# Patient Record
Sex: FEMALE | Race: WHITE | NOT HISPANIC OR LATINO | Employment: OTHER | URBAN - METROPOLITAN AREA
[De-identification: names, ages, dates, MRNs, and addresses within clinical notes are randomized per-mention and may not be internally consistent; named-entity substitution may affect disease eponyms.]

---

## 2017-10-19 ENCOUNTER — HISTORICAL (OUTPATIENT)
Dept: ADMINISTRATIVE | Facility: HOSPITAL | Age: 82
End: 2017-10-19

## 2017-11-16 ENCOUNTER — HISTORICAL (OUTPATIENT)
Dept: ADMINISTRATIVE | Facility: HOSPITAL | Age: 82
End: 2017-11-16

## 2018-09-27 ENCOUNTER — HISTORICAL (OUTPATIENT)
Dept: ADMINISTRATIVE | Facility: HOSPITAL | Age: 83
End: 2018-09-27

## 2022-04-11 ENCOUNTER — HISTORICAL (OUTPATIENT)
Dept: ADMINISTRATIVE | Facility: HOSPITAL | Age: 87
End: 2022-04-11

## 2022-04-13 ENCOUNTER — HISTORICAL (OUTPATIENT)
Dept: ADMINISTRATIVE | Facility: HOSPITAL | Age: 87
End: 2022-04-13

## 2022-04-26 ENCOUNTER — HISTORICAL (OUTPATIENT)
Dept: ADMINISTRATIVE | Facility: HOSPITAL | Age: 87
End: 2022-04-26

## 2022-04-28 VITALS
WEIGHT: 143 LBS | DIASTOLIC BLOOD PRESSURE: 60 MMHG | HEIGHT: 68 IN | SYSTOLIC BLOOD PRESSURE: 118 MMHG | BODY MASS INDEX: 21.67 KG/M2

## 2022-08-16 ENCOUNTER — HOSPITAL ENCOUNTER (INPATIENT)
Facility: HOSPITAL | Age: 87
LOS: 27 days | Discharge: NURSING, REHAB OR LONG-TERM CARE | DRG: 392 | End: 2022-09-13
Attending: STUDENT IN AN ORGANIZED HEALTH CARE EDUCATION/TRAINING PROGRAM | Admitting: INTERNAL MEDICINE
Payer: MEDICARE

## 2022-08-16 DIAGNOSIS — R10.9 ABDOMINAL PAIN, UNSPECIFIED ABDOMINAL LOCATION: ICD-10-CM

## 2022-08-16 DIAGNOSIS — N39.0 URINARY TRACT INFECTION WITH HEMATURIA, SITE UNSPECIFIED: ICD-10-CM

## 2022-08-16 DIAGNOSIS — R60.9 SWELLING: ICD-10-CM

## 2022-08-16 DIAGNOSIS — K59.00 CONSTIPATION, UNSPECIFIED CONSTIPATION TYPE: ICD-10-CM

## 2022-08-16 DIAGNOSIS — E87.20 LACTIC ACIDOSIS: ICD-10-CM

## 2022-08-16 DIAGNOSIS — M79.606 LEG PAIN: ICD-10-CM

## 2022-08-16 DIAGNOSIS — R31.9 URINARY TRACT INFECTION WITH HEMATURIA, SITE UNSPECIFIED: ICD-10-CM

## 2022-08-16 DIAGNOSIS — R10.9 ABDOMINAL CRAMPING: Primary | ICD-10-CM

## 2022-08-16 DIAGNOSIS — K29.70 GASTRITIS: ICD-10-CM

## 2022-08-16 DIAGNOSIS — D72.829 LEUKOCYTOSIS, UNSPECIFIED TYPE: ICD-10-CM

## 2022-08-16 DIAGNOSIS — K86.9 PANCREATIC LESION: ICD-10-CM

## 2022-08-16 DIAGNOSIS — R55 SYNCOPE: ICD-10-CM

## 2022-08-16 DIAGNOSIS — E87.6 HYPOKALEMIA: ICD-10-CM

## 2022-08-16 LAB
ALBUMIN SERPL-MCNC: 3.8 GM/DL (ref 3.4–4.8)
ALBUMIN/GLOB SERPL: 1.4 RATIO (ref 1.1–2)
ALP SERPL-CCNC: 79 UNIT/L (ref 40–150)
ALT SERPL-CCNC: 37 UNIT/L (ref 0–55)
APPEARANCE UR: CLEAR
AST SERPL-CCNC: 50 UNIT/L (ref 5–34)
BACTERIA #/AREA URNS AUTO: NORMAL /HPF
BILIRUB UR QL STRIP.AUTO: ABNORMAL MG/DL
BILIRUBIN DIRECT+TOT PNL SERPL-MCNC: 1.2 MG/DL
BUN SERPL-MCNC: 17.8 MG/DL (ref 9.8–20.1)
CALCIUM SERPL-MCNC: 9.7 MG/DL (ref 8.4–10.2)
CHLORIDE SERPL-SCNC: 97 MMOL/L (ref 98–111)
CO2 SERPL-SCNC: 26 MMOL/L (ref 23–31)
COLOR UR AUTO: ABNORMAL
CREAT SERPL-MCNC: 0.92 MG/DL (ref 0.55–1.02)
ERYTHROCYTE [DISTWIDTH] IN BLOOD BY AUTOMATED COUNT: 13 % (ref 11.5–17)
GFR SERPLBLD CREATININE-BSD FMLA CKD-EPI: 59 MLS/MIN/1.73/M2
GLOBULIN SER-MCNC: 2.8 GM/DL (ref 2.4–3.5)
GLUCOSE SERPL-MCNC: 161 MG/DL (ref 75–121)
GLUCOSE UR QL STRIP.AUTO: NEGATIVE MG/DL
HCT VFR BLD AUTO: 42 % (ref 37–47)
HGB BLD-MCNC: 14.3 GM/DL (ref 12–16)
IMM GRANULOCYTES # BLD AUTO: 0.31 X10(3)/MCL (ref 0–0.04)
IMM GRANULOCYTES NFR BLD AUTO: 1.1 %
KETONES UR QL STRIP.AUTO: ABNORMAL MG/DL
LEUKOCYTE ESTERASE UR QL STRIP.AUTO: ABNORMAL UNIT/L
LIPASE SERPL-CCNC: 12 U/L
MCH RBC QN AUTO: 31.8 PG (ref 27–31)
MCHC RBC AUTO-ENTMCNC: 34 MG/DL (ref 33–36)
MCV RBC AUTO: 93.5 FL (ref 80–94)
NITRITE UR QL STRIP.AUTO: POSITIVE
NRBC BLD AUTO-RTO: 0 %
PH UR STRIP.AUTO: 6 [PH]
PLATELET # BLD AUTO: 250 X10(3)/MCL (ref 130–400)
PMV BLD AUTO: 9.9 FL (ref 7.4–10.4)
POTASSIUM SERPL-SCNC: 2.6 MMOL/L (ref 3.5–5.1)
PROT SERPL-MCNC: 6.6 GM/DL (ref 5.8–7.6)
PROT UR QL STRIP.AUTO: ABNORMAL MG/DL
RBC # BLD AUTO: 4.49 X10(6)/MCL (ref 4.2–5.4)
RBC #/AREA URNS AUTO: <5 /HPF
RBC UR QL AUTO: NEGATIVE UNIT/L
SODIUM SERPL-SCNC: 137 MMOL/L (ref 132–146)
SP GR UR STRIP.AUTO: 1.02 (ref 1–1.03)
SQUAMOUS #/AREA URNS AUTO: <5 /HPF
UROBILINOGEN UR STRIP-ACNC: 1 MG/DL
WBC # SPEC AUTO: 28.1 X10(3)/MCL (ref 4.5–11.5)
WBC #/AREA URNS AUTO: <5 /HPF

## 2022-08-16 PROCEDURE — 96365 THER/PROPH/DIAG IV INF INIT: CPT

## 2022-08-16 PROCEDURE — 85025 COMPLETE CBC W/AUTO DIFF WBC: CPT | Performed by: STUDENT IN AN ORGANIZED HEALTH CARE EDUCATION/TRAINING PROGRAM

## 2022-08-16 PROCEDURE — 96366 THER/PROPH/DIAG IV INF ADDON: CPT

## 2022-08-16 PROCEDURE — 63600175 PHARM REV CODE 636 W HCPCS: Performed by: STUDENT IN AN ORGANIZED HEALTH CARE EDUCATION/TRAINING PROGRAM

## 2022-08-16 PROCEDURE — 81001 URINALYSIS AUTO W/SCOPE: CPT | Performed by: STUDENT IN AN ORGANIZED HEALTH CARE EDUCATION/TRAINING PROGRAM

## 2022-08-16 PROCEDURE — 80053 COMPREHEN METABOLIC PANEL: CPT | Performed by: STUDENT IN AN ORGANIZED HEALTH CARE EDUCATION/TRAINING PROGRAM

## 2022-08-16 PROCEDURE — 83690 ASSAY OF LIPASE: CPT | Performed by: STUDENT IN AN ORGANIZED HEALTH CARE EDUCATION/TRAINING PROGRAM

## 2022-08-16 PROCEDURE — 96368 THER/DIAG CONCURRENT INF: CPT

## 2022-08-16 PROCEDURE — 99285 EMERGENCY DEPT VISIT HI MDM: CPT | Mod: 25

## 2022-08-16 PROCEDURE — 36415 COLL VENOUS BLD VENIPUNCTURE: CPT | Performed by: STUDENT IN AN ORGANIZED HEALTH CARE EDUCATION/TRAINING PROGRAM

## 2022-08-16 RX ORDER — MAGNESIUM SULFATE HEPTAHYDRATE 40 MG/ML
2 INJECTION, SOLUTION INTRAVENOUS
Status: COMPLETED | OUTPATIENT
Start: 2022-08-16 | End: 2022-08-17

## 2022-08-16 RX ORDER — POTASSIUM CHLORIDE 14.9 MG/ML
40 INJECTION INTRAVENOUS ONCE
Status: COMPLETED | OUTPATIENT
Start: 2022-08-16 | End: 2022-08-17

## 2022-08-16 RX ADMIN — MAGNESIUM SULFATE HEPTAHYDRATE 2 G: 40 INJECTION, SOLUTION INTRAVENOUS at 10:08

## 2022-08-16 RX ADMIN — IOPAMIDOL 100 ML: 755 INJECTION, SOLUTION INTRAVENOUS at 11:08

## 2022-08-16 RX ADMIN — POTASSIUM CHLORIDE 40 MEQ: 14.9 INJECTION, SOLUTION INTRAVENOUS at 10:08

## 2022-08-17 PROBLEM — R10.9 ABDOMINAL CRAMPING: Status: ACTIVE | Noted: 2022-08-17

## 2022-08-17 LAB
ABS NEUT (OLG): 26.04 X10(3)/MCL (ref 2.1–9.2)
ABS NEUT (OLG): 26.13 X10(3)/MCL (ref 2.1–9.2)
ANION GAP SERPL CALC-SCNC: 12 MEQ/L
AV INDEX (PROSTH): 0.89
AV MEAN GRADIENT: 3 MMHG
AV PEAK GRADIENT: 5 MMHG
AV VALVE AREA: 2.78 CM2
AV VELOCITY RATIO: 0.88
BSA FOR ECHO PROCEDURE: 1.71 M2
BUN SERPL-MCNC: 22.4 MG/DL (ref 9.8–20.1)
BURR CELLS (OLG): ABNORMAL
BURR CELLS (OLG): ABNORMAL
CALCIUM SERPL-MCNC: 8.5 MG/DL (ref 8.4–10.2)
CHLORIDE SERPL-SCNC: 102 MMOL/L (ref 98–111)
CHOLEST SERPL-MCNC: 78 MG/DL
CHOLEST/HDLC SERPL: 2 {RATIO} (ref 0–5)
CO2 SERPL-SCNC: 21 MMOL/L (ref 23–31)
CREAT SERPL-MCNC: 0.8 MG/DL (ref 0.55–1.02)
CREAT/UREA NIT SERPL: 28
CV ECHO LV RWT: 0.68 CM
DOP CALC AO PEAK VEL: 1.13 M/S
DOP CALC AO VTI: 20.9 CM
DOP CALC LVOT AREA: 3.1 CM2
DOP CALC LVOT DIAMETER: 2 CM
DOP CALC LVOT PEAK VEL: 1 M/S
DOP CALC LVOT STROKE VOLUME: 58.09 CM3
DOP CALC MV VTI: 28.1 CM
DOP CALCLVOT PEAK VEL VTI: 18.5 CM
E/A RATIO: 0.76
E/E' RATIO: 11.64 M/S
ECHO LV POSTERIOR WALL: 1.2 CM (ref 0.6–1.1)
EJECTION FRACTION: 66 %
ELLIPTOCYTOSIS (OHS): ABNORMAL
ERYTHROCYTE [DISTWIDTH] IN BLOOD BY AUTOMATED COUNT: 13.2 % (ref 11.5–17)
EST. AVERAGE GLUCOSE BLD GHB EST-MCNC: 102.5 MG/DL
FRACTIONAL SHORTENING: 21 % (ref 28–44)
GFR SERPLBLD CREATININE-BSD FMLA CKD-EPI: >60 MLS/MIN/1.73/M2
GLUCOSE SERPL-MCNC: 152 MG/DL (ref 75–121)
HBA1C MFR BLD: 5.2 %
HCT VFR BLD AUTO: 41.9 % (ref 37–47)
HDLC SERPL-MCNC: 35 MG/DL (ref 35–60)
HGB BLD-MCNC: 14 GM/DL (ref 12–16)
IMM GRANULOCYTES # BLD AUTO: 0.57 X10(3)/MCL (ref 0–0.04)
IMM GRANULOCYTES NFR BLD AUTO: 2 %
INSTRUMENT WBC (OLG): 28 X10(3)/MCL
INSTRUMENT WBC (OLG): 28.4 X10(3)/MCL
INTERVENTRICULAR SEPTUM: 0.97 CM (ref 0.6–1.1)
LACTATE SERPL-SCNC: 2.5 MMOL/L (ref 0.5–2.2)
LACTATE SERPL-SCNC: 2.5 MMOL/L (ref 0.5–2.2)
LACTATE SERPL-SCNC: 3 MMOL/L (ref 0.5–2.2)
LACTATE SERPL-SCNC: 3.2 MMOL/L (ref 0.5–2.2)
LACTATE SERPL-SCNC: 3.4 MMOL/L (ref 0.5–2.2)
LACTATE SERPL-SCNC: 3.9 MMOL/L (ref 0.5–2.2)
LACTATE SERPL-SCNC: 5.3 MMOL/L (ref 0.5–2.2)
LDLC SERPL CALC-MCNC: 29 MG/DL (ref 50–140)
LEFT INTERNAL DIMENSION IN SYSTOLE: 2.79 CM (ref 2.1–4)
LEFT VENTRICLE DIASTOLIC VOLUME INDEX: 30 ML/M2
LEFT VENTRICLE DIASTOLIC VOLUME: 51.9 ML
LEFT VENTRICLE MASS INDEX: 68 G/M2
LEFT VENTRICLE SYSTOLIC VOLUME INDEX: 16.9 ML/M2
LEFT VENTRICLE SYSTOLIC VOLUME: 29.3 ML
LEFT VENTRICULAR INTERNAL DIMENSION IN DIASTOLE: 3.53 CM (ref 3.5–6)
LEFT VENTRICULAR MASS: 118.08 G
LV LATERAL E/E' RATIO: 10.67 M/S
LV SEPTAL E/E' RATIO: 12.8 M/S
LVOT MG: 3 MMHG
LVOT MV: 0.77 CM/S
LYMPHOCYTES NFR BLD MANUAL: 0.85 X10(3)/MCL
LYMPHOCYTES NFR BLD MANUAL: 1.12 X10(3)/MCL
LYMPHOCYTES NFR BLD MANUAL: 3 %
LYMPHOCYTES NFR BLD MANUAL: 4 %
MAGNESIUM SERPL-MCNC: 3.6 MG/DL (ref 1.6–2.6)
MCH RBC QN AUTO: 31.5 PG (ref 27–31)
MCHC RBC AUTO-ENTMCNC: 33.4 MG/DL (ref 33–36)
MCV RBC AUTO: 94.2 FL (ref 80–94)
MONOCYTES NFR BLD MANUAL: 1.12 X10(3)/MCL (ref 0.1–1.3)
MONOCYTES NFR BLD MANUAL: 1.42 X10(3)/MCL (ref 0.1–1.3)
MONOCYTES NFR BLD MANUAL: 4 %
MONOCYTES NFR BLD MANUAL: 5 %
MV MEAN GRADIENT: 2 MMHG
MV PEAK A VEL: 0.84 M/S
MV PEAK E VEL: 0.64 M/S
MV PEAK GRADIENT: 5 MMHG
MV VALVE AREA BY CONTINUITY EQUATION: 2.07 CM2
NEUTROPHILS NFR BLD MANUAL: 92 %
NEUTROPHILS NFR BLD MANUAL: 93 %
NRBC BLD AUTO-RTO: 0 %
PHOSPHATE SERPL-MCNC: 3.6 MG/DL (ref 2.3–4.7)
PISA TR MAX VEL: 2.33 M/S
PLATELET # BLD AUTO: 269 X10(3)/MCL (ref 130–400)
PLATELET # BLD EST: ADEQUATE 10*3/UL
PLATELET # BLD EST: NORMAL 10*3/UL
PMV BLD AUTO: 10.3 FL (ref 7.4–10.4)
POIKILOCYTOSIS BLD QL SMEAR: ABNORMAL
POIKILOCYTOSIS BLD QL SMEAR: ABNORMAL
POTASSIUM SERPL-SCNC: 3.1 MMOL/L (ref 3.5–5.1)
PV PEAK VELOCITY: 1.4 CM/S
RBC # BLD AUTO: 4.45 X10(6)/MCL (ref 4.2–5.4)
RBC MORPH BLD: ABNORMAL
RBC MORPH BLD: ABNORMAL
RIGHT VENTRICULAR END-DIASTOLIC DIMENSION: 2.21 CM
SODIUM SERPL-SCNC: 135 MMOL/L (ref 132–146)
TDI LATERAL: 0.06 M/S
TDI SEPTAL: 0.05 M/S
TDI: 0.06 M/S
TEAR DROP CELL (OLG): ABNORMAL
TR MAX PG: 22 MMHG
TRICUSPID ANNULAR PLANE SYSTOLIC EXCURSION: 1.67 CM
TRIGL SERPL-MCNC: 71 MG/DL (ref 37–140)
VLDLC SERPL CALC-MCNC: 14 MG/DL
WBC # SPEC AUTO: 28.4 X10(3)/MCL (ref 4.5–11.5)

## 2022-08-17 PROCEDURE — 96366 THER/PROPH/DIAG IV INF ADDON: CPT

## 2022-08-17 PROCEDURE — 36415 COLL VENOUS BLD VENIPUNCTURE: CPT | Performed by: STUDENT IN AN ORGANIZED HEALTH CARE EDUCATION/TRAINING PROGRAM

## 2022-08-17 PROCEDURE — 83036 HEMOGLOBIN GLYCOSYLATED A1C: CPT | Performed by: PHYSICIAN ASSISTANT

## 2022-08-17 PROCEDURE — 85025 COMPLETE CBC W/AUTO DIFF WBC: CPT | Performed by: INTERNAL MEDICINE

## 2022-08-17 PROCEDURE — 80048 BASIC METABOLIC PNL TOTAL CA: CPT | Performed by: INTERNAL MEDICINE

## 2022-08-17 PROCEDURE — 25500020 PHARM REV CODE 255: Performed by: STUDENT IN AN ORGANIZED HEALTH CARE EDUCATION/TRAINING PROGRAM

## 2022-08-17 PROCEDURE — 83605 ASSAY OF LACTIC ACID: CPT | Performed by: STUDENT IN AN ORGANIZED HEALTH CARE EDUCATION/TRAINING PROGRAM

## 2022-08-17 PROCEDURE — 63600175 PHARM REV CODE 636 W HCPCS: Performed by: INTERNAL MEDICINE

## 2022-08-17 PROCEDURE — 25000003 PHARM REV CODE 250: Performed by: PHYSICIAN ASSISTANT

## 2022-08-17 PROCEDURE — 63600175 PHARM REV CODE 636 W HCPCS: Performed by: STUDENT IN AN ORGANIZED HEALTH CARE EDUCATION/TRAINING PROGRAM

## 2022-08-17 PROCEDURE — 21400001 HC TELEMETRY ROOM

## 2022-08-17 PROCEDURE — 83735 ASSAY OF MAGNESIUM: CPT | Performed by: INTERNAL MEDICINE

## 2022-08-17 PROCEDURE — 83605 ASSAY OF LACTIC ACID: CPT | Performed by: INTERNAL MEDICINE

## 2022-08-17 PROCEDURE — 87040 BLOOD CULTURE FOR BACTERIA: CPT | Performed by: STUDENT IN AN ORGANIZED HEALTH CARE EDUCATION/TRAINING PROGRAM

## 2022-08-17 PROCEDURE — 63600175 PHARM REV CODE 636 W HCPCS: Performed by: PHYSICIAN ASSISTANT

## 2022-08-17 PROCEDURE — 11000001 HC ACUTE MED/SURG PRIVATE ROOM

## 2022-08-17 PROCEDURE — 25000003 PHARM REV CODE 250: Performed by: STUDENT IN AN ORGANIZED HEALTH CARE EDUCATION/TRAINING PROGRAM

## 2022-08-17 PROCEDURE — 84100 ASSAY OF PHOSPHORUS: CPT | Performed by: INTERNAL MEDICINE

## 2022-08-17 PROCEDURE — 80061 LIPID PANEL: CPT | Performed by: PHYSICIAN ASSISTANT

## 2022-08-17 RX ORDER — ACETAMINOPHEN 325 MG/1
650 TABLET ORAL EVERY 8 HOURS PRN
Status: DISCONTINUED | OUTPATIENT
Start: 2022-08-17 | End: 2022-09-13 | Stop reason: HOSPADM

## 2022-08-17 RX ORDER — MORPHINE SULFATE 4 MG/ML
4 INJECTION, SOLUTION INTRAMUSCULAR; INTRAVENOUS EVERY 6 HOURS PRN
Status: DISCONTINUED | OUTPATIENT
Start: 2022-08-17 | End: 2022-08-26

## 2022-08-17 RX ORDER — TALC
6 POWDER (GRAM) TOPICAL NIGHTLY PRN
Status: DISCONTINUED | OUTPATIENT
Start: 2022-08-17 | End: 2022-09-13 | Stop reason: HOSPADM

## 2022-08-17 RX ORDER — SODIUM CHLORIDE 9 MG/ML
INJECTION, SOLUTION INTRAVENOUS CONTINUOUS
Status: DISCONTINUED | OUTPATIENT
Start: 2022-08-17 | End: 2022-08-17

## 2022-08-17 RX ORDER — HYDRALAZINE HYDROCHLORIDE 20 MG/ML
10 INJECTION INTRAMUSCULAR; INTRAVENOUS
Status: DISCONTINUED | OUTPATIENT
Start: 2022-08-17 | End: 2022-08-21

## 2022-08-17 RX ORDER — DEXTROSE MONOHYDRATE, SODIUM CHLORIDE, AND POTASSIUM CHLORIDE 50; 1.49; 9 G/1000ML; G/1000ML; G/1000ML
INJECTION, SOLUTION INTRAVENOUS CONTINUOUS
Status: DISCONTINUED | OUTPATIENT
Start: 2022-08-17 | End: 2022-08-17

## 2022-08-17 RX ORDER — SODIUM CHLORIDE, SODIUM LACTATE, POTASSIUM CHLORIDE, CALCIUM CHLORIDE 600; 310; 30; 20 MG/100ML; MG/100ML; MG/100ML; MG/100ML
INJECTION, SOLUTION INTRAVENOUS CONTINUOUS
Status: DISCONTINUED | OUTPATIENT
Start: 2022-08-17 | End: 2022-08-17

## 2022-08-17 RX ORDER — SODIUM CHLORIDE 0.9 % (FLUSH) 0.9 %
10 SYRINGE (ML) INJECTION
Status: DISCONTINUED | OUTPATIENT
Start: 2022-08-17 | End: 2022-09-13 | Stop reason: HOSPADM

## 2022-08-17 RX ORDER — SODIUM CHLORIDE AND POTASSIUM CHLORIDE 300; 900 MG/100ML; MG/100ML
INJECTION, SOLUTION INTRAVENOUS CONTINUOUS
Status: DISCONTINUED | OUTPATIENT
Start: 2022-08-17 | End: 2022-08-17

## 2022-08-17 RX ORDER — METRONIDAZOLE 500 MG/100ML
500 INJECTION, SOLUTION INTRAVENOUS
Status: DISCONTINUED | OUTPATIENT
Start: 2022-08-17 | End: 2022-08-20

## 2022-08-17 RX ORDER — GLUCAGON 1 MG
1 KIT INJECTION
Status: DISCONTINUED | OUTPATIENT
Start: 2022-08-17 | End: 2022-09-13 | Stop reason: HOSPADM

## 2022-08-17 RX ORDER — INSULIN ASPART 100 [IU]/ML
0-5 INJECTION, SOLUTION INTRAVENOUS; SUBCUTANEOUS
Status: DISCONTINUED | OUTPATIENT
Start: 2022-08-17 | End: 2022-09-13 | Stop reason: HOSPADM

## 2022-08-17 RX ORDER — ONDANSETRON 2 MG/ML
4 INJECTION INTRAMUSCULAR; INTRAVENOUS EVERY 8 HOURS PRN
Status: DISCONTINUED | OUTPATIENT
Start: 2022-08-17 | End: 2022-08-21

## 2022-08-17 RX ORDER — POTASSIUM CHLORIDE 20 MEQ/1
40 TABLET, EXTENDED RELEASE ORAL ONCE
Status: COMPLETED | OUTPATIENT
Start: 2022-08-17 | End: 2022-08-17

## 2022-08-17 RX ORDER — CIPROFLOXACIN 2 MG/ML
400 INJECTION, SOLUTION INTRAVENOUS
Status: DISCONTINUED | OUTPATIENT
Start: 2022-08-17 | End: 2022-08-20

## 2022-08-17 RX ORDER — ENOXAPARIN SODIUM 100 MG/ML
30 INJECTION SUBCUTANEOUS EVERY 24 HOURS
Status: DISCONTINUED | OUTPATIENT
Start: 2022-08-17 | End: 2022-08-18

## 2022-08-17 RX ORDER — IBUPROFEN 200 MG
16 TABLET ORAL
Status: DISCONTINUED | OUTPATIENT
Start: 2022-08-17 | End: 2022-09-13 | Stop reason: HOSPADM

## 2022-08-17 RX ORDER — DOCUSATE SODIUM 100 MG/1
100 CAPSULE, LIQUID FILLED ORAL DAILY
Status: DISCONTINUED | OUTPATIENT
Start: 2022-08-17 | End: 2022-08-17

## 2022-08-17 RX ORDER — HYDROCODONE BITARTRATE AND ACETAMINOPHEN 5; 325 MG/1; MG/1
1 TABLET ORAL EVERY 6 HOURS PRN
Status: DISCONTINUED | OUTPATIENT
Start: 2022-08-17 | End: 2022-09-13 | Stop reason: HOSPADM

## 2022-08-17 RX ORDER — POTASSIUM CHLORIDE 14.9 MG/ML
20 INJECTION INTRAVENOUS
Status: DISPENSED | OUTPATIENT
Start: 2022-08-17 | End: 2022-08-18

## 2022-08-17 RX ORDER — IBUPROFEN 200 MG
24 TABLET ORAL
Status: DISCONTINUED | OUTPATIENT
Start: 2022-08-17 | End: 2022-09-13 | Stop reason: HOSPADM

## 2022-08-17 RX ORDER — POLYETHYLENE GLYCOL 3350 17 G/17G
17 POWDER, FOR SOLUTION ORAL 2 TIMES DAILY PRN
Status: DISCONTINUED | OUTPATIENT
Start: 2022-08-17 | End: 2022-08-17

## 2022-08-17 RX ADMIN — ONDANSETRON 4 MG: 2 INJECTION INTRAMUSCULAR; INTRAVENOUS at 07:08

## 2022-08-17 RX ADMIN — SODIUM CHLORIDE, POTASSIUM CHLORIDE, SODIUM LACTATE AND CALCIUM CHLORIDE 1000 ML: 600; 310; 30; 20 INJECTION, SOLUTION INTRAVENOUS at 07:08

## 2022-08-17 RX ADMIN — POTASSIUM CHLORIDE 40 MEQ: 1500 TABLET, EXTENDED RELEASE ORAL at 12:08

## 2022-08-17 RX ADMIN — POTASSIUM CHLORIDE: 149 INJECTION, SOLUTION, CONCENTRATE INTRAVENOUS at 09:08

## 2022-08-17 RX ADMIN — SODIUM CHLORIDE, POTASSIUM CHLORIDE, SODIUM LACTATE AND CALCIUM CHLORIDE 1000 ML: 600; 310; 30; 20 INJECTION, SOLUTION INTRAVENOUS at 01:08

## 2022-08-17 RX ADMIN — SODIUM CHLORIDE, POTASSIUM CHLORIDE, SODIUM LACTATE AND CALCIUM CHLORIDE: 600; 310; 30; 20 INJECTION, SOLUTION INTRAVENOUS at 08:08

## 2022-08-17 RX ADMIN — SODIUM CHLORIDE, POTASSIUM CHLORIDE, SODIUM LACTATE AND CALCIUM CHLORIDE 1000 ML: 600; 310; 30; 20 INJECTION, SOLUTION INTRAVENOUS at 05:08

## 2022-08-17 RX ADMIN — CEFTRIAXONE SODIUM 1 G: 1 INJECTION, POWDER, FOR SOLUTION INTRAMUSCULAR; INTRAVENOUS at 02:08

## 2022-08-17 RX ADMIN — HYDRALAZINE HYDROCHLORIDE 10 MG: 20 INJECTION, SOLUTION INTRAMUSCULAR; INTRAVENOUS at 01:08

## 2022-08-17 RX ADMIN — SODIUM CHLORIDE 1000 ML: 9 INJECTION, SOLUTION INTRAVENOUS at 07:08

## 2022-08-17 RX ADMIN — ENOXAPARIN SODIUM 30 MG: 100 INJECTION SUBCUTANEOUS at 06:08

## 2022-08-17 NOTE — H&P
[facility] Hospital Medicine History & Physical Examination       Patient Name: Fidelina Darling  MRN: [id number]  Patient Class: IP- Inpatient   Admission Date: 8/16/2022   Admitting Physician: Alex Whitfield MD   Length of Stay: 0  Attending Physician: LORNA Duran MD   Primary Care Provider: Primary Doctor No  Face-to-Face encounter date: 08/17/2022  Code Status: Full Code  Chief Complaint: Abdominal Cramping (Worsening abd pain/cramping x 5 days. Has not had BM in 5 days. On ems arrival, pt hypotensive. Intermittent N/V. Also c/o pain near sacrum after fall 2 days ago)        Patient information was obtained from patient, patient's family, past medical records and ER records.     HISTORY OF PRESENT ILLNESS:   Fidelina Darling is a 89 y.o. White female with a past medical history of hypertension, hyperlipidemia. The patient presented to [facility] on 8/16/2022 with a primary complaint of abdominal pain and falls due to syncope.  Patient reports having a fall on 8/12 while in her garage.  She states she was grabbing for the car door handle when she missed it and fell. Fall was unwitnessed and she reports loss of consciousness. On 08/15 she had a second fall while in the kitchen but denies any loss of consciousness.  Yesterday (08/16/2022) patient was going to the bathroom when she passed out.  Episode was witnessed by neighbor who is visiting her. She reports urinary frequency. Patient denies complaints of headache, vision changes, shortness of breath, cough.  She has been experiencing lower abdominal pain for the last several days. Granddaughter at bedside states that patient is dependent on laxatives have a bowel movement.  Although she uses laxatives she often has to manually disimpact herself . She normally goes every 4-5 days with last bowel movement being 5 days ago. She has been having increased belching.  Granddaughter also states patient has little to no appetite.  Patient lives alone,  ambulates with walker, drives and completes activity daily living independently.  Family as it patient to be experiencing dementia over the last 6 months.    Upon presentation to the ED, patient afebrile, hemodynamically stable and SpO2 96% on room air.  Labs notable for WBC 28, potassium 2.6, glucose 161, lactic acid 3.9.  UA with 1+ leukocyte esterase, 2+ bilirubin, positive nitrites, trace ketones and protein.  Chest x-ray without acute processes.  CT abdomen pelvis revealed mild distention of the colon with liquefied stool, indeterminate bilateral adrenal nodules and pancreatic cysts.  While in the ED patient received 40 mEq of IV potassium chloride, 2 g of magnesium sulfate and Rocephin was started for UTI.  Patient admitted to hospital medicine services for further medical management.      PAST MEDICAL HISTORY:   Hypertension   Hyperlipidemia  Gastritis    PAST SURGICAL HISTORY:   Knee replacement   Hip replacement  Cholecystectomy  Tonsillectomy   Lipoma excision     ALLERGIES:   No known allergies    FAMILY HISTORY:   Mother: Cervical cancer   Father:  Stroke, myocardial infarction    SOCIAL HISTORY:   Denies tobacco, alcohol and illicit drug use    HOME MEDICATIONS:   As documented    REVIEW OF SYSTEMS:   Except as documented, all other systems reviewed and negative     PHYSICAL EXAM:     VITAL SIGNS: 24 HRS MIN & MAX LAST   Temp  Min: 97.5 °F (36.4 °C)  Max: 97.5 °F (36.4 °C) 97.5 °F (36.4 °C)   BP  Min: 113/75  Max: 178/84 (!) 152/89   Pulse  Min: 70  Max: 80  76   Resp  Min: 14  Max: 24 19   SpO2  Min: 93 %  Max: 97 % 96 %       General appearance: Well-developed, well-nourished female in no apparent distress.  Granddaughter at bedside.  HEENT: Atraumatic head. Dry mucous membranes of oral cavity.  Lungs: Clear to auscultation bilaterally.   Heart: Regular rate and rhythm.   Abdomen: Soft, mildly distended, generalized tenderness. Bowel sounds are hypoactive.  Frequently belching  Extremities: No  cyanosis, clubbing. No deformities.  Skin: No Rash. Warm and dry.  Neuro: Awake, alert and oriented self, place and city. Motor and sensory exams grossly intact.  Psych/mental status: Appropriate mood and affect. Cooperative. Responds appropriately to questions.       LABS AND IMAGING:     Recent Labs   Lab 08/16/22 2118   WBC 28.1*   RBC 4.49   HGB 14.3   HCT 42.0   MCV 93.5   MCH 31.8*   MCHC 34.0   RDW 13.0      MPV 9.9       Recent Labs   Lab 08/16/22 2118      K 2.6*   CO2 26   BUN 17.8   CREATININE 0.92   CALCIUM 9.7   ALBUMIN 3.8   ALKPHOS 79   ALT 37   AST 50*   BILITOT 1.2       Microbiology Results (last 7 days)     Procedure Component Value Units Date/Time    Blood Culture #1 **CANNOT BE ORDERED STAT** [[id number]] Collected: 08/17/22 0148    Order Status: Resulted Specimen: Blood Updated: 08/17/22 0201    Blood Culture #2 **CANNOT BE ORDERED STAT** [[id number]] Collected: 08/17/22 0148    Order Status: Resulted Specimen: Blood Updated: 08/17/22 0201           X-Ray Chest 1 View  Narrative: EXAMINATION:  XR CHEST 1 VIEW    CLINICAL HISTORY:  cough;, .    COMPARISON:  September 27, 2017    FINDINGS:  No alveolar consolidation, effusion, or pneumothorax is seen.   The thoracic aorta is normal  cardiac silhouette, central pulmonary vessels and mediastinum are normal in size and are grossly unremarkable.   visualized osseous structures are grossly unremarkable.    Some linear densities in the left retrocardiac region either represent atelectatic changes and or fibrotic streaks  Impression: No acute chest disease is identified.    Electronically signed by: Bharathi Gautiher  Date:    08/17/2022  Time:    09:06  CT Abdomen Pelvis With Contrast  Narrative: EXAMINATION:  CT ABDOMEN PELVIS WITH CONTRAST    CLINICAL HISTORY:  Abdominal pain, acute, nonlocalized;Fall few days ago, worsening abdominal pain;    TECHNIQUE:  Helically acquired images with axial, sagittal and coronal reformations were  obtained from the lung bases to the pubic symphysis after the IV administration of contrast.    Automated tube current modulation, weight-based exposure dosing, and/or iterative reconstruction technique utilized to reach lowest reasonably achievable exposure rate.    DLP: 319 mGy*cm    COMPARISON:  No relevant prior available for comparison at the time of dictation.    FINDINGS:  HEART: Normal in size. No pericardial effusion.    LUNG BASES: Basilar atelectasis versus scarring.    LIVER: Normal attenuation. No appreciable focal hepatic lesion.    BILIARY: Gallbladder is surgically absent.    PANCREAS: There are small pancreatic cysts.  Cysts measure 1.5 cm at the tail (3, 24 and 1.1 cm at the neck (3, 23.    SPLEEN: Normal in size    ADRENALS: There are bilateral adrenal nodules.  Right adrenal nodule measures 1.6 cm.  Left adrenal nodule measures 1.1 cm.  Contrast enhanced CT appearance is nonspecific.    KIDNEYS/URETERS: The kidneys enhance symmetrically.  No hydronephrosis.   Too small to characterize left renal cortical hypodensity.  Peripelvic left renal cyst at the lower pole.    GI TRACT/MESENTERY: There is fluid in the distal esophagus.  Small sliding hiatal hernia.  The small bowel is normal in caliber.  The colon is distended with stool which appears liquified to the level of the descending colon.  The appendix is not confidently visualized.    PERITONEUM: No free fluid.No free air.    LYMPH NODES: No enlarged lymph nodes by size criteria.    VASCULATURE: Aortic atherosclerosis.    BLADDER: Nondistended bladder limits CT evaluation    REPRODUCTIVE ORGANS: Normal as visualized.    SOFT TISSUES: Unremarkable.    BONES: Postop left hip arthroplasty.  Lumbar spondylosis.  Impression: 1. Mild distention of the colon with liquefied stool.  No appreciable obstructing lesion by CT.  2. Indeterminate bilateral adrenal nodules.  In a patient with no known history of malignancy this can be evaluated with 1 year  follow-up adrenal CT per ACR white paper.  Consider clinical endocrinology evaluation  3. Pancreatic cysts.  For incidental pancreatic cysts in patients greater than 80 years of age at presentation ACR white paper recommend reimaging in 2 years depending on clinical comorbidity.  The preliminary and final reports are concordant.    Electronically signed by: Lise Holloway  Date:    08/17/2022  Time:    07:54        ASSESSMENT & PLAN:   Assessment:  Recurrent syncope with falls  Acute bacterial cystitis  Bilateral adrenal nodules  Pancreatic cyst  Lactic acidosis  Hypokalemia  Abdominal pain  Essential hypertension    Plan:  - CT of head, echo, carotid ultrasound, lipid panel and hemoglobin A1c ordered for syncope  - Continue with Rocephin. Urine culture ordered  - Follow results of blood cultures  - IVF hydration  - Trend lactic acid  - Potassium replaced. Will repeat CMP this morning with potassium 3.1. Will order Potassium chloride 40 mEq. Continue to monitor electrolytes  - IV Hydralazine as needed for hypertension  - Consult placed to general surgery  - Resume appropriate home medications  - Labs in AM      VTE Prophylaxis: will be placed on Lovenox for DVT prophylaxis and will be advised to be as mobile as possible and sit in a chair as tolerated      __________________________________________________________________________  INPATIENT LIST OF MEDICATIONS     Current Facility-Administered Medications:     acetaminophen tablet 650 mg, 650 mg, Oral, Q8H PRN, Umer Bynum MD    lactated ringers infusion, , Intravenous, Continuous, Alex Whitfield MD    melatonin tablet 6 mg, 6 mg, Oral, Nightly PRN, Umer Bynum MD    ondansetron injection 4 mg, 4 mg, Intravenous, Q8H PRN, Umer Bynum MD    sodium chloride 0.9% flush 10 mL, 10 mL, Intravenous, PRN, Umer Bynum MD  No current outpatient medications on file.      Scheduled Meds:  Continuous Infusions:   lactated ringers       PRN  Meds:.acetaminophen, melatonin, ondansetron, sodium chloride 0.9%      Discharge Planning and Disposition: Anticipated discharge to be determined.    IGeorgina PA, have reviewed and discussed the case with Dr. LORNA Duran.    Please see the following addendum for further assessment and plan from there attending MD.    Georgina Nevarez PA-C  08/17/2022

## 2022-08-17 NOTE — ED PROVIDER NOTES
Encounter Date: 8/16/2022    SCRIBE #1 NOTE: I, Rebecca Joya, am scribing for, and in the presence of,  Umer Bynum. I have scribed the following portions of the note - the EKG reading. Other sections scribed: HPI, ROS, physical exam.       History     Chief Complaint   Patient presents with    Abdominal Cramping     Worsening abd pain/cramping x 5 days. Has not had BM in 5 days. On ems arrival, pt hypotensive. Intermittent N/V. Also c/o pain near sacrum after fall 2 days ago     89 y/o female presents to the ED with complaints of abdominal pain and sacral pain following a fall last week. Pt reports that she has fallen a couple times and complains of back pain, leg pain and abdominal pain. She notes that it has been about 4 days since she has had a bowel movement. She denies symptoms of chest pain and SOB. Pt notes that she lives alone and that she has been ambulatory since the falls.     The history is provided by the patient. No  was used.   Abdominal Pain  The current episode started several days ago. The onset of the illness was abrupt. The problem has been gradually worsening. The abdominal pain is generalized. The abdominal pain does not radiate. The abdominal pain is relieved by nothing. The other symptoms of the illness do not include fever, shortness of breath or dysuria.   Additional symptoms associated with the illness include back pain.     Review of patient's allergies indicates:  No Known Allergies  History reviewed. No pertinent past medical history.  History reviewed. No pertinent surgical history.  History reviewed. No pertinent family history.     Review of Systems   Constitutional: Negative for fever.   HENT: Negative for sore throat.    Eyes: Negative for visual disturbance.   Respiratory: Negative for shortness of breath.    Cardiovascular: Negative for chest pain.   Gastrointestinal: Positive for abdominal pain.   Genitourinary: Negative for dysuria.   Musculoskeletal:  Positive for back pain. Negative for joint swelling.        Leg pain   Skin: Negative for rash.   Neurological: Negative for weakness.   Psychiatric/Behavioral: Negative for confusion.       Physical Exam     Initial Vitals [08/16/22 1858]   BP Pulse Resp Temp SpO2   (!) 145/81 80 20 97.5 °F (36.4 °C) 96 %      MAP       --         Physical Exam    Nursing note and vitals reviewed.  Constitutional: She appears well-developed and well-nourished.   HENT:   Head: Normocephalic and atraumatic.   Eyes: EOM are normal. Pupils are equal, round, and reactive to light.   Neck:   Normal range of motion.  Cardiovascular: Normal rate, regular rhythm, normal heart sounds and intact distal pulses.   No murmur heard.  Pulmonary/Chest: Breath sounds normal. No respiratory distress. She has no wheezes. She has no rales.   Abdominal: Abdomen is soft. She exhibits distension. There is generalized abdominal tenderness. There is no rebound.   Genitourinary:    Genitourinary Comments: Rectal exam: No fecal impaction noted.  Female chaperone present.      Musculoskeletal:         General: No tenderness or edema. Normal range of motion.      Cervical back: Normal range of motion.     Neurological: She is alert and oriented to person, place, and time. She has normal strength. No cranial nerve deficit. GCS score is 15. GCS eye subscore is 4. GCS verbal subscore is 5. GCS motor subscore is 6.   demented   Skin: Skin is warm and dry. Capillary refill takes less than 2 seconds. No rash noted. No erythema.   Psychiatric: She has a normal mood and affect.         ED Course   Procedures  Labs Reviewed   COMPREHENSIVE METABOLIC PANEL - Abnormal; Notable for the following components:       Result Value    Potassium Level 2.6 (*)     Chloride 97 (*)     Glucose Level 161 (*)     Aspartate Aminotransferase 50 (*)     All other components within normal limits   URINALYSIS, REFLEX TO URINE CULTURE - Abnormal; Notable for the following components:     Color, UA Dark Yellow (*)     Protein, UA Trace (*)     Ketones, UA Trace (*)     Bilirubin, UA 2+ (*)     Nitrites, UA Positive (*)     Leukocyte Esterase, UA 1+ (*)     All other components within normal limits   CBC WITH DIFFERENTIAL - Abnormal; Notable for the following components:    WBC 28.1 (*)     MCH 31.8 (*)     IG# 0.31 (*)     All other components within normal limits   MANUAL DIFFERENTIAL - Abnormal; Notable for the following components:    Abs Neut 26.04 (*)     RBC Morph Abnormal (*)     Poik 2+ (*)     Shanon Cells 1+ (*)     Elliptocytosis 1+ (*)     All other components within normal limits   LACTIC ACID, PLASMA - Abnormal; Notable for the following components:    Lactic Acid Level 2.5 (*)     All other components within normal limits    All other components within normal limits   BASIC METABOLIC PANEL - Abnormal; Notable for the following components:    Potassium Level 3.1 (*)     Carbon Dioxide 21 (*)     Glucose Level 152 (*)     Blood Urea Nitrogen 22.4 (*)     All other components within normal limits   MAGNESIUM - Abnormal; Notable for the following components:    Magnesium Level 3.60 (*)     All other components within normal limits   CBC WITH DIFFERENTIAL - Abnormal; Notable for the following components:    WBC 28.4 (*)     MCV 94.2 (*)     MCH 31.5 (*)     IG# 0.57 (*)     All other components within normal limits   LACTIC ACID, PLASMA - Abnormal; Notable for the following components:    Lactic Acid Level 2.5 (*)     All other components within normal limits   LIPID PANEL - Abnormal; Notable for the following components:    LDL Cholesterol 29.00 (*)     All other components within normal limits   MANUAL DIFFERENTIAL - Abnormal; Notable for the following components:    Abs Mono 1.42 (*)     Abs Neut 26.128 (*)     RBC Morph Abnormal (*)     Poik 2+ (*)     Tear drop cell 1+ (*)     [place] Cells 1+ (*)     All other components within normal limits   LACTIC ACID, PLASMA - Abnormal; Notable for  the following components:    Lactic Acid Level 3.4 (*)     All other components within normal limits   LIPASE - Normal   URINALYSIS, MICROSCOPIC - Normal   PHOSPHORUS - Normal   CBC W/ AUTO DIFFERENTIAL    Narrative:     The following orders were created for panel order CBC auto differential.  Procedure                               Abnormality         Status                     ---------                               -----------         ------                     CBC with Differential[[id number]]        Abnormal            Final result               Manual Differential[[id number]]          Abnormal            Final result                 Please view results for these tests on the individual orders.   CBC W/ AUTO DIFFERENTIAL    Narrative:     The following orders were created for panel order CBC Auto Differential.  Procedure                               Abnormality         Status                     ---------                               -----------         ------                     CBC with Differential[[id number]]        Abnormal            Final result               Manual Differential[[id number]]          Abnormal            Final result                 Please view results for these tests on the individual orders.   HEMOGLOBIN A1C   POCT GLUCOSE MONITORING CONTINUOUS     EKG Readings: (Independently Interpreted)   Initial Reading: No STEMI. Rhythm: Normal Sinus Rhythm. Heart Rate: 83. Ectopy: No Ectopy. Conduction: LBBB. ST Segments: Normal ST Segments. T Waves: Normal. Clinical Impression: Normal Sinus Rhythm with LBBB   EKG performed at 19:05.       Imaging Results          CT Head Without Contrast (Final result)  Result time 08/17/22 11:30:51    Final result by Jorden Gomez MD (08/17/22 11:30:51)                 Impression:      1.  No acute intracranial findings identified.    2.  Chronic microangiopathic ischemia and atrophy.      Electronically signed by: Jorden  Gomez  Date:    08/17/2022  Time:    11:30             Narrative:    EXAMINATION:  CT HEAD WITHOUT CONTRAST    CLINICAL HISTORY:  Syncope, recurrent;    TECHNIQUE:  Sequential axial images were performed of the brain without contrast.    Dose product length of 1091 mGycm. Automated exposure control was utilized to minimize radiation dose.    COMPARISON:  None available.    FINDINGS:  There is no intracranial mass effect, midline shift, hydrocephalus or hemorrhage. There is no sulcal effacement or low attenuation changes to suggest recent large vessel territory infarction. Chronic appearing periventricular and subcortical white matter low attenuation changes are consistent with chronic microangiopathic ischemia. The ventricular system and sulcal markings prominence is consistent with atrophy. There is no acute extra axial fluid collection.  There is an expanded empty sella.  Visualized paranasal sinuses are clear without mucosal thickening, polypoidal abnormality or air-fluid levels. Mastoid air cells aeration is optimal.                               X-Ray Chest 1 View (Final result)  Result time 08/17/22 09:06:00    Final result by Bharathi Gauthier MD (08/17/22 09:06:00)                 Impression:      No acute chest disease is identified.      Electronically signed by: Bharathi Gauthier  Date:    08/17/2022  Time:    09:06             Narrative:    EXAMINATION:  XR CHEST 1 VIEW    CLINICAL HISTORY:  cough;, .    COMPARISON:  September 27, 2017    FINDINGS:  No alveolar consolidation, effusion, or pneumothorax is seen.   The thoracic aorta is normal  cardiac silhouette, central pulmonary vessels and mediastinum are normal in size and are grossly unremarkable.   visualized osseous structures are grossly unremarkable.    Some linear densities in the left retrocardiac region either represent atelectatic changes and or fibrotic streaks                               CT Abdomen Pelvis With Contrast (Final result)   Result time 08/17/22 07:54:48    Final result by Lise Holloway MD (08/17/22 07:54:48)                 Impression:      1. Mild distention of the colon with liquefied stool.  No appreciable obstructing lesion by CT.  2. Indeterminate bilateral adrenal nodules.  In a patient with no known history of malignancy this can be evaluated with 1 year follow-up adrenal CT per ACR white paper.  Consider clinical endocrinology evaluation  3. Pancreatic cysts.  For incidental pancreatic cysts in patients greater than 80 years of age at presentation ACR white paper recommend reimaging in 2 years depending on clinical comorbidity.  The preliminary and final reports are concordant.      Electronically signed by: Lise Holloway  Date:    08/17/2022  Time:    07:54             Narrative:    EXAMINATION:  CT ABDOMEN PELVIS WITH CONTRAST    CLINICAL HISTORY:  Abdominal pain, acute, nonlocalized;Fall few days ago, worsening abdominal pain;    TECHNIQUE:  Helically acquired images with axial, sagittal and coronal reformations were obtained from the lung bases to the pubic symphysis after the IV administration of contrast.    Automated tube current modulation, weight-based exposure dosing, and/or iterative reconstruction technique utilized to reach lowest reasonably achievable exposure rate.    DLP: 319 mGy*cm    COMPARISON:  No relevant prior available for comparison at the time of dictation.    FINDINGS:  HEART: Normal in size. No pericardial effusion.    LUNG BASES: Basilar atelectasis versus scarring.    LIVER: Normal attenuation. No appreciable focal hepatic lesion.    BILIARY: Gallbladder is surgically absent.    PANCREAS: There are small pancreatic cysts.  Cysts measure 1.5 cm at the tail (3, 24 and 1.1 cm at the neck (3, 23.    SPLEEN: Normal in size    ADRENALS: There are bilateral adrenal nodules.  Right adrenal nodule measures 1.6 cm.  Left adrenal nodule measures 1.1 cm.  Contrast enhanced CT appearance is  nonspecific.    KIDNEYS/URETERS: The kidneys enhance symmetrically.  No hydronephrosis.   Too small to characterize left renal cortical hypodensity.  Peripelvic left renal cyst at the lower pole.    GI TRACT/MESENTERY: There is fluid in the distal esophagus.  Small sliding hiatal hernia.  The small bowel is normal in caliber.  The colon is distended with stool which appears liquified to the level of the descending colon.  The appendix is not confidently visualized.    PERITONEUM: No free fluid.No free air.    LYMPH NODES: No enlarged lymph nodes by size criteria.    VASCULATURE: Aortic atherosclerosis.    BLADDER: Nondistended bladder limits CT evaluation    REPRODUCTIVE ORGANS: Normal as visualized.    SOFT TISSUES: Unremarkable.    BONES: Postop left hip arthroplasty.  Lumbar spondylosis.                    Preliminary result by Interface, Rad Results In (08/16/22 23:22:20)                 Narrative:    START OF REPORT:  Technique: CT of the abdomen and pelvis was performed with axial images as well as sagittal and coronal reconstruction images with intravenous contrast but without oral contrast.    Comparison: None available.    Clinical History: Abdominal pain, acute, nonlocalized, Fall few days ago, worsening abdominal pain.    Dosage Information: Automated Exposure Control was utilized.    Findings:  Lines and Tubes: None.  Thorax:  Lungs: Mild streaky opacity is present at the visualized lung bases, consistent with nonspecific dependent changes scarring and atelectasis.  Pleura: No pleural effusion is seen.  Heart: The heart size is within normal limits.  Abdomen:  Abdominal Wall: No abdominal wall pathology is seen.  Liver: The liver appears unremarkable.  Biliary System: No extrahepatic biliary duct dilatation is seen.  Gallbladder: The gallbladder appears unremarkable.  Pancreas: There is a 9 mm thin walled cystic lesion in the proximal body of the pancreas (series 3 image 23). Another similar appearing 17  x 8 mm lesion is seen in the distal body of the pancreas (series 3 image 24). Considerations include pseudocysts versus cystic neoplasms.  Spleen: The spleen appears unremarkable.  Adrenals: There are enhancing nodules in the right and left adrenal glands which measures 15 mm and 11 mm respectively. These are of indeterminate etiology.  Kidneys: There is a 9 mm exophytic cyst at the lower pole of the right kidney (series 3 image 33). Mild fullness of the lower pole calyces and extrarenal pelvis of the left kidney are seen. The kidneys otherwise appear unremarkable.  Aorta: There is mild calcification of the abdominal aorta and its branches.  IVC: Unremarkable.  Bowel:  Esophagus: The visualized esophagus appears unremarkable.  Stomach: The stomach appears unremarkable.  Duodenum: Unremarkable appearing duodenum.  Small Bowel: The small bowel appears unremarkable.  Colon: The colon is diffusely moderately fluid distended with prominent liquid stool in the descending colon. There is slow gradual transition to normal caliber in the sigmoid and rectum. No focal stricture or filling defect identified. Findings suggest a generally atonic colon. Correlate for symptoms of constipation and chronic laxative abuse.  Appendix: The appendix is not identified but no inflammatory changes are seen in the right lower quadrant to suggest appendicitis.  Peritoneum: No intraperitoneal free air or ascites is seen.    Pelvis:  Bladder: The bladder is nondistended and cannot be definitively evaluated.  Female:  Uterus: The uterus appears unremarkable.  Ovaries: No adnexal masses are seen.    Bony structures: The bones are osteopenic. Mild degenerative changes are seen in the spine. A left total hip prosthesis is in place.      Impression:  1. There is a 9 mm thin walled cystic lesion in the proximal body of the pancreas (series 3 image 23). Another similar appearing 17 x 8 mm lesion is seen in the distal body of the pancreas (series 3  image 24). Considerations include pseudocysts versus cystic neoplasms. Correlate clinically as regards further evaluation and follow-up.  2. There are enhancing nodules in the right and left adrenal glands which measures 15 mm and 11 mm respectively. These are of indeterminate etiology. Correlate clinically as regards further evaluation and follow-up.  3. Mild fullness of the lower pole calyces and extrarenal pelvis of the left kidney are seen.  4. The colon is diffusely moderately fluid distended with prominent liquid stool in the descending colon. There is slow gradual transition to normal caliber in the sigmoid and rectum. No focal stricture or filling defect identified. Findings suggest a generally atonic colon. Correlate for symptoms of constipation and chronic laxative abuse.  5. Details and other findings as discussed above.                      Preliminary result by Lise Holloway MD (08/16/22 23:22:20)                 Narrative:    START OF REPORT:  Technique: CT of the abdomen and pelvis was performed with axial images as well as sagittal and coronal reconstruction images with intravenous contrast but without oral contrast.    Comparison: None available.    Clinical History: Abdominal pain, acute, nonlocalized, Fall few days ago, worsening abdominal pain.    Dosage Information: Automated Exposure Control was utilized.    Findings:  Lines and Tubes: None.  Thorax:  Lungs: Mild streaky opacity is present at the visualized lung bases, consistent with nonspecific dependent changes scarring and atelectasis.  Pleura: No pleural effusion is seen.  Heart: The heart size is within normal limits.  Abdomen:  Abdominal Wall: No abdominal wall pathology is seen.  Liver: The liver appears unremarkable.  Biliary System: No extrahepatic biliary duct dilatation is seen.  Gallbladder: The gallbladder appears unremarkable.  Pancreas: There is a 9 mm thin walled cystic lesion in the proximal body of the pancreas (series  3 image 23). Another similar appearing 17 x 8 mm lesion is seen in the distal body of the pancreas (series 3 image 24). Considerations include pseudocysts versus cystic neoplasms.  Spleen: The spleen appears unremarkable.  Adrenals: There are enhancing nodules in the right and left adrenal glands which measures 15 mm and 11 mm respectively. These are of indeterminate etiology.  Kidneys: There is a 9 mm exophytic cyst at the lower pole of the right kidney (series 3 image 33). Mild fullness of the lower pole calyces and extrarenal pelvis of the left kidney are seen. The kidneys otherwise appear unremarkable.  Aorta: There is mild calcification of the abdominal aorta and its branches.  IVC: Unremarkable.  Bowel:  Esophagus: The visualized esophagus appears unremarkable.  Stomach: The stomach appears unremarkable.  Duodenum: Unremarkable appearing duodenum.  Small Bowel: The small bowel appears unremarkable.  Colon: The colon is diffusely moderately fluid distended with prominent liquid stool in the descending colon. There is slow gradual transition to normal caliber in the sigmoid and rectum. No focal stricture or filling defect identified. Findings suggest a generally atonic colon. Correlate for symptoms of constipation and chronic laxative abuse.  Appendix: The appendix is not identified but no inflammatory changes are seen in the right lower quadrant to suggest appendicitis.  Peritoneum: No intraperitoneal free air or ascites is seen.    Pelvis:  Bladder: The bladder is nondistended and cannot be definitively evaluated.  Female:  Uterus: The uterus appears unremarkable.  Ovaries: No adnexal masses are seen.    Bony structures: The bones are osteopenic. Mild degenerative changes are seen in the spine. A left total hip prosthesis is in place.      Impression:  1. There is a 9 mm thin walled cystic lesion in the proximal body of the pancreas (series 3 image 23). Another similar appearing 17 x 8 mm lesion is seen in the  distal body of the pancreas (series 3 image 24). Considerations include pseudocysts versus cystic neoplasms. Correlate clinically as regards further evaluation and follow-up.  2. There are enhancing nodules in the right and left adrenal glands which measures 15 mm and 11 mm respectively. These are of indeterminate etiology. Correlate clinically as regards further evaluation and follow-up.  3. Mild fullness of the lower pole calyces and extrarenal pelvis of the left kidney are seen.  4. The colon is diffusely moderately fluid distended with prominent liquid stool in the descending colon. There is slow gradual transition to normal caliber in the sigmoid and rectum. No focal stricture or filling defect identified. Findings suggest a generally atonic colon. Correlate for symptoms of constipation and chronic laxative abuse.  5. Details and other findings as discussed above.                                   Medications   sodium chloride 0.9% flush 10 mL (has no administration in time range)   melatonin tablet 6 mg (has no administration in time range)   acetaminophen tablet 650 mg (has no administration in time range)   morphine injection 4 mg (has no administration in time range)   HYDROcodone-acetaminophen 5-325 mg per tablet 1 tablet (1 tablet Oral Given 8/19/22 2009)   glucose chewable tablet 16 g (has no administration in time range)   glucose chewable tablet 24 g (has no administration in time range)   dextrose 50% injection 12.5 g (has no administration in time range)   dextrose 50% injection 25 g (has no administration in time range)   glucagon (human recombinant) injection 1 mg (has no administration in time range)   insulin aspart U-100 injection 0-5 Units (has no administration in time range)   potassium chloride 20 mEq in 100 mL IVPB (FOR CENTRAL LINE ADMINISTRATION ONLY) (has no administration in time range)   nystatin 100,000 unit/mL suspension 500,000 Units (500,000 Units Oral Given 8/21/22 9924)    enoxaparin injection 30 mg (30 mg Subcutaneous Given 8/21/22 0884)   albuterol-ipratropium 2.5 mg-0.5 mg/3 mL nebulizer solution 3 mL (3 mLs Nebulization Given 8/20/22 0157)   albuterol-ipratropium 2.5 mg-0.5 mg/3 mL nebulizer solution 3 mL (3 mLs Nebulization Given 8/21/22 1420)   piperacillin-tazobactam (ZOSYN) 4.5 g in dextrose 5 % in water (D5W) 5 % 100 mL IVPB (MB+) (4.5 g Intravenous New Bag 8/21/22 1346)   Amino acid 4.25% - dextrose 5% (CLINIMIX-E) solution (1L provides 42.5 gm AA, 50 gm CHO (170 kcal/L dextrose), Na 35, K 30, Mg 5, Ca 4.5, Acetate 70, Cl 39, Phos 15) ( Intravenous New Bag 8/21/22 5516)   metoclopramide HCl injection 5 mg (has no administration in time range)   ondansetron injection 4 mg (has no administration in time range)   labetaloL injection 20 mg (has no administration in time range)   methylnaltrexone 12 mg/0.6 mL subcutaneous injection 9.2 mg (9.2 mg Subcutaneous Given 8/21/22 1457)   Amino acid 4.25% - dextrose 5% (CLINIMIX-E) solution (1L provides 42.5 gm AA, 50 gm CHO (170 kcal/L dextrose), Na 35, K 30, Mg 5, Ca 4.5, Acetate 70, Cl 39, Phos 15) ( Intravenous New Bag 8/21/22 9926)   bisacodyL suppository 10 mg (10 mg Rectal Given 8/21/22 1513)   magnesium sulfate 2g in water 50mL IVPB (premix) (0 g Intravenous Stopped 8/17/22 0039)   potassium chloride 20 mEq in 100 mL IVPB (FOR CENTRAL LINE ADMINISTRATION ONLY) (0 mEq Intravenous Stopped 8/17/22 0258)   iopamidoL (ISOVUE-370) injection 100 mL (100 mLs Intravenous Given 8/16/22 1483)   cefTRIAXone (ROCEPHIN) 1 g in dextrose 5 % in water (D5W) 5 % 50 mL IVPB (MB+) (0 g Intravenous Stopped 8/17/22 0329)   lactated ringers bolus 1,000 mL (0 mLs Intravenous Stopped 8/17/22 0630)   lactated ringers bolus 1,000 mL (0 mLs Intravenous Stopped 8/17/22 0830)   potassium chloride SA CR tablet 40 mEq (40 mEq Oral Given 8/17/22 1214)   lactated ringers bolus 1,000 mL (0 mLs Intravenous Stopped 8/17/22 1400)   lactated ringers bolus 1,000 mL  (0 mLs Intravenous Stopped 8/17/22 1842)   sodium chloride 0.9% bolus 1,000 mL (0 mLs Intravenous Stopped 8/17/22 2023)   sodium chloride 0.9% bolus 1,000 mL (0 mLs Intravenous Stopped 8/18/22 0230)   methocarbamoL tablet 500 mg (500 mg Oral Given 8/18/22 2347)   potassium chloride SA CR tablet 40 mEq (40 mEq Oral Given 8/19/22 1546)     Medical Decision Making:   ED Management:  Patient is a 89 y/o female presents to the ED for generalized weakness, constipation. See HPI/exam. Labs/imaging as noted.  Hypokalemia, lactic acid noted.  UTI noted.  Started on abx.  Discussed all results and discussed treatment plan.  Discussed need for admission for continued evaluation, management, diagnosis, and treatment.  Patient and family verbalized understanding and agreed to plan as discussed.               Attending Attestation:           Physician Attestation for Scribe:  Physician Attestation Statement for Scribe #1: I, Umer Bynum, reviewed documentation, as scribed by Rebecca Joya in my presence, and it is both accurate and complete.             ED Course as of 08/21/22 1838   Wed Aug 17, 2022   0137 Spoke with Taty who will admit [MM]      ED Course User Index  [MM] Gwen Coleman             Clinical Impression:   Final diagnoses:  [R10.9] Abdominal cramping (Primary)  [K59.00] Constipation, unspecified constipation type  [R10.9] Abdominal pain, unspecified abdominal location  [N39.0, R31.9] Urinary tract infection with hematuria, site unspecified  [D72.829] Leukocytosis, unspecified type  [K86.9] Pancreatic lesion  [E87.6] Hypokalemia  [E87.2] Lactic acidosis          ED Disposition Condition    Admit               Umer Bynum MD  08/21/22 1840

## 2022-08-17 NOTE — CONSULTS
General/Acute Care Surgery   History and Physical     HD#0  POD#* No surgery found *    HPI  89yoF presented to ER after a fall and with abdominal pain. States abdominal pain began a few days ago. Pain is diffuse and associated with reflux, nausea (x several weeks). Last BM 5 days ago but endorses passing gas, feels like she needs to have a BM now.    Past Medical History: HTN, takes daily ASA  Surgical History: knee replacement, hip replacement  Social History: negative x3    Review of Systems: 10 point ROS negative except as stated in HPI       Objective  Temp:  [97.5 °F (36.4 °C)] 97.5 °F (36.4 °C)  Pulse:  [63-80] 77  Resp:  [14-24] 18  SpO2:  [93 %-97 %] 94 %  BP: (113-178)/(74-89) 168/88      GEN: NAD   NEURO: AAOx3, some mild dementia  HEENT: NC, AT  RESP: No increased WOB, equal rise and fall of the chest   CV: Regular rate   ABD: Soft, nondistended, pain out of proportion in bilateral lower quadrants with light palpation  : Curry none  EXT: Moving all extremities spontaneously      Labs  Lactate 2.5 > 3.9 > 5.3 > 2.5 > 3.4    Imaging  CT A/P:  1. Mild distention of the colon with liquefied stool.  No appreciable obstructing lesion by CT.  2. Indeterminate bilateral adrenal nodules.  In a patient with no known history of malignancy this can be evaluated with 1 year follow-up adrenal CT per ACR white paper.  Consider clinical endocrinology evaluation  3. Pancreatic cysts.  For incidental pancreatic cysts in patients greater than 80 years of age at presentation ACR white paper recommend reimaging in 2 years depending on clinical comorbidity.     Assessment/Plan  89yoF presents with fall and abdominal pain for a few days; very tender on abdominal exam. Lactate 3.4 from 2.5 from 5.3 with continuous fluids. Possibility of acute mesenteric ischemia although unclear at this time based on labs and CT scan.    - Will give another liter of LR; recommend aggressive fluid resuscitation. Trend lactate.  - Surgery will  follow for serial abdominal exams    Kadie Ayala MD  [facility]    8/17/2022  4:27 PM

## 2022-08-18 LAB
ALBUMIN SERPL-MCNC: 2.4 GM/DL (ref 3.4–4.8)
ALBUMIN/GLOB SERPL: 1.2 RATIO (ref 1.1–2)
ALP SERPL-CCNC: 63 UNIT/L (ref 40–150)
ALT SERPL-CCNC: 24 UNIT/L (ref 0–55)
AST SERPL-CCNC: 33 UNIT/L (ref 5–34)
BASOPHILS # BLD AUTO: 0.06 X10(3)/MCL (ref 0–0.2)
BASOPHILS NFR BLD AUTO: 0.3 %
BILIRUBIN DIRECT+TOT PNL SERPL-MCNC: 0.7 MG/DL
BUN SERPL-MCNC: 19.4 MG/DL (ref 9.8–20.1)
CALCIUM SERPL-MCNC: 7.4 MG/DL (ref 8.4–10.2)
CHLORIDE SERPL-SCNC: 107 MMOL/L (ref 98–111)
CK SERPL-CCNC: 223 U/L (ref 29–168)
CO2 SERPL-SCNC: 25 MMOL/L (ref 23–31)
CREAT SERPL-MCNC: 0.66 MG/DL (ref 0.55–1.02)
EOSINOPHIL # BLD AUTO: 0.1 X10(3)/MCL (ref 0–0.9)
EOSINOPHIL NFR BLD AUTO: 0.5 %
ERYTHROCYTE [DISTWIDTH] IN BLOOD BY AUTOMATED COUNT: 13.6 % (ref 11.5–17)
GFR SERPLBLD CREATININE-BSD FMLA CKD-EPI: >60 MLS/MIN/1.73/M2
GLOBULIN SER-MCNC: 2 GM/DL (ref 2.4–3.5)
GLUCOSE SERPL-MCNC: 132 MG/DL (ref 75–121)
HCT VFR BLD AUTO: 36 % (ref 37–47)
HGB BLD-MCNC: 11.9 GM/DL (ref 12–16)
IMM GRANULOCYTES # BLD AUTO: 0.62 X10(3)/MCL (ref 0–0.04)
IMM GRANULOCYTES NFR BLD AUTO: 3.2 %
LACTATE SERPL-SCNC: 2 MMOL/L (ref 0.5–2.2)
LEFT CCA DIST DIAS: 9 CM/S
LEFT CCA DIST SYS: 49 CM/S
LEFT CCA PROX SYS: 59 CM/S
LEFT ECA SYS: 68 CM/S
LEFT ICA DIST DIAS: 13 CM/S
LEFT ICA DIST SYS: 56 CM/S
LEFT ICA MID DIAS: 18 CM/S
LEFT ICA MID SYS: 78 CM/S
LEFT ICA PROX DIAS: 12 CM/S
LEFT ICA PROX SYS: 50 CM/S
LEFT VERTEBRAL DIAS: 10 CM/S
LEFT VERTEBRAL SYS: 46 CM/S
LYMPHOCYTES # BLD AUTO: 1.25 X10(3)/MCL (ref 0.6–4.6)
LYMPHOCYTES NFR BLD AUTO: 6.4 %
MCH RBC QN AUTO: 31.5 PG (ref 27–31)
MCHC RBC AUTO-ENTMCNC: 33.1 MG/DL (ref 33–36)
MCV RBC AUTO: 95.2 FL (ref 80–94)
MONOCYTES # BLD AUTO: 1.45 X10(3)/MCL (ref 0.1–1.3)
MONOCYTES NFR BLD AUTO: 7.4 %
NEUTROPHILS # BLD AUTO: 16.2 X10(3)/MCL (ref 2.1–9.2)
NEUTROPHILS NFR BLD AUTO: 82.2 %
NRBC BLD AUTO-RTO: 0 %
OHS CV CAROTID RIGHT ICA EDV HIGHEST: 15
OHS CV CAROTID ULTRASOUND LEFT ICA/CCA RATIO: 1.59
OHS CV CAROTID ULTRASOUND RIGHT ICA/CCA RATIO: 1.13
OHS CV PV CAROTID LEFT HIGHEST CCA: 59
OHS CV PV CAROTID LEFT HIGHEST ICA: 78
OHS CV PV CAROTID RIGHT HIGHEST CCA: 71
OHS CV PV CAROTID RIGHT HIGHEST ICA: 62
OHS CV US CAROTID LEFT HIGHEST EDV: 18
PLATELET # BLD AUTO: 226 X10(3)/MCL (ref 130–400)
PMV BLD AUTO: 9.3 FL (ref 7.4–10.4)
POCT GLUCOSE: 122 MG/DL (ref 70–110)
POCT GLUCOSE: 144 MG/DL (ref 70–110)
POCT GLUCOSE: 165 MG/DL (ref 70–110)
POCT GLUCOSE: 174 MG/DL (ref 70–110)
POTASSIUM SERPL-SCNC: 3.5 MMOL/L (ref 3.5–5.1)
PROT SERPL-MCNC: 4.4 GM/DL (ref 5.8–7.6)
RBC # BLD AUTO: 3.78 X10(6)/MCL (ref 4.2–5.4)
RIGHT CCA DIST DIAS: 9 CM/S
RIGHT CCA DIST SYS: 55 CM/S
RIGHT CCA PROX DIAS: 8 CM/S
RIGHT CCA PROX SYS: 71 CM/S
RIGHT ECA DIAS: 0 CM/S
RIGHT ECA SYS: 54 CM/S
RIGHT ICA DIST DIAS: 15 CM/S
RIGHT ICA DIST SYS: 60 CM/S
RIGHT ICA MID DIAS: 15 CM/S
RIGHT ICA MID SYS: 62 CM/S
RIGHT ICA PROX DIAS: 10 CM/S
RIGHT ICA PROX SYS: 46 CM/S
RIGHT VERTEBRAL DIAS: 6 CM/S
RIGHT VERTEBRAL SYS: 36 CM/S
SODIUM SERPL-SCNC: 139 MMOL/L (ref 132–146)
URATE SERPL-MCNC: 2.1 MG/DL (ref 2.6–6)
WBC # SPEC AUTO: 19.6 X10(3)/MCL (ref 4.5–11.5)

## 2022-08-18 PROCEDURE — 25000003 PHARM REV CODE 250: Performed by: NURSE PRACTITIONER

## 2022-08-18 PROCEDURE — 25000003 PHARM REV CODE 250: Performed by: STUDENT IN AN ORGANIZED HEALTH CARE EDUCATION/TRAINING PROGRAM

## 2022-08-18 PROCEDURE — 11000001 HC ACUTE MED/SURG PRIVATE ROOM

## 2022-08-18 PROCEDURE — 80053 COMPREHEN METABOLIC PANEL: CPT | Performed by: PHYSICIAN ASSISTANT

## 2022-08-18 PROCEDURE — 21400001 HC TELEMETRY ROOM

## 2022-08-18 PROCEDURE — 83605 ASSAY OF LACTIC ACID: CPT | Performed by: STUDENT IN AN ORGANIZED HEALTH CARE EDUCATION/TRAINING PROGRAM

## 2022-08-18 PROCEDURE — 85025 COMPLETE CBC W/AUTO DIFF WBC: CPT | Performed by: PHYSICIAN ASSISTANT

## 2022-08-18 PROCEDURE — 82550 ASSAY OF CK (CPK): CPT | Performed by: STUDENT IN AN ORGANIZED HEALTH CARE EDUCATION/TRAINING PROGRAM

## 2022-08-18 PROCEDURE — 63600175 PHARM REV CODE 636 W HCPCS: Performed by: INTERNAL MEDICINE

## 2022-08-18 PROCEDURE — 25000003 PHARM REV CODE 250: Performed by: INTERNAL MEDICINE

## 2022-08-18 PROCEDURE — 87088 URINE BACTERIA CULTURE: CPT | Performed by: PHYSICIAN ASSISTANT

## 2022-08-18 PROCEDURE — S0030 INJECTION, METRONIDAZOLE: HCPCS | Performed by: INTERNAL MEDICINE

## 2022-08-18 PROCEDURE — 84550 ASSAY OF BLOOD/URIC ACID: CPT | Performed by: STUDENT IN AN ORGANIZED HEALTH CARE EDUCATION/TRAINING PROGRAM

## 2022-08-18 PROCEDURE — 51702 INSERT TEMP BLADDER CATH: CPT

## 2022-08-18 PROCEDURE — 36415 COLL VENOUS BLD VENIPUNCTURE: CPT | Performed by: STUDENT IN AN ORGANIZED HEALTH CARE EDUCATION/TRAINING PROGRAM

## 2022-08-18 PROCEDURE — 36415 COLL VENOUS BLD VENIPUNCTURE: CPT | Performed by: PHYSICIAN ASSISTANT

## 2022-08-18 RX ORDER — NYSTATIN 100000 [USP'U]/ML
500000 SUSPENSION ORAL
Status: DISCONTINUED | OUTPATIENT
Start: 2022-08-18 | End: 2022-09-09

## 2022-08-18 RX ORDER — ENOXAPARIN SODIUM 100 MG/ML
30 INJECTION SUBCUTANEOUS EVERY 12 HOURS
Status: DISCONTINUED | OUTPATIENT
Start: 2022-08-18 | End: 2022-09-09

## 2022-08-18 RX ORDER — METHOCARBAMOL 500 MG/1
500 TABLET, FILM COATED ORAL ONCE
Status: COMPLETED | OUTPATIENT
Start: 2022-08-18 | End: 2022-08-18

## 2022-08-18 RX ADMIN — HYDROCODONE BITARTRATE AND ACETAMINOPHEN 1 TABLET: 5; 325 TABLET ORAL at 09:08

## 2022-08-18 RX ADMIN — METHOCARBAMOL 500 MG: 500 TABLET ORAL at 11:08

## 2022-08-18 RX ADMIN — METRONIDAZOLE 500 MG: 500 INJECTION, SOLUTION INTRAVENOUS at 09:08

## 2022-08-18 RX ADMIN — METRONIDAZOLE 500 MG: 500 INJECTION, SOLUTION INTRAVENOUS at 01:08

## 2022-08-18 RX ADMIN — ENOXAPARIN SODIUM 30 MG: 30 INJECTION SUBCUTANEOUS at 08:08

## 2022-08-18 RX ADMIN — METRONIDAZOLE 500 MG: 500 INJECTION, SOLUTION INTRAVENOUS at 06:08

## 2022-08-18 RX ADMIN — NYSTATIN 500000 UNITS: 100000 SUSPENSION ORAL at 08:08

## 2022-08-18 RX ADMIN — CIPROFLOXACIN 400 MG: 2 INJECTION, SOLUTION INTRAVENOUS at 06:08

## 2022-08-18 RX ADMIN — SODIUM CHLORIDE 1000 ML: 9 INJECTION, SOLUTION INTRAVENOUS at 01:08

## 2022-08-18 RX ADMIN — CIPROFLOXACIN 400 MG: 2 INJECTION, SOLUTION INTRAVENOUS at 05:08

## 2022-08-18 RX ADMIN — NYSTATIN 500000 UNITS: 100000 SUSPENSION ORAL at 05:08

## 2022-08-18 NOTE — PROGRESS NOTES
[facility] Hospital Medicine Progress Note        Chief Complaint: Inpatient Follow-up for     Subjective:  Fidelina Darling is a 89 y.o. White female with a past medical history of hypertension, hyperlipidemia. The patient presented to [facility] on 8/16/2022 with a primary complaint of abdominal pain and falls due to syncope.  Patient reports having a fall on 8/12 while in her garage.  She states she was grabbing for the car door handle when she missed it and fell. Fall was unwitnessed and she reports loss of consciousness. On 08/15 she had a second fall while in the kitchen but denies any loss of consciousness.  Yesterday (08/16/2022) patient was going to the bathroom when she passed out.  Episode was witnessed by neighbor who is visiting her. She reports urinary frequency. Patient denies complaints of headache, vision changes, shortness of breath, cough.  She has been experiencing lower abdominal pain for the last several days. Granddaughter at bedside states that patient is dependent on laxatives have a bowel movement.  Although she uses laxatives she often has to manually disimpact herself . She normally goes every 4-5 days with last bowel movement being 5 days ago. She has been having increased belching.  Granddaughter also states patient has little to no appetite.  Patient lives alone, ambulates with walker, drives and completes activity daily living independently.  Family as it patient to be experiencing dementia over the last 6 months.     Upon presentation to the ED, patient afebrile, hemodynamically stable and SpO2 96% on room air.  Labs notable for WBC 28, potassium 2.6, glucose 161, lactic acid 3.9.  UA with 1+ leukocyte esterase, 2+ bilirubin, positive nitrites, trace ketones and protein.  Chest x-ray without acute processes.  CT abdomen pelvis revealed mild distention of the colon with liquefied stool, indeterminate bilateral adrenal nodules and pancreatic cysts.  While in the  ED patient received 40 mEq of IV potassium chloride, 2 g of magnesium sulfate and Rocephin was started for UTI.  Patient admitted to hospital medicine services for further medical management.    Patient is clinically improving.  She was complaining of bilateral lower extremity pain.  She states that she has chronic knee issues.     Objective/physical exam:  General appearance: Well-developed, well-nourished female in no apparent distress.  Granddaughter at bedside.  HEENT: Atraumatic head. Dry mucous membranes of oral cavity.  Lungs: Clear to auscultation bilaterally.   Heart: Regular rate and rhythm.   Abdomen: Soft, mildly distended, generalized tenderness. Bowel sounds are hypoactive.  Frequently belching  Extremities: No cyanosis, clubbing. No deformities.  Skin: No Rash. Warm and dry.  Neuro: Awake, alert and oriented self, place and city. Motor and sensory exams grossly intact.    VITAL SIGNS: 24 HRS MIN & MAX LAST   Temp  Min: 97.4 °F (36.3 °C)  Max: 98.4 °F (36.9 °C) 98.2 °F (36.8 °C)   BP  Min: 120/61  Max: 152/77 120/61   Pulse  Min: 72  Max: 88  76   Resp  Min: 20  Max: 20 20   SpO2  Min: 88 %  Max: 95 % (!) 94 %       Labs, Microbiology and Imaging were Reviewed.      Microbiology Results (last 7 days)     Procedure Component Value Units Date/Time    Urine Culture High Risk [[id number]] Collected: 08/18/22 1325    Order Status: Sent Specimen: Urine, Clean Catch     Blood Culture #1 **CANNOT BE ORDERED STAT** [[id number]]  (Normal) Collected: 08/17/22 0148    Order Status: Completed Specimen: Blood Updated: 08/18/22 0302     CULTURE, BLOOD ([facility]) No Growth At 24 Hours    Blood Culture #2 **CANNOT BE ORDERED STAT** [[id number]]  (Normal) Collected: 08/17/22 0148    Order Status: Completed Specimen: Blood Updated: 08/18/22 0302     CULTURE, BLOOD ([facility]) No Growth At 24 Hours             Medications   ciprofloxacin  400 mg Intravenous Q12H    enoxaparin  30 mg Subcutaneous Daily    metronidazole  500 mg  Intravenous Q8H        acetaminophen, dextrose 50%, dextrose 50%, glucagon (human recombinant), glucose, glucose, hydrALAZINE, HYDROcodone-acetaminophen, insulin aspart U-100, melatonin, morphine, ondansetron, sodium chloride 0.9%     Radiology:  Echo  · The left ventricle is normal in size with concentric remodeling and   normal systolic function.  · The estimated ejection fraction is 66%.  · Normal right ventricular size with normal right ventricular systolic   function.  · Grade I left ventricular diastolic dysfunction.     CT Head Without Contrast  Narrative: EXAMINATION:  CT HEAD WITHOUT CONTRAST    CLINICAL HISTORY:  Syncope, recurrent;    TECHNIQUE:  Sequential axial images were performed of the brain without contrast.    Dose product length of 1091 mGycm. Automated exposure control was utilized to minimize radiation dose.    COMPARISON:  None available.    FINDINGS:  There is no intracranial mass effect, midline shift, hydrocephalus or hemorrhage. There is no sulcal effacement or low attenuation changes to suggest recent large vessel territory infarction. Chronic appearing periventricular and subcortical white matter low attenuation changes are consistent with chronic microangiopathic ischemia. The ventricular system and sulcal markings prominence is consistent with atrophy. There is no acute extra axial fluid collection.  There is an expanded empty sella.  Visualized paranasal sinuses are clear without mucosal thickening, polypoidal abnormality or air-fluid levels. Mastoid air cells aeration is optimal.  Impression: 1.  No acute intracranial findings identified.    2.  Chronic microangiopathic ischemia and atrophy.    Electronically signed by: Jorden Gomez  Date:    08/17/2022  Time:    11:30  X-Ray Chest 1 View  Narrative: EXAMINATION:  XR CHEST 1 VIEW    CLINICAL HISTORY:  cough;, .    COMPARISON:  September 27, 2017    FINDINGS:  No alveolar consolidation, effusion, or pneumothorax is seen.   The  thoracic aorta is normal  cardiac silhouette, central pulmonary vessels and mediastinum are normal in size and are grossly unremarkable.   visualized osseous structures are grossly unremarkable.    Some linear densities in the left retrocardiac region either represent atelectatic changes and or fibrotic streaks  Impression: No acute chest disease is identified.    Electronically signed by: Bharathi Gauthier  Date:    08/17/2022  Time:    09:06  CT Abdomen Pelvis With Contrast  Narrative: EXAMINATION:  CT ABDOMEN PELVIS WITH CONTRAST    CLINICAL HISTORY:  Abdominal pain, acute, nonlocalized;Fall few days ago, worsening abdominal pain;    TECHNIQUE:  Helically acquired images with axial, sagittal and coronal reformations were obtained from the lung bases to the pubic symphysis after the IV administration of contrast.    Automated tube current modulation, weight-based exposure dosing, and/or iterative reconstruction technique utilized to reach lowest reasonably achievable exposure rate.    DLP: 319 mGy*cm    COMPARISON:  No relevant prior available for comparison at the time of dictation.    FINDINGS:  HEART: Normal in size. No pericardial effusion.    LUNG BASES: Basilar atelectasis versus scarring.    LIVER: Normal attenuation. No appreciable focal hepatic lesion.    BILIARY: Gallbladder is surgically absent.    PANCREAS: There are small pancreatic cysts.  Cysts measure 1.5 cm at the tail (3, 24 and 1.1 cm at the neck (3, 23.    SPLEEN: Normal in size    ADRENALS: There are bilateral adrenal nodules.  Right adrenal nodule measures 1.6 cm.  Left adrenal nodule measures 1.1 cm.  Contrast enhanced CT appearance is nonspecific.    KIDNEYS/URETERS: The kidneys enhance symmetrically.  No hydronephrosis.   Too small to characterize left renal cortical hypodensity.  Peripelvic left renal cyst at the lower pole.    GI TRACT/MESENTERY: There is fluid in the distal esophagus.  Small sliding hiatal hernia.  The small bowel is  normal in caliber.  The colon is distended with stool which appears liquified to the level of the descending colon.  The appendix is not confidently visualized.    PERITONEUM: No free fluid.No free air.    LYMPH NODES: No enlarged lymph nodes by size criteria.    VASCULATURE: Aortic atherosclerosis.    BLADDER: Nondistended bladder limits CT evaluation    REPRODUCTIVE ORGANS: Normal as visualized.    SOFT TISSUES: Unremarkable.    BONES: Postop left hip arthroplasty.  Lumbar spondylosis.  Impression: 1. Mild distention of the colon with liquefied stool.  No appreciable obstructing lesion by CT.  2. Indeterminate bilateral adrenal nodules.  In a patient with no known history of malignancy this can be evaluated with 1 year follow-up adrenal CT per ACR white paper.  Consider clinical endocrinology evaluation  3. Pancreatic cysts.  For incidental pancreatic cysts in patients greater than 80 years of age at presentation ACR white paper recommend reimaging in 2 years depending on clinical comorbidity.  The preliminary and final reports are concordant.    Electronically signed by: Lise Holloway  Date:    08/17/2022  Time:    07:54          Assessment/Plan:  Recurrent syncope with falls  Acute bacterial cystitis  Bilateral adrenal nodules  Pancreatic cyst  Lactic acidosis  Hypokalemia  Abdominal pain  Essential hypertension     Plan:  - oral candidiasis, nystatin SS.  - continue treating with ciprofloxacin and Flagyl.  She is responding.  -will get stool cultures and C diff since she is having diarrhea>5 and normal stool softeners.  -is general surgery recommendations were noted.  -lactic acid is 2.  she is hemodynamically stable, CK level was checked and was less than 300.    - hypokalemia resolved.  Severe dilation of the colon, will consult GI.    All diagnosis and differential diagnosis have been reviewed; assessment and plan has been documented; I have personally reviewed the labs and test results that are presently  available; I have reviewed the patients medication list; I have reviewed the consulting providers response and recommendations. I have reviewed or attempted to review medical records based upon their availability.       Too Miner MD   08/18/2022

## 2022-08-18 NOTE — PROGRESS NOTES
Trauma/Acute Care Surgery   Daily Progress Note     HD#1  POD#* No surgery found *    Subjective  NAEON. AF. HTN, satting well on 1LNC. Lactate cleared. Large non-bloody BM yesterday, abdominal pain completely resolved today.    PRN Meds:  zofran  hydralazine     Objective  Temp:  [97.4 °F (36.3 °C)-98.4 °F (36.9 °C)] 98.4 °F (36.9 °C)  Pulse:  [63-88] 78  Resp:  [17-20] 20  SpO2:  [88 %-97 %] 88 %  BP: (133-178)/(59-89) 144/59     Intake/Output/LDA:  BMx1     Physical Exam:  GEN: NAD   NEURO: AAOx3, some mild dementia  HEENT: NC, AT  RESP: No increased WOB, equal rise and fall of the chest   CV: Regular rate   ABD: Soft, nondistended, nontender  : Curry none  EXT: Moving all extremities spontaneously      Labs  Lactate 2.5 > 3.9 > 5.3 > 2.5 > 3.4 > 2.0    Micro  Bcx, Ucx NG x 24hr    Assessment/Plan  89yoF presents with fall and abdominal pain for a few days; very tender on abdominal exam. Lactate 3.4 from 2.5 from 5.3 with continuous fluids. Initially with pain out of proportion to exam and c/f mesenteric ischemia. Now lactate cleared, abdominal exam benign after having BM.     - Lactate now cleared  - Abdominal exam now benign. Low concern for intra-abdominal process requiring surgery. Abdominal pain likely 2/2 constipation and UTI  - Recommend continue bowel regiment.  - No indication for acute intervention surgically. Surgery will sign off. Please reconsult with concerns.      Kadie Ayala MD  [facility]      8/18/2022  5:54 AM

## 2022-08-18 NOTE — NURSING
Nurses Note -- 4 Eyes      8/17/2022   06:00PM      Skin assessed during: Admit      [x] No Pressure Injuries Present    [x]Prevention Measures Documented      [] Yes- Altered Skin Integrity Present or Discovered   [] LDA Added if Not in Epic (Describe Wound)   [] New Altered Skin Integrity was Present on Admit and Documented in LDA   [] Wound Image Taken    Wound Care Consulted? No    Attending Nurse:  Marvin Barajas RN     Second RN/Staff Member:  Chandan Lugo

## 2022-08-18 NOTE — PLAN OF CARE
Problem: Adult Inpatient Plan of Care  Goal: Absence of Hospital-Acquired Illness or Injury  Outcome: Ongoing, Progressing  Goal: Optimal Comfort and Wellbeing  Outcome: Ongoing, Progressing     Problem: Fall Injury Risk  Goal: Absence of Fall and Fall-Related Injury  Outcome: Ongoing, Progressing     Problem: Infection  Goal: Absence of Infection Signs and Symptoms  Outcome: Ongoing, Progressing

## 2022-08-19 LAB
ALBUMIN SERPL-MCNC: 2.1 GM/DL (ref 3.4–4.8)
ALBUMIN/GLOB SERPL: 1.3 RATIO (ref 1.1–2)
ALP SERPL-CCNC: 59 UNIT/L (ref 40–150)
ALT SERPL-CCNC: 43 UNIT/L (ref 0–55)
AST SERPL-CCNC: 90 UNIT/L (ref 5–34)
BASOPHILS # BLD AUTO: 0.01 X10(3)/MCL (ref 0–0.2)
BASOPHILS NFR BLD AUTO: 0.1 %
BILIRUBIN DIRECT+TOT PNL SERPL-MCNC: 0.4 MG/DL
BUN SERPL-MCNC: 14.4 MG/DL (ref 9.8–20.1)
CALCIUM SERPL-MCNC: 7.1 MG/DL (ref 8.4–10.2)
CHLORIDE SERPL-SCNC: 109 MMOL/L (ref 98–111)
CO2 SERPL-SCNC: 24 MMOL/L (ref 23–31)
CREAT SERPL-MCNC: 0.6 MG/DL (ref 0.55–1.02)
EOSINOPHIL # BLD AUTO: 0.03 X10(3)/MCL (ref 0–0.9)
EOSINOPHIL NFR BLD AUTO: 0.3 %
ERYTHROCYTE [DISTWIDTH] IN BLOOD BY AUTOMATED COUNT: 13.5 % (ref 11.5–17)
GFR SERPLBLD CREATININE-BSD FMLA CKD-EPI: >60 MLS/MIN/1.73/M2
GLOBULIN SER-MCNC: 1.6 GM/DL (ref 2.4–3.5)
GLUCOSE SERPL-MCNC: 119 MG/DL (ref 75–121)
HCT VFR BLD AUTO: 29.9 % (ref 37–47)
HGB BLD-MCNC: 9.6 GM/DL (ref 12–16)
IMM GRANULOCYTES # BLD AUTO: 0.04 X10(3)/MCL (ref 0–0.04)
IMM GRANULOCYTES NFR BLD AUTO: 0.5 %
LYMPHOCYTES # BLD AUTO: 1.06 X10(3)/MCL (ref 0.6–4.6)
LYMPHOCYTES NFR BLD AUTO: 12.2 %
MCH RBC QN AUTO: 31.4 PG (ref 27–31)
MCHC RBC AUTO-ENTMCNC: 32.1 MG/DL (ref 33–36)
MCV RBC AUTO: 97.7 FL (ref 80–94)
MONOCYTES # BLD AUTO: 0.9 X10(3)/MCL (ref 0.1–1.3)
MONOCYTES NFR BLD AUTO: 10.3 %
NEUTROPHILS # BLD AUTO: 6.7 X10(3)/MCL (ref 2.1–9.2)
NEUTROPHILS NFR BLD AUTO: 76.6 %
NRBC BLD AUTO-RTO: 0 %
PLATELET # BLD AUTO: 180 X10(3)/MCL (ref 130–400)
PMV BLD AUTO: 9.7 FL (ref 7.4–10.4)
POCT GLUCOSE: 135 MG/DL (ref 70–110)
POCT GLUCOSE: 157 MG/DL (ref 70–110)
POTASSIUM SERPL-SCNC: 3.1 MMOL/L (ref 3.5–5.1)
PROT SERPL-MCNC: 3.7 GM/DL (ref 5.8–7.6)
RBC # BLD AUTO: 3.06 X10(6)/MCL (ref 4.2–5.4)
SODIUM SERPL-SCNC: 137 MMOL/L (ref 132–146)
WBC # SPEC AUTO: 8.7 X10(3)/MCL (ref 4.5–11.5)

## 2022-08-19 PROCEDURE — 63600175 PHARM REV CODE 636 W HCPCS: Performed by: INTERNAL MEDICINE

## 2022-08-19 PROCEDURE — 11000001 HC ACUTE MED/SURG PRIVATE ROOM

## 2022-08-19 PROCEDURE — 97166 OT EVAL MOD COMPLEX 45 MIN: CPT

## 2022-08-19 PROCEDURE — 25000003 PHARM REV CODE 250: Performed by: STUDENT IN AN ORGANIZED HEALTH CARE EDUCATION/TRAINING PROGRAM

## 2022-08-19 PROCEDURE — S0030 INJECTION, METRONIDAZOLE: HCPCS | Performed by: INTERNAL MEDICINE

## 2022-08-19 PROCEDURE — 80053 COMPREHEN METABOLIC PANEL: CPT | Performed by: STUDENT IN AN ORGANIZED HEALTH CARE EDUCATION/TRAINING PROGRAM

## 2022-08-19 PROCEDURE — 63600175 PHARM REV CODE 636 W HCPCS: Performed by: STUDENT IN AN ORGANIZED HEALTH CARE EDUCATION/TRAINING PROGRAM

## 2022-08-19 PROCEDURE — 97162 PT EVAL MOD COMPLEX 30 MIN: CPT

## 2022-08-19 PROCEDURE — 36415 COLL VENOUS BLD VENIPUNCTURE: CPT | Performed by: STUDENT IN AN ORGANIZED HEALTH CARE EDUCATION/TRAINING PROGRAM

## 2022-08-19 PROCEDURE — 21400001 HC TELEMETRY ROOM

## 2022-08-19 PROCEDURE — 25000003 PHARM REV CODE 250: Performed by: INTERNAL MEDICINE

## 2022-08-19 PROCEDURE — 85025 COMPLETE CBC W/AUTO DIFF WBC: CPT | Performed by: STUDENT IN AN ORGANIZED HEALTH CARE EDUCATION/TRAINING PROGRAM

## 2022-08-19 RX ORDER — POTASSIUM CHLORIDE 20 MEQ/1
40 TABLET, EXTENDED RELEASE ORAL
Status: COMPLETED | OUTPATIENT
Start: 2022-08-19 | End: 2022-08-19

## 2022-08-19 RX ADMIN — CIPROFLOXACIN 400 MG: 2 INJECTION, SOLUTION INTRAVENOUS at 05:08

## 2022-08-19 RX ADMIN — NYSTATIN 500000 UNITS: 100000 SUSPENSION ORAL at 08:08

## 2022-08-19 RX ADMIN — NYSTATIN 500000 UNITS: 100000 SUSPENSION ORAL at 05:08

## 2022-08-19 RX ADMIN — POTASSIUM CHLORIDE 40 MEQ: 1500 TABLET, EXTENDED RELEASE ORAL at 03:08

## 2022-08-19 RX ADMIN — ENOXAPARIN SODIUM 30 MG: 30 INJECTION SUBCUTANEOUS at 08:08

## 2022-08-19 RX ADMIN — METRONIDAZOLE 500 MG: 500 INJECTION, SOLUTION INTRAVENOUS at 06:08

## 2022-08-19 RX ADMIN — POTASSIUM CHLORIDE: 149 INJECTION, SOLUTION, CONCENTRATE INTRAVENOUS at 08:08

## 2022-08-19 RX ADMIN — HYDROCODONE BITARTRATE AND ACETAMINOPHEN 1 TABLET: 5; 325 TABLET ORAL at 08:08

## 2022-08-19 RX ADMIN — METRONIDAZOLE 500 MG: 500 INJECTION, SOLUTION INTRAVENOUS at 01:08

## 2022-08-19 RX ADMIN — NYSTATIN 500000 UNITS: 100000 SUSPENSION ORAL at 03:08

## 2022-08-19 RX ADMIN — POTASSIUM CHLORIDE: 149 INJECTION, SOLUTION, CONCENTRATE INTRAVENOUS at 03:08

## 2022-08-19 RX ADMIN — CIPROFLOXACIN 400 MG: 2 INJECTION, SOLUTION INTRAVENOUS at 06:08

## 2022-08-19 RX ADMIN — POTASSIUM CHLORIDE 40 MEQ: 1500 TABLET, EXTENDED RELEASE ORAL at 08:08

## 2022-08-19 RX ADMIN — ONDANSETRON 4 MG: 2 INJECTION INTRAMUSCULAR; INTRAVENOUS at 08:08

## 2022-08-19 RX ADMIN — METRONIDAZOLE 500 MG: 500 INJECTION, SOLUTION INTRAVENOUS at 08:08

## 2022-08-19 NOTE — PLAN OF CARE
Problem: Fall Injury Risk  Goal: Absence of Fall and Fall-Related Injury  Outcome: Ongoing, Progressing  Intervention: Promote Injury-Free Environment  Flowsheets (Taken 8/19/2022 0602)  Safety Promotion/Fall Prevention:   bed alarm set   assistive device/personal item within reach   Fall Risk signage in place   side rails raised x 3   instructed to call staff for mobility     Problem: Infection  Goal: Absence of Infection Signs and Symptoms  Outcome: Ongoing, Progressing

## 2022-08-19 NOTE — PLAN OF CARE
08/19/22 1050   Discharge Assessment   Assessment Type Discharge Planning Assessment   Confirmed/corrected address, phone number and insurance Yes   Confirmed Demographics Correct on Facesheet   Source of Information patient;family   When was your last doctors appointment?   (PCP: Dr. Errol Negron)   Communicated PRISCILLA with patient/caregiver Date not available/Unable to determine   Reason For Admission ABD Cramping   Lives With alone   Do you expect to return to your current living situation? Yes   Do you have help at home or someone to help you manage your care at home? No   Prior to hospitilization cognitive status: Alert/Oriented   Current cognitive status: Alert/Oriented   Walking or Climbing Stairs Difficulty ambulation difficulty, requires equipment   Mobility Management RW   Dressing/Bathing Difficulty none   Equipment Currently Used at Home bedside commode;cane, straight;grab bar;shower chair;wheelchair;walker, rolling   Readmission within 30 days? No   Patient currently being followed by outpatient case management? No   Do you currently have service(s) that help you manage your care at home? No   Do you take prescription medications? Yes   Do you have prescription coverage? Yes   Do you have any problems affording any of your prescribed medications? No   Is the patient taking medications as prescribed? yes   Who is going to help you get home at discharge? Ayana Soler, daughter, [phone] Christiane Vasquez, granddaughter, [phone]   How do you get to doctors appointments? car, drives self;family or friend will provide   Are you on dialysis? No   Do you take coumadin? No   Discharge Plan A Home Health   Discharge Plan B Home with family   DME Needed Upon Discharge  none   Discharge Plan discussed with: Patient;Adult children   Discharge Barriers Identified None   Discussed dc options with patient and granddaughter Christiane Vasquez at bedside. Requesting HHC at discharge and possibly [place] [facility]  referral. Will follow up after PT/OT eval.

## 2022-08-19 NOTE — PT/OT/SLP EVAL
Physical Therapy Evaluation    Patient Name:  Fidelina Darling   MRN:  [id number]    Recommendations:     Discharge Recommendations:  rehabilitation facility, nursing facility, skilled   Discharge Equipment Recommendations: none   Barriers to discharge:  impaired mobility    Assessment:     Fidelina Darling is a 89 y.o. female admitted with a medical diagnosis of Abdominal cramping.  She presents with the following impairments/functional limitations:  weakness, impaired endurance, impaired functional mobility, gait instability, impaired balance, decreased safety awareness, pain, decreased ROM. Patient tolerated PT evaluation poorly, mobilized to EOB w/ modA, however c/o significant abdominal in sitting at EOB and lightheadedness. BP assessed: 149/76, SpO2 at 95% on room air. Patient attempted sit<>stand with maxA, however unable to achieve full stand due to pain and feeling faint. Pt is appropriate for continued acute PT services while in-house with recommended d/c disposition pending progress.     Rehab Prognosis: Good; patient would benefit from acute skilled PT services to address these deficits and reach maximum level of function.    Recent Surgery: * No surgery found *      Plan:     During this hospitalization, patient to be seen daily to address the identified rehab impairments via gait training, therapeutic activities, therapeutic exercises, neuromuscular re-education and progress toward the following goals:    Plan of Care Expires:  09/16/22    Subjective     Chief Complaint: abdominal pain  Patient/Family Comments/goals: to go home  Pain/Comfort:  Pain Rating 1:  (Patient reported significant increase in abdominal pain with transition to upright sitting, did not provide numeric rating.)    Patients cultural, spiritual, Sabianist conflicts given the current situation: no    Living Environment:  Pt lives alone in [facility] with no steps to enter, daughter and granddaughter check on her frequently.  Prior to admission,  patients level of function was independent with rollator.  Equipment used at home: rollator, walker, rolling.  DME owned (not currently used): bedside commode and shower chair.  Upon discharge, patient will have assistance from family.    Objective:     Communicated with RN prior to session.  Patient found HOB elevated with pearson catheter, peripheral IV, telemetry  upon PT entry to room.    General Precautions: Standard, fall   Orthopedic Precautions:N/A   Braces: N/A  Respiratory Status: Room air    Exams:  RLE ROM: Pt with limited knee flexion due to h/o TKA  RLE Strength: WFL  LLE ROM: WFL  LLE Strength: WFL    Functional Mobility:  Bed Mobility:  Supine to Sit: moderate assistance  Transfers:  Sit to Stand:  maximal assistance and of 2 persons with hand-held assist      AM-PAC 6 CLICK MOBILITY  Total Score:11     Patient left HOB elevated with all lines intact, call button in reach, RN notified, and family present.    GOALS:   Multidisciplinary Problems       Physical Therapy Goals          Problem: Physical Therapy    Goal Priority Disciplines Outcome Goal Variances Interventions   Physical Therapy Goal     PT, PT/OT Ongoing, Progressing     Description: Goals to be met by: 22     Patient will increase functional independence with mobility by performin. Supine to sit with Stand-by Assistance  2. Sit to stand transfer with Stand-by Assistance  3. Gait  x 200 feet with Stand-by Assistance using Rolling Walker.                          History:     No past medical history on file.    No past surgical history on file.    Time Tracking:     PT Received On: 22  PT Start Time: 1028     PT Stop Time: 1053  PT Total Time (min): 25 min     Billable Minutes: Evaluation 25min      2022

## 2022-08-19 NOTE — PT/OT/SLP EVAL
Occupational Therapy   Evaluation    Name: Fidelina Darling  MRN: [id number]  Admitting Diagnosis:  Abdominal cramping  Recent Surgery: * No surgery found *      Recommendations:     Discharge Recommendations:  SNF, pending progress  Discharge Equipment Recommendations:     Barriers to discharge:   decreased caregiver support    Assessment:     Fidelina Darling is a 89 y.o. female with a medical diagnosis of Abdominal cramping.  She presents with generalized weakness, c/o abdominal pain and dizziness sitting EOB with stable BP and vitals throughout, appears likely with some anxiety from pain/ongoing abdominal issues and with decreased effort to attempt standing d/t above. At this time, pt would benefit from further skilled therapy prior to d/c to home as pt lives alone and is not at baseline presently. Performance deficits affecting function: self care, mobility, weakness, decreased activity tolerance.     Rehab Prognosis: Good; patient would benefit from acute skilled OT services to address these deficits and reach maximum level of function.       Plan:     Patient to be seen 3-5x/wk   to address the above listed problems via    · Plan of Care Expires:  9/16/22  · Plan of Care Reviewed with:  pt and granddaughter    Subjective     Chief Complaint: abdominal pain  Patient/Family Comments/goals: return to baseline    Occupational Profile:  Living Environment: lives in  home alone, granddaughter and daughter check in daily, tub shower  Previous level of function: pt has been sponge bathing at sink , ambulated with Rollator and can dress dress, drive, IADL's without assist.   Roles and Routines: none stated  Equipment Used at Home:   rollator, RW, cane, w/c, grab bars, bedside commode  Assistance upon Discharge: limited    Pain/Comfort:  ·  10/10 abdomen    Patients cultural, spiritual, L[employer] conflicts given the current situation:      Objective:     Communicated with: nsg and PT prior to session.  Patient found  supine with   upon OT entry to room.    General Precautions: Standard,     Orthopedic Precautions:    Braces:    Respiratory Status: Room air    Occupational Performance:    Bed Mobility:    · Patient completed Supine to Sit with moderate assistance    Functional Mobility/Transfers:  · Patient completed Sit <> Stand Transfer with maximal assistance  with  hand-held assist u/a to come to full stand, c/o dizziness throughout sitting and attempted stand  · Functional Mobility:     Activities of Daily Living:  · Socks total assist    Cognitive/Visual Perceptual:  Follows commands, oriented to place    Physical Exam:  wfl BUE's, R knee with decreased flexion    AMPAC 6 Click ADL:  AMPAC Total Score:      Treatment & Education:  Educated pt and granddaughter on POC, importance of OOB with nsg to chair if possible, rec's presently.   Education:    Patient left HOB elevated with all lines intact, call button in reach and granddaughter present present    GOALS:   Multidisciplinary Problems     Occupational Therapy Goals        Problem: Occupational Therapy    Goal Priority Disciplines Outcome Interventions   Occupational Therapy Goal     OT, PT/OT Ongoing, Progressing    Description: Goals to be met by: 9/16/22     Patient will increase functional independence with ADLs by performing:    UE Dressing with Modified [place].  LE Dressing with Modified [place].  Grooming while standing with Modified [place].  Toileting from bedside commode with Modified [place] for hygiene  and clothing management.   Toilet transfer to bedside commode with Modified [place].progress  to toilet.                     History:     No past medical history on file.    No past surgical history on file.    Time Tracking:     OT Date of Treatment:  8/19/22  OT Start Time:  1034  OT Stop Time:  890914 min  OT Total Time (min):      Billable Minutes:Evaluation mod [id number] min      8/19/2022

## 2022-08-19 NOTE — PROGRESS NOTES
Inpatient Nutrition Assessment    Admit Date: 8/16/2022   Length of Stay: 2 days  Nutrition Recommendation/Prescription     - Continue current diet as tolerated. Assist with meals as needed.   - Boost Max TID for additional nourishment.  - Consider an appetite stimulant as medically feasible.   - Also consider a Multi-vitamin with minerals as medically feasible.   - Monitor wt, labs, and intake.     Communication of Recommendations: reviewed with patient and/or caregiver    Nutrition Assessment     Malnutrition Assessment/Nutrition-Focused Physical Exam    Malnutrition in the context of chronic illness  Degree of Malnutrition: non-severe (moderate) malnutrition  Energy Intake: < 75% of estimated energy requirement for >/= 1 month  Interpretation of Weight Loss: unable to obtain  Body Fat: mild depletion  Area of Body Fat Loss: orbital region  and upper arm region - triceps / biceps  Muscle Mass Loss: mild depletion  Area of Muscle Mass Loss: temple region - temporalis muscle, clavicle bone region - pectoralis major, deltoid, trapezius muscles and dorsal hand - interosseous musle  Fluid Accumulation: does not meet criteria  Edema: does not meet criteria  Reduced  Strength: unable to obtain  A minimum of two characteristics is recommended for diagnosis of either severe or non-severe malnutrition.    Chart Review    Reason Seen: malnutrition screening tool    Diagnosis:  Recurrent syncope with falls  Acute bacterial cystitis  Bilateral adrenal nodules  Pancreatic cyst  Lactic acidosis  Hypokalemia  Abdominal pain  Essential hypertension    Relevant Medical History: HTN, HLD    Nutrition-Related Medications: Cipro, SSI, Zofran PRN, Kcl  Calorie Containing IV Medications: no significant kcals from medications at this time    Nutrition-Related Labs:  8/19: K 3.1, Glu 119, GFR>60    Diet/PN Order: Diet Soft & Bite Sized  Oral Supplement Order: none at this time  Tube Feeding Order: none at this time  Appetite/Oral  "Intake: poor/0-25% of meals  Factors Affecting Nutritional Intake: impaired cognitive status/motor control and decreased appetite  Food/Congregational/Cultural Preferences: none reported    Skin Integrity: excoriation (labia)  Wound(s):   none documented    Comments    8/19: Pt asleep during rounds; per pt's granddaughter, pt's appetite and wt has decreased over the last few years, more noticeably in the last 6 months. Granddaughter unsure of what weight was 6 months ago but states that the patient weighed ~175 lbs several years ago. Pt was on an appetite stimulant a few months ago which actually seemed to help but did not continue the regimen 2/2 price. Will send an oral supplement.     Anthropometrics    Height: 5' 7.99" (172.7 cm) Height Method: Stated  Weight: 61.2 kg (134 lb 14.7 oz) (08/17/22 0733) Weight Method: Bed Scale  BMI (Calculated): 20.5  BMI Classification: normal (BMI 18.5-24.9)  Ideal Body Weight (IBW), Female: 139.95 lb  % Ideal Body Weight, Female (lb): 96.41 %                       Usual Weight Provided By: not applicable    Wt Readings from Last 5 Encounters:   08/17/22 61.2 kg (134 lb 14.7 oz)   01/15/19 64.9 kg (142 lb 15.9 oz)     Weight Change(s) Since Admission:  Admit Weight: 61.2 kg (135 lb) (08/16/22 1901)    Estimated Needs    Weight Used For Calorie Calculations: 61.2 kg (134 lb 14.7 oz)  Energy Calorie Requirements (kcal): 1512 kcal (MSJ x 1.4 SF)  Energy Need Method: [facility]  Weight Used For Protein Calculations: 61.2 kg (134 lb 14.7 oz)  Protein Requirements: 80 gm (1.3g/kg)  Fluid Requirements (mL): 1836 mL (30mL/kg)  Temp: 98.2 °F (36.8 °C)       Enteral Nutrition    Patient not receiving enteral nutrition at this time.    Parenteral Nutrition    Patient not receiving parenteral nutrition support at this time.    Evaluation of Received Nutrient Intake    Calories: not meeting estimated needs  Protein: not meeting estimated needs    Patient Education    Not " applicable.    Nutrition Diagnosis     PES: Malnutrition related to insufficient energy intake as evidenced by <75% est energy requirements met >1 month, physical evidence of mild muscle and fat depletion. (new)    Interventions/Goals     Intervention(s): modified composition of meals/snacks, commercial beverage, multivitamin/mineral supplement therapy, prescription medication and collaboration with other providers  Goal: Meet greater than 75% of nutritional needs by follow-up. (new)    Monitoring & Evaluation     Dietitian will monitor energy intake and weight.  Nutrition Risk/Follow-Up: moderate (follow-up in 3-5 days)

## 2022-08-19 NOTE — PLAN OF CARE
Problem: Occupational Therapy  Goal: Occupational Therapy Goal  Description: Goals to be met by: 9/16/22     Patient will increase functional independence with ADLs by performing:    UE Dressing with Modified [place].  LE Dressing with Modified [place].  Grooming while standing with Modified [place].  Toileting from bedside commode with Modified [place] for hygiene  and clothing management.   Toilet transfer to bedside commode with Modified [place].progress  to toilet.    Outcome: Ongoing, Progressing

## 2022-08-19 NOTE — CONSULTS
Gastroenterology & Hepatology consult  History & Physical    Consult reason:  Dilated colon on imaging    HPI:  Ms. Darling is a 89-year-old female who presented to the ER with lower extremity pain, abdominal pain, cramping, and constipation.  She is a poor historian though endorses decreasing bowel movements with increasing leg pain, on pain medications, and decreased mobility.  She endorses no significant GI history in the past.  She reports undergoing colonoscopy 10 years ago that was negative.  She states as the constipation got worse, her crampy abdominal pain, nausea, and vomiting also became worse.  In the ER, she was found to have mild electrolyte abnormalities with elevated lactate and white count.  CT abdomen/pelvis with contrast showed mild colonic distention (5 cm in largest diameter by my measurement), a non obstructing pancreatic cyst, and no other acute findings.  General surgery was called given the dilated colon and lactic acidosis for which she was managed supportively.  Her lactic acidosis resolved and she reports no abdominal pain at this time.  She notably had 2 bowel movements this morning, brown per nursing report.  Unfortunately, with her recent falls and surgeries she has limited mobility secondary to pain.  She currently denies any infectious symptoms.      PCP:    Primary Doctor No        Review of patient's allergies indicates:  No Known Allergies         Current Facility-Administered Medications   Medication Dose Route Frequency Provider Last Rate Last Admin    acetaminophen tablet 650 mg  650 mg Oral Q8H PRN Umer Bynum MD        ciprofloxacin (CIPRO)400mg/200ml D5W IVPB 400 mg  400 mg Intravenous Q12H Neelima Duran  mL/hr at 08/18/22 1802 400 mg at 08/18/22 1802    dextrose 5 % and 0.9 % NaCl with potassium chloride 20 mEq   Intravenous Continuous Alex Whitfield  mL/hr at 08/17/22 2120 New Bag at 08/17/22 2120    dextrose 50% injection 12.5 g  12.5 g Intravenous PRN  Neelima Duran MD        dextrose 50% injection 25 g  25 g Intravenous PRN Neelima Duran MD        enoxaparin injection 30 mg  30 mg Subcutaneous Q12H Alex Whitfield MD        glucagon (human recombinant) injection 1 mg  1 mg Intramuscular PRN Neelima Duran MD        glucose chewable tablet 16 g  16 g Oral PRN Neelima Duran MD        glucose chewable tablet 24 g  24 g Oral PRN Neelima Duran MD        hydrALAZINE injection 10 mg  10 mg Intravenous Q2H PRN Georgina Nevarez PA-C   10 mg at 08/17/22 1346    HYDROcodone-acetaminophen 5-325 mg per tablet 1 tablet  1 tablet Oral Q6H PRN Neelima Duran MD        insulin aspart U-100 injection 0-5 Units  0-5 Units Subcutaneous QID (AC + HS) PRN Neelima Duran MD        melatonin tablet 6 mg  6 mg Oral Nightly PRN Umer Bynum MD        metronidazole IVPB 500 mg  500 mg Intravenous Q8H Neelima Duran  mL/hr at 08/18/22 1802 500 mg at 08/18/22 1802    morphine injection 4 mg  4 mg Intravenous Q6H PRN Neelima Duran MD        nystatin 100,000 unit/mL suspension 500,000 Units  500,000 Units Oral QID (WM & HS) Too Miner MD   500,000 Units at 08/18/22 1715    ondansetron injection 4 mg  4 mg Intravenous Q8H PRN Umer Bynum MD   4 mg at 08/17/22 1945    sodium chloride 0.9% flush 10 mL  10 mL Intravenous PRN Umer Bynum MD           No medications prior to admission.           Past Medical History:    No past medical history on file.     Past Surgical History:    No past surgical history on file.     Family History:    No family history on file.    Social History:    Social History     Tobacco Use    Smoking status: Not on file    Smokeless tobacco: Not on file   Substance Use Topics    Alcohol use: Not on file                 Review of Systems:        Review of Systems   Constitutional: Positive for activity change. Negative for chills and fever.   HENT: Negative.    Eyes: Negative.    Respiratory: Negative.    Cardiovascular:  Negative.    Gastrointestinal: Positive for constipation. Negative for abdominal distention, abdominal pain, blood in stool, nausea and vomiting.   Genitourinary: Negative.    Musculoskeletal: Positive for back pain.   Skin: Negative.    Neurological: Negative.    Psychiatric/Behavioral: Negative.            Objective:            VITAL SIGNS: 24 HR MIN & MAX    LAST    Temp  Min: 97.7 °F (36.5 °C)  Max: 98.5 °F (36.9 °C)    98.5 °F (36.9 °C)    BP  Min: 120/61  Max: 152/77    (!) 148/67    Pulse  Min: 72  Max: 81    76    Resp  Min: 14  Max: 20    14    SpO2  Min: 88 %  Max: 94 %    (!) 93 %          Intake/Output Summary (Last 24 hours) at 8/18/2022 1904  Last data filed at 8/18/2022 1807  Gross per 24 hour   Intake 240 ml   Output 1300 ml   Net -1060 ml           Physical Exam  Constitutional:       General: She is not in acute distress.     Appearance: Normal appearance. She is not ill-appearing.   HENT:      Head: Normocephalic and atraumatic.      Mouth/Throat:      Mouth: Mucous membranes are moist.      Comments: Poor dentition.  Eyes:      Conjunctiva/sclera: Conjunctivae normal.      Pupils: Pupils are equal, round, and reactive to light.   Cardiovascular:      Rate and Rhythm: Normal rate.      Pulses: Normal pulses.   Pulmonary:      Effort: Pulmonary effort is normal. No respiratory distress.   Abdominal:      General: Abdomen is flat. There is no distension.      Tenderness: There is no abdominal tenderness. There is no guarding or rebound.   Skin:     General: Skin is warm.      Coloration: Skin is not jaundiced.   Neurological:      General: No focal deficit present.      Mental Status: She is alert. Mental status is at baseline.   Psychiatric:         Mood and Affect: Mood normal.         Thought Content: Thought content normal.                 Recent Results (from the past 48 hour(s))   Comprehensive metabolic panel    Collection Time: 08/16/22  9:18 PM   Result Value Ref Range    Sodium Level 137  132 - 146 mmol/L    Potassium Level 2.6 (LL) 3.5 - 5.1 mmol/L    Chloride 97 (L) 98 - 111 mmol/L    Carbon Dioxide 26 23 - 31 mmol/L    Glucose Level 161 (H) 75 - 121 mg/dL    Blood Urea Nitrogen 17.8 9.8 - 20.1 mg/dL    Creatinine 0.92 0.55 - 1.02 mg/dL    Calcium Level Total 9.7 8.4 - 10.2 mg/dL    Protein Total 6.6 5.8 - 7.6 gm/dL    Albumin Level 3.8 3.4 - 4.8 gm/dL    Globulin 2.8 2.4 - 3.5 gm/dL    Albumin/Globulin Ratio 1.4 1.1 - 2.0 ratio    Bilirubin Total 1.2 <=1.5 mg/dL    Alkaline Phosphatase 79 40 - 150 unit/L    Alanine Aminotransferase 37 0 - 55 unit/L    Aspartate Aminotransferase 50 (H) 5 - 34 unit/L    eGFR 59 mls/min/1.73/m2   Lipase    Collection Time: 08/16/22  9:18 PM   Result Value Ref Range    Lipase Level 12 <=60 U/L   CBC with Differential    Collection Time: 08/16/22  9:18 PM   Result Value Ref Range    WBC 28.1 (H) 4.5 - 11.5 x10(3)/mcL    RBC 4.49 4.20 - 5.40 x10(6)/mcL    Hgb 14.3 12.0 - 16.0 gm/dL    Hct 42.0 37.0 - 47.0 %    MCV 93.5 80.0 - 94.0 fL    MCH 31.8 (H) 27.0 - 31.0 pg    MCHC 34.0 33.0 - 36.0 mg/dL    RDW 13.0 11.5 - 17.0 %    Platelet 250 130 - 400 x10(3)/mcL    MPV 9.9 7.4 - 10.4 fL    IG# 0.31 (H) 0 - 0.04 x10(3)/mcL    IG% 1.1 %    NRBC% 0.0 %   Manual Differential    Collection Time: 08/16/22  9:18 PM   Result Value Ref Range    Neut Man 93 %    Lymph Man 4 %    Monocyte Man 4 %    Instr WBC 28 x10(3)/mcL    Abs Mono 1.12 0.1 - 1.3 x10(3)/mcL    Abs Lymp 1.12 0.6 - 4.6 x10(3)/mcL    Abs Neut 26.04 (H) 2.1 - 9.2 x10(3)/mcL    RBC Morph Abnormal (A) Normal    Poik 2+ (A) (none)    Shanon Cells 1+ (A) (none)    Elliptocytosis 1+ (A) (none)    Platelet Est Adequate Normal, Adequate   Urinalysis, Reflex to Urine Culture Urine, Clean Catch    Collection Time: 08/16/22 10:25 PM    Specimen: Urine   Result Value Ref Range    Color, UA Dark Yellow (A) Yellow, Colorless, Other, Clear    Appearance, UA Clear Clear    Specific Gravity, UA 1.023 1.001 - 1.030    pH, UA 6.0 5.0, 5.5,  6.0, 6.5, 7.0, 7.5, 8.0, 8.5    Protein, UA Trace (A) Negative, 300  mg/dL    Glucose, UA Negative Negative, Normal mg/dL    Ketones, UA Trace (A) Negative, +1, +2, +3, +4, +5, >=160, >=80 mg/dL    Blood, UA Negative Negative unit/L    Bilirubin, UA 2+ (A) Negative mg/dL    Urobilinogen, UA 1.0 0.2, 1.0, Normal mg/dL    Nitrites, UA Positive (A) Negative    Leukocyte Esterase, UA 1+ (A) Negative, 75  unit/L   Urinalysis, Microscopic    Collection Time: 08/16/22 10:25 PM   Result Value Ref Range    RBC, UA <5 <=5 /HPF    WBC, UA <5 <=5 /HPF    Squamous Epithelial Cells, UA <5 <=5 /HPF    Bacteria, UA None Seen None Seen, Rare, Occasional /HPF   Lactic acid, plasma    Collection Time: 08/17/22  1:48 AM   Result Value Ref Range    Lactic Acid Level 2.5 (H) 0.5 - 2.2 mmol/L   Blood Culture #1 **CANNOT BE ORDERED STAT**    Collection Time: 08/17/22  1:48 AM    Specimen: Blood   Result Value Ref Range    CULTURE, BLOOD ([facility]) No Growth At 24 Hours    Blood Culture #2 **CANNOT BE ORDERED STAT**    Collection Time: 08/17/22  1:48 AM    Specimen: Blood   Result Value Ref Range    CULTURE, BLOOD ([facility]) No Growth At 24 Hours    Lactic Acid, Plasma    Collection Time: 08/17/22  6:08 AM   Result Value Ref Range    Lactic Acid Level 3.9 (HH) 0.5 - 2.2 mmol/L   Basic Metabolic Panel    Collection Time: 08/17/22  9:05 AM   Result Value Ref Range    Sodium Level 135 132 - 146 mmol/L    Potassium Level 3.1 (L) 3.5 - 5.1 mmol/L    Chloride 102 98 - 111 mmol/L    Carbon Dioxide 21 (L) 23 - 31 mmol/L    Glucose Level 152 (H) 75 - 121 mg/dL    Blood Urea Nitrogen 22.4 (H) 9.8 - 20.1 mg/dL    Creatinine 0.80 0.55 - 1.02 mg/dL    BUN/Creatinine Ratio 28     Calcium Level Total 8.5 8.4 - 10.2 mg/dL    Anion Gap 12.0 mEq/L    eGFR >60 mls/min/1.73/m2   Phosphorus    Collection Time: 08/17/22  9:05 AM   Result Value Ref Range    Phosphorus Level 3.6 2.3 - 4.7 mg/dL   Magnesium    Collection Time: 08/17/22  9:05 AM   Result Value Ref Range     Magnesium Level 3.60 (H) 1.60 - 2.60 mg/dL   Lactic Acid, Plasma    Collection Time: 08/17/22  9:05 AM   Result Value Ref Range    Lactic Acid Level 5.3 (HH) 0.5 - 2.2 mmol/L   CBC with Differential    Collection Time: 08/17/22 10:10 AM   Result Value Ref Range    WBC 28.4 (H) 4.5 - 11.5 x10(3)/mcL    RBC 4.45 4.20 - 5.40 x10(6)/mcL    Hgb 14.0 12.0 - 16.0 gm/dL    Hct 41.9 37.0 - 47.0 %    MCV 94.2 (H) 80.0 - 94.0 fL    MCH 31.5 (H) 27.0 - 31.0 pg    MCHC 33.4 33.0 - 36.0 mg/dL    RDW 13.2 11.5 - 17.0 %    Platelet 269 130 - 400 x10(3)/mcL    MPV 10.3 7.4 - 10.4 fL    IG# 0.57 (H) 0 - 0.04 x10(3)/mcL    IG% 2.0 %    NRBC% 0.0 %   Manual Differential    Collection Time: 08/17/22 10:10 AM   Result Value Ref Range    Neut Man 92 %    Lymph Man 3 %    Monocyte Man 5 %    Instr WBC 28.4 x10(3)/mcL    Abs Mono 1.42 (H) 0.1 - 1.3 x10(3)/mcL    Abs Lymp 0.852 0.6 - 4.6 x10(3)/mcL    Abs Neut 26.128 (H) 2.1 - 9.2 x10(3)/mcL    RBC Morph Abnormal (A) Normal    Poik 2+ (A) (none)    Tear drop cell 1+ (A) (none)    Shanon Cells 1+ (A) (none)    Platelet Est Normal Normal, Adequate   CV Ultrasound Bilateral Doppler Carotid    Collection Time: 08/17/22 10:43 AM   Result Value Ref Range    Left ICA/CCA ratio 1.59     Right ICA/CCA ratio 1.13     Left Highest ICA 78.00     Left Highest CCA 59     Right Highest ICA 62.00     Right Highest CCA 71     Right Highest EDV 15.00     LT Highest EDV 18.00     Right CCA prox sys 71 cm/s    Right CCA prox carrillo 8 cm/s    Right CCA dist sys 55 cm/s    Right CCA dist carrillo 9 cm/s    Right ICA prox sys 46 cm/s    Right ICA prox carrillo 10 cm/s    Right ICA mid sys 62 cm/s    Right ICA mid carrillo 15 cm/s    Right ICA dist sys 60 cm/s    Right ICA dist carrillo 15 cm/s    Right ECA sys 54 cm/s    Right vertebral sys 36 cm/s    Left CCA prox sys 59 cm/s    Left CCA dist sys 49 cm/s    Left CCA dist carrillo 9 cm/s    Left ICA prox sys 50 cm/s    Left ICA prox carrillo 12 cm/s    Left ICA mid sys 78 cm/s    Left ICA  mid carrillo 18 cm/s    Left ICA dist sys 56 cm/s    Left ICA dist carrillo 13 cm/s    Left ECA sys 68 cm/s    Left vertebral sys 46 cm/s    Right vertebral carrillo 6 cm/s    Right ECA carrillo 0 cm/s    Left vertebral carrillo 10 cm/s   Hemoglobin A1C    Collection Time: 08/17/22 11:03 AM   Result Value Ref Range    Hemoglobin A1c 5.2 <=7.0 %    Estimated Average Glucose 102.5 mg/dL   Lipid Panel    Collection Time: 08/17/22 11:03 AM   Result Value Ref Range    Cholesterol Total 78 <=200 mg/dL    HDL Cholesterol 35 35 - 60 mg/dL    Triglyceride 71 37 - 140 mg/dL    Cholesterol/HDL Ratio 2 0 - 5    Very Low Density Lipoprotein 14     LDL Cholesterol 29.00 (L) 50.00 - 140.00 mg/dL   Echo    Collection Time: 08/17/22 12:20 PM   Result Value Ref Range    BSA 1.71 m2    TDI SEPTAL 0.05 m/s    LV LATERAL E/E' RATIO 10.67 m/s    LV SEPTAL E/E' RATIO 12.80 m/s    EF 66 %    Left Ventricular Outflow Tract Mean Velocity 0.77 cm/s    Left Ventricular Outflow Tract Mean Gradient 3.00 mmHg    TDI LATERAL 0.06 m/s    PV PEAK VELOCITY 1.40 cm/s    LVIDd 3.53 3.5 - 6.0 cm    IVS 0.97 0.6 - 1.1 cm    Posterior Wall 1.20 (A) 0.6 - 1.1 cm    LVIDs 2.79 2.1 - 4.0 cm    FS 21 28 - 44 %    LV mass 118.08 g    RVDD 2.21 cm    TAPSE 1.67 cm    Left Ventricle Relative Wall Thickness 0.68 cm    AV mean gradient 3 mmHg    AV valve area 2.78 cm2    AV Velocity Ratio 0.88     AV index (prosthetic) 0.89     MV mean gradient 2 mmHg    MV valve area by continuity eq 2.07 cm2    E/A ratio 0.76     Mean e' 0.06 m/s    LVOT diameter 2.00 cm    LVOT area 3.1 cm2    LVOT peak jorge 1.00 m/s    LVOT peak VTI 18.50 cm    Ao peak jorge 1.13 m/s    Ao VTI 20.9 cm    LVOT stroke volume 58.09 cm3    AV peak gradient 5 mmHg    MV peak gradient 5 mmHg    E/E' ratio 11.64 m/s    MV Peak E Jorge 0.64 m/s    TR Max Jorge 2.33 m/s    MV VTI 28.1 cm    MV Peak A Jorge 0.84 m/s    LV Systolic Volume 29.30 mL    LV Systolic Volume Index 16.9 mL/m2    LV Diastolic Volume 51.90 mL    LV Diastolic  Volume Index 30.00 mL/m2    LV Mass Index 68 g/m2    Triscuspid Valve Regurgitation Peak Gradient 22 mmHg   Lactic Acid, Plasma    Collection Time: 08/17/22 12:36 PM   Result Value Ref Range    Lactic Acid Level 2.5 (H) 0.5 - 2.2 mmol/L   Lactic Acid, Plasma    Collection Time: 08/17/22  2:42 PM   Result Value Ref Range    Lactic Acid Level 3.4 (H) 0.5 - 2.2 mmol/L   Lactic Acid, Plasma    Collection Time: 08/17/22  6:00 PM   Result Value Ref Range    Lactic Acid Level 3.2 (H) 0.5 - 2.2 mmol/L   Lactic Acid, Plasma    Collection Time: 08/17/22 10:31 PM   Result Value Ref Range    Lactic Acid Level 3.0 (H) 0.5 - 2.2 mmol/L   POCT glucose    Collection Time: 08/18/22 12:22 AM   Result Value Ref Range    POCT Glucose 174 (H) 70 - 110 mg/dL   Comprehensive Metabolic Panel    Collection Time: 08/18/22  2:24 AM   Result Value Ref Range    Sodium Level 139 132 - 146 mmol/L    Potassium Level 3.5 3.5 - 5.1 mmol/L    Chloride 107 98 - 111 mmol/L    Carbon Dioxide 25 23 - 31 mmol/L    Glucose Level 132 (H) 75 - 121 mg/dL    Blood Urea Nitrogen 19.4 9.8 - 20.1 mg/dL    Creatinine 0.66 0.55 - 1.02 mg/dL    Calcium Level Total 7.4 (L) 8.4 - 10.2 mg/dL    Protein Total 4.4 (L) 5.8 - 7.6 gm/dL    Albumin Level 2.4 (L) 3.4 - 4.8 gm/dL    Globulin 2.0 (L) 2.4 - 3.5 gm/dL    Albumin/Globulin Ratio 1.2 1.1 - 2.0 ratio    Bilirubin Total 0.7 <=1.5 mg/dL    Alkaline Phosphatase 63 40 - 150 unit/L    Alanine Aminotransferase 24 0 - 55 unit/L    Aspartate Aminotransferase 33 5 - 34 unit/L    eGFR >60 mls/min/1.73/m2   Lactic Acid, Plasma    Collection Time: 08/18/22  2:24 AM   Result Value Ref Range    Lactic Acid Level 2.0 0.5 - 2.2 mmol/L   CBC with Differential    Collection Time: 08/18/22  2:24 AM   Result Value Ref Range    WBC 19.6 (H) 4.5 - 11.5 x10(3)/mcL    RBC 3.78 (L) 4.20 - 5.40 x10(6)/mcL    Hgb 11.9 (L) 12.0 - 16.0 gm/dL    Hct 36.0 (L) 37.0 - 47.0 %    MCV 95.2 (H) 80.0 - 94.0 fL    MCH 31.5 (H) 27.0 - 31.0 pg    MCHC 33.1  33.0 - 36.0 mg/dL    RDW 13.6 11.5 - 17.0 %    Platelet 226 130 - 400 x10(3)/mcL    MPV 9.3 7.4 - 10.4 fL    Neut % 82.2 %    Lymph % 6.4 %    Mono % 7.4 %    Eos % 0.5 %    Basophil % 0.3 %    Lymph # 1.25 0.6 - 4.6 x10(3)/mcL    Neut # 16.2 (H) 2.1 - 9.2 x10(3)/mcL    Mono # 1.45 (H) 0.1 - 1.3 x10(3)/mcL    Eos # 0.10 0 - 0.9 x10(3)/mcL    Baso # 0.06 0 - 0.2 x10(3)/mcL    IG# 0.62 (H) 0 - 0.04 x10(3)/mcL    IG% 3.2 %    NRBC% 0.0 %   CK    Collection Time: 08/18/22  2:24 AM   Result Value Ref Range    Creatine Kinase 223 (H) 29 - 168 U/L   POCT glucose    Collection Time: 08/18/22  5:12 AM   Result Value Ref Range    POCT Glucose 165 (H) 70 - 110 mg/dL   POCT glucose    Collection Time: 08/18/22 10:58 AM   Result Value Ref Range    POCT Glucose 122 (H) 70 - 110 mg/dL   POCT glucose    Collection Time: 08/18/22  6:00 PM   Result Value Ref Range    POCT Glucose 144 (H) 70 - 110 mg/dL           X-Ray Chest 1 View    Result Date: 8/17/2022  EXAMINATION: XR CHEST 1 VIEW CLINICAL HISTORY: cough;, . COMPARISON: September 27, 2017 FINDINGS: No alveolar consolidation, effusion, or pneumothorax is seen.   The thoracic aorta is normal  cardiac silhouette, central pulmonary vessels and mediastinum are normal in size and are grossly unremarkable.   visualized osseous structures are grossly unremarkable. Some linear densities in the left retrocardiac region either represent atelectatic changes and or fibrotic streaks     No acute chest disease is identified. Electronically signed by: Bharathi Gauthier Date:    08/17/2022 Time:    09:06    CT Head Without Contrast    Result Date: 8/17/2022  EXAMINATION: CT HEAD WITHOUT CONTRAST CLINICAL HISTORY: Syncope, recurrent; TECHNIQUE: Sequential axial images were performed of the brain without contrast. Dose product length of 1091 mGycm. Automated exposure control was utilized to minimize radiation dose. COMPARISON: None available. FINDINGS: There is no intracranial mass effect, midline  shift, hydrocephalus or hemorrhage. There is no sulcal effacement or low attenuation changes to suggest recent large vessel territory infarction. Chronic appearing periventricular and subcortical white matter low attenuation changes are consistent with chronic microangiopathic ischemia. The ventricular system and sulcal markings prominence is consistent with atrophy. There is no acute extra axial fluid collection.  There is an expanded empty sella.  Visualized paranasal sinuses are clear without mucosal thickening, polypoidal abnormality or air-fluid levels. Mastoid air cells aeration is optimal.     1.  No acute intracranial findings identified. 2.  Chronic microangiopathic ischemia and atrophy. Electronically signed by: Jorden Gomez Date:    08/17/2022 Time:    11:30    CT Abdomen Pelvis With Contrast    Result Date: 8/17/2022  EXAMINATION: CT ABDOMEN PELVIS WITH CONTRAST CLINICAL HISTORY: Abdominal pain, acute, nonlocalized;Fall few days ago, worsening abdominal pain; TECHNIQUE: Helically acquired images with axial, sagittal and coronal reformations were obtained from the lung bases to the pubic symphysis after the IV administration of contrast. Automated tube current modulation, weight-based exposure dosing, and/or iterative reconstruction technique utilized to reach lowest reasonably achievable exposure rate. DLP: 319 mGy*cm COMPARISON: No relevant prior available for comparison at the time of dictation. FINDINGS: HEART: Normal in size. No pericardial effusion. LUNG BASES: Basilar atelectasis versus scarring. LIVER: Normal attenuation. No appreciable focal hepatic lesion. BILIARY: Gallbladder is surgically absent. PANCREAS: There are small pancreatic cysts.  Cysts measure 1.5 cm at the tail (3, 24 and 1.1 cm at the neck (3, 23. SPLEEN: Normal in size ADRENALS: There are bilateral adrenal nodules.  Right adrenal nodule measures 1.6 cm.  Left adrenal nodule measures 1.1 cm.  Contrast enhanced CT appearance is  nonspecific. KIDNEYS/URETERS: The kidneys enhance symmetrically.  No hydronephrosis.   Too small to characterize left renal cortical hypodensity.  Peripelvic left renal cyst at the lower pole. GI TRACT/MESENTERY: There is fluid in the distal esophagus.  Small sliding hiatal hernia.  The small bowel is normal in caliber.  The colon is distended with stool which appears liquified to the level of the descending colon.  The appendix is not confidently visualized. PERITONEUM: No free fluid.No free air. LYMPH NODES: No enlarged lymph nodes by size criteria. VASCULATURE: Aortic atherosclerosis. BLADDER: Nondistended bladder limits CT evaluation REPRODUCTIVE ORGANS: Normal as visualized. SOFT TISSUES: Unremarkable. BONES: Postop left hip arthroplasty.  Lumbar spondylosis.     1. Mild distention of the colon with liquefied stool.  No appreciable obstructing lesion by CT. 2. Indeterminate bilateral adrenal nodules.  In a patient with no known history of malignancy this can be evaluated with 1 year follow-up adrenal CT per ACR white paper.  Consider clinical endocrinology evaluation 3. Pancreatic cysts.  For incidental pancreatic cysts in patients greater than 80 years of age at presentation ACR white paper recommend reimaging in 2 years depending on clinical comorbidity. The preliminary and final reports are concordant. Electronically signed by: Lise Holloway Date:    08/17/2022 Time:    07:54    Echo    Result Date: 8/17/2022  · The left ventricle is normal in size with concentric remodeling and normal systolic function. · The estimated ejection fraction is 66%. · Normal right ventricular size with normal right ventricular systolic function. · Grade I left ventricular diastolic dysfunction.      CV Ultrasound Bilateral Doppler Carotid    Result Date: 8/18/2022  The right internal carotid artery demonstrated less than 50% stenosis. The left internal carotid artery demonstrated less than 50% stenosis. Bilateral vertebral  arteries were patent with antegrade flow.                Assessment & Plan:     This is a 89-year-old female admitted to the hospital service with extremity pain, falls, nausea vomiting and abdominal pain in the setting of electrolyte abnormalities and lactic acidosis.  Her electrolyte abnormalities and lactic acidosis has resolved in addition to her GI complaints.  She was tolerating a clear liquid diet on my exam this evening.  Regarding her bowel distension, it was mild (5 cm at max) with no abnormal thickening and indicate colitis.  I believe her overall symptom burden was due to her poor p.o. intake, electrolyte abnormalities, and constipation secondary to her immobility with lower extremity pain.  Given she is having bowel movements at this time, tolerating p.o. intake, and has a benign abdominal exam, I would not pursue any further workup at this time.  Recommendations are to continue aggressive electrolyte repletion, advanced diet as tolerated, and bowel regimen to ensure daily bowel movements.  She would benefit from increased mobility is able.    Thank you for this consult    Silver Padilla MD

## 2022-08-19 NOTE — PLAN OF CARE
Problem: Physical Therapy  Goal: Physical Therapy Goal  Description: Goals to be met by: 22     Patient will increase functional independence with mobility by performin. Supine to sit with Stand-by Assistance  2. Sit to stand transfer with Stand-by Assistance  3. Gait  x 200 feet with Stand-by Assistance using Rolling Walker.     Outcome: Ongoing, Progressing

## 2022-08-19 NOTE — PROGRESS NOTES
[facility] Hospital Medicine Progress Note        Chief Complaint: Inpatient Follow-up for     Subjective:  Fidelina Darling is a 89 y.o. White female with a past medical history of hypertension, hyperlipidemia. The patient presented to [facility] on 8/16/2022 with a primary complaint of abdominal pain and falls due to syncope.  Patient reports having a fall on 8/12 while in her garage.  She states she was grabbing for the car door handle when she missed it and fell. Fall was unwitnessed and she reports loss of consciousness. On 08/15 she had a second fall while in the kitchen but denies any loss of consciousness.  Yesterday (08/16/2022) patient was going to the bathroom when she passed out.  Episode was witnessed by neighbor who is visiting her. She reports urinary frequency. Patient denies complaints of headache, vision changes, shortness of breath, cough.  She has been experiencing lower abdominal pain for the last several days. Granddaughter at bedside states that patient is dependent on laxatives have a bowel movement.  Although she uses laxatives she often has to manually disimpact herself . She normally goes every 4-5 days with last bowel movement being 5 days ago. She has been having increased belching.  Granddaughter also states patient has little to no appetite.  Patient lives alone, ambulates with walker, drives and completes activity daily living independently.  Family as it patient to be experiencing dementia over the last 6 months.     Upon presentation to the ED, patient afebrile, hemodynamically stable and SpO2 96% on room air.  Labs notable for WBC 28, potassium 2.6, glucose 161, lactic acid 3.9.  UA with 1+ leukocyte esterase, 2+ bilirubin, positive nitrites, trace ketones and protein.  Chest x-ray without acute processes.  CT abdomen pelvis revealed mild distention of the colon with liquefied stool, indeterminate bilateral adrenal nodules and pancreatic cysts.  While in the  ED patient received 40 mEq of IV potassium chloride, 2 g of magnesium sulfate and Rocephin was started for UTI.  Patient admitted to hospital medicine services for further medical management.    Patient is having BMs GI and surgery signed off.     Looking much better and communicative today.     Objective/physical exam:  General appearance: Well-developed, well-nourished female in no apparent distress.  Granddaughter at bedside.  HEENT: Atraumatic head. Dry mucous membranes of oral cavity.  Lungs: Clear to auscultation bilaterally.   Heart: Regular rate and rhythm.   Abdomen: Soft, mildly distended, generalized tenderness. Bowel sounds are hypoactive.  Frequently belching  Extremities: No cyanosis, clubbing. No deformities.  Skin: No Rash. Warm and dry.  Neuro: Awake, alert and oriented self, place and city. Motor and sensory exams grossly intact.    VITAL SIGNS: 24 HRS MIN & MAX LAST   Temp  Min: 97.3 °F (36.3 °C)  Max: 99.1 °F (37.3 °C) 98.2 °F (36.8 °C)   BP  Min: 120/65  Max: 148/67 120/65   Pulse  Min: 71  Max: 80  71   Resp  Min: 14  Max: 18 17   SpO2  Min: 93 %  Max: 94 % (!) 94 %       Labs, Microbiology and Imaging were Reviewed.      Microbiology Results (last 7 days)     Procedure Component Value Units Date/Time    Urine Culture High Risk [[id number]] Collected: 08/18/22 1325    Order Status: Completed Specimen: Urine, Clean Catch Updated: 08/19/22 0632     Urine Culture No Growth At 24 Hours    Blood Culture #2 **CANNOT BE ORDERED STAT** [[id number]]  (Normal) Collected: 08/17/22 0148    Order Status: Completed Specimen: Blood Updated: 08/19/22 0300     CULTURE, BLOOD ([facility]) No Growth At 48 Hours    Blood Culture #1 **CANNOT BE ORDERED STAT** [[id number]]  (Normal) Collected: 08/17/22 0148    Order Status: Completed Specimen: Blood Updated: 08/19/22 0300     CULTURE, BLOOD ([facility]) No Growth At 48 Hours    Stool Culture [[id number]]     Order Status: Sent Specimen: Stool     Clostridium Diff Toxin, A & B, EIA  [[id number]]     Order Status: Sent Specimen: Stool              Medications   ciprofloxacin  400 mg Intravenous Q12H    enoxaparin  30 mg Subcutaneous Q12H    metronidazole  500 mg Intravenous Q8H    nystatin  500,000 Units Oral QID ([facility] & HS)    potassium chloride  40 mEq Oral Q4H        acetaminophen, dextrose 50%, dextrose 50%, glucagon (human recombinant), glucose, glucose, hydrALAZINE, HYDROcodone-acetaminophen, insulin aspart U-100, melatonin, morphine, ondansetron, sodium chloride 0.9%     Radiology:  CV Ultrasound Bilateral Doppler Carotid  The right internal carotid artery demonstrated less than 50% stenosis.  The left internal carotid artery demonstrated less than 50% stenosis.  Bilateral vertebral arteries were patent with antegrade flow.          Assessment/Plan:  Recurrent syncope with falls  Acute bacterial cystitis  Bilateral adrenal nodules  Pancreatic cyst  Lactic acidosis  Hypokalemia  Abdominal pain  Essential hypertension     Plan:  - patient is having bms , continues to have burping most likely from sliding hernia.   - oral candidiasis, nystatin SS.  - continue treating with ciprofloxacin and Flagyl.  She is responding.  -will get stool cultures and C diff since she is having diarrhea>5 and normal stool softeners.  -is general surgery recommendations were noted.  -lactic acid is 2.  she is hemodynamically stable, CK level was checked and was less than 300.    - hypokalemia resolved.  Surgery and GI signed off,     All diagnosis and differential diagnosis have been reviewed; assessment and plan has been documented; I have personally reviewed the labs and test results that are presently available; I have reviewed the patients medication list; I have reviewed the consulting providers response and recommendations. I have reviewed or attempted to review medical records based upon their availability.       Too Miner MD   08/19/2022

## 2022-08-20 LAB
ANION GAP SERPL CALC-SCNC: 5 MEQ/L
BACTERIA UR CULT: NO GROWTH
BASOPHILS # BLD AUTO: 0.08 X10(3)/MCL (ref 0–0.2)
BASOPHILS NFR BLD AUTO: 0.8 %
BUN SERPL-MCNC: 11.7 MG/DL (ref 9.8–20.1)
CALCIUM SERPL-MCNC: 7.5 MG/DL (ref 8.4–10.2)
CHLORIDE SERPL-SCNC: 107 MMOL/L (ref 98–111)
CO2 SERPL-SCNC: 22 MMOL/L (ref 23–31)
CREAT SERPL-MCNC: 0.53 MG/DL (ref 0.55–1.02)
CREAT/UREA NIT SERPL: 22
EOSINOPHIL # BLD AUTO: 0.18 X10(3)/MCL (ref 0–0.9)
EOSINOPHIL NFR BLD AUTO: 1.7 %
ERYTHROCYTE [DISTWIDTH] IN BLOOD BY AUTOMATED COUNT: 13.6 % (ref 11.5–17)
GFR SERPLBLD CREATININE-BSD FMLA CKD-EPI: >60 MLS/MIN/1.73/M2
GLUCOSE SERPL-MCNC: 106 MG/DL (ref 75–121)
HCT VFR BLD AUTO: 36.3 % (ref 37–47)
HGB BLD-MCNC: 12 GM/DL (ref 12–16)
IMM GRANULOCYTES # BLD AUTO: 0.08 X10(3)/MCL (ref 0–0.04)
IMM GRANULOCYTES NFR BLD AUTO: 0.8 %
LACTATE SERPL-SCNC: 1.7 MMOL/L (ref 0.5–2.2)
LYMPHOCYTES # BLD AUTO: 1.52 X10(3)/MCL (ref 0.6–4.6)
LYMPHOCYTES NFR BLD AUTO: 14.5 %
MAGNESIUM SERPL-MCNC: 2.1 MG/DL (ref 1.6–2.6)
MCH RBC QN AUTO: 31.3 PG (ref 27–31)
MCHC RBC AUTO-ENTMCNC: 33.1 MG/DL (ref 33–36)
MCV RBC AUTO: 94.5 FL (ref 80–94)
MONOCYTES # BLD AUTO: 1.24 X10(3)/MCL (ref 0.1–1.3)
MONOCYTES NFR BLD AUTO: 11.8 %
NEUTROPHILS # BLD AUTO: 7.4 X10(3)/MCL (ref 2.1–9.2)
NEUTROPHILS NFR BLD AUTO: 70.4 %
NRBC BLD AUTO-RTO: 0 %
PLATELET # BLD AUTO: 253 X10(3)/MCL (ref 130–400)
PMV BLD AUTO: 9.4 FL (ref 7.4–10.4)
POCT GLUCOSE: 111 MG/DL (ref 70–110)
POCT GLUCOSE: 123 MG/DL (ref 70–110)
POCT GLUCOSE: 132 MG/DL (ref 70–110)
POCT GLUCOSE: 133 MG/DL (ref 70–110)
POCT GLUCOSE: 135 MG/DL (ref 70–110)
POCT GLUCOSE: 181 MG/DL (ref 70–110)
POTASSIUM SERPL-SCNC: 4.2 MMOL/L (ref 3.5–5.1)
RBC # BLD AUTO: 3.84 X10(6)/MCL (ref 4.2–5.4)
SODIUM SERPL-SCNC: 134 MMOL/L (ref 132–146)
WBC # SPEC AUTO: 10.5 X10(3)/MCL (ref 4.5–11.5)

## 2022-08-20 PROCEDURE — 21400001 HC TELEMETRY ROOM

## 2022-08-20 PROCEDURE — 63600175 PHARM REV CODE 636 W HCPCS: Performed by: INTERNAL MEDICINE

## 2022-08-20 PROCEDURE — 85025 COMPLETE CBC W/AUTO DIFF WBC: CPT | Performed by: INTERNAL MEDICINE

## 2022-08-20 PROCEDURE — 25000242 PHARM REV CODE 250 ALT 637 W/ HCPCS: Performed by: NURSE PRACTITIONER

## 2022-08-20 PROCEDURE — 25000003 PHARM REV CODE 250: Performed by: INTERNAL MEDICINE

## 2022-08-20 PROCEDURE — 63600175 PHARM REV CODE 636 W HCPCS: Performed by: STUDENT IN AN ORGANIZED HEALTH CARE EDUCATION/TRAINING PROGRAM

## 2022-08-20 PROCEDURE — S0030 INJECTION, METRONIDAZOLE: HCPCS | Performed by: INTERNAL MEDICINE

## 2022-08-20 PROCEDURE — 36415 COLL VENOUS BLD VENIPUNCTURE: CPT | Performed by: INTERNAL MEDICINE

## 2022-08-20 PROCEDURE — 97530 THERAPEUTIC ACTIVITIES: CPT | Mod: CQ

## 2022-08-20 PROCEDURE — 11000001 HC ACUTE MED/SURG PRIVATE ROOM

## 2022-08-20 PROCEDURE — 25000242 PHARM REV CODE 250 ALT 637 W/ HCPCS: Performed by: INTERNAL MEDICINE

## 2022-08-20 PROCEDURE — 94640 AIRWAY INHALATION TREATMENT: CPT

## 2022-08-20 PROCEDURE — 25000003 PHARM REV CODE 250: Performed by: STUDENT IN AN ORGANIZED HEALTH CARE EDUCATION/TRAINING PROGRAM

## 2022-08-20 PROCEDURE — 83605 ASSAY OF LACTIC ACID: CPT | Performed by: INTERNAL MEDICINE

## 2022-08-20 PROCEDURE — 99900031 HC PATIENT EDUCATION (STAT)

## 2022-08-20 PROCEDURE — 80048 BASIC METABOLIC PNL TOTAL CA: CPT | Performed by: INTERNAL MEDICINE

## 2022-08-20 PROCEDURE — 94761 N-INVAS EAR/PLS OXIMETRY MLT: CPT

## 2022-08-20 PROCEDURE — 83735 ASSAY OF MAGNESIUM: CPT | Performed by: INTERNAL MEDICINE

## 2022-08-20 RX ORDER — IPRATROPIUM BROMIDE AND ALBUTEROL SULFATE 2.5; .5 MG/3ML; MG/3ML
3 SOLUTION RESPIRATORY (INHALATION) EVERY 6 HOURS
Status: DISCONTINUED | OUTPATIENT
Start: 2022-08-20 | End: 2022-09-08

## 2022-08-20 RX ORDER — IPRATROPIUM BROMIDE AND ALBUTEROL SULFATE 2.5; .5 MG/3ML; MG/3ML
3 SOLUTION RESPIRATORY (INHALATION) EVERY 4 HOURS PRN
Status: DISCONTINUED | OUTPATIENT
Start: 2022-08-20 | End: 2022-09-13 | Stop reason: HOSPADM

## 2022-08-20 RX ADMIN — LEUCINE, PHENYLALANINE, LYSINE, METHIONINE, ISOLEUCINE, VALINE, HISTIDINE, THREONINE, TRYPTOPHAN, ALANINE, GLYCINE, ARGININE, PROLINE, SERINE, TYROSINE, SODIUM ACETATE, DIBASIC POTASSIUM PHOSPHATE, MAGNESIUM CHLORIDE, SODIUM CHLORIDE, CALCIUM CHLORIDE, DEXTROSE
311; 238; 247; 170; 255; 247; 204; 179; 77; 880; 438; 489; 289; 213; 17; 297; 261; 51; 77; 33; 5 INJECTION INTRAVENOUS at 02:08

## 2022-08-20 RX ADMIN — NYSTATIN 500000 UNITS: 100000 SUSPENSION ORAL at 08:08

## 2022-08-20 RX ADMIN — METRONIDAZOLE 500 MG: 500 INJECTION, SOLUTION INTRAVENOUS at 09:08

## 2022-08-20 RX ADMIN — PIPERACILLIN SODIUM AND TAZOBACTAM SODIUM 4.5 G: 4; .5 INJECTION, POWDER, LYOPHILIZED, FOR SOLUTION INTRAVENOUS at 08:08

## 2022-08-20 RX ADMIN — IPRATROPIUM BROMIDE AND ALBUTEROL SULFATE 3 ML: 2.5; .5 SOLUTION RESPIRATORY (INHALATION) at 01:08

## 2022-08-20 RX ADMIN — PIPERACILLIN SODIUM AND TAZOBACTAM SODIUM 4.5 G: 4; .5 INJECTION, POWDER, LYOPHILIZED, FOR SOLUTION INTRAVENOUS at 02:08

## 2022-08-20 RX ADMIN — ONDANSETRON 4 MG: 2 INJECTION INTRAMUSCULAR; INTRAVENOUS at 05:08

## 2022-08-20 RX ADMIN — NYSTATIN 500000 UNITS: 100000 SUSPENSION ORAL at 12:08

## 2022-08-20 RX ADMIN — ENOXAPARIN SODIUM 30 MG: 30 INJECTION SUBCUTANEOUS at 08:08

## 2022-08-20 RX ADMIN — CIPROFLOXACIN 400 MG: 2 INJECTION, SOLUTION INTRAVENOUS at 05:08

## 2022-08-20 RX ADMIN — METRONIDAZOLE 500 MG: 500 INJECTION, SOLUTION INTRAVENOUS at 12:08

## 2022-08-20 RX ADMIN — IPRATROPIUM BROMIDE AND ALBUTEROL SULFATE 3 ML: 2.5; .5 SOLUTION RESPIRATORY (INHALATION) at 08:08

## 2022-08-20 NOTE — PT/OT/SLP PROGRESS
Physical Therapy Treatment    Patient Name:  Fidelina Darling   MRN:  [id number]    Recommendations:     Discharge Recommendations:  nursing facility, skilled, rehabilitation facility   Discharge Equipment Recommendations: none     Subjective     Patient reports she is really not feeling well today, but despite this agreed to participate.     Communicated with NSG prior to session to see if Pt is appropriate for therapy today. Nsg reports patient is really weak and is in fluid overload but ok to treat. Pt greeted and agreed to session.    Objective:     General Precautions: Standard, fall   Orthopedic Precautions:N/A   Braces: N/A  Respiratory Status: room air     Functional Mobility:    · Bed Mobility: Mod Assist  · Sit to Stand: did not occur  · Transfers: Mod Assist  · Gait: did not occur  Equipment Used:     Assessment:     Fidelina Darling is a 89 y.o. female admitted with a medical diagnosis of Abdominal cramping.     Treatment Encounter Note:  Patient performed bed mobility rolling to sit EOB. Decreased ROM of BLE from previous hx. Upon sitting EOB 5-10 sec pt belching and expressed that she feels she might throw up and request to lie back down. Patient attempted to sit EOB again with same result. Transferred patient back to supine. Encouraged patient to perform BLE ex in bed throughout the day. Patient left in semi supine with all needs in reach.       Rehab Prognosis: Fair; patient would benefit from acute skilled PT services to address these deficits and reach maximum level of function.    Recent Surgery: * No surgery found *    GOALS:   Multidisciplinary Problems     Physical Therapy Goals        Problem: Physical Therapy    Goal Priority Disciplines Outcome Goal Variances Interventions   Physical Therapy Goal     PT, PT/OT Ongoing, Progressing     Description: Goals to be met by: 22     Patient will increase functional independence with mobility by performin. Supine to sit with Stand-by  Assistance  2. Sit to stand transfer with Stand-by Assistance  3. Gait  x 200 feet with Stand-by Assistance using Rolling Walker.                      Plan:     During this hospitalization, patient to be seen daily to address the identified rehab impairments via gait training, therapeutic activities, therapeutic exercises, neuromuscular re-education and progress toward the following goals:    · Plan of Care Expires:  09/16/22    Billable Minutes     Billable Minutes:19    Treatment Type: Treatment  PT/PTA: PTA     PTA Visit Number: 1     08/20/2022

## 2022-08-20 NOTE — PROGRESS NOTES
[facility] Hospital Medicine Progress Note          Chief Complaint: Inpatient Follow-up for      Subjective:  Fidelina Darling is a 89 y.o. White female with a past medical history of hypertension, hyperlipidemia. The patient presented to [facility] on 8/16/2022 with a primary complaint of abdominal pain and falls due to syncope.  Patient reports having a fall on 8/12 while in her garage.  She states she was grabbing for the car door handle when she missed it and fell. Fall was unwitnessed and she reports loss of consciousness. On 08/15 she had a second fall while in the kitchen but denies any loss of consciousness.  Yesterday (08/16/2022) patient was going to the bathroom when she passed out.  Episode was witnessed by neighbor who is visiting her. She reports urinary frequency. Patient denies complaints of headache, vision changes, shortness of breath, cough.  She has been experiencing lower abdominal pain for the last several days. Granddaughter at bedside states that patient is dependent on laxatives have a bowel movement.  Although she uses laxatives she often has to manually disimpact herself . She normally goes every 4-5 days with last bowel movement being 5 days ago. She has been having increased belching.  Granddaughter also states patient has little to no appetite.  Patient lives alone, ambulates with walker, drives and completes activity daily living independently.  Family as it patient to be experiencing dementia over the last 6 months.     Upon presentation to the ED, patient afebrile, hemodynamically stable and SpO2 96% on room air.  Labs notable for WBC 28, potassium 2.6, glucose 161, lactic acid 3.9.  UA with 1+ leukocyte esterase, 2+ bilirubin, positive nitrites, trace ketones and protein.  Chest x-ray without acute processes.  CT abdomen pelvis revealed mild distention of the colon with liquefied stool, indeterminate bilateral adrenal nodules and pancreatic cysts.  While in  the ED patient received 40 mEq of IV potassium chloride, 2 g of magnesium sulfate and Rocephin was started for UTI.  Patient admitted to hospital medicine services for further medical management.     Patient was admitted on account of recurrent syncope and falls.  Found to have acute TTE high.  Colonic dilatation with lactic acidosis.      Today's information  Patient seen and examined at bedside, grandson at bedside   Patient complains of not feeling good still having lots of burping especially when she pushes on her stomach.  Abdomen looks distended.  She does not feel good  Actively wheezing at bedside   Vitals reviewed still on room air.    Labs- follow-up morning labs , check l.a, cbc , bmp . mg  Hemoglobin noted with a 2 g drop the previous day, monitor hemoglobin levels   Glucose within normal limits   X-ray of the chest reveals left-sided atelectasis cannot rule out infiltrate.    KUB shows persistent colonic dilatation.  With a small sliding hiatal hernia  Patient currently on p.o. Cipro and Flagyl, discontinue this and resume IV Zosyn  Insert an NG tube and connect to low intermittent wall suction, keep NPO  Begin IV Clinimix for nutrition   Re-consult GI patient may need a proctosigmoidoscopy to help with colonic dilatation       Objective/physical exam:  General appearance: Well-developed, well-nourished female in no apparent distress.  Granddaughter at bedside.  HEENT: Atraumatic head. Dry mucous membranes of oral cavity.  Lungs: Clear to auscultation bilaterally.   Heart: Regular rate and rhythm.   Abdomen: Soft, mildly distended, generalized tenderness. Bowel sounds are hypoactive.  Frequently belching  Extremities: No cyanosis, clubbing. No deformities.  Skin: No Rash. Warm and dry.  Neuro: Awake, alert and oriented self, place and city. Motor and sensory exams grossly intact.        Assessment/Plan:  Acute abdominal distention   Dyspepsia  Left lower lobe atelectasis versus infiltrates   Hypokalemia    Small sliding hiatal hernia   Recurrent syncope with falls  Acute bacterial cystitis  Bilateral adrenal nodules  Pancreatic cyst  Lactic acidosis, resolved   Essential hypertension     Plan:    follow-up morning labs   Hemoglobin noted with a 2 g drop the previous day, monitor hemoglobin levels   Glucose within normal limits   X-ray of the chest reveals left-sided atelectasis cannot rule out infiltrate.    KUB shows persistent colonic dilatation.  With a small sliding hiatal hernia  Patient currently on p.o. Cipro and Flagyl, discontinue this and resume IV Zosyn  Insert an NG tube and connect to low intermittent wall suction, keep NPO  Begin IV Clinimix for nutrition   Re-consult GI patient may need a proctosigmoidoscopy to help with colonic dilatation    oral candidiasis, nystatin SS.  Follow-up stool cultures, GI PCR   -is general surgery recommendations were noted.  Surgery and GI signed off,   jose f prn for wheezing      All diagnosis and differential diagnosis have been reviewed; assessment and plan has been documented; I have personally reviewed the labs and test results that are presently available; I have reviewed the patients medication list; I have reviewed the consulting providers response and recommendations. I have reviewed or attempted to review medical records based upon their availability.        VITAL SIGNS: 24 HRS MIN & MAX LAST   Temp  Min: 97.4 °F (36.3 °C)  Max: 99.2 °F (37.3 °C) 98.3 °F (36.8 °C)   BP  Min: 134/65  Max: 153/75 (!) 144/77   Pulse  Min: 80  Max: 96  96   Resp  Min: 16  Max: 20 16   SpO2  Min: 85 %  Max: 99 % 99 %       Recent Labs   Lab 08/17/22  1010 08/18/22  0224 08/19/22  0504   WBC 28.4* 19.6* 8.7   RBC 4.45 3.78* 3.06*   HGB 14.0 11.9* 9.6*   HCT 41.9 36.0* 29.9*   MCV 94.2* 95.2* 97.7*   MCH 31.5* 31.5* 31.4*   MCHC 33.4 33.1 32.1*   RDW 13.2 13.6 13.5    226 180   MPV 10.3 9.3 9.7       Recent Labs   Lab 08/16/22  2118 08/17/22  0905 08/18/22 0224  08/19/22  0506 08/20/22  1105    135 139 137 134   K 2.6* 3.1* 3.5 3.1* 4.2   CO2 26 21* 25 24 22*   BUN 17.8 22.4* 19.4 14.4 11.7   CREATININE 0.92 0.80 0.66 0.60 0.53*   CALCIUM 9.7 8.5 7.4* 7.1* 7.5*   MG  --  3.60*  --   --  2.10   ALBUMIN 3.8  --  2.4* 2.1*  --    ALKPHOS 79  --  63 59  --    ALT 37  --  24 43  --    AST 50*  --  33 90*  --    BILITOT 1.2  --  0.7 0.4  --           Microbiology Results (last 7 days)     Procedure Component Value Units Date/Time    Urine Culture High Risk [[id number]] Collected: 08/18/22 1325    Order Status: Completed Specimen: Urine, Clean Catch Updated: 08/20/22 1026     Urine Culture No Growth    Stool Culture [[id number]]     Order Status: Sent Specimen: Stool     Blood Culture #1 **CANNOT BE ORDERED STAT** [[id number]]  (Normal) Collected: 08/17/22 0148    Order Status: Completed Specimen: Blood Updated: 08/20/22 0301     CULTURE, BLOOD ([facility]) No Growth At 72 Hours    Blood Culture #2 **CANNOT BE ORDERED STAT** [[id number]]  (Normal) Collected: 08/17/22 0148    Order Status: Completed Specimen: Blood Updated: 08/20/22 0301     CULTURE, BLOOD ([facility]) No Growth At 72 Hours    Stool Culture [[id number]]     Order Status: Sent Specimen: Stool     Clostridium Diff Toxin, A & B, EIA [[id number]]     Order Status: Sent Specimen: Stool            See below for Radiology    Scheduled Med:   albuterol-ipratropium  3 mL Nebulization Q6H    ciprofloxacin  400 mg Intravenous Q12H    enoxaparin  30 mg Subcutaneous Q12H    metronidazole  500 mg Intravenous Q8H    nystatin  500,000 Units Oral QID ([facility] & HS)        Continuous Infusions:       PRN Meds:  acetaminophen, albuterol-ipratropium, dextrose 50%, dextrose 50%, glucagon (human recombinant), glucose, glucose, hydrALAZINE, HYDROcodone-acetaminophen, insulin aspart U-100, melatonin, morphine, ondansetron, sodium chloride 0.9%       VTE prophylaxis:     Patient condition:  Stable/Fair/Guarded/ Serious/ Critical    Anticipated discharge  and Disposition:         All diagnosis and differential diagnosis have been reviewed; assessment and plan has been documented; I have personally reviewed the labs and test results that are presently available; I have reviewed the patients medication list; I have reviewed the consulting providers response and recommendations. I have reviewed or attempted to review medical records based upon their availability    All of the patient's questions have been  addressed and answered. Patient's is agreeable to the above stated plan. I will continue to monitor closely and make adjustments to medical management as needed.  _____________________________________________________________________    Nutrition Status:    Radiology:  X-Ray Chest 1 View  Narrative: EXAMINATION:  XR CHEST 1 VIEW    CLINICAL HISTORY:  sob wheezing;    TECHNIQUE:  One view    COMPARISON:  August 17, 2022.    FINDINGS:  Cardiopericardial silhouette is within normal limits.  There is new small left pleural effusion and left lower lung zone atelectasis without exclusion of infiltrates.  There is also minimal right pleural effusion.  No pulmonary edema.  No pneumothorax.  Impression: New minimal right and small left pleural effusion.    Left lower lung zone atelectasis without exclusion of infiltrates.    Electronically signed by: Jorden Gomez  Date:    08/20/2022  Time:    11:31  X-Ray Abdomen AP 1 View  Narrative: EXAMINATION:  XR ABDOMEN AP 1 VIEW    CLINICAL HISTORY:  abd distension;    TECHNIQUE:  AP View(s) of the abdomen was performed.    COMPARISON:  None.    FINDINGS:  Persistent colonic distension, similar to prior CT.    Postop left hip arthroplasty.  Impression: Unchanged distension of the colon.    Electronically signed by: Lise Holloway  Date:    08/20/2022  Time:    10:47      Yinka Cabral MD   08/20/2022

## 2022-08-21 LAB
POCT GLUCOSE: 114 MG/DL (ref 70–110)
POCT GLUCOSE: 122 MG/DL (ref 70–110)
POCT GLUCOSE: 125 MG/DL (ref 70–110)

## 2022-08-21 PROCEDURE — 25000003 PHARM REV CODE 250: Performed by: INTERNAL MEDICINE

## 2022-08-21 PROCEDURE — 94761 N-INVAS EAR/PLS OXIMETRY MLT: CPT

## 2022-08-21 PROCEDURE — 63600175 PHARM REV CODE 636 W HCPCS: Performed by: NURSE PRACTITIONER

## 2022-08-21 PROCEDURE — 63600175 PHARM REV CODE 636 W HCPCS: Performed by: INTERNAL MEDICINE

## 2022-08-21 PROCEDURE — 21400001 HC TELEMETRY ROOM

## 2022-08-21 PROCEDURE — 25000242 PHARM REV CODE 250 ALT 637 W/ HCPCS: Performed by: INTERNAL MEDICINE

## 2022-08-21 PROCEDURE — 25000003 PHARM REV CODE 250: Performed by: NURSE PRACTITIONER

## 2022-08-21 PROCEDURE — 11000001 HC ACUTE MED/SURG PRIVATE ROOM

## 2022-08-21 PROCEDURE — 94640 AIRWAY INHALATION TREATMENT: CPT

## 2022-08-21 RX ORDER — BISACODYL 10 MG
10 SUPPOSITORY, RECTAL RECTAL 2 TIMES DAILY
Status: DISPENSED | OUTPATIENT
Start: 2022-08-21 | End: 2022-08-23

## 2022-08-21 RX ORDER — METOCLOPRAMIDE HYDROCHLORIDE 5 MG/ML
5 INJECTION INTRAMUSCULAR; INTRAVENOUS EVERY 6 HOURS PRN
Status: DISCONTINUED | OUTPATIENT
Start: 2022-08-21 | End: 2022-09-13 | Stop reason: HOSPADM

## 2022-08-21 RX ORDER — LABETALOL HYDROCHLORIDE 5 MG/ML
20 INJECTION, SOLUTION INTRAVENOUS EVERY 6 HOURS PRN
Status: DISCONTINUED | OUTPATIENT
Start: 2022-08-21 | End: 2022-09-13 | Stop reason: HOSPADM

## 2022-08-21 RX ORDER — BISACODYL 10 MG
10 SUPPOSITORY, RECTAL RECTAL 2 TIMES DAILY
Status: DISCONTINUED | OUTPATIENT
Start: 2022-08-21 | End: 2022-08-21

## 2022-08-21 RX ORDER — ONDANSETRON 2 MG/ML
4 INJECTION INTRAMUSCULAR; INTRAVENOUS EVERY 6 HOURS PRN
Status: DISCONTINUED | OUTPATIENT
Start: 2022-08-21 | End: 2022-09-13 | Stop reason: HOSPADM

## 2022-08-21 RX ADMIN — LEUCINE, PHENYLALANINE, LYSINE, METHIONINE, ISOLEUCINE, VALINE, HISTIDINE, THREONINE, TRYPTOPHAN, ALANINE, GLYCINE, ARGININE, PROLINE, SERINE, TYROSINE, SODIUM ACETATE, DIBASIC POTASSIUM PHOSPHATE, MAGNESIUM CHLORIDE, SODIUM CHLORIDE, CALCIUM CHLORIDE, DEXTROSE
311; 238; 247; 170; 255; 247; 204; 179; 77; 880; 438; 489; 289; 213; 17; 297; 261; 51; 77; 33; 5 INJECTION INTRAVENOUS at 04:08

## 2022-08-21 RX ADMIN — METHYLNALTREXONE BROMIDE 9.2 MG: 12 INJECTION, SOLUTION SUBCUTANEOUS at 02:08

## 2022-08-21 RX ADMIN — ENOXAPARIN SODIUM 30 MG: 30 INJECTION SUBCUTANEOUS at 08:08

## 2022-08-21 RX ADMIN — IPRATROPIUM BROMIDE AND ALBUTEROL SULFATE 3 ML: 2.5; .5 SOLUTION RESPIRATORY (INHALATION) at 08:08

## 2022-08-21 RX ADMIN — PIPERACILLIN SODIUM AND TAZOBACTAM SODIUM 4.5 G: 4; .5 INJECTION, POWDER, LYOPHILIZED, FOR SOLUTION INTRAVENOUS at 09:08

## 2022-08-21 RX ADMIN — PIPERACILLIN SODIUM AND TAZOBACTAM SODIUM 4.5 G: 4; .5 INJECTION, POWDER, LYOPHILIZED, FOR SOLUTION INTRAVENOUS at 01:08

## 2022-08-21 RX ADMIN — NYSTATIN 500000 UNITS: 100000 SUSPENSION ORAL at 09:08

## 2022-08-21 RX ADMIN — IPRATROPIUM BROMIDE AND ALBUTEROL SULFATE 3 ML: 2.5; .5 SOLUTION RESPIRATORY (INHALATION) at 01:08

## 2022-08-21 RX ADMIN — ENOXAPARIN SODIUM 30 MG: 30 INJECTION SUBCUTANEOUS at 09:08

## 2022-08-21 RX ADMIN — LEUCINE, PHENYLALANINE, LYSINE, METHIONINE, ISOLEUCINE, VALINE, HISTIDINE, THREONINE, TRYPTOPHAN, ALANINE, GLYCINE, ARGININE, PROLINE, SERINE, TYROSINE, SODIUM ACETATE, DIBASIC POTASSIUM PHOSPHATE, MAGNESIUM CHLORIDE, SODIUM CHLORIDE, CALCIUM CHLORIDE, DEXTROSE
311; 238; 247; 170; 255; 247; 204; 179; 77; 880; 438; 489; 289; 213; 17; 297; 261; 51; 77; 33; 5 INJECTION INTRAVENOUS at 05:08

## 2022-08-21 RX ADMIN — NYSTATIN 500000 UNITS: 100000 SUSPENSION ORAL at 05:08

## 2022-08-21 RX ADMIN — PIPERACILLIN SODIUM AND TAZOBACTAM SODIUM 4.5 G: 4; .5 INJECTION, POWDER, LYOPHILIZED, FOR SOLUTION INTRAVENOUS at 05:08

## 2022-08-21 RX ADMIN — IPRATROPIUM BROMIDE AND ALBUTEROL SULFATE 3 ML: 2.5; .5 SOLUTION RESPIRATORY (INHALATION) at 02:08

## 2022-08-21 RX ADMIN — IPRATROPIUM BROMIDE AND ALBUTEROL SULFATE 3 ML: 2.5; .5 SOLUTION RESPIRATORY (INHALATION) at 07:08

## 2022-08-21 RX ADMIN — BISACODYL 10 MG: 10 SUPPOSITORY RECTAL at 03:08

## 2022-08-21 NOTE — PROGRESS NOTES
[facility] Hospital Medicine Progress Note        Chief Complaint: Inpatient Follow-up for      Subjective:  Fidelina Darling is a 89 y.o. White female with a past medical history of hypertension, hyperlipidemia. The patient presented to [facility] on 8/16/2022 with a primary complaint of abdominal pain and falls due to syncope.  Patient reports having a fall on 8/12 while in her garage.  She states she was grabbing for the car door handle when she missed it and fell. Fall was unwitnessed and she reports loss of consciousness. On 08/15 she had a second fall while in the kitchen but denies any loss of consciousness.  Yesterday (08/16/2022) patient was going to the bathroom when she passed out.  Episode was witnessed by neighbor who is visiting her. She reports urinary frequency. Patient denies complaints of headache, vision changes, shortness of breath, cough.  She has been experiencing lower abdominal pain for the last several days. Granddaughter at bedside states that patient is dependent on laxatives have a bowel movement.  Although she uses laxatives she often has to manually disimpact herself . She normally goes every 4-5 days with last bowel movement being 5 days ago. She has been having increased belching.  Granddaughter also states patient has little to no appetite.  Patient lives alone, ambulates with walker, drives and completes activity daily living independently.  Family as it patient to be experiencing dementia over the last 6 months.     Upon presentation to the ED, patient afebrile, hemodynamically stable and SpO2 96% on room air.  Labs notable for WBC 28, potassium 2.6, glucose 161, lactic acid 3.9.  UA with 1+ leukocyte esterase, 2+ bilirubin, positive nitrites, trace ketones and protein.  Chest x-ray without acute processes.  CT abdomen pelvis revealed mild distention of the colon with liquefied stool, indeterminate bilateral adrenal nodules and pancreatic cysts.  While in  the ED patient received 40 mEq of IV potassium chloride, 2 g of magnesium sulfate and Rocephin was started for UTI.  Patient admitted to hospital medicine services for further medical management.     Patient was admitted on account of recurrent syncope and falls.  Found to have acute TTE high.  Colonic dilatation with lactic acidosis.   X-ray of the chest reveals left-sided atelectasis cannot rule out infiltrate.    KUB shows persistent colonic dilatation.  With a small sliding hiatal hernia, resume IV Zosyn  Insert an NG tube and connect to low intermittent wall suction, keep NPO  Begin IV Clinimix for nutrition   Re-consult GI patient may need a proctosigmoidoscopy to help with colonic dilatation         Today's information  Patient seen and examined at bedside  Patient remains on NG tube connected to low intermittent wall suction   Abdominal distention is improving slowly, she still burping but improved.    Lactic acid was normal.    Continue IV Clinimix, continue Zosyn day 2   Follow-up with GI re-consult  Add on IV labetalol p.r.n. for   Blood pressure control  Follow-up stool studies       Objective/physical exam:  General appearance: Well-developed, well-nourished female in no apparent distress.  Granddaughter at bedside.  HEENT: Atraumatic head. Dry mucous membranes of oral cavity.  Lungs: Clear to auscultation bilaterally.   Heart: Regular rate and rhythm.   Abdomen: Soft, mildly distended, generalized tenderness. Bowel sounds are hypoactive.  Frequently belching  Extremities: No cyanosis, clubbing. No deformities.  Skin: No Rash. Warm and dry.  Neuro: Awake, alert and oriented self, place and city. Motor and sensory exams grossly intact.           Assessment/Plan:  Acute abdominal distention , improving slowly  Dyspepsia  Left lower lobe atelectasis versus infiltrates   Hypokalemia   Small sliding hiatal hernia   Recurrent syncope with falls  Acute bacterial cystitis  Bilateral adrenal nodules  Pancreatic  cyst  Lactic acidosis, resolved   Essential hypertension     Plan:   Patient remains on NG tube connected to low intermittent wall suction   Abdominal distention is improving slowly, she still burping but improved.    Lactic acid was normal.    Continue IV Clinimix, continue Zosyn day 2   Follow-up with GI re-consult  Add on IV labetalol p.r.n.   Follow-up stool studies   Glucose within normal limit  X-ray of the chest reveals left-sided atelectasis cannot rule out infiltrate.    KUB shows persistent colonic dilatation.  With a small sliding hiatal hernia  Re-consult GI patient may need a proctosigmoidoscopy to help with colonic dilatation    oral candidiasis, nystatin SS.  Follow-up stool cultures, GI PCR   general surgery recommendations were noted.  duonebs prn for wheezing      All diagnosis and differential diagnosis have been reviewed; assessment and plan has been documented; I have personally reviewed the labs and test results that are presently available; I have reviewed the patients medication list; I have reviewed the consulting providers response and recommendations. I have reviewed or attempted to review medical records based upon their availability.        VITAL SIGNS: 24 HRS MIN & MAX LAST   Temp  Min: 97.7 °F (36.5 °C)  Max: 99.4 °F (37.4 °C) 98.4 °F (36.9 °C)   BP  Min: 128/71  Max: 153/78 (!) 153/78   Pulse  Min: 78  Max: 99  91   Resp  Min: 16  Max: 20 20   SpO2  Min: 90 %  Max: 99 % (!) 91 %       Recent Labs   Lab 08/18/22 0224 08/19/22  0504 08/20/22  1503   WBC 19.6* 8.7 10.5   RBC 3.78* 3.06* 3.84*   HGB 11.9* 9.6* 12.0   HCT 36.0* 29.9* 36.3*   MCV 95.2* 97.7* 94.5*   MCH 31.5* 31.4* 31.3*   MCHC 33.1 32.1* 33.1   RDW 13.6 13.5 13.6    180 253   MPV 9.3 9.7 9.4       Recent Labs   Lab 08/16/22  2118 08/17/22  0905 08/18/22  0224 08/19/22  0506 08/20/22  1105    135 139 137 134   K 2.6* 3.1* 3.5 3.1* 4.2   CO2 26 21* 25 24 22*   BUN 17.8 22.4* 19.4 14.4 11.7   CREATININE 0.92  0.80 0.66 0.60 0.53*   CALCIUM 9.7 8.5 7.4* 7.1* 7.5*   MG  --  3.60*  --   --  2.10   ALBUMIN 3.8  --  2.4* 2.1*  --    ALKPHOS 79  --  63 59  --    ALT 37  --  24 43  --    AST 50*  --  33 90*  --    BILITOT 1.2  --  0.7 0.4  --           Microbiology Results (last 7 days)     Procedure Component Value Units Date/Time    Blood Culture #1 **CANNOT BE ORDERED STAT** [[id number]]  (Normal) Collected: 08/17/22 0148    Order Status: Completed Specimen: Blood Updated: 08/21/22 0301     CULTURE, BLOOD ([facility]) No Growth At 96 Hours    Blood Culture #2 **CANNOT BE ORDERED STAT** [[id number]]  (Normal) Collected: 08/17/22 0148    Order Status: Completed Specimen: Blood Updated: 08/21/22 0301     CULTURE, BLOOD ([facility]) No Growth At 96 Hours    Urine Culture High Risk [[id number]] Collected: 08/18/22 1325    Order Status: Completed Specimen: Urine, Clean Catch Updated: 08/20/22 1026     Urine Culture No Growth    Stool Culture [[id number]]     Order Status: Sent Specimen: Stool     Stool Culture [[id number]]     Order Status: Sent Specimen: Stool     Clostridium Diff Toxin, A & B, EIA [[id number]]     Order Status: Sent Specimen: Stool            See below for Radiology    Scheduled Med:   albuterol-ipratropium  3 mL Nebulization Q6H    enoxaparin  30 mg Subcutaneous Q12H    nystatin  500,000 Units Oral QID (WM & HS)    piperacillin-tazobactam (ZOSYN) IVPB  4.5 g Intravenous Q8H        Continuous Infusions:   Amino acid 4.25% - dextrose 5% (CLINIMIX-E) solution (1L provides 42.5 gm AA, 50 gm CHO (170 kcal/L dextrose), Na 35, K 30, Mg 5, Ca 4.5, Acetate 70, Cl 39, Phos 15) 75 mL/hr at 08/21/22 0446        PRN Meds:  acetaminophen, albuterol-ipratropium, dextrose 50%, dextrose 50%, glucagon (human recombinant), glucose, glucose, HYDROcodone-acetaminophen, insulin aspart U-100, labetaloL, melatonin, metoclopramide HCl, morphine, ondansetron, sodium chloride 0.9%       VTE prophylaxis:     Patient condition:  Stable/Fair/Guarded/  Serious/ Critical    Anticipated discharge and Disposition:         All diagnosis and differential diagnosis have been reviewed; assessment and plan has been documented; I have personally reviewed the labs and test results that are presently available; I have reviewed the patients medication list; I have reviewed the consulting providers response and recommendations. I have reviewed or attempted to review medical records based upon their availability    All of the patient's questions have been  addressed and answered. Patient's is agreeable to the above stated plan. I will continue to monitor closely and make adjustments to medical management as needed.  _____________________________________________________________________    Nutrition Status:    Radiology:  XR Non-Rad Performed NG/Gastric Tube Check  EXAMINATION:  XR NON-RADIOLOGIST PERFORMED NG/GASTRIC TUBE CHECK    CLINICAL HISTORY:  NG tube placement;    TECHNIQUE:  AP View(s) of the abdomen was performed.    COMPARISON:  None.    FINDINGS:  Enteric tube courses off the inferior collimation of the radiograph likely terminating at the gastric antrum.  Side port is beyond the GE junction.    Electronically signed by: Lise Holloway  Date:    08/20/2022  Time:    15:46  X-Ray Chest 1 View  Narrative: EXAMINATION:  XR CHEST 1 VIEW    CLINICAL HISTORY:  sob wheezing;    TECHNIQUE:  One view    COMPARISON:  August 17, 2022.    FINDINGS:  Cardiopericardial silhouette is within normal limits.  There is new small left pleural effusion and left lower lung zone atelectasis without exclusion of infiltrates.  There is also minimal right pleural effusion.  No pulmonary edema.  No pneumothorax.  Impression: New minimal right and small left pleural effusion.    Left lower lung zone atelectasis without exclusion of infiltrates.    Electronically signed by: Jorden Gomez  Date:    08/20/2022  Time:    11:31  X-Ray Abdomen AP 1 View  Narrative: EXAMINATION:  XR ABDOMEN AP 1  VIEW    CLINICAL HISTORY:  abd distension;    TECHNIQUE:  AP View(s) of the abdomen was performed.    COMPARISON:  None.    FINDINGS:  Persistent colonic distension, similar to prior CT.    Postop left hip arthroplasty.  Impression: Unchanged distension of the colon.    Electronically signed by: Lise Holloway  Date:    08/20/2022  Time:    10:47      Yinka Cabral MD   08/21/2022

## 2022-08-21 NOTE — PROGRESS NOTES
[facility]    Chief Complaint: Inpatient Follow-up for      Subjective:  Fidelina Darling is a 89 y.o. White female with a past medical history of hypertension, hyperlipidemia. The patient presented to [facility] on 8/16/2022 with a primary complaint of abdominal pain and falls due to syncope.  Patient reports having a fall on 8/12 while in her garage.  She states she was grabbing for the car door handle when she missed it and fell. Fall was unwitnessed and she reports loss of consciousness. On 08/15 she had a second fall while in the kitchen but denies any loss of consciousness.  Yesterday (08/16/2022) patient was going to the bathroom when she passed out.  Episode was witnessed by neighbor who is visiting her. She reports urinary frequency. Patient denies complaints of headache, vision changes, shortness of breath, cough.  She has been experiencing lower abdominal pain for the last several days. Granddaughter at bedside states that patient is dependent on laxatives have a bowel movement.  Although she uses laxatives she often has to manually disimpact herself . She normally goes every 4-5 days with last bowel movement being 5 days ago. She has been having increased belching.  Granddaughter also states patient has little to no appetite.  Patient lives alone, ambulates with walker, drives and completes activity daily living independently.       Upon presentation to the ED, patient afebrile, hemodynamically stable and SpO2 96% on room air.  Labs notable for WBC 28, potassium 2.6, glucose 161, lactic acid 3.9.  UA with 1+ leukocyte esterase, 2+ bilirubin, positive nitrites, trace ketones and protein.  Chest x-ray without acute processes.  CT abdomen pelvis revealed mild distention of the colon with liquefied stool, indeterminate bilateral adrenal nodules and pancreatic cysts.  While in the ED patient received 40 mEq of IV potassium chloride, 2 g of magnesium sulfate and Rocephin was started for  UTI.    Patient was admitted on account of recurrent syncope and falls. Colonic dilatation with lactic acidosis.   X-ray of the chest reveals left-sided atelectasis cannot rule out infiltrate.      8-21-22  Pt with persistent abd pain and nausea  Has NGT to LIWS  Belching  Minimal flatus  KUB with dilation 56.4mm to 76.8mm of colon            Objective/physical exam:  General appearance: Well-developed, well-nourished female in no apparent distress.  Granddaughter at bedside.  HEENT: Atraumatic head. Dry mucous membranes of oral cavity.  Lungs: Clear to auscultation bilaterally.   Heart: Regular rate and rhythm.   Abdomen: mod firm, distended, generalized tenderness. Bowel sounds are hypoactive. Ileus heard to RUMQ Frequently belching  Extremities: No cyanosis, clubbing. No deformities.  Skin: No Rash. Warm and dry.  Neuro: Awake, alert and oriented self, place and city. Motor and sensory exams grossly intact.                VITAL SIGNS: 24 HRS MIN & MAX LAST   Temp  Min: 97.7 °F (36.5 °C)  Max: 99.4 °F (37.4 °C) 97.7 °F (36.5 °C)   BP  Min: 128/71  Max: 153/78 (!) 150/68   Pulse  Min: 78  Max: 99  93   Resp  Min: 16  Max: 20 20   SpO2  Min: 90 %  Max: 99 % (!) 94 %       Recent Labs   Lab 08/18/22 0224 08/19/22  0504 08/20/22  1503   WBC 19.6* 8.7 10.5   RBC 3.78* 3.06* 3.84*   HGB 11.9* 9.6* 12.0   HCT 36.0* 29.9* 36.3*   MCV 95.2* 97.7* 94.5*   MCH 31.5* 31.4* 31.3*   MCHC 33.1 32.1* 33.1   RDW 13.6 13.5 13.6    180 253   MPV 9.3 9.7 9.4       Recent Labs   Lab 08/16/22  2118 08/17/22  0905 08/18/22  0224 08/19/22  0506 08/20/22  1105    135 139 137 134   K 2.6* 3.1* 3.5 3.1* 4.2   CO2 26 21* 25 24 22*   BUN 17.8 22.4* 19.4 14.4 11.7   CREATININE 0.92 0.80 0.66 0.60 0.53*   CALCIUM 9.7 8.5 7.4* 7.1* 7.5*   MG  --  3.60*  --   --  2.10   ALBUMIN 3.8  --  2.4* 2.1*  --    ALKPHOS 79  --  63 59  --    ALT 37  --  24 43  --    AST 50*  --  33 90*  --    BILITOT 1.2  --  0.7 0.4  --            Microbiology Results (last 7 days)     Procedure Component Value Units Date/Time    Blood Culture #1 **CANNOT BE ORDERED STAT** [[id number]]  (Normal) Collected: 08/17/22 0148    Order Status: Completed Specimen: Blood Updated: 08/21/22 0301     CULTURE, BLOOD ([facility]) No Growth At 96 Hours    Blood Culture #2 **CANNOT BE ORDERED STAT** [[id number]]  (Normal) Collected: 08/17/22 0148    Order Status: Completed Specimen: Blood Updated: 08/21/22 0301     CULTURE, BLOOD ([facility]) No Growth At 96 Hours    Urine Culture High Risk [[id number]] Collected: 08/18/22 1325    Order Status: Completed Specimen: Urine, Clean Catch Updated: 08/20/22 1026     Urine Culture No Growth    Stool Culture [[id number]]     Order Status: Sent Specimen: Stool     Stool Culture [[id number]]     Order Status: Sent Specimen: Stool     Clostridium Diff Toxin, A & B, EIA [[id number]]     Order Status: Sent Specimen: Stool            See below for Radiology    Scheduled Med:   albuterol-ipratropium  3 mL Nebulization Q6H    enoxaparin  30 mg Subcutaneous Q12H    nystatin  500,000 Units Oral QID (WM & HS)    piperacillin-tazobactam (ZOSYN) IVPB  4.5 g Intravenous Q8H        Continuous Infusions:   Amino acid 4.25% - dextrose 5% (CLINIMIX-E) solution (1L provides 42.5 gm AA, 50 gm CHO (170 kcal/L dextrose), Na 35, K 30, Mg 5, Ca 4.5, Acetate 70, Cl 39, Phos 15) 75 mL/hr at 08/21/22 0446        PRN Meds:  acetaminophen, albuterol-ipratropium, dextrose 50%, dextrose 50%, glucagon (human recombinant), glucose, glucose, HYDROcodone-acetaminophen, insulin aspart U-100, labetaloL, melatonin, metoclopramide HCl, morphine, ondansetron, sodium chloride 0.9%       VTE prophylaxis:     Patient condition:  Stable/Fair/Guarded/ Serious/ Critical    _____________________________________________________________________    Nutrition Status:    Radiology:  XR Non-Rad Performed NG/Gastric Tube Check  EXAMINATION:  XR NON-RADIOLOGIST PERFORMED NG/GASTRIC TUBE  CHECK    CLINICAL HISTORY:  NG tube placement;    TECHNIQUE:  AP View(s) of the abdomen was performed.    COMPARISON:  None.    FINDINGS:  Enteric tube courses off the inferior collimation of the radiograph likely terminating at the gastric antrum.  Side port is beyond the GE junction.    Electronically signed by: Lise Holloway  Date:    08/20/2022  Time:    15:46  X-Ray Chest 1 View  Narrative: EXAMINATION:  XR CHEST 1 VIEW    CLINICAL HISTORY:  sob wheezing;    TECHNIQUE:  One view    COMPARISON:  August 17, 2022.    FINDINGS:  Cardiopericardial silhouette is within normal limits.  There is new small left pleural effusion and left lower lung zone atelectasis without exclusion of infiltrates.  There is also minimal right pleural effusion.  No pulmonary edema.  No pneumothorax.  Impression: New minimal right and small left pleural effusion.    Left lower lung zone atelectasis without exclusion of infiltrates.    Electronically signed by: Jorden Gomez  Date:    08/20/2022  Time:    11:31  X-Ray Abdomen AP 1 View  Narrative: EXAMINATION:  XR ABDOMEN AP 1 VIEW    CLINICAL HISTORY:  abd distension;    TECHNIQUE:  AP View(s) of the abdomen was performed.    COMPARISON:  None.    FINDINGS:  Persistent colonic distension, similar to prior CT.    Postop left hip arthroplasty.  Impression: Unchanged distension of the colon.    Electronically signed by: Lise Holloway  Date:    08/20/2022  Time:    10:47      Assessment/Plan:  1. Acute abdominal distention   NGT LIWS   Start dulcolax supp   Relistor x 1   KUB with colonic distension 56.4mm to 76.8mmon my read/measurement  2. Dyspepsia   PPI  3. Nitrite positive urine   On Zosyn  4.Hypokalemia    Replace per attending      89 yo female admitted for falls, found to have UTI, and ileus. NG has been placed to LIWS, pt on appropriate ax therapy  Will add dulcolax suppository, Relistor  Repeat KUB in am  If distension.ileus does not resolve will plan decompression via  sigmoidoscopy        Kim Nevarez, LEXIS   08/21/2022

## 2022-08-22 LAB
ALBUMIN SERPL-MCNC: 1.8 GM/DL (ref 3.4–4.8)
ALBUMIN/GLOB SERPL: 1.1 RATIO (ref 1.1–2)
ALP SERPL-CCNC: 123 UNIT/L (ref 40–150)
ALT SERPL-CCNC: 108 UNIT/L (ref 0–55)
AST SERPL-CCNC: 210 UNIT/L (ref 5–34)
BACTERIA BLD CULT: NORMAL
BACTERIA BLD CULT: NORMAL
BASOPHILS # BLD AUTO: 0.04 X10(3)/MCL (ref 0–0.2)
BASOPHILS NFR BLD AUTO: 0.6 %
BILIRUBIN DIRECT+TOT PNL SERPL-MCNC: 0.4 MG/DL
BUN SERPL-MCNC: 16.6 MG/DL (ref 9.8–20.1)
CALCIUM SERPL-MCNC: 7.3 MG/DL (ref 8.4–10.2)
CHLORIDE SERPL-SCNC: 101 MMOL/L (ref 98–111)
CO2 SERPL-SCNC: 23 MMOL/L (ref 23–31)
CREAT SERPL-MCNC: 0.52 MG/DL (ref 0.55–1.02)
EOSINOPHIL # BLD AUTO: 0.36 X10(3)/MCL (ref 0–0.9)
EOSINOPHIL NFR BLD AUTO: 5.7 %
ERYTHROCYTE [DISTWIDTH] IN BLOOD BY AUTOMATED COUNT: 13.6 % (ref 11.5–17)
GFR SERPLBLD CREATININE-BSD FMLA CKD-EPI: >60 MLS/MIN/1.73/M2
GLOBULIN SER-MCNC: 1.7 GM/DL (ref 2.4–3.5)
GLUCOSE SERPL-MCNC: 107 MG/DL (ref 75–121)
HCT VFR BLD AUTO: 32.2 % (ref 37–47)
HGB BLD-MCNC: 10.5 GM/DL (ref 12–16)
IMM GRANULOCYTES # BLD AUTO: 0.24 X10(3)/MCL (ref 0–0.04)
IMM GRANULOCYTES NFR BLD AUTO: 3.8 %
LYMPHOCYTES # BLD AUTO: 1.26 X10(3)/MCL (ref 0.6–4.6)
LYMPHOCYTES NFR BLD AUTO: 19.8 %
MAGNESIUM SERPL-MCNC: 1.8 MG/DL (ref 1.6–2.6)
MCH RBC QN AUTO: 31.2 PG (ref 27–31)
MCHC RBC AUTO-ENTMCNC: 32.6 MG/DL (ref 33–36)
MCV RBC AUTO: 95.5 FL (ref 80–94)
MONOCYTES # BLD AUTO: 0.9 X10(3)/MCL (ref 0.1–1.3)
MONOCYTES NFR BLD AUTO: 14.2 %
NEUTROPHILS # BLD AUTO: 3.6 X10(3)/MCL (ref 2.1–9.2)
NEUTROPHILS NFR BLD AUTO: 55.9 %
NRBC BLD AUTO-RTO: 0 %
PLATELET # BLD AUTO: 216 X10(3)/MCL (ref 130–400)
PMV BLD AUTO: 9.4 FL (ref 7.4–10.4)
POCT GLUCOSE: 110 MG/DL (ref 70–110)
POCT GLUCOSE: 137 MG/DL (ref 70–110)
POTASSIUM SERPL-SCNC: 3.5 MMOL/L (ref 3.5–5.1)
PROT SERPL-MCNC: 3.5 GM/DL (ref 5.8–7.6)
RBC # BLD AUTO: 3.37 X10(6)/MCL (ref 4.2–5.4)
SODIUM SERPL-SCNC: 130 MMOL/L (ref 132–146)
WBC # SPEC AUTO: 6.4 X10(3)/MCL (ref 4.5–11.5)

## 2022-08-22 PROCEDURE — 21400001 HC TELEMETRY ROOM

## 2022-08-22 PROCEDURE — 25000003 PHARM REV CODE 250: Performed by: INTERNAL MEDICINE

## 2022-08-22 PROCEDURE — 36415 COLL VENOUS BLD VENIPUNCTURE: CPT | Performed by: INTERNAL MEDICINE

## 2022-08-22 PROCEDURE — 25000242 PHARM REV CODE 250 ALT 637 W/ HCPCS: Performed by: INTERNAL MEDICINE

## 2022-08-22 PROCEDURE — 25000003 PHARM REV CODE 250: Performed by: NURSE PRACTITIONER

## 2022-08-22 PROCEDURE — 94761 N-INVAS EAR/PLS OXIMETRY MLT: CPT

## 2022-08-22 PROCEDURE — 97530 THERAPEUTIC ACTIVITIES: CPT | Mod: CQ

## 2022-08-22 PROCEDURE — 99900031 HC PATIENT EDUCATION (STAT)

## 2022-08-22 PROCEDURE — 27000221 HC OXYGEN, UP TO 24 HOURS

## 2022-08-22 PROCEDURE — 63600175 PHARM REV CODE 636 W HCPCS: Performed by: INTERNAL MEDICINE

## 2022-08-22 PROCEDURE — 80053 COMPREHEN METABOLIC PANEL: CPT | Performed by: INTERNAL MEDICINE

## 2022-08-22 PROCEDURE — 97110 THERAPEUTIC EXERCISES: CPT | Mod: CQ

## 2022-08-22 PROCEDURE — 63600175 PHARM REV CODE 636 W HCPCS: Performed by: NURSE PRACTITIONER

## 2022-08-22 PROCEDURE — 83735 ASSAY OF MAGNESIUM: CPT | Performed by: INTERNAL MEDICINE

## 2022-08-22 PROCEDURE — 94640 AIRWAY INHALATION TREATMENT: CPT

## 2022-08-22 PROCEDURE — 85025 COMPLETE CBC W/AUTO DIFF WBC: CPT | Performed by: INTERNAL MEDICINE

## 2022-08-22 PROCEDURE — 11000001 HC ACUTE MED/SURG PRIVATE ROOM

## 2022-08-22 RX ORDER — DICLOFENAC SODIUM 10 MG/G
4 GEL TOPICAL DAILY
Status: DISCONTINUED | OUTPATIENT
Start: 2022-08-23 | End: 2022-09-13 | Stop reason: HOSPADM

## 2022-08-22 RX ORDER — LIDOCAINE 50 MG/G
1 PATCH TOPICAL
Status: DISCONTINUED | OUTPATIENT
Start: 2022-08-22 | End: 2022-09-13 | Stop reason: HOSPADM

## 2022-08-22 RX ADMIN — IPRATROPIUM BROMIDE AND ALBUTEROL SULFATE 3 ML: 2.5; .5 SOLUTION RESPIRATORY (INHALATION) at 08:08

## 2022-08-22 RX ADMIN — NYSTATIN 500000 UNITS: 100000 SUSPENSION ORAL at 12:08

## 2022-08-22 RX ADMIN — ENOXAPARIN SODIUM 30 MG: 30 INJECTION SUBCUTANEOUS at 10:08

## 2022-08-22 RX ADMIN — BISACODYL 10 MG: 10 SUPPOSITORY RECTAL at 08:08

## 2022-08-22 RX ADMIN — ENOXAPARIN SODIUM 30 MG: 30 INJECTION SUBCUTANEOUS at 08:08

## 2022-08-22 RX ADMIN — NYSTATIN 500000 UNITS: 100000 SUSPENSION ORAL at 05:08

## 2022-08-22 RX ADMIN — METHYLNALTREXONE BROMIDE 9.2 MG: 12 INJECTION, SOLUTION SUBCUTANEOUS at 10:08

## 2022-08-22 RX ADMIN — PIPERACILLIN SODIUM AND TAZOBACTAM SODIUM 4.5 G: 4; .5 INJECTION, POWDER, LYOPHILIZED, FOR SOLUTION INTRAVENOUS at 01:08

## 2022-08-22 RX ADMIN — MORPHINE SULFATE 4 MG: 4 INJECTION INTRAVENOUS at 10:08

## 2022-08-22 RX ADMIN — NYSTATIN 500000 UNITS: 100000 SUSPENSION ORAL at 10:08

## 2022-08-22 RX ADMIN — PIPERACILLIN SODIUM AND TAZOBACTAM SODIUM 4.5 G: 4; .5 INJECTION, POWDER, LYOPHILIZED, FOR SOLUTION INTRAVENOUS at 04:08

## 2022-08-22 RX ADMIN — PIPERACILLIN SODIUM AND TAZOBACTAM SODIUM 4.5 G: 4; .5 INJECTION, POWDER, LYOPHILIZED, FOR SOLUTION INTRAVENOUS at 08:08

## 2022-08-22 RX ADMIN — IPRATROPIUM BROMIDE AND ALBUTEROL SULFATE 3 ML: 2.5; .5 SOLUTION RESPIRATORY (INHALATION) at 07:08

## 2022-08-22 RX ADMIN — LIDOCAINE 1 PATCH: 50 PATCH TOPICAL at 10:08

## 2022-08-22 RX ADMIN — IPRATROPIUM BROMIDE AND ALBUTEROL SULFATE 3 ML: 2.5; .5 SOLUTION RESPIRATORY (INHALATION) at 12:08

## 2022-08-22 RX ADMIN — BISACODYL 10 MG: 10 SUPPOSITORY RECTAL at 10:08

## 2022-08-22 NOTE — PROGRESS NOTES
[facility]    Chief Complaint: Inpatient Follow-up for      Subjective:  Fidelina Darling is a 89 y.o. White female with a past medical history of hypertension, hyperlipidemia. The patient presented to [facility] on 8/16/2022 with a primary complaint of abdominal pain and falls due to syncope.  Patient reports having a fall on 8/12 while in her garage.  She states she was grabbing for the car door handle when she missed it and fell. Fall was unwitnessed and she reports loss of consciousness. On 08/15 she had a second fall while in the kitchen but denies any loss of consciousness.  Yesterday (08/16/2022) patient was going to the bathroom when she passed out.  Episode was witnessed by neighbor who is visiting her. She reports urinary frequency. Patient denies complaints of headache, vision changes, shortness of breath, cough.  She has been experiencing lower abdominal pain for the last several days. Granddaughter at bedside states that patient is dependent on laxatives have a bowel movement.  Although she uses laxatives she often has to manually disimpact herself . She normally goes every 4-5 days with last bowel movement being 5 days ago. She has been having increased belching.  Granddaughter also states patient has little to no appetite.  Patient lives alone, ambulates with walker, drives and completes activity daily living independently.       Upon presentation to the ED, patient afebrile, hemodynamically stable and SpO2 96% on room air.  Labs notable for WBC 28, potassium 2.6, glucose 161, lactic acid 3.9.  UA with 1+ leukocyte esterase, 2+ bilirubin, positive nitrites, trace ketones and protein.  Chest x-ray without acute processes.  CT abdomen pelvis revealed mild distention of the colon with liquefied stool, indeterminate bilateral adrenal nodules and pancreatic cysts.  While in the ED patient received 40 mEq of IV potassium chloride, 2 g of magnesium sulfate and Rocephin was started for  UTI.    Patient was admitted on account of recurrent syncope and falls. Colonic dilatation with lactic acidosis.   X-ray of the chest reveals left-sided atelectasis cannot rule out infiltrate.      8-21-22  Pt with persistent abd pain and nausea  Has NGT to LIWS  Belching  Minimal flatus  KUB with dilation 56.4mm to 76.8mm of colon    8/22/22:  NG still in place. Was on high suction. Placed on low suction.  Generalized mild abdominal discomfort  No flatus, no stool.  KUB pending.       Objective/physical exam:  General appearance: Well-developed, well-nourished female in no apparent distress.  IN bed.  HEENT: Atraumatic head. Dry mucous membranes of oral cavity.  Eyes: anicteric  Lungs: Clear to auscultation bilaterally.   Heart: Regular rate and rhythm.   Abdomen: mod firm, distended, generalized tenderness. Bowel sounds are hypoactive. Frequently belching. NG to LIS. Small amount of clear fluid in canister.  Extremities: No cyanosis, clubbing. No deformities.  Skin: No Rash. Warm and dry.  Neuro: Awake, alert and oriented self, place and city. Motor and sensory exams grossly intact.  Psyc: calm and cooperative    VITAL SIGNS: 24 HRS MIN & MAX LAST   Temp  Min: 97.7 °F (36.5 °C)  Max: 98.7 °F (37.1 °C) 98.4 °F (36.9 °C)   BP  Min: 129/76  Max: 150/68 (!) 144/78   Pulse  Min: 83  Max: 93  85   Resp  Min: 18  Max: 20 18   SpO2  Min: 89 %  Max: 98 % 97 %       Recent Labs   Lab 08/19/22  0504 08/20/22  1503 08/22/22  0357   WBC 8.7 10.5 6.4   RBC 3.06* 3.84* 3.37*   HGB 9.6* 12.0 10.5*   HCT 29.9* 36.3* 32.2*   MCV 97.7* 94.5* 95.5*   MCH 31.4* 31.3* 31.2*   MCHC 32.1* 33.1 32.6*   RDW 13.5 13.6 13.6    253 216   MPV 9.7 9.4 9.4       Recent Labs   Lab 08/17/22  0905 08/18/22  0224 08/19/22  0506 08/20/22  1105 08/22/22  0358    139 137 134 130*   K 3.1* 3.5 3.1* 4.2 3.5   CO2 21* 25 24 22* 23   BUN 22.4* 19.4 14.4 11.7 16.6   CREATININE 0.80 0.66 0.60 0.53* 0.52*   CALCIUM 8.5 7.4* 7.1* 7.5* 7.3*   MG  3.60*  --   --  2.10 1.80   ALBUMIN  --  2.4* 2.1*  --  1.8*   ALKPHOS  --  63 59  --  123   ALT  --  24 43  --  108*   AST  --  33 90*  --  210*   BILITOT  --  0.7 0.4  --  0.4           See below for Radiology    Scheduled Med:   albuterol-ipratropium  3 mL Nebulization Q6H    bisacodyL  10 mg Rectal BID    enoxaparin  30 mg Subcutaneous Q12H    methylnaltrexone  0.15 mg/kg Subcutaneous Daily    nystatin  500,000 Units Oral QID (WM & HS)    piperacillin-tazobactam (ZOSYN) IVPB  4.5 g Intravenous Q8H        Continuous Infusions:   Amino acid 4.25% - dextrose 5% (CLINIMIX-E) solution (1L provides 42.5 gm AA, 50 gm CHO (170 kcal/L dextrose), Na 35, K 30, Mg 5, Ca 4.5, Acetate 70, Cl 39, Phos 15) 75 mL/hr at 08/21/22 1736        PRN Meds:  acetaminophen, albuterol-ipratropium, dextrose 50%, dextrose 50%, glucagon (human recombinant), glucose, glucose, HYDROcodone-acetaminophen, insulin aspart U-100, labetaloL, melatonin, metoclopramide HCl, morphine, ondansetron, sodium chloride 0.9%       Radiology:  XR Non-Rad Performed NG/Gastric Tube Check  EXAMINATION:  XR NON-RADIOLOGIST PERFORMED NG/GASTRIC TUBE CHECK    CLINICAL HISTORY:  NG tube placement;    TECHNIQUE:  AP View(s) of the abdomen was performed.    COMPARISON:  None.    FINDINGS:  Enteric tube courses off the inferior collimation of the radiograph likely terminating at the gastric antrum.  Side port is beyond the GE junction.    Electronically signed by: Lise Holloway  Date:    08/20/2022  Time:    15:46  X-Ray Chest 1 View  Narrative: EXAMINATION:  XR CHEST 1 VIEW    CLINICAL HISTORY:  sob wheezing;    TECHNIQUE:  One view    COMPARISON:  August 17, 2022.    FINDINGS:  Cardiopericardial silhouette is within normal limits.  There is new small left pleural effusion and left lower lung zone atelectasis without exclusion of infiltrates.  There is also minimal right pleural effusion.  No pulmonary edema.  No pneumothorax.  Impression: New minimal right  and small left pleural effusion.    Left lower lung zone atelectasis without exclusion of infiltrates.    Electronically signed by: Jorden Gomez  Date:    08/20/2022  Time:    11:31  X-Ray Abdomen AP 1 View  Narrative: EXAMINATION:  XR ABDOMEN AP 1 VIEW    CLINICAL HISTORY:  abd distension;    TECHNIQUE:  AP View(s) of the abdomen was performed.    COMPARISON:  None.    FINDINGS:  Persistent colonic distension, similar to prior CT.    Postop left hip arthroplasty.  Impression: Unchanged distension of the colon.    Electronically signed by: Lise Holloway  Date:    08/20/2022  Time:    10:47      Assessment/Plan:  1. Acute abdominal distention   NGT LIWS   Start dulcolax supp   Relistor x 1   KUB with colonic distension 56.4mm to 76.8mmon my read/measurement  2. Dyspepsia   PPI  3. Nitrite positive urine   On Zosyn  4.Hypokalemia    Replace per attending      89 yo female admitted for falls, found to have UTI, and ileus. NG has been placed to LIWS, pt on appropriate ax therapy  Will add dulcolax suppository, Relistor  Repeat KUB reading pending  If distension.ileus does not resolve will plan gastrograffin UGI/SBS    Sheryl Chan RN acting as scribe for , MD  Gastroenterology  [facility]    Patient is a 89 yo female with a prolonged ileus - start bowel regimen as noted above.

## 2022-08-22 NOTE — PT/OT/SLP PROGRESS
Physical Therapy Treatment    Patient Name:  Fidelina Darling   MRN:  [id number]    Recommendations:     Discharge Recommendations:  nursing facility, skilled   Discharge Equipment Recommendations: none   Barriers to discharge: Decreased caregiver support    Assessment:     Fidelina Darling is a 89 y.o. female admitted with a medical diagnosis of Abdominal cramping.  She presents with the following impairments/functional limitations:  weakness, impaired endurance, gait instability, impaired self care skills, impaired balance, decreased lower extremity function, decreased coordination, decreased safety awareness, pain.    Rehab Prognosis: Fair; patient would benefit from acute skilled PT services to address these deficits and reach maximum level of function.    Recent Surgery: * No surgery found *      Plan:     During this hospitalization, patient to be seen daily to address the identified rehab impairments via gait training, therapeutic activities, therapeutic exercises, neuromuscular re-education and progress toward the following goals:    · Plan of Care Expires:  09/16/22    Subjective     Chief Complaint: Constant abdomen pain.    Pain/Comfort:  Pain Rating 1: 8/10  Location - Orientation 1: lower  Location 1: abdomen  Pain Addressed 1: Pre-medicate for activity, Reposition, Distraction  Pain Rating Post-Intervention 1: 8/10      Objective:     Communicated with NSG prior to session.  Patient found HOB elevated with   upon PT entry to room.     General Precautions: Standard, fall   Orthopedic Precautions:N/A   Braces: N/A  Respiratory Status: Room air     Functional Mobility:  · Bed Mobility:     · Rolling Left:  maximal assistance  · Rolling Right: maximal assistance  · Supine to Sit: maximal assistance  · Sit to Supine: maximal assistance      AM-PAC 6 CLICK MOBILITY  Turning over in bed (including adjusting bedclothes, sheets and blankets)?: 2  Sitting down on and standing up from a chair with arms (e.g.,  wheelchair, bedside commode, etc.): 2  Moving from lying on back to sitting on the side of the bed?: 2  Moving to and from a bed to a chair (including a wheelchair)?: 2  Need to walk in hospital room?: 1  Climbing 3-5 steps with a railing?: 1  Basic Mobility Total Score: 10       Therapeutic Activities and Exercises:   BLE therex performed in bed with modA provided for FROM.    Patient left with bed in chair position with all lines intact and call button in reach.  Pt's NG tube was malfunctioning during therapy session to which NSG was notified of this and came to address leaking NG tube.  Chux pad was left around NG tube opening to prevent from getting on the patient.    GOALS:   Multidisciplinary Problems     Physical Therapy Goals        Problem: Physical Therapy    Goal Priority Disciplines Outcome Goal Variances Interventions   Physical Therapy Goal     PT, PT/OT Ongoing, Progressing     Description: Goals to be met by: 22     Patient will increase functional independence with mobility by performin. Supine to sit with Stand-by Assistance  2. Sit to stand transfer with Stand-by Assistance  3. Gait  x 200 feet with Stand-by Assistance using Rolling Walker.                      Time Tracking:     PT Received On: 22  PT Start Time: 921     PT Stop Time: 957  PT Total Time (min): 36 min     Billable Minutes: Therapeutic Activity 26 and Therapeutic Exercise 10             PTA Visit Number: 1     2022

## 2022-08-22 NOTE — PROGRESS NOTES
[facility] Hospital Medicine Progress Note        Chief Complaint: Inpatient Follow-up for      Subjective:  Fidelina Darling is a 89 y.o. White female with a past medical history of hypertension, hyperlipidemia. The patient presented to [facility] on 8/16/2022 with a primary complaint of abdominal pain and falls due to syncope.  Patient reports having a fall on 8/12 while in her garage.  She states she was grabbing for the car door handle when she missed it and fell. Fall was unwitnessed and she reports loss of consciousness. On 08/15 she had a second fall while in the kitchen but denies any loss of consciousness.  Yesterday (08/16/2022) patient was going to the bathroom when she passed out.  Episode was witnessed by neighbor who is visiting her. She reports urinary frequency. Patient denies complaints of headache, vision changes, shortness of breath, cough.  She has been experiencing lower abdominal pain for the last several days. Granddaughter at bedside states that patient is dependent on laxatives have a bowel movement.  Although she uses laxatives she often has to manually disimpact herself . She normally goes every 4-5 days with last bowel movement being 5 days ago. She has been having increased belching.  Granddaughter also states patient has little to no appetite.  Patient lives alone, ambulates with walker, drives and completes activity daily living independently.  Family as it patient to be experiencing dementia over the last 6 months.     Upon presentation to the ED, patient afebrile, hemodynamically stable and SpO2 96% on room air.  Labs notable for WBC 28, potassium 2.6, glucose 161, lactic acid 3.9.  UA with 1+ leukocyte esterase, 2+ bilirubin, positive nitrites, trace ketones and protein.  Chest x-ray without acute processes.  CT abdomen pelvis revealed mild distention of the colon with liquefied stool, indeterminate bilateral adrenal nodules and pancreatic cysts.  While in  the ED patient received 40 mEq of IV potassium chloride, 2 g of magnesium sulfate and Rocephin was started for UTI.  Patient admitted to hospital medicine services for further medical management.     Patient was admitted on account of recurrent syncope and falls.  Found to have acute TTE high.  Colonic dilatation with lactic acidosis.   X-ray of the chest reveals left-sided atelectasis cannot rule out infiltrate.    KUB shows persistent colonic dilatation.  With a small sliding hiatal hernia, resume IV Zosyn  Insert an NG tube and connect to low intermittent wall suction, keep NPO  Begin IV Clinimix for nutrition   Re-consult GI patient may need a proctosigmoidoscopy to help with colonic dilatation         Today's information  Patient seen and examined at bedside  Patient remains on NG tube connected to low intermittent wall suction   Abdominal distention is improving slowly  kub this am   F/up gi recs         Objective/physical exam:  General appearance: Well-developed, well-nourished female in no apparent distress.  Granddaughter at bedside.  HEENT: Atraumatic head. Dry mucous membranes of oral cavity.  Lungs: Clear to auscultation bilaterally.   Heart: Regular rate and rhythm.   Abdomen: Soft, mildly distended, generalized tenderness. Bowel sounds are hypoactive.  Frequently belching  Extremities: No cyanosis, clubbing. No deformities.  Skin: No Rash. Warm and dry.  Neuro: Awake, alert and oriented self, place and city. Motor and sensory exams grossly intact.           Assessment/Plan:  Acute abdominal distention , improving slowly  Dyspepsia  Left lower lobe atelectasis versus infiltrates   Hypokalemia   Small sliding hiatal hernia   Recurrent syncope with falls  Acute bacterial cystitis  Bilateral adrenal nodules  Pancreatic cyst  Lactic acidosis, resolved   Essential hypertension     Plan:   kub  F/up gi recs   Continue IV Clinimix, continue Zosyn day 3  Add on IV labetalol p.r.n.   Follow-up stool studies    Glucose within normal limit  X-ray of the chest reveals left-sided atelectasis cannot rule out infiltrate.    KUB shows persistent colonic dilatation.  With a small sliding hiatal hernia   oral candidiasis, nystatin SS.  Follow-up stool cultures, GI PCR   general surgery recommendations were noted.  duonebs prn for wheezing      All diagnosis and differential diagnosis have been reviewed; assessment and plan has been documented; I have personally reviewed the labs and test results that are presently available; I have reviewed the patients medication list; I have reviewed the consulting providers response and recommendations. I have reviewed or attempted to review medical records based upon their availability.          VITAL SIGNS: 24 HRS MIN & MAX LAST   Temp  Min: 98.3 °F (36.8 °C)  Max: 98.7 °F (37.1 °C) 98.3 °F (36.8 °C)   BP  Min: 129/76  Max: 148/68 (!) 147/79   Pulse  Min: 83  Max: 92  86   Resp  Min: 18  Max: 20 18   SpO2  Min: 89 %  Max: 98 % 97 %       Recent Labs   Lab 08/19/22  0504 08/20/22  1503 08/22/22  0357   WBC 8.7 10.5 6.4   RBC 3.06* 3.84* 3.37*   HGB 9.6* 12.0 10.5*   HCT 29.9* 36.3* 32.2*   MCV 97.7* 94.5* 95.5*   MCH 31.4* 31.3* 31.2*   MCHC 32.1* 33.1 32.6*   RDW 13.5 13.6 13.6    253 216   MPV 9.7 9.4 9.4       Recent Labs   Lab 08/17/22  0905 08/18/22  0224 08/19/22  0506 08/20/22  1105 08/22/22  0358    139 137 134 130*   K 3.1* 3.5 3.1* 4.2 3.5   CO2 21* 25 24 22* 23   BUN 22.4* 19.4 14.4 11.7 16.6   CREATININE 0.80 0.66 0.60 0.53* 0.52*   CALCIUM 8.5 7.4* 7.1* 7.5* 7.3*   MG 3.60*  --   --  2.10 1.80   ALBUMIN  --  2.4* 2.1*  --  1.8*   ALKPHOS  --  63 59  --  123   ALT  --  24 43  --  108*   AST  --  33 90*  --  210*   BILITOT  --  0.7 0.4  --  0.4          Microbiology Results (last 7 days)     Procedure Component Value Units Date/Time    Blood Culture #1 **CANNOT BE ORDERED STAT** [[id number]]  (Normal) Collected: 08/17/22 0148    Order Status: Completed Specimen: Blood  Updated: 08/22/22 0302     CULTURE, BLOOD ([facility]) No Growth at 5 days    Blood Culture #2 **CANNOT BE ORDERED STAT** [[id number]]  (Normal) Collected: 08/17/22 0148    Order Status: Completed Specimen: Blood Updated: 08/22/22 0302     CULTURE, BLOOD ([facility]) No Growth at 5 days    Urine Culture High Risk [[id number]] Collected: 08/18/22 1325    Order Status: Completed Specimen: Urine, Clean Catch Updated: 08/20/22 1026     Urine Culture No Growth    Stool Culture [[id number]]     Order Status: Sent Specimen: Stool     Stool Culture [[id number]]     Order Status: Sent Specimen: Stool     Clostridium Diff Toxin, A & B, EIA [[id number]]     Order Status: Sent Specimen: Stool            See below for Radiology    Scheduled Med:   albuterol-ipratropium  3 mL Nebulization Q6H    bisacodyL  10 mg Rectal BID    enoxaparin  30 mg Subcutaneous Q12H    methylnaltrexone  0.15 mg/kg Subcutaneous Daily    nystatin  500,000 Units Oral QID (WM & HS)    piperacillin-tazobactam (ZOSYN) IVPB  4.5 g Intravenous Q8H        Continuous Infusions:   Amino acid 4.25% - dextrose 5% (CLINIMIX-E) solution (1L provides 42.5 gm AA, 50 gm CHO (170 kcal/L dextrose), Na 35, K 30, Mg 5, Ca 4.5, Acetate 70, Cl 39, Phos 15) 75 mL/hr at 08/21/22 1736        PRN Meds:  acetaminophen, albuterol-ipratropium, dextrose 50%, dextrose 50%, glucagon (human recombinant), glucose, glucose, HYDROcodone-acetaminophen, insulin aspart U-100, labetaloL, melatonin, metoclopramide HCl, morphine, ondansetron, sodium chloride 0.9%       Assessment/Plan:      VTE prophylaxis:     Patient condition:  Stable/Fair/Guarded/ Serious/ Critical    Anticipated discharge and Disposition:         All diagnosis and differential diagnosis have been reviewed; assessment and plan has been documented; I have personally reviewed the labs and test results that are presently available; I have reviewed the patients medication list; I have reviewed the consulting providers response and  recommendations. I have reviewed or attempted to review medical records based upon their availability    All of the patient's questions have been  addressed and answered. Patient's is agreeable to the above stated plan. I will continue to monitor closely and make adjustments to medical management as needed.  _____________________________________________________________________    Nutrition Status:    Radiology:  XR Non-Rad Performed NG/Gastric Tube Check  EXAMINATION:  XR NON-RADIOLOGIST PERFORMED NG/GASTRIC TUBE CHECK    CLINICAL HISTORY:  NG tube placement;    TECHNIQUE:  AP View(s) of the abdomen was performed.    COMPARISON:  None.    FINDINGS:  Enteric tube courses off the inferior collimation of the radiograph likely terminating at the gastric antrum.  Side port is beyond the GE junction.    Electronically signed by: Lise Holloway  Date:    08/20/2022  Time:    15:46  X-Ray Chest 1 View  Narrative: EXAMINATION:  XR CHEST 1 VIEW    CLINICAL HISTORY:  sob wheezing;    TECHNIQUE:  One view    COMPARISON:  August 17, 2022.    FINDINGS:  Cardiopericardial silhouette is within normal limits.  There is new small left pleural effusion and left lower lung zone atelectasis without exclusion of infiltrates.  There is also minimal right pleural effusion.  No pulmonary edema.  No pneumothorax.  Impression: New minimal right and small left pleural effusion.    Left lower lung zone atelectasis without exclusion of infiltrates.    Electronically signed by: Jorden Gomez  Date:    08/20/2022  Time:    11:31  X-Ray Abdomen AP 1 View  Narrative: EXAMINATION:  XR ABDOMEN AP 1 VIEW    CLINICAL HISTORY:  abd distension;    TECHNIQUE:  AP View(s) of the abdomen was performed.    COMPARISON:  None.    FINDINGS:  Persistent colonic distension, similar to prior CT.    Postop left hip arthroplasty.  Impression: Unchanged distension of the colon.    Electronically signed by: Lise  Jewel  Date:    08/20/2022  Time:    10:47      Yinka Cabral MD   08/22/2022

## 2022-08-23 LAB
POCT GLUCOSE: 105 MG/DL (ref 70–110)
POCT GLUCOSE: 108 MG/DL (ref 70–110)
POCT GLUCOSE: 114 MG/DL (ref 70–110)
POCT GLUCOSE: 98 MG/DL (ref 70–110)

## 2022-08-23 PROCEDURE — 94761 N-INVAS EAR/PLS OXIMETRY MLT: CPT

## 2022-08-23 PROCEDURE — 11000001 HC ACUTE MED/SURG PRIVATE ROOM

## 2022-08-23 PROCEDURE — 97110 THERAPEUTIC EXERCISES: CPT | Mod: CQ

## 2022-08-23 PROCEDURE — 25000003 PHARM REV CODE 250: Performed by: INTERNAL MEDICINE

## 2022-08-23 PROCEDURE — 63600175 PHARM REV CODE 636 W HCPCS: Performed by: INTERNAL MEDICINE

## 2022-08-23 PROCEDURE — 63600175 PHARM REV CODE 636 W HCPCS: Performed by: NURSE PRACTITIONER

## 2022-08-23 PROCEDURE — 27000221 HC OXYGEN, UP TO 24 HOURS

## 2022-08-23 PROCEDURE — 25000242 PHARM REV CODE 250 ALT 637 W/ HCPCS: Performed by: INTERNAL MEDICINE

## 2022-08-23 PROCEDURE — 97530 THERAPEUTIC ACTIVITIES: CPT | Mod: CO

## 2022-08-23 PROCEDURE — 99900031 HC PATIENT EDUCATION (STAT)

## 2022-08-23 PROCEDURE — 94640 AIRWAY INHALATION TREATMENT: CPT

## 2022-08-23 PROCEDURE — 21400001 HC TELEMETRY ROOM

## 2022-08-23 PROCEDURE — 97535 SELF CARE MNGMENT TRAINING: CPT | Mod: CO

## 2022-08-23 RX ADMIN — ENOXAPARIN SODIUM 30 MG: 30 INJECTION SUBCUTANEOUS at 09:08

## 2022-08-23 RX ADMIN — NYSTATIN 500000 UNITS: 100000 SUSPENSION ORAL at 02:08

## 2022-08-23 RX ADMIN — PIPERACILLIN SODIUM AND TAZOBACTAM SODIUM 4.5 G: 4; .5 INJECTION, POWDER, LYOPHILIZED, FOR SOLUTION INTRAVENOUS at 09:08

## 2022-08-23 RX ADMIN — ONDANSETRON 4 MG: 2 INJECTION INTRAMUSCULAR; INTRAVENOUS at 01:08

## 2022-08-23 RX ADMIN — LEUCINE, PHENYLALANINE, LYSINE, METHIONINE, ISOLEUCINE, VALINE, HISTIDINE, THREONINE, TRYPTOPHAN, ALANINE, GLYCINE, ARGININE, PROLINE, SERINE, TYROSINE, SODIUM ACETATE, DIBASIC POTASSIUM PHOSPHATE, MAGNESIUM CHLORIDE, SODIUM CHLORIDE, CALCIUM CHLORIDE, DEXTROSE
311; 238; 247; 170; 255; 247; 204; 179; 77; 880; 438; 489; 289; 213; 17; 297; 261; 51; 77; 33; 5 INJECTION INTRAVENOUS at 09:08

## 2022-08-23 RX ADMIN — NYSTATIN 500000 UNITS: 100000 SUSPENSION ORAL at 05:08

## 2022-08-23 RX ADMIN — IPRATROPIUM BROMIDE AND ALBUTEROL SULFATE 3 ML: 2.5; .5 SOLUTION RESPIRATORY (INHALATION) at 09:08

## 2022-08-23 RX ADMIN — PIPERACILLIN SODIUM AND TAZOBACTAM SODIUM 4.5 G: 4; .5 INJECTION, POWDER, LYOPHILIZED, FOR SOLUTION INTRAVENOUS at 02:08

## 2022-08-23 RX ADMIN — NYSTATIN 500000 UNITS: 100000 SUSPENSION ORAL at 09:08

## 2022-08-23 RX ADMIN — IPRATROPIUM BROMIDE AND ALBUTEROL SULFATE 3 ML: 2.5; .5 SOLUTION RESPIRATORY (INHALATION) at 02:08

## 2022-08-23 RX ADMIN — IPRATROPIUM BROMIDE AND ALBUTEROL SULFATE 3 ML: 2.5; .5 SOLUTION RESPIRATORY (INHALATION) at 07:08

## 2022-08-23 RX ADMIN — LIDOCAINE 1 PATCH: 50 PATCH TOPICAL at 04:08

## 2022-08-23 RX ADMIN — MORPHINE SULFATE 4 MG: 4 INJECTION INTRAVENOUS at 09:08

## 2022-08-23 RX ADMIN — PIPERACILLIN SODIUM AND TAZOBACTAM SODIUM 4.5 G: 4; .5 INJECTION, POWDER, LYOPHILIZED, FOR SOLUTION INTRAVENOUS at 05:08

## 2022-08-23 RX ADMIN — DICLOFENAC SODIUM 4 G: 10 GEL TOPICAL at 09:08

## 2022-08-23 RX ADMIN — METHYLNALTREXONE BROMIDE 9.2 MG: 12 INJECTION, SOLUTION SUBCUTANEOUS at 09:08

## 2022-08-23 NOTE — PROGRESS NOTES
[facility]    Chief Complaint: Inpatient Follow-up for      Subjective:  Fidelina Darling is a 89 y.o. White female with a past medical history of hypertension, hyperlipidemia. The patient presented to [facility] on 8/16/2022 with a primary complaint of abdominal pain and falls due to syncope.  Patient reports having a fall on 8/12 while in her garage.  She states she was grabbing for the car door handle when she missed it and fell. Fall was unwitnessed and she reports loss of consciousness. On 08/15 she had a second fall while in the kitchen but denies any loss of consciousness.  Yesterday (08/16/2022) patient was going to the bathroom when she passed out.  Episode was witnessed by neighbor who is visiting her. She reports urinary frequency. Patient denies complaints of headache, vision changes, shortness of breath, cough.  She has been experiencing lower abdominal pain for the last several days. Granddaughter at bedside states that patient is dependent on laxatives have a bowel movement.  Although she uses laxatives she often has to manually disimpact herself . She normally goes every 4-5 days with last bowel movement being 5 days ago. She has been having increased belching.  Granddaughter also states patient has little to no appetite.  Patient lives alone, ambulates with walker, drives and completes activity daily living independently.       Upon presentation to the ED, patient afebrile, hemodynamically stable and SpO2 96% on room air.  Labs notable for WBC 28, potassium 2.6, glucose 161, lactic acid 3.9.  UA with 1+ leukocyte esterase, 2+ bilirubin, positive nitrites, trace ketones and protein.  Chest x-ray without acute processes.  CT abdomen pelvis revealed mild distention of the colon with liquefied stool, indeterminate bilateral adrenal nodules and pancreatic cysts.  While in the ED patient received 40 mEq of IV potassium chloride, 2 g of magnesium sulfate and Rocephin was started for  UTI.    Patient was admitted on account of recurrent syncope and falls. Colonic dilatation with lactic acidosis.   X-ray of the chest reveals left-sided atelectasis cannot rule out infiltrate.      8-21-22  Pt with persistent abd pain and nausea  Has NGT to LIWS  Belching  Minimal flatus  KUB with dilation 56.4mm to 76.8mm of colon    8/22/22:  NG still in place. Was on high suction. Placed on low suction.  Generalized mild abdominal discomfort  No flatus, no stool.  KUB pending.    8/23/22:  US ordered for today due to elevated LFTs  Patient having small liquid brown stool  NG in place with minimal output        Objective/physical exam:  General appearance: Well-developed, well-nourished female in no apparent distress.  IN bed.  HEENT: Atraumatic head. Dry mucous membranes of oral cavity.  Eyes: anicteric  Lungs: Clear to auscultation bilaterally.   Heart: Regular rate and rhythm.   Abdomen: mod firm, distended, generalized tenderness. Bowel sounds are hypoactive. Frequently belching. NG to LIS. Small amount of clear fluid in canister.  Extremities: No cyanosis, clubbing. No deformities.  Skin: No Rash. Warm and dry.  Neuro: Awake, alert and oriented self, place and city. Motor and sensory exams grossly intact.  Psyc: calm and cooperative    VITAL SIGNS: 24 HRS MIN & MAX LAST   Temp  Min: 98.1 °F (36.7 °C)  Max: 99.1 °F (37.3 °C) 98.2 °F (36.8 °C)   BP  Min: 124/75  Max: 147/79 138/81   Pulse  Min: 85  Max: 97  90   Resp  Min: 18  Max: 20 18   SpO2  Min: 95 %  Max: 98 % 98 %       Recent Labs   Lab 08/19/22  0504 08/20/22  1503 08/22/22  0357   WBC 8.7 10.5 6.4   RBC 3.06* 3.84* 3.37*   HGB 9.6* 12.0 10.5*   HCT 29.9* 36.3* 32.2*   MCV 97.7* 94.5* 95.5*   MCH 31.4* 31.3* 31.2*   MCHC 32.1* 33.1 32.6*   RDW 13.5 13.6 13.6    253 216   MPV 9.7 9.4 9.4       Recent Labs   Lab 08/17/22  0905 08/18/22  0224 08/19/22  0506 08/20/22  1105 08/22/22  0358    139 137 134 130*   K 3.1* 3.5 3.1* 4.2 3.5   CO2 21*  25 24 22* 23   BUN 22.4* 19.4 14.4 11.7 16.6   CREATININE 0.80 0.66 0.60 0.53* 0.52*   CALCIUM 8.5 7.4* 7.1* 7.5* 7.3*   MG 3.60*  --   --  2.10 1.80   ALBUMIN  --  2.4* 2.1*  --  1.8*   ALKPHOS  --  63 59  --  123   ALT  --  24 43  --  108*   AST  --  33 90*  --  210*   BILITOT  --  0.7 0.4  --  0.4           See below for Radiology    Scheduled Med:   albuterol-ipratropium  3 mL Nebulization Q6H    diclofenac sodium  4 g Topical (Top) Daily    enoxaparin  30 mg Subcutaneous Q12H    LIDOcaine  1 patch Transdermal Q24H    nystatin  500,000 Units Oral QID (WM & HS)    piperacillin-tazobactam (ZOSYN) IVPB  4.5 g Intravenous Q8H        Continuous Infusions:       PRN Meds:  acetaminophen, albuterol-ipratropium, dextrose 50%, dextrose 50%, glucagon (human recombinant), glucose, glucose, HYDROcodone-acetaminophen, insulin aspart U-100, labetaloL, melatonin, metoclopramide HCl, morphine, ondansetron, sodium chloride 0.9%       Radiology:  X-Ray Abdomen AP 1 View  Narrative: EXAMINATION:  XR ABDOMEN AP 1 VIEW    CLINICAL HISTORY:  ileus, colonic dilatation , reassessment, on ngtube;    TECHNIQUE:  AP X-RAY OF THE ABDOMEN:    CPT 15070    COMPARISON:  August 20, 2022    FINDINGS:  Examination reveals some residual feces throughout the colon there is gaseous distension of the abdomen with gas in loops of large bowel gas pattern is nonspecific with no clear evidence of ileus or obstruction the distension of the colon similar to previous exam.    Nasogastric tube is seen with the tip in the body/antrum of the stomach  Impression: Persistent distension of the colon with no other significant change    Electronically signed by: Bharathi Gauthier  Date:    08/22/2022  Time:    14:49      Assessment/Plan:  1. Acute abdominal distention, having small volume stool    NGT LIWS   Start dulcolax supp   Relistor x 1, give another dose today   KUB with colonic distension 56.4mm to 76.8mmon my read/measurement  2. Dyspepsia   PPI  3.  Nitrite positive urine   On Zosyn  4.Hypokalemia    Replace per attending  5. Elevated LFTS    US pending today       89 yo female admitted for falls, found to have UTI, and ileus. NG has been placed to LIWS, pt on appropriate ax therapy  Contine dulcolax suppository, Relistor  If distension.ileus does not resolve will plan gastrograffin UGI/SBS    Sheryl Chan RN acting as scribe for Jonn Austin MD  Gastroenterology  [facility]      The documentation recorded by the scribe/NP accurately reflects the service I personally performed and the decisions made by me.     Jonn Austin MD

## 2022-08-23 NOTE — PT/OT/SLP PROGRESS
Physical Therapy Treatment    Patient Name:  Fidelina Darling   MRN:  [id number]    Recommendations:     Discharge Recommendations:  nursing facility, skilled   Discharge Equipment Recommendations: none   Barriers to discharge: Decreased caregiver support    Assessment:     Fidelina Darling is a 89 y.o. female admitted with a medical diagnosis of Abdominal cramping.  She presents with the following impairments/functional limitations:  weakness, impaired endurance, impaired functional mobility, decreased safety awareness, pain, decreased lower extremity function.    Rehab Prognosis: Fair; patient would benefit from acute skilled PT services to address these deficits and reach maximum level of function.    Recent Surgery: * No surgery found *      Plan:     During this hospitalization, patient to be seen daily to address the identified rehab impairments via gait training, therapeutic activities, therapeutic exercises, neuromuscular re-education and progress toward the following goals:    · Plan of Care Expires:  09/16/22    Subjective     Chief Complaint: Pain in abdomen area.    Pain/Comfort:  · Pain Rating 1: 8/10  · Location - Orientation 1: lower  · Location 1: abdomen  · Pain Addressed 1: Pre-medicate for activity, Reposition  · Pain Rating Post-Intervention 1: 8/10      Objective:     Communicated with NSG prior to session.  Patient found HOB elevated with   upon PT entry to room.     General Precautions: Standard, fall   Orthopedic Precautions:N/A   Braces:    Respiratory Status: Nasal cannula, flow 2 L/min    Therapeutic Activities and Exercises:   BLE therex performed in bed.  ROM very limited in knee flexion.  modA provided throughout for FROM.    Patient left HOB elevated with all lines intact and call button in reach..    GOALS:   Multidisciplinary Problems     Physical Therapy Goals        Problem: Physical Therapy    Goal Priority Disciplines Outcome Goal Variances Interventions   Physical Therapy Goal     PT,  PT/OT Ongoing, Progressing     Description: Goals to be met by: 22     Patient will increase functional independence with mobility by performin. Supine to sit with Stand-by Assistance  2. Sit to stand transfer with Stand-by Assistance  3. Gait  x 200 feet with Stand-by Assistance using Rolling Walker.                      Time Tracking:     PT Received On: 22  PT Start Time: 826     PT Stop Time: 843  PT Total Time (min): 17 min     Billable Minutes: Therapeutic Exercise 17    Treatment Type: Treatment  PT/PTA: PTA     PTA Visit Number: 3     2022

## 2022-08-23 NOTE — PT/OT/SLP PROGRESS
Occupational Therapy  Treatment    Fidelina Darling   MRN: [id number]   Admitting Diagnosis: Abdominal cramping    OT Date of Treatment: 08/23/22   OT Start Time: 0930  OT Stop Time: 1016  OT Total Time (min): 46 min     Billable Minutes:  Self Care/Home Management 15 and Therapeutic Activity 31  Total Minutes: 46     OT/CONSUELO: CONSUELO     CONSUELO Visit Number: 1    General Precautions: Standard, fall  Orthopedic Precautions: N/A  Braces: N/A    Spiritual, Cultural Beliefs, Alevism Practices, Values that Affect Care: no    Subjective:  Communicated with nurse prior to session.    Objective:  Patient found with: peripheral IV, pearson catheter, NG tube    Functional Mobility:  Bed Mobility:   Supine to sit: Maximum Assistance   Sit to supine: Maximum Assistance   Rolling: Maximum Assistance   Scooting: Maximum Assistance    Toilet Training:  Pt performed toileting with Total Assistance in supine and side lying in bed secondary to being soiled.    Balance:   Static Sit: FAIR-: Maintains without assist but inconsistent   Dynamic Sit:  FAIR: Cannot move trunk without losing balance     Patient left HOB elevated with call button in reach    ASSESSMENT:  Fidelina Darling is a 89 y.o. female with a medical diagnosis of Abdominal cramping and presents with decreased ADL tasks, decreased Ax tolerance and decreased strength.    Rehab potential is good    Activity tolerance: Good    Discharge recommendations: nursing facility, skilled     Equipment recommendations: none     GOALS:   Multidisciplinary Problems     Occupational Therapy Goals        Problem: Occupational Therapy    Goal Priority Disciplines Outcome Interventions   Occupational Therapy Goal     OT, PT/OT Ongoing, Progressing    Description: Goals to be met by: 9/16/22     Patient will increase functional independence with ADLs by performing:    UE Dressing with Modified [place].  LE Dressing with Modified [place].  Grooming while standing with Modified  [place].  Toileting from bedside commode with Modified [place] for hygiene  and clothing management.   Toilet transfer to bedside commode with Modified [place].progress  to toilet.                     Plan:  Patient to be seen 3 x/week, 5 x/week to address the above listed problems via self-care/home management, therapeutic activities, therapeutic exercises  Plan of Care expires: 09/16/22  Plan of Care reviewed with: patient         08/23/2022

## 2022-08-23 NOTE — PROGRESS NOTES
[facility] Hospital Medicine Progress Note        Chief Complaint: Inpatient Follow-up for      Subjective:  Fidelina Darling is a 89 y.o. White female with a past medical history of hypertension, hyperlipidemia. The patient presented to [facility] on 8/16/2022 with a primary complaint of abdominal pain and falls due to syncope.  Patient reports having a fall on 8/12 while in her garage.  She states she was grabbing for the car door handle when she missed it and fell. Fall was unwitnessed and she reports loss of consciousness. On 08/15 she had a second fall while in the kitchen but denies any loss of consciousness.  Yesterday (08/16/2022) patient was going to the bathroom when she passed out.  Episode was witnessed by neighbor who is visiting her. She reports urinary frequency. Patient denies complaints of headache, vision changes, shortness of breath, cough.  She has been experiencing lower abdominal pain for the last several days. Granddaughter at bedside states that patient is dependent on laxatives have a bowel movement.  Although she uses laxatives she often has to manually disimpact herself . She normally goes every 4-5 days with last bowel movement being 5 days ago. She has been having increased belching.  Granddaughter also states patient has little to no appetite.  Patient lives alone, ambulates with walker, drives and completes activity daily living independently.  Family as it patient to be experiencing dementia over the last 6 months.     Upon presentation to the ED, patient afebrile, hemodynamically stable and SpO2 96% on room air.  Labs notable for WBC 28, potassium 2.6, glucose 161, lactic acid 3.9.  UA with 1+ leukocyte esterase, 2+ bilirubin, positive nitrites, trace ketones and protein.  Chest x-ray without acute processes.  CT abdomen pelvis revealed mild distention of the colon with liquefied stool, indeterminate bilateral adrenal nodules and pancreatic cysts.  While in  the ED patient received 40 mEq of IV potassium chloride, 2 g of magnesium sulfate and Rocephin was started for UTI.  Patient admitted to hospital medicine services for further medical management.     Patient was admitted on account of recurrent syncope and falls.  Found to have acute TTE high.  Colonic dilatation with lactic acidosis.   X-ray of the chest reveals left-sided atelectasis cannot rule out infiltrate.    KUB shows persistent colonic dilatation.  With a small sliding hiatal hernia, resume IV Zosyn  Insert an NG tube and connect to low intermittent wall suction, keep NPO  Begin IV Clinimix for nutrition   GI was reconsulted.  Planning for upper GI/small-bowel series        Today's information  Patient seen and examined at bedside  Patient remains on NG tube connected to low intermittent wall suction   Abdominal distention is improving slowly  Patient has had 2 bowel movements, watery.  She is passing flatus.    She currently has no complaints.    Follow-up with GI recs  Liver enzymes elevated.  Get ultrasound of the abdomen.  Trend LFTs  May be related to IV antibiotics.  Monitor closely  On IV Zosyn day 4/7        Objective/physical exam:  General appearance: Well-developed, well-nourished female in no apparent distress.  Granddaughter at bedside.  HEENT: Atraumatic head. Dry mucous membranes of oral cavity.  Lungs: Clear to auscultation bilaterally.   Heart: Regular rate and rhythm.   Abdomen: Soft, mildly distended, generalized tenderness. Bowel sounds are hypoactive.  Frequently belching  Extremities: No cyanosis, clubbing. No deformities.  Skin: No Rash. Warm and dry.  Neuro: Awake, alert and oriented self, place and city. Motor and sensory exams grossly intact.           Assessment/Plan:  Acute abdominal distention , improving slowly  Dyspepsia  Left lower lobe atelectasis versus infiltrates   Hypokalemia   Small sliding hiatal hernia   Recurrent syncope with falls  Acute bacterial  cystitis  Bilateral adrenal nodules  Pancreatic cyst  Lactic acidosis, resolved   Essential hypertension  Elevated liver enzymes      Plan:   Patient remains on NG tube connected to low intermittent wall suction   Abdominal distention is improving slowly  Patient has had 2 bowel movements, watery.  She is passing flatus.    Follow-up with GI recs  Liver enzymes elevated.  Get ultrasound of the abdomen.  Trend LFTs  May be related to IV antibiotics.  Monitor closely  On IV Zosyn day 4/7  Add on IV labetalol p.r.n.   Follow-up stool studies   Glucose within normal limit  X-ray of the chest reveals left-sided atelectasis cannot rule out infiltrate.    KUB shows persistent colonic dilatation.  With a small sliding hiatal hernia   oral candidiasis, nystatin SS.  Follow-up stool cultures, GI PCR   general surgery recommendations were noted.  duonebs prn for wheezing      All diagnosis and differential diagnosis have been reviewed; assessment and plan has been documented; I have personally reviewed the labs and test results that are presently available; I have reviewed the patients medication list; I have reviewed the consulting providers response and recommendations. I have reviewed or attempted to review medical records based upon their availability.     VITAL SIGNS: 24 HRS MIN & MAX LAST   Temp  Min: 98.1 °F (36.7 °C)  Max: 99.1 °F (37.3 °C) 98.1 °F (36.7 °C)   BP  Min: 124/75  Max: 146/79 139/83   Pulse  Min: 85  Max: 97  91   Resp  Min: 18  Max: 20 18   SpO2  Min: 95 %  Max: 98 % 96 %       Recent Labs   Lab 08/19/22  0504 08/20/22  1503 08/22/22  0357   WBC 8.7 10.5 6.4   RBC 3.06* 3.84* 3.37*   HGB 9.6* 12.0 10.5*   HCT 29.9* 36.3* 32.2*   MCV 97.7* 94.5* 95.5*   MCH 31.4* 31.3* 31.2*   MCHC 32.1* 33.1 32.6*   RDW 13.5 13.6 13.6    253 216   MPV 9.7 9.4 9.4       Recent Labs   Lab 08/17/22  0905 08/18/22  0224 08/19/22  0506 08/20/22  1105 08/22/22  0358    139 137 134 130*   K 3.1* 3.5 3.1* 4.2 3.5    CO2 21* 25 24 22* 23   BUN 22.4* 19.4 14.4 11.7 16.6   CREATININE 0.80 0.66 0.60 0.53* 0.52*   CALCIUM 8.5 7.4* 7.1* 7.5* 7.3*   MG 3.60*  --   --  2.10 1.80   ALBUMIN  --  2.4* 2.1*  --  1.8*   ALKPHOS  --  63 59  --  123   ALT  --  24 43  --  108*   AST  --  33 90*  --  210*   BILITOT  --  0.7 0.4  --  0.4          Microbiology Results (last 7 days)     Procedure Component Value Units Date/Time    Blood Culture #1 **CANNOT BE ORDERED STAT** [[id number]]  (Normal) Collected: 08/17/22 0148    Order Status: Completed Specimen: Blood Updated: 08/22/22 0302     CULTURE, BLOOD ([facility]) No Growth at 5 days    Blood Culture #2 **CANNOT BE ORDERED STAT** [[id number]]  (Normal) Collected: 08/17/22 0148    Order Status: Completed Specimen: Blood Updated: 08/22/22 0302     CULTURE, BLOOD ([facility]) No Growth at 5 days    Urine Culture High Risk [[id number]] Collected: 08/18/22 1325    Order Status: Completed Specimen: Urine, Clean Catch Updated: 08/20/22 1026     Urine Culture No Growth    Stool Culture [[id number]]     Order Status: Canceled Specimen: Stool     Stool Culture [[id number]]     Order Status: Canceled Specimen: Stool     Clostridium Diff Toxin, A & B, EIA [[id number]]     Order Status: Canceled Specimen: Stool            See below for Radiology    Scheduled Med:   albuterol-ipratropium  3 mL Nebulization Q6H    diclofenac sodium  4 g Topical (Top) Daily    enoxaparin  30 mg Subcutaneous Q12H    LIDOcaine  1 patch Transdermal Q24H    nystatin  500,000 Units Oral QID (WM & HS)    piperacillin-tazobactam (ZOSYN) IVPB  4.5 g Intravenous Q8H        Continuous Infusions:       PRN Meds:  acetaminophen, albuterol-ipratropium, dextrose 50%, dextrose 50%, glucagon (human recombinant), glucose, glucose, HYDROcodone-acetaminophen, insulin aspart U-100, labetaloL, melatonin, metoclopramide HCl, morphine, ondansetron, sodium chloride 0.9%       Assessment/Plan:      VTE prophylaxis:     Patient condition:  Stable/Fair/Guarded/  Serious/ Critical    Anticipated discharge and Disposition:         All diagnosis and differential diagnosis have been reviewed; assessment and plan has been documented; I have personally reviewed the labs and test results that are presently available; I have reviewed the patients medication list; I have reviewed the consulting providers response and recommendations. I have reviewed or attempted to review medical records based upon their availability    All of the patient's questions have been  addressed and answered. Patient's is agreeable to the above stated plan. I will continue to monitor closely and make adjustments to medical management as needed.  _____________________________________________________________________    Nutrition Status:    Radiology:  US Abdomen Complete  Narrative: EXAMINATION:  US ABDOMEN COMPLETE    CLINICAL HISTORY:  elevated liver enzymes;    COMPARISON:  CT 16 August 2022.    FINDINGS:  Grayscale, color and spectral Doppler evaluation of the abdomen.    There is 12 mm cyst of the pancreatic head as seen on CT.    Aorta and IVC obscured.    The liver is not significantly enlarged. There is no focal liver lesion.  Normal hepatopetal flow is noted in the portal vein.    Gallbladder absent.  The common bile duct is normal in caliber  and measures 4 mm.    The right kidney measures 10 cm, while the left measures 10 cm.  There is no hydronephrosis or solid renal mass.    The spleen was not visualized.    There is a right pleural effusion.  Impression: 1. No significant sonographic abnormality of the liver.  2. Status post cholecystectomy with no biliary ductal dilatation.  3. Small pancreatic cyst as seen on CT.  4. Right pleural effusion.    Electronically signed by: Silver Layne  Date:    08/23/2022  Time:    12:06      Yinka Cabral MD   08/23/2022

## 2022-08-24 LAB
ABS NEUT (OLG): 11.6 X10(3)/MCL (ref 2.1–9.2)
ALBUMIN SERPL-MCNC: 2 GM/DL (ref 3.4–4.8)
ALBUMIN/GLOB SERPL: 1 RATIO (ref 1.1–2)
ALP SERPL-CCNC: 327 UNIT/L (ref 40–150)
ALT SERPL-CCNC: 209 UNIT/L (ref 0–55)
AST SERPL-CCNC: 419 UNIT/L (ref 5–34)
BILIRUBIN DIRECT+TOT PNL SERPL-MCNC: 0.4 MG/DL
BUN SERPL-MCNC: 23.7 MG/DL (ref 9.8–20.1)
BURR CELLS (OLG): ABNORMAL
CALCIUM SERPL-MCNC: 7.8 MG/DL (ref 8.4–10.2)
CHLORIDE SERPL-SCNC: 105 MMOL/L (ref 98–111)
CO2 SERPL-SCNC: 25 MMOL/L (ref 23–31)
CREAT SERPL-MCNC: 0.56 MG/DL (ref 0.55–1.02)
EOSINOPHIL NFR BLD MANUAL: 0.14 X10(3)/MCL (ref 0–0.9)
EOSINOPHIL NFR BLD MANUAL: 1 %
ERYTHROCYTE [DISTWIDTH] IN BLOOD BY AUTOMATED COUNT: 14 % (ref 11.5–17)
GFR SERPLBLD CREATININE-BSD FMLA CKD-EPI: >60 MLS/MIN/1.73/M2
GLOBULIN SER-MCNC: 2 GM/DL (ref 2.4–3.5)
GLUCOSE SERPL-MCNC: 149 MG/DL (ref 75–121)
HCT VFR BLD AUTO: 35.2 % (ref 37–47)
HGB BLD-MCNC: 11.9 GM/DL (ref 12–16)
IMM GRANULOCYTES # BLD AUTO: 0.22 X10(3)/MCL (ref 0–0.04)
IMM GRANULOCYTES NFR BLD AUTO: 1.5 %
INSTRUMENT WBC (OLG): 14.5 X10(3)/MCL
LYMPHOCYTES NFR BLD MANUAL: 1.16 X10(3)/MCL
LYMPHOCYTES NFR BLD MANUAL: 8 %
MAGNESIUM SERPL-MCNC: 2.3 MG/DL (ref 1.6–2.6)
MCH RBC QN AUTO: 30.9 PG (ref 27–31)
MCHC RBC AUTO-ENTMCNC: 33.8 MG/DL (ref 33–36)
MCV RBC AUTO: 91.4 FL (ref 80–94)
METAMYELOCYTES NFR BLD MANUAL: 1 %
MONOCYTES NFR BLD MANUAL: 1.59 X10(3)/MCL (ref 0.1–1.3)
MONOCYTES NFR BLD MANUAL: 11 %
NEUTROPHILS NFR BLD MANUAL: 79 %
NRBC BLD AUTO-RTO: 0 %
OVALOCYTES (OLG): ABNORMAL
PLATELET # BLD AUTO: 151 X10(3)/MCL (ref 130–400)
PLATELET # BLD EST: NORMAL 10*3/UL
PMV BLD AUTO: 11.7 FL (ref 7.4–10.4)
POCT GLUCOSE: 126 MG/DL (ref 70–110)
POCT GLUCOSE: 153 MG/DL (ref 70–110)
POCT GLUCOSE: 163 MG/DL (ref 70–110)
POIKILOCYTOSIS BLD QL SMEAR: ABNORMAL
POTASSIUM SERPL-SCNC: 2.9 MMOL/L (ref 3.5–5.1)
PROT SERPL-MCNC: 4 GM/DL (ref 5.8–7.6)
RBC # BLD AUTO: 3.85 X10(6)/MCL (ref 4.2–5.4)
RBC MORPH BLD: ABNORMAL
SODIUM SERPL-SCNC: 141 MMOL/L (ref 132–146)
WBC # SPEC AUTO: 14.5 X10(3)/MCL (ref 4.5–11.5)

## 2022-08-24 PROCEDURE — 99900031 HC PATIENT EDUCATION (STAT)

## 2022-08-24 PROCEDURE — 27000221 HC OXYGEN, UP TO 24 HOURS

## 2022-08-24 PROCEDURE — 85025 COMPLETE CBC W/AUTO DIFF WBC: CPT | Performed by: INTERNAL MEDICINE

## 2022-08-24 PROCEDURE — 63600175 PHARM REV CODE 636 W HCPCS: Performed by: INTERNAL MEDICINE

## 2022-08-24 PROCEDURE — 11000001 HC ACUTE MED/SURG PRIVATE ROOM

## 2022-08-24 PROCEDURE — 25000003 PHARM REV CODE 250: Performed by: INTERNAL MEDICINE

## 2022-08-24 PROCEDURE — 80074 ACUTE HEPATITIS PANEL: CPT | Performed by: NURSE PRACTITIONER

## 2022-08-24 PROCEDURE — 25000242 PHARM REV CODE 250 ALT 637 W/ HCPCS: Performed by: INTERNAL MEDICINE

## 2022-08-24 PROCEDURE — 83735 ASSAY OF MAGNESIUM: CPT | Performed by: INTERNAL MEDICINE

## 2022-08-24 PROCEDURE — 21400001 HC TELEMETRY ROOM

## 2022-08-24 PROCEDURE — 94761 N-INVAS EAR/PLS OXIMETRY MLT: CPT

## 2022-08-24 PROCEDURE — 94640 AIRWAY INHALATION TREATMENT: CPT

## 2022-08-24 PROCEDURE — 80053 COMPREHEN METABOLIC PANEL: CPT | Performed by: INTERNAL MEDICINE

## 2022-08-24 PROCEDURE — 36415 COLL VENOUS BLD VENIPUNCTURE: CPT | Performed by: INTERNAL MEDICINE

## 2022-08-24 RX ADMIN — IPRATROPIUM BROMIDE AND ALBUTEROL SULFATE 3 ML: 2.5; .5 SOLUTION RESPIRATORY (INHALATION) at 09:08

## 2022-08-24 RX ADMIN — ONDANSETRON 4 MG: 2 INJECTION INTRAMUSCULAR; INTRAVENOUS at 05:08

## 2022-08-24 RX ADMIN — LEUCINE, PHENYLALANINE, LYSINE, METHIONINE, ISOLEUCINE, VALINE, HISTIDINE, THREONINE, TRYPTOPHAN, ALANINE, GLYCINE, ARGININE, PROLINE, SERINE, TYROSINE, SODIUM ACETATE, DIBASIC POTASSIUM PHOSPHATE, MAGNESIUM CHLORIDE, SODIUM CHLORIDE, CALCIUM CHLORIDE, DEXTROSE
311; 238; 247; 170; 255; 247; 204; 179; 77; 880; 438; 489; 289; 213; 17; 297; 261; 51; 77; 33; 5 INJECTION INTRAVENOUS at 01:08

## 2022-08-24 RX ADMIN — PIPERACILLIN SODIUM AND TAZOBACTAM SODIUM 4.5 G: 4; .5 INJECTION, POWDER, LYOPHILIZED, FOR SOLUTION INTRAVENOUS at 05:08

## 2022-08-24 RX ADMIN — POTASSIUM BICARBONATE 50 MEQ: 977.5 TABLET, EFFERVESCENT ORAL at 01:08

## 2022-08-24 RX ADMIN — MORPHINE SULFATE 4 MG: 4 INJECTION INTRAVENOUS at 09:08

## 2022-08-24 RX ADMIN — ENOXAPARIN SODIUM 30 MG: 30 INJECTION SUBCUTANEOUS at 09:08

## 2022-08-24 RX ADMIN — MORPHINE SULFATE 4 MG: 4 INJECTION INTRAVENOUS at 05:08

## 2022-08-24 RX ADMIN — IPRATROPIUM BROMIDE AND ALBUTEROL SULFATE 3 ML: 2.5; .5 SOLUTION RESPIRATORY (INHALATION) at 08:08

## 2022-08-24 RX ADMIN — IPRATROPIUM BROMIDE AND ALBUTEROL SULFATE 3 ML: 2.5; .5 SOLUTION RESPIRATORY (INHALATION) at 01:08

## 2022-08-24 RX ADMIN — NYSTATIN 500000 UNITS: 100000 SUSPENSION ORAL at 09:08

## 2022-08-24 RX ADMIN — DICLOFENAC SODIUM 4 G: 10 GEL TOPICAL at 09:08

## 2022-08-24 RX ADMIN — IPRATROPIUM BROMIDE AND ALBUTEROL SULFATE 3 ML: 2.5; .5 SOLUTION RESPIRATORY (INHALATION) at 12:08

## 2022-08-24 RX ADMIN — NYSTATIN 500000 UNITS: 100000 SUSPENSION ORAL at 01:08

## 2022-08-24 RX ADMIN — NYSTATIN 500000 UNITS: 100000 SUSPENSION ORAL at 05:08

## 2022-08-24 RX ADMIN — LEVOFLOXACIN 750 MG: 500 TABLET, FILM COATED ORAL at 02:08

## 2022-08-24 RX ADMIN — LIDOCAINE 1 PATCH: 50 PATCH TOPICAL at 05:08

## 2022-08-24 NOTE — PROGRESS NOTES
[facility]    Chief Complaint: Inpatient Follow-up for      Subjective:  Fidelina Darling is a 89 y.o. White female with a past medical history of hypertension, hyperlipidemia. The patient presented to [facility] on 8/16/2022 with a primary complaint of abdominal pain and falls due to syncope.  Patient reports having a fall on 8/12 while in her garage.  She states she was grabbing for the car door handle when she missed it and fell. Fall was unwitnessed and she reports loss of consciousness. On 08/15 she had a second fall while in the kitchen but denies any loss of consciousness.  Yesterday (08/16/2022) patient was going to the bathroom when she passed out.  Episode was witnessed by neighbor who is visiting her. She reports urinary frequency. Patient denies complaints of headache, vision changes, shortness of breath, cough.  She has been experiencing lower abdominal pain for the last several days. Granddaughter at bedside states that patient is dependent on laxatives have a bowel movement.  Although she uses laxatives she often has to manually disimpact herself . She normally goes every 4-5 days with last bowel movement being 5 days ago. She has been having increased belching.  Granddaughter also states patient has little to no appetite.  Patient lives alone, ambulates with walker, drives and completes activity daily living independently.       Upon presentation to the ED, patient afebrile, hemodynamically stable and SpO2 96% on room air.  Labs notable for WBC 28, potassium 2.6, glucose 161, lactic acid 3.9.  UA with 1+ leukocyte esterase, 2+ bilirubin, positive nitrites, trace ketones and protein.  Chest x-ray without acute processes.  CT abdomen pelvis revealed mild distention of the colon with liquefied stool, indeterminate bilateral adrenal nodules and pancreatic cysts.  While in the ED patient received 40 mEq of IV potassium chloride, 2 g of magnesium sulfate and Rocephin was started for  UTI.    Patient was admitted on account of recurrent syncope and falls. Colonic dilatation with lactic acidosis.   X-ray of the chest reveals left-sided atelectasis cannot rule out infiltrate.      8-21-22  Pt with persistent abd pain and nausea  Has NGT to LIWS  Belching  Minimal flatus  KUB with dilation 56.4mm to 76.8mm of colon    8/22/22:  NG still in place. Was on high suction. Placed on low suction.  Generalized mild abdominal discomfort  No flatus, no stool.  KUB pending.    8/23/22:  US ordered for today due to elevated LFTs  Patient having small liquid brown stool  NG in place with minimal output     8/24/22:  SBFT unremarkable- normal SB transit time  Abdominal U/S unremarkable  LFTs trending up   Patient continues to have stool (per nurse charting x5 stools yesterday) and endorses diffuse abdominal discomfort,   Denies N/V, fever/chills       Objective/physical exam:  General appearance: Well-developed, well-nourished female in no apparent distress.  IN bed.  HEENT: Atraumatic head. Dry mucous membranes of oral cavity.  Eyes: anicteric  Lungs: Clear to auscultation bilaterally.   Heart: Regular rate and rhythm.   Abdomen: mod firm, distended, generalized tenderness. Bowel sounds are hypoactive. Frequently belching. NG to LIS. Very Small amount of fluid in canister   Extremities: No cyanosis, clubbing. No deformities.  Skin: No Rash. Warm and dry.  Neuro: Awake, alert and oriented self, place and city. Motor and sensory exams grossly intact.  Psyc: calm and cooperative    VITAL SIGNS: 24 HRS MIN & MAX LAST   Temp  Min: 97.4 °F (36.3 °C)  Max: 98.4 °F (36.9 °C) 97.4 °F (36.3 °C)   BP  Min: 128/73  Max: 144/76 (!) 144/76   Pulse  Min: 81  Max: 97  84   Resp  Min: 16  Max: 18 18   SpO2  Min: 90 %  Max: 97 % (!) 92 %       Recent Labs   Lab 08/20/22  1503 08/22/22  0357 08/24/22  0437   WBC 10.5 6.4 14.5*   RBC 3.84* 3.37* 3.85*   HGB 12.0 10.5* 11.9*   HCT 36.3* 32.2* 35.2*   MCV 94.5* 95.5* 91.4   MCH 31.3*  31.2* 30.9   MCHC 33.1 32.6* 33.8   RDW 13.6 13.6 14.0    216 151   MPV 9.4 9.4 11.7*       Recent Labs   Lab 08/19/22  0506 08/20/22  1105 08/22/22  0358 08/24/22  0437    134 130* 141   K 3.1* 4.2 3.5 2.9*   CO2 24 22* 23 25   BUN 14.4 11.7 16.6 23.7*   CREATININE 0.60 0.53* 0.52* 0.56   CALCIUM 7.1* 7.5* 7.3* 7.8*   MG  --  2.10 1.80 2.30   ALBUMIN 2.1*  --  1.8* 2.0*   ALKPHOS 59  --  123 327*   ALT 43  --  108* 209*   AST 90*  --  210* 419*   BILITOT 0.4  --  0.4 0.4           See below for Radiology    Scheduled Med:   albuterol-ipratropium  3 mL Nebulization Q6H    diclofenac sodium  4 g Topical (Top) Daily    enoxaparin  30 mg Subcutaneous Q12H    LIDOcaine  1 patch Transdermal Q24H    nystatin  500,000 Units Oral QID (WM & HS)        Continuous Infusions:   Amino acid 4.25% - dextrose 5% (CLINIMIX-E) solution (1L provides 42.5 gm AA, 50 gm CHO (170 kcal/L dextrose), Na 35, K 30, Mg 5, Ca 4.5, Acetate 70, Cl 39, Phos 15) 75 mL/hr at 08/23/22 2137        PRN Meds:  acetaminophen, albuterol-ipratropium, dextrose 50%, dextrose 50%, glucagon (human recombinant), glucose, glucose, HYDROcodone-acetaminophen, insulin aspart U-100, labetaloL, melatonin, metoclopramide HCl, morphine, ondansetron, sodium chloride 0.9%       Radiology:  XR Small Bowel Follow Through  Narrative: EXAMINATION:  XR SMALL BOWEL FOLLOW THROUGH    HISTORY:  bowel distention. Use gastrografin per NG;    TECHNIQUE:  A  radiograph of the abdomen was first obtained. Following this, the patient was given oral contrast and intermittent overhead radiographs performed to follow the contrast column through the small bowel.    COMPARISON:  Radiography 08/22/2022 and CT 08/16/2022    FINDINGS:   radiographs show the side port for the enteric tube overlying the upper stomach with gaseous distention of most of the colon.  Contrast make steady progression through the small bowel and has reached the colon within 2 hours.  No  fixed small bowel dilatation appreciated.  Impression: Normal small bowel transit time.    Electronically signed by: Richard Marie  Date:    08/23/2022  Time:    16:04  US Abdomen Complete  Narrative: EXAMINATION:  US ABDOMEN COMPLETE    CLINICAL HISTORY:  elevated liver enzymes;    COMPARISON:  CT 16 August 2022.    FINDINGS:  Grayscale, color and spectral Doppler evaluation of the abdomen.    There is 12 mm cyst of the pancreatic head as seen on CT.    Aorta and IVC obscured.    The liver is not significantly enlarged. There is no focal liver lesion.  Normal hepatopetal flow is noted in the portal vein.    Gallbladder absent.  The common bile duct is normal in caliber  and measures 4 mm.    The right kidney measures 10 cm, while the left measures 10 cm.  There is no hydronephrosis or solid renal mass.    The spleen was not visualized.    There is a right pleural effusion.  Impression: 1. No significant sonographic abnormality of the liver.  2. Status post cholecystectomy with no biliary ductal dilatation.  3. Small pancreatic cyst as seen on CT.  4. Right pleural effusion.    Electronically signed by: Silver Layne  Date:    08/23/2022  Time:    12:06      Assessment/Plan:  1. Acute abdominal distention, having small volume stool    NGT LIWS- Will clamp this am due to low ouput and check this afternoon   KUB with colonic distension 56.4mm to 76.8mmon my read/measurement   2.Hypokalemia    Replace per attending  3. Elevated LFTS    US with no significant findings. Small bowel follow through showing normal small bowel transit time      89 yo female admitted for falls, found to have UTI, and ileus. NG tube now clamped for trial, pt on appropriate ax therapy      REJI Pitts acting as scribe for Jonn Austin MD  Gastroenterology  [facility]

## 2022-08-24 NOTE — PROGRESS NOTES
[facility] Hospital Medicine Progress Note        Chief Complaint: Inpatient Follow-up for abdominal distension    HPI: 89 y.o. White female with a past medical history of hypertension, hyperlipidemia. The patient presented to [facility] on 8/16/2022 with a primary complaint of abdominal pain and falls due to syncope.  Patient reports having a fall on 8/12 while in her garage.  She states she was grabbing for the car door handle when she missed it and fell. Fall was unwitnessed and she reports loss of consciousness. On 08/15 she had a second fall while in the kitchen but denies any loss of consciousness.  Yesterday (08/16/2022) patient was going to the bathroom when she passed out.  Episode was witnessed by neighbor who is visiting her. She reports urinary frequency. Patient denies complaints of headache, vision changes, shortness of breath, cough.  She has been experiencing lower abdominal pain for the last several days. Granddaughter at bedside states that patient is dependent on laxatives have a bowel movement.  Although she uses laxatives she often has to manually disimpact herself . She normally goes every 4-5 days with last bowel movement being 5 days ago. She has been having increased belching.  Granddaughter also states patient has little to no appetite.  Patient lives alone, ambulates with walker, drives and completes activity daily living independently.  Family as it patient to be experiencing dementia over the last 6 months.     Upon presentation to the ED, patient afebrile, hemodynamically stable and SpO2 96% on room air.  Labs notable for WBC 28, potassium 2.6, glucose 161, lactic acid 3.9.  UA with 1+ leukocyte esterase, 2+ bilirubin, positive nitrites, trace ketones and protein.  Chest x-ray without acute processes.  CT abdomen pelvis revealed mild distention of the colon with liquefied stool, indeterminate bilateral adrenal nodules and pancreatic cysts.  While in the ED  patient received 40 mEq of IV potassium chloride, 2 g of magnesium sulfate and Rocephin was started for UTI.  Patient admitted to hospital medicine services for further medical management.     Patient was admitted on account of recurrent syncope and falls.  Found to have acute TTE high.  Colonic dilatation with lactic acidosis.   X-ray of the chest reveals left-sided atelectasis cannot rule out infiltrate.    KUB shows persistent colonic dilatation.  With a small sliding hiatal hernia, resume IV Zosyn  Inserted an NG tube and connect to low intermittent wall suction, keep NPO   IV Clinimix for nutrition  patient had small-bowel follow-through with normal transit time had ultrasound of the abdomen that showed no significant abnormality of liver small pancreatic cyst and right-sided pleural effusion    Interval Hx:   Patient seen and examined this morning still complains of abdominal distension no bowel movement this morning     Objective/physical exam:  General: In no acute distress, afebrile  Chest: Clear to auscultation bilaterally  Heart: RRR, +S1, S2, no appreciable murmur  Abdomen slightly distended  MSK: Warm, no lower extremity edema, no clubbing or cyanosis  Neurologic: Alert and oriented x4,    VITAL SIGNS: 24 HRS MIN & MAX LAST   Temp  Min: 97.4 °F (36.3 °C)  Max: 98.4 °F (36.9 °C) 97.4 °F (36.3 °C)   BP  Min: 128/73  Max: 144/76 (!) 144/76   Pulse  Min: 81  Max: 97  84   Resp  Min: 16  Max: 18 18   SpO2  Min: 90 %  Max: 97 % (!) 92 %       Recent Labs   Lab 08/20/22  1503 08/22/22  0357 08/24/22  0437   WBC 10.5 6.4 14.5*   RBC 3.84* 3.37* 3.85*   HGB 12.0 10.5* 11.9*   HCT 36.3* 32.2* 35.2*   MCV 94.5* 95.5* 91.4   MCH 31.3* 31.2* 30.9   MCHC 33.1 32.6* 33.8   RDW 13.6 13.6 14.0    216 151   MPV 9.4 9.4 11.7*       Recent Labs   Lab 08/19/22  0506 08/20/22  1105 08/22/22  0358 08/24/22  0437    134 130* 141   K 3.1* 4.2 3.5 2.9*   CO2 24 22* 23 25   BUN 14.4 11.7 16.6 23.7*   CREATININE 0.60  0.53* 0.52* 0.56   CALCIUM 7.1* 7.5* 7.3* 7.8*   MG  --  2.10 1.80 2.30   ALBUMIN 2.1*  --  1.8* 2.0*   ALKPHOS 59  --  123 327*   ALT 43  --  108* 209*   AST 90*  --  210* 419*   BILITOT 0.4  --  0.4 0.4          Microbiology Results (last 7 days)     Procedure Component Value Units Date/Time    Blood Culture #1 **CANNOT BE ORDERED STAT** [[id number]]  (Normal) Collected: 08/17/22 0148    Order Status: Completed Specimen: Blood Updated: 08/22/22 0302     CULTURE, BLOOD ([facility]) No Growth at 5 days    Blood Culture #2 **CANNOT BE ORDERED STAT** [[id number]]  (Normal) Collected: 08/17/22 0148    Order Status: Completed Specimen: Blood Updated: 08/22/22 0302     CULTURE, BLOOD ([facility]) No Growth at 5 days    Urine Culture High Risk [[id number]] Collected: 08/18/22 1325    Order Status: Completed Specimen: Urine, Clean Catch Updated: 08/20/22 1026     Urine Culture No Growth    Stool Culture [[id number]]     Order Status: Canceled Specimen: Stool     Stool Culture [[id number]]     Order Status: Canceled Specimen: Stool     Clostridium Diff Toxin, A & B, EIA [[id number]]     Order Status: Canceled Specimen: Stool            See below for Radiology    Scheduled Med:   albuterol-ipratropium  3 mL Nebulization Q6H    diclofenac sodium  4 g Topical (Top) Daily    enoxaparin  30 mg Subcutaneous Q12H    LIDOcaine  1 patch Transdermal Q24H    nystatin  500,000 Units Oral QID (WM & HS)    piperacillin-tazobactam (ZOSYN) IVPB  4.5 g Intravenous Q8H        Continuous Infusions:   Amino acid 4.25% - dextrose 5% (CLINIMIX-E) solution (1L provides 42.5 gm AA, 50 gm CHO (170 kcal/L dextrose), Na 35, K 30, Mg 5, Ca 4.5, Acetate 70, Cl 39, Phos 15) 75 mL/hr at 08/23/22 2137        PRN Meds:  acetaminophen, albuterol-ipratropium, dextrose 50%, dextrose 50%, glucagon (human recombinant), glucose, glucose, HYDROcodone-acetaminophen, insulin aspart U-100, labetaloL, melatonin, metoclopramide HCl, morphine, ondansetron, sodium chloride 0.9%        Assessment/Plan:  Colonic distension   Dyspepsia  Transaminitis trending up  Hypokalemia  Abdominal distention secondary to above   Small pancreatic cyst   Sliding hernia   Bilateral adrenal nodules repeat follow-up CT outpatient  Essential hypertension  Diastolic dysfunction      Still distended small bowel fall though through has a normal transit time  GI following did have bowel movements yesterday still has NG tube   Liver enzymes are trending up can be secondary to Zosyn?  I will discontinue urine cultures were negative and even CT scan of the abdomen did not show any infection  White cell count is slightly elevated today will keep a close watch if trending up or patient starts spiking fever then we might have to redo the fever workup but otherwise I do not see any reason to continue IV antibiotics at this time  On admission chest x-ray had shown left lower lobe atelectasis versus infiltrate I will repeat chest x-ray again today and if chest x-ray shows an infiltrate then I will start her on  antibiotics  Ultrasound of the abdomen showed no significant abnormality of the liver  We will give 50 mEq of p.o. potassium  GI following  Repeat blood work in a.m.   On Clinimix for nutrition still has NG tube    VTE prophylaxis:     Patient condition:  Stable/Fair/Guarded/ Serious/ Critical    Anticipated discharge and Disposition:         All diagnosis and differential diagnosis have been reviewed; assessment and plan has been documented; I have personally reviewed the labs and test results that are presently available; I have reviewed the patients medication list; I have reviewed the consulting providers response and recommendations. I have reviewed or attempted to review medical records based upon their availability    All of the patient's questions have been  addressed and answered. Patient's is agreeable to the above stated plan. I will continue to monitor closely and make adjustments to medical management as  needed.  _____________________________________________________________________    Nutrition Status:    Radiology:  XR Small Bowel Follow Through  Narrative: EXAMINATION:  XR SMALL BOWEL FOLLOW THROUGH    HISTORY:  bowel distention. Use gastrografin per NG;    TECHNIQUE:  A  radiograph of the abdomen was first obtained. Following this, the patient was given oral contrast and intermittent overhead radiographs performed to follow the contrast column through the small bowel.    COMPARISON:  Radiography 08/22/2022 and CT 08/16/2022    FINDINGS:   radiographs show the side port for the enteric tube overlying the upper stomach with gaseous distention of most of the colon.  Contrast make steady progression through the small bowel and has reached the colon within 2 hours.  No fixed small bowel dilatation appreciated.  Impression: Normal small bowel transit time.    Electronically signed by: Richard Marie  Date:    08/23/2022  Time:    16:04  US Abdomen Complete  Narrative: EXAMINATION:  US ABDOMEN COMPLETE    CLINICAL HISTORY:  elevated liver enzymes;    COMPARISON:  CT 16 August 2022.    FINDINGS:  Grayscale, color and spectral Doppler evaluation of the abdomen.    There is 12 mm cyst of the pancreatic head as seen on CT.    Aorta and IVC obscured.    The liver is not significantly enlarged. There is no focal liver lesion.  Normal hepatopetal flow is noted in the portal vein.    Gallbladder absent.  The common bile duct is normal in caliber  and measures 4 mm.    The right kidney measures 10 cm, while the left measures 10 cm.  There is no hydronephrosis or solid renal mass.    The spleen was not visualized.    There is a right pleural effusion.  Impression: 1. No significant sonographic abnormality of the liver.  2. Status post cholecystectomy with no biliary ductal dilatation.  3. Small pancreatic cyst as seen on CT.  4. Right pleural effusion.    Electronically signed by: Silver  Roverto  Date:    08/23/2022  Time:    12:06      Dawn Tate MD   08/24/2022

## 2022-08-24 NOTE — PROGRESS NOTES
Inpatient Nutrition Assessment    Admit Date: 8/16/2022   Length of Stay: 7 days  Nutrition Recommendation/Prescription     - Once medically appropriate, resume oral diet as tolerated. Assist with meals as needed.   - Boost Max TID for additional nourishment.  - Consider an appetite stimulant as medically feasible.   - Also consider a Multi-vitamin with minerals as medically feasible.   - Monitor wt, labs, and intake.   - Continue Clinimix until able to resume oral diet    Communication of Recommendations: reviewed with patient and/or caregiver    Nutrition Assessment     Malnutrition Assessment/Nutrition-Focused Physical Exam    Malnutrition in the context of chronic illness  Degree of Malnutrition: non-severe (moderate) malnutrition  Energy Intake: < 75% of estimated energy requirement for >/= 1 month  Interpretation of Weight Loss: unable to obtain  Body Fat: mild depletion  Area of Body Fat Loss: orbital region  and upper arm region - triceps / biceps  Muscle Mass Loss: mild depletion  Area of Muscle Mass Loss: temple region - temporalis muscle, clavicle bone region - pectoralis major, deltoid, trapezius muscles and dorsal hand - interosseous musle  Fluid Accumulation: does not meet criteria  Edema: does not meet criteria  Reduced  Strength: unable to obtain  A minimum of two characteristics is recommended for diagnosis of either severe or non-severe malnutrition.    Chart Review    Reason Seen: malnutrition screening tool    Diagnosis:  Recurrent syncope with falls  Acute bacterial cystitis  Bilateral adrenal nodules  Pancreatic cyst  Lactic acidosis  Hypokalemia  Abdominal pain  Essential hypertension    Relevant Medical History: HTN, HLD    Nutrition-Related Medications: Cipro, SSI, Zofran PRN, Kcl  Calorie Containing IV Medications: no significant kcals from medications at this time    Nutrition-Related Labs:  8/19: K 3.1, Glu 119, GFR>60  8/24: K 2.9, BUN 23.7, glu 149, Ca 7.8, AlkPhos 327, , ALT  "209    Diet/PN Order: Diet NPO  Amino acid 4.25% - dextrose 5% (CLINIMIX-E) solution (1L provides 42.5 gm AA, 50 gm CHO (170 kcal/L dextrose), Na 35, K 30, Mg 5, Ca 4.5, Acetate 70, Cl 39, Phos 15)  Oral Supplement Order: Boost Max  Tube Feeding Order: none at this time  Appetite/Oral Intake: NPO/NPO  Factors Affecting Nutritional Intake: impaired cognitive status/motor control, constipation, decreased appetite and NPO  Food/[employer]/Cultural Preferences: none reported    Skin Integrity: bruised (ecchymotic)  Wound(s):   none documented    Comments    8/19: Pt asleep during rounds; per pt's granddaughter, pt's appetite and wt has decreased over the last few years, more noticeably in the last 6 months. Granddaughter unsure of what weight was 6 months ago but states that the patient weighed ~175 lbs several years ago. Pt was on an appetite stimulant a few months ago which actually seemed to help but did not continue the regimen 2/2 price. Will send an oral supplement.     8/24: pt currently NPO on PPN for ileus with NG for suction; GI following    Anthropometrics    Height: 5' 7.99" (172.7 cm) Height Method: Stated  Weight: 61.2 kg (134 lb 14.7 oz) (08/17/22 0733) Weight Method: Bed Scale  BMI (Calculated): 20.5  BMI Classification: normal (BMI 18.5-24.9)  Ideal Body Weight (IBW), Female: 139.95 lb  % Ideal Body Weight, Female (lb): 96.41 %                       Usual Weight Provided By: not applicable    Wt Readings from Last 5 Encounters:   08/17/22 61.2 kg (134 lb 14.7 oz)   01/15/19 64.9 kg (142 lb 15.9 oz)     Weight Change(s) Since Admission:  Admit Weight: 61.2 kg (135 lb) (08/16/22 1901)    Estimated Needs    Weight Used For Calorie Calculations: 61.2 kg (134 lb 14.7 oz)  Energy Calorie Requirements (kcal): 1512 kcal (MSJ x 1.4 SF)  Energy Need Method: [place]- Jeor  Weight Used For Protein Calculations: 61.2 kg (134 lb 14.7 oz)  Protein Requirements: 80 gm (1.3g/kg)  Fluid Requirements (mL): 1836 mL " (30mL/kg)  Temp: 98.2 °F (36.8 °C)       Enteral Nutrition    Patient not receiving enteral nutrition at this time.    Parenteral Nutrition    Standard Formula: Clinimix E 4.25/5  Custom Formula: not applicable  Additives: none  Rate/Volume: 75ml/lhr  Lipids: none  Total Nutrition Provided by Parenteral Nutrition:  Calories Provided  612 kcal/d, 40% needs   Protein Provided  77 g/d, 126% needs   Dextrose Provided  90 g/d,    Fluid Provided  1800 ml/d, 100% needs       Evaluation of Received Nutrient Intake    Calories: not meeting estimated needs  Protein: not meeting estimated needs    Patient Education    Not applicable.    Nutrition Diagnosis     PES: Malnutrition related to insufficient energy intake as evidenced by <75% est energy requirements met >1 month, physical evidence of mild muscle and fat depletion. (continues)    Interventions/Goals     Intervention(s): modified composition of meals/snacks, commercial beverage, multivitamin/mineral supplement therapy, prescription medication and collaboration with other providers  Goal: Meet greater than 75% of nutritional needs by follow-up. (goal progressing)    Monitoring & Evaluation     Dietitian will monitor energy intake and weight.  Nutrition Risk/Follow-Up: high (follow-up in 1-4 days)

## 2022-08-25 LAB
ALBUMIN SERPL-MCNC: 1.8 GM/DL (ref 3.4–4.8)
ALBUMIN/GLOB SERPL: 0.8 RATIO (ref 1.1–2)
ALP SERPL-CCNC: 330 UNIT/L (ref 40–150)
ALT SERPL-CCNC: 179 UNIT/L (ref 0–55)
AST SERPL-CCNC: 335 UNIT/L (ref 5–34)
BASOPHILS # BLD AUTO: 0.09 X10(3)/MCL (ref 0–0.2)
BASOPHILS NFR BLD AUTO: 0.8 %
BILIRUBIN DIRECT+TOT PNL SERPL-MCNC: 0.3 MG/DL
BUN SERPL-MCNC: 23.9 MG/DL (ref 9.8–20.1)
CALCIUM SERPL-MCNC: 7.9 MG/DL (ref 8.4–10.2)
CHLORIDE SERPL-SCNC: 103 MMOL/L (ref 98–111)
CO2 SERPL-SCNC: 28 MMOL/L (ref 23–31)
CREAT SERPL-MCNC: 0.5 MG/DL (ref 0.55–1.02)
EOSINOPHIL # BLD AUTO: 0.06 X10(3)/MCL (ref 0–0.9)
EOSINOPHIL NFR BLD AUTO: 0.5 %
ERYTHROCYTE [DISTWIDTH] IN BLOOD BY AUTOMATED COUNT: 14 % (ref 11.5–17)
GFR SERPLBLD CREATININE-BSD FMLA CKD-EPI: >60 MLS/MIN/1.73/M2
GLOBULIN SER-MCNC: 2.3 GM/DL (ref 2.4–3.5)
GLUCOSE SERPL-MCNC: 119 MG/DL (ref 75–121)
HAV IGM SERPL QL IA: NONREACTIVE
HBV CORE IGM SERPL QL IA: NONREACTIVE
HBV SURFACE AG SERPL QL IA: NONREACTIVE
HCT VFR BLD AUTO: 32.4 % (ref 37–47)
HCV AB SERPL QL IA: NONREACTIVE
HGB BLD-MCNC: 10.3 GM/DL (ref 12–16)
IMM GRANULOCYTES # BLD AUTO: 0.12 X10(3)/MCL (ref 0–0.04)
IMM GRANULOCYTES NFR BLD AUTO: 1 %
LYMPHOCYTES # BLD AUTO: 1.11 X10(3)/MCL (ref 0.6–4.6)
LYMPHOCYTES NFR BLD AUTO: 9.6 %
MCH RBC QN AUTO: 31.7 PG (ref 27–31)
MCHC RBC AUTO-ENTMCNC: 31.8 MG/DL (ref 33–36)
MCV RBC AUTO: 99.7 FL (ref 80–94)
MONOCYTES # BLD AUTO: 1.14 X10(3)/MCL (ref 0.1–1.3)
MONOCYTES NFR BLD AUTO: 9.8 %
NEUTROPHILS # BLD AUTO: 9.1 X10(3)/MCL (ref 2.1–9.2)
NEUTROPHILS NFR BLD AUTO: 78.3 %
NRBC BLD AUTO-RTO: 0 %
PLATELET # BLD AUTO: 252 X10(3)/MCL (ref 130–400)
PMV BLD AUTO: 9.7 FL (ref 7.4–10.4)
POCT GLUCOSE: 104 MG/DL (ref 70–110)
POCT GLUCOSE: 113 MG/DL (ref 70–110)
POCT GLUCOSE: 125 MG/DL (ref 70–110)
POCT GLUCOSE: 164 MG/DL (ref 70–110)
POTASSIUM SERPL-SCNC: 3 MMOL/L (ref 3.5–5.1)
PROT SERPL-MCNC: 4.1 GM/DL (ref 5.8–7.6)
RBC # BLD AUTO: 3.25 X10(6)/MCL (ref 4.2–5.4)
SODIUM SERPL-SCNC: 139 MMOL/L (ref 132–146)
WBC # SPEC AUTO: 11.6 X10(3)/MCL (ref 4.5–11.5)

## 2022-08-25 PROCEDURE — 36415 COLL VENOUS BLD VENIPUNCTURE: CPT | Performed by: INTERNAL MEDICINE

## 2022-08-25 PROCEDURE — 97530 THERAPEUTIC ACTIVITIES: CPT | Mod: CQ

## 2022-08-25 PROCEDURE — 25000003 PHARM REV CODE 250: Performed by: INTERNAL MEDICINE

## 2022-08-25 PROCEDURE — 25000003 PHARM REV CODE 250: Performed by: NURSE PRACTITIONER

## 2022-08-25 PROCEDURE — 97530 THERAPEUTIC ACTIVITIES: CPT | Mod: CO

## 2022-08-25 PROCEDURE — 11000001 HC ACUTE MED/SURG PRIVATE ROOM

## 2022-08-25 PROCEDURE — 21400001 HC TELEMETRY ROOM

## 2022-08-25 PROCEDURE — 25000242 PHARM REV CODE 250 ALT 637 W/ HCPCS: Performed by: INTERNAL MEDICINE

## 2022-08-25 PROCEDURE — 94761 N-INVAS EAR/PLS OXIMETRY MLT: CPT

## 2022-08-25 PROCEDURE — 63600175 PHARM REV CODE 636 W HCPCS: Performed by: INTERNAL MEDICINE

## 2022-08-25 PROCEDURE — 85025 COMPLETE CBC W/AUTO DIFF WBC: CPT | Performed by: INTERNAL MEDICINE

## 2022-08-25 PROCEDURE — 99900031 HC PATIENT EDUCATION (STAT)

## 2022-08-25 PROCEDURE — 94640 AIRWAY INHALATION TREATMENT: CPT

## 2022-08-25 PROCEDURE — 80053 COMPREHEN METABOLIC PANEL: CPT | Performed by: INTERNAL MEDICINE

## 2022-08-25 RX ORDER — METOPROLOL TARTRATE 1 MG/ML
5 INJECTION, SOLUTION INTRAVENOUS EVERY 5 MIN PRN
Status: DISCONTINUED | OUTPATIENT
Start: 2022-08-25 | End: 2022-09-13 | Stop reason: HOSPADM

## 2022-08-25 RX ORDER — POTASSIUM CHLORIDE 14.9 MG/ML
40 INJECTION INTRAVENOUS
Status: COMPLETED | OUTPATIENT
Start: 2022-08-25 | End: 2022-08-25

## 2022-08-25 RX ORDER — METOPROLOL TARTRATE 25 MG/1
25 TABLET, FILM COATED ORAL 2 TIMES DAILY
Status: DISCONTINUED | OUTPATIENT
Start: 2022-08-25 | End: 2022-09-13 | Stop reason: HOSPADM

## 2022-08-25 RX ORDER — BISACODYL 10 MG
10 SUPPOSITORY, RECTAL RECTAL DAILY
Status: DISCONTINUED | OUTPATIENT
Start: 2022-08-25 | End: 2022-08-29

## 2022-08-25 RX ADMIN — ONDANSETRON 4 MG: 2 INJECTION INTRAMUSCULAR; INTRAVENOUS at 09:08

## 2022-08-25 RX ADMIN — MORPHINE SULFATE 4 MG: 4 INJECTION INTRAVENOUS at 07:08

## 2022-08-25 RX ADMIN — ENOXAPARIN SODIUM 30 MG: 30 INJECTION SUBCUTANEOUS at 08:08

## 2022-08-25 RX ADMIN — DICLOFENAC SODIUM 4 G: 10 GEL TOPICAL at 09:08

## 2022-08-25 RX ADMIN — NALOXEGOL OXALATE 25 MG: 25 TABLET, FILM COATED ORAL at 12:08

## 2022-08-25 RX ADMIN — LEUCINE, PHENYLALANINE, LYSINE, METHIONINE, ISOLEUCINE, VALINE, HISTIDINE, THREONINE, TRYPTOPHAN, ALANINE, GLYCINE, ARGININE, PROLINE, SERINE, TYROSINE, SODIUM ACETATE, DIBASIC POTASSIUM PHOSPHATE, MAGNESIUM CHLORIDE, SODIUM CHLORIDE, CALCIUM CHLORIDE, DEXTROSE
311; 238; 247; 170; 255; 247; 204; 179; 77; 880; 438; 489; 289; 213; 17; 297; 261; 51; 77; 33; 5 INJECTION INTRAVENOUS at 05:08

## 2022-08-25 RX ADMIN — METOCLOPRAMIDE 5 MG: 5 INJECTION, SOLUTION INTRAMUSCULAR; INTRAVENOUS at 10:08

## 2022-08-25 RX ADMIN — IPRATROPIUM BROMIDE AND ALBUTEROL SULFATE 3 ML: 2.5; .5 SOLUTION RESPIRATORY (INHALATION) at 01:08

## 2022-08-25 RX ADMIN — IPRATROPIUM BROMIDE AND ALBUTEROL SULFATE 3 ML: 2.5; .5 SOLUTION RESPIRATORY (INHALATION) at 02:08

## 2022-08-25 RX ADMIN — NYSTATIN 500000 UNITS: 100000 SUSPENSION ORAL at 09:08

## 2022-08-25 RX ADMIN — MORPHINE SULFATE 4 MG: 4 INJECTION INTRAVENOUS at 05:08

## 2022-08-25 RX ADMIN — IPRATROPIUM BROMIDE AND ALBUTEROL SULFATE 3 ML: 2.5; .5 SOLUTION RESPIRATORY (INHALATION) at 08:08

## 2022-08-25 RX ADMIN — MORPHINE SULFATE 4 MG: 4 INJECTION INTRAVENOUS at 12:08

## 2022-08-25 RX ADMIN — METOPROLOL TARTRATE 25 MG: 25 TABLET, FILM COATED ORAL at 09:08

## 2022-08-25 RX ADMIN — POTASSIUM CHLORIDE 40 MEQ: 14.9 INJECTION, SOLUTION INTRAVENOUS at 09:08

## 2022-08-25 RX ADMIN — NYSTATIN 500000 UNITS: 100000 SUSPENSION ORAL at 08:08

## 2022-08-25 RX ADMIN — NYSTATIN 500000 UNITS: 100000 SUSPENSION ORAL at 04:08

## 2022-08-25 RX ADMIN — METOPROLOL TARTRATE 25 MG: 25 TABLET, FILM COATED ORAL at 08:08

## 2022-08-25 RX ADMIN — BISACODYL 10 MG: 10 SUPPOSITORY RECTAL at 12:08

## 2022-08-25 RX ADMIN — POTASSIUM CHLORIDE 40 MEQ: 14.9 INJECTION, SOLUTION INTRAVENOUS at 11:08

## 2022-08-25 RX ADMIN — LEVOFLOXACIN 750 MG: 500 TABLET, FILM COATED ORAL at 10:08

## 2022-08-25 RX ADMIN — LIDOCAINE 1 PATCH: 50 PATCH TOPICAL at 04:08

## 2022-08-25 RX ADMIN — ENOXAPARIN SODIUM 30 MG: 30 INJECTION SUBCUTANEOUS at 09:08

## 2022-08-25 RX ADMIN — NYSTATIN 500000 UNITS: 100000 SUSPENSION ORAL at 12:08

## 2022-08-25 NOTE — PT/OT/SLP PROGRESS
Occupational Therapy  Treatment    Fidelina Darling   MRN: [id number]   Admitting Diagnosis: Abdominal cramping    OT Date of Treatment: 08/25/22   OT Start Time: 1335  OT Stop Time: 1353  OT Total Time (min): 18 min     Billable Minutes:  Therapeutic Activity 18  Total Minutes: 18     OT/CONSUELO: CONSUELO     CONSUELO Visit Number: 2    General Precautions: Standard, fall  Orthopedic Precautions: N/A  Braces: N/A    Spiritual, Cultural Beliefs, Sabianism Practices, Values that Affect Care: no    Subjective:  Communicated with nurse prior and following session.    Pain/Comfort  Pain Rating 1: 10/10  Location - Side 1: Bilateral  Location - Orientation 1: lower  Location 1: abdomen  Pain Addressed 1: Cessation of Activity, Nurse notified    Objective:  Patient found with: peripheral IV, pearson catheter, NG tube    Functional Mobility:  Bed Mobility:   Supine to sit: Maximum Assistance   Sit to supine: Maximum Assistance   Rolling: Maximum Assistance   Scooting: Maximum Assistance    Additional Treatment:  Static sitting balance edge of bed with Min A to maintain.  Sitting tolerance approx 5 mins.  Pt begged to lie back down secondary to pain. Patient positioned on left side with wedge.    Patient left HOB elevated with call button in reach    ASSESSMENT:  Fidelina Darling is a 89 y.o. female with a medical diagnosis of Abdominal cramping and presents with decreased functional mobility, decreased ADL skills, increased pain and decreased Ax tolerance.    Rehab potential is good    Activity tolerance: Good    Discharge recommendations: nursing facility, skilled     Equipment recommendations: none     GOALS:   Multidisciplinary Problems     Occupational Therapy Goals        Problem: Occupational Therapy    Goal Priority Disciplines Outcome Interventions   Occupational Therapy Goal     OT, PT/OT Ongoing, Progressing    Description: Goals to be met by: 9/16/22     Patient will increase functional independence with ADLs by performing:    UE  Dressing with Modified [place].  LE Dressing with Modified [place].  Grooming while standing with Modified [place].  Toileting from bedside commode with Modified [place] for hygiene  and clothing management.   Toilet transfer to bedside commode with Modified [place].progress  to toilet.                     Plan:  Patient to be seen 3 x/week, 5 x/week to address the above listed problems via self-care/home management, therapeutic activities, therapeutic exercises  Plan of Care expires: 09/16/22  Plan of Care reviewed with: patient         08/25/2022

## 2022-08-25 NOTE — PROGRESS NOTES
[facility]    Chief Complaint: Inpatient Follow-up for      Subjective:  Fidelina Darling is a 89 y.o. White female with a past medical history of hypertension, hyperlipidemia. The patient presented to [facility] on 8/16/2022 with a primary complaint of abdominal pain and falls due to syncope.  Patient reports having a fall on 8/12 while in her garage.  She states she was grabbing for the car door handle when she missed it and fell. Fall was unwitnessed and she reports loss of consciousness. On 08/15 she had a second fall while in the kitchen but denies any loss of consciousness.  Yesterday (08/16/2022) patient was going to the bathroom when she passed out.  Episode was witnessed by neighbor who is visiting her. She reports urinary frequency. Patient denies complaints of headache, vision changes, shortness of breath, cough.  She has been experiencing lower abdominal pain for the last several days. Granddaughter at bedside states that patient is dependent on laxatives have a bowel movement.  Although she uses laxatives she often has to manually disimpact herself . She normally goes every 4-5 days with last bowel movement being 5 days ago. She has been having increased belching.  Granddaughter also states patient has little to no appetite.  Patient lives alone, ambulates with walker, drives and completes activity daily living independently.       Upon presentation to the ED, patient afebrile, hemodynamically stable and SpO2 96% on room air.  Labs notable for WBC 28, potassium 2.6, glucose 161, lactic acid 3.9.  UA with 1+ leukocyte esterase, 2+ bilirubin, positive nitrites, trace ketones and protein.  Chest x-ray without acute processes.  CT abdomen pelvis revealed mild distention of the colon with liquefied stool, indeterminate bilateral adrenal nodules and pancreatic cysts.  While in the ED patient received 40 mEq of IV potassium chloride, 2 g of magnesium sulfate and Rocephin was started for  UTI.    Patient was admitted on account of recurrent syncope and falls. Colonic dilatation with lactic acidosis.   X-ray of the chest reveals left-sided atelectasis cannot rule out infiltrate.      8-21-22  Pt with persistent abd pain and nausea  Has NGT to LIWS  Belching  Minimal flatus  KUB with dilation 56.4mm to 76.8mm of colon    8/22/22:  NG still in place. Was on high suction. Placed on low suction.  Generalized mild abdominal discomfort  No flatus, no stool.  KUB pending.    8/23/22:  US ordered for today due to elevated LFTs  Patient having small liquid brown stool  NG in place with minimal output     8/24/22:  SBFT unremarkable- normal SB transit time  Abdominal U/S unremarkable  LFTs trending up   Patient continues to have stool (per nurse charting x5 stools yesterday) and endorses diffuse abdominal discomfort,   Denies N/V, fever/chills    8/25/ss:  LFTS trending down. AST/ALT: 419/209---> 335/179  Patient has not had a BM today and continues to endorse diffuse abdominal pain.   Denies N/V, fever/chills         Objective/physical exam:  General appearance: Well-developed, well-nourished female in no apparent distress.  IN bed.  HEENT: Atraumatic head. Dry mucous membranes of oral cavity.  Eyes: anicteric  Lungs: Clear to auscultation bilaterally.   Heart: Regular rate and rhythm.   Abdomen: mod firm, distended, generalized tenderness. Bowel sounds are hypoactive. Frequently belching. NG clamped  Extremities: No cyanosis, clubbing. No deformities.  Skin: No Rash. Warm and dry.  Neuro: Awake, alert and oriented self, place and city. Motor and sensory exams grossly intact.  Psyc: calm and cooperative    VITAL SIGNS: 24 HRS MIN & MAX LAST   Temp  Min: 97.8 °F (36.6 °C)  Max: 98.9 °F (37.2 °C) 98.9 °F (37.2 °C)   BP  Min: 115/60  Max: 148/76 130/69   Pulse  Min: 83  Max: 118  (!) 118   Resp  Min: 16  Max: 20 18   SpO2  Min: 90 %  Max: 97 % (!) 91 %       Recent Labs   Lab 08/22/22  0357 08/24/22  7877  08/25/22 0432   WBC 6.4 14.5* 11.6*   RBC 3.37* 3.85* 3.25*   HGB 10.5* 11.9* 10.3*   HCT 32.2* 35.2* 32.4*   MCV 95.5* 91.4 99.7*   MCH 31.2* 30.9 31.7*   MCHC 32.6* 33.8 31.8*   RDW 13.6 14.0 14.0    151 252   MPV 9.4 11.7* 9.7       Recent Labs   Lab 08/20/22  1105 08/22/22  0358 08/24/22  0437 08/25/22 0432    130* 141 139   K 4.2 3.5 2.9* 3.0*   CO2 22* 23 25 28   BUN 11.7 16.6 23.7* 23.9*   CREATININE 0.53* 0.52* 0.56 0.50*   CALCIUM 7.5* 7.3* 7.8* 7.9*   MG 2.10 1.80 2.30  --    ALBUMIN  --  1.8* 2.0* 1.8*   ALKPHOS  --  123 327* 330*   ALT  --  108* 209* 179*   AST  --  210* 419* 335*   BILITOT  --  0.4 0.4 0.3           See below for Radiology    Scheduled Med:   albuterol-ipratropium  3 mL Nebulization Q6H    diclofenac sodium  4 g Topical (Top) Daily    enoxaparin  30 mg Subcutaneous Q12H    levoFLOXacin  750 mg Per NG tube Daily    LIDOcaine  1 patch Transdermal Q24H    metoprolol tartrate  25 mg Oral BID    nystatin  500,000 Units Oral QID (WM & HS)    potassium chloride in water  40 mEq Intravenous Q1H        Continuous Infusions:   Amino acid 4.25% - dextrose 5% (CLINIMIX-E) solution (1L provides 42.5 gm AA, 50 gm CHO (170 kcal/L dextrose), Na 35, K 30, Mg 5, Ca 4.5, Acetate 70, Cl 39, Phos 15) 75 mL/hr at 08/25/22 0548        PRN Meds:  acetaminophen, albuterol-ipratropium, dextrose 50%, dextrose 50%, glucagon (human recombinant), glucose, glucose, HYDROcodone-acetaminophen, insulin aspart U-100, labetaloL, melatonin, metoclopramide HCl, metoprolol, morphine, ondansetron, sodium chloride 0.9%       Radiology:  X-Ray Abdomen AP 1 View  Narrative: EXAMINATION:  XR ABDOMEN AP 1 VIEW    CLINICAL HISTORY:  Abdomen Distended;    TECHNIQUE:  AP View(s) of the abdomen was performed.    COMPARISON:  08/23/2022    FINDINGS:  Enteric tube terminates at the stomach with side port at the GE junction.    Retained enteric contrast is seen within the colon.    Postop left hip  arthroplasty.  Impression: Enteric tube with side port at the GE junction.    Retained enteric contrast in the colon.    Electronically signed by: Lise Holloway  Date:    08/25/2022  Time:    07:17      Assessment/Plan:  1. Acute abdominal distention, having small volume stool    NGT clamped as of 8/24- Begin clear liquid diet. Possible NG tube removal in the future depending on how she tolerates diet advancement  Current KUB shows contrast remaining in colon    Begin Movantik   Give Ducolax supp qd   2.Hypokalemia    Replace per attending  3. Elevated LFTS- trending down     US with no significant findings.   Small bowel follow through showing normal small bowel transit time       89 yo female admitted for falls, found to have UTI, and ileus. NG tube now clamped for trial, pt on appropriate ax therapy      Meche Chan RN acting as scribe for Jonn Austin MD  Gastroenterology  [facility]      The documentation recorded by the scribe/NP accurately reflects the service I personally performed and the decisions made by me.     Jonn Austin MD

## 2022-08-25 NOTE — PT/OT/SLP PROGRESS
Physical Therapy         Treatment        Fidelina Darling   MRN: [id number]     PT Received On: 22  PT Start Time: 933     PT Stop Time: 951    PT Total Time (min): 18 min       Billable Minutes:  Therapeutic Activity 18  Total Minutes: 18    Treatment Type: Treatment  PT/PTA: PTA     PTA Visit Number: 4       General Precautions: Standard, fall  Orthopedic Precautions: Orthopedic Precautions : N/A   Braces:      Spiritual, Cultural Beliefs, Spiritism Practices, Values that Affect Care: no    Objective:  Patient found in bed upon entry.    Functional Mobility:  Bed Mobility:   Supine to sit: Maximum Assistance   Sit to supine: Maximum Assistance   Rolling: Maximum Assistance   Scooting: Maximum Assistance    Balance:   Static Sit: Min A     Transitional Sit-to-stand: Max A X 2   RLE in extension and LLE unable to completely flex to 90 degrees.    Static Standing: Max A x 2 with RW   Attempted to stand pt but uanble to clear buttocks off EOB. Pt c/o abdominal pain and demanding to return to supine.     Activity Tolerance:  Patient limited by pain    Patient left HOB elevated with call button in reach.    Assessment:  Fidelina Darling is a 89 y.o. female with a medical diagnosis of Abdominal cramping.     Rehab potential is good.    Activity tolerance: Good    Discharge recommendations: Discharge Facility/Level of Care Needs: nursing facility, skilled     Equipment recommendations:       GOALS:   Multidisciplinary Problems     Physical Therapy Goals        Problem: Physical Therapy    Goal Priority Disciplines Outcome Goal Variances Interventions   Physical Therapy Goal     PT, PT/OT Ongoing, Progressing     Description: Goals to be met by: 22     Patient will increase functional independence with mobility by performin. Supine to sit with Stand-by Assistance  2. Sit to stand transfer with Stand-by Assistance  3. Gait  x 200 feet with Stand-by Assistance using Rolling Walker.                      PLAN:     Patient to be seen daily  to address the above listed problems via therapeutic activities  Plan of Care expires: 09/16/22  Plan of Care reviewed with: patient         8/25/2022

## 2022-08-25 NOTE — PROGRESS NOTES
[facility] Hospital Medicine Progress Note           Chief Complaint: Inpatient Follow-up for abdominal distension     HPI: 89 y.o. White female with a past medical history of hypertension, hyperlipidemia. The patient presented to [facility] on 8/16/2022 with a primary complaint of abdominal pain and falls due to syncope.  Patient reports having a fall on 8/12 while in her garage.  She states she was grabbing for the car door handle when she missed it and fell. Fall was unwitnessed and she reports loss of consciousness. On 08/15 she had a second fall while in the kitchen but denies any loss of consciousness.  Yesterday (08/16/2022) patient was going to the bathroom when she passed out.  Episode was witnessed by neighbor who is visiting her. She reports urinary frequency. Patient denies complaints of headache, vision changes, shortness of breath, cough.  She has been experiencing lower abdominal pain for the last several days. Granddaughter at bedside states that patient is dependent on laxatives have a bowel movement.  Although she uses laxatives she often has to manually disimpact herself . She normally goes every 4-5 days with last bowel movement being 5 days ago. She has been having increased belching.  Granddaughter also states patient has little to no appetite.  Patient lives alone, ambulates with walker, drives and completes activity daily living independently.  Family as it patient to be experiencing dementia over the last 6 months.     Upon presentation to the ED, patient afebrile, hemodynamically stable and SpO2 96% on room air.  Labs notable for WBC 28, potassium 2.6, glucose 161, lactic acid 3.9.  UA with 1+ leukocyte esterase, 2+ bilirubin, positive nitrites, trace ketones and protein.  Chest x-ray without acute processes.  CT abdomen pelvis revealed mild distention of the colon with liquefied stool, indeterminate bilateral adrenal nodules and pancreatic cysts.  While in the ED  patient received 40 mEq of IV potassium chloride, 2 g of magnesium sulfate and Rocephin was started for UTI.  Patient admitted to hospital medicine services for further medical management.     Patient was admitted on account of recurrent syncope and falls.  Found to have acute TTE high.  Colonic dilatation with lactic acidosis.   X-ray of the chest reveals left-sided atelectasis cannot rule out infiltrate.    KUB shows persistent colonic dilatation.  With a small sliding hiatal hernia, resume IV Zosyn  Inserted an NG tube and connect to low intermittent wall suction, keep NPO   IV Clinimix for nutrition  patient had small-bowel follow-through with normal transit time had ultrasound of the abdomen that showed no significant abnormality of liver small pancreatic cyst and right-sided pleural effusion     Interval Hx:   Patient seen and examined this morning   She currently has no new complaints.    Heart rate is elevated add on metoprolol tartrate 25 mg t.i.d.   Leukocytosis is improving she is currently on IV levofloxacin day 2   Potassium is low will replete with 40 x 2 IV   Liver enzymes is trending down slowly.    NG tube is currently clamped to repeat KUB and watch out for abdominal distension if negative will be able to remove NG tube, await gi recs   Continue IV Clinimix  Patient's lower extremity noted to be swollen.  Elevated on 2 pillows.  Will need to discontinue IV fluids soon.           Objective/physical exam:  General: In no acute distress, afebrile, on ng tube   Chest: Clear to auscultation bilaterally  Heart: RRR, +S1, S2, no appreciable murmur  Abdomen slightly distended  MSK: Warm,  2 + lower extremity edema, no clubbing or cyanosis  Neurologic: Alert and oriented x4,    Assessment/Plan:  Colonic distension   Dyspepsia  Transaminitis, improving   Hypokalemia  Abdominal distention secondary to above   Small pancreatic cyst   Sliding hernia   Bilateral adrenal nodules repeat follow-up CT  outpatient  Essential hypertension  Diastolic dysfunction   sinus tachycardia    hypokalemia        Plan   add on metoprolol tartrate 25 mg t.i.d.   Iv lopressor prn with parameters > 120bpm  Leukocytosis is improving she is currently on IV levofloxacin day 2   Potassium is low will replete with 40 x 2 IV   Liver enzymes is trending down slowly.    NG tube is currently clamped to repeat KUB and watch out for abdominal distension if negative will be able to remove NG tube, await gi recs   Continue IV Clinimix  Patient's lower extremity noted to be swollen.  Elevate on 2 pillows.  Will need to discontinue IV fluids soon.     small bowel fall though through has a normal transit time  Ultrasound of the abdomen showed no significant abnormality of the liver  GI following  Repeat blood work in a.m.   On Clinimix for nutrition still has NG tube     VTE prophylaxis:      Patient condition:  Stable/Fair/Guarded/ Serious/ Critical     Anticipated discharge and Disposition:              VITAL SIGNS: 24 HRS MIN & MAX LAST   Temp  Min: 97.8 °F (36.6 °C)  Max: 98.9 °F (37.2 °C) 98.9 °F (37.2 °C)   BP  Min: 115/60  Max: 147/73 130/69   Pulse  Min: 83  Max: 118  (!) 118   Resp  Min: 16  Max: 20 18   SpO2  Min: 90 %  Max: 97 % (!) 91 %       Recent Labs   Lab 08/22/22  0357 08/24/22 0437 08/25/22 0432   WBC 6.4 14.5* 11.6*   RBC 3.37* 3.85* 3.25*   HGB 10.5* 11.9* 10.3*   HCT 32.2* 35.2* 32.4*   MCV 95.5* 91.4 99.7*   MCH 31.2* 30.9 31.7*   MCHC 32.6* 33.8 31.8*   RDW 13.6 14.0 14.0    151 252   MPV 9.4 11.7* 9.7       Recent Labs   Lab 08/20/22  1105 08/22/22  0358 08/24/22 0437 08/25/22 0432    130* 141 139   K 4.2 3.5 2.9* 3.0*   CO2 22* 23 25 28   BUN 11.7 16.6 23.7* 23.9*   CREATININE 0.53* 0.52* 0.56 0.50*   CALCIUM 7.5* 7.3* 7.8* 7.9*   MG 2.10 1.80 2.30  --    ALBUMIN  --  1.8* 2.0* 1.8*   ALKPHOS  --  123 327* 330*   ALT  --  108* 209* 179*   AST  --  210* 419* 335*   BILITOT  --  0.4 0.4 0.3           Microbiology Results (last 7 days)     Procedure Component Value Units Date/Time    Blood Culture #1 **CANNOT BE ORDERED STAT** [[id number]]  (Normal) Collected: 08/17/22 0148    Order Status: Completed Specimen: Blood Updated: 08/22/22 0302     CULTURE, BLOOD ([facility]) No Growth at 5 days    Blood Culture #2 **CANNOT BE ORDERED STAT** [[id number]]  (Normal) Collected: 08/17/22 0148    Order Status: Completed Specimen: Blood Updated: 08/22/22 0302     CULTURE, BLOOD ([facility]) No Growth at 5 days    Urine Culture High Risk [[id number]] Collected: 08/18/22 1325    Order Status: Completed Specimen: Urine, Clean Catch Updated: 08/20/22 1026     Urine Culture No Growth    Stool Culture [[id number]]     Order Status: Canceled Specimen: Stool     Stool Culture [[id number]]     Order Status: Canceled Specimen: Stool     Clostridium Diff Toxin, A & B, EIA [[id number]]     Order Status: Canceled Specimen: Stool            See below for Radiology    Scheduled Med:   albuterol-ipratropium  3 mL Nebulization Q6H    diclofenac sodium  4 g Topical (Top) Daily    enoxaparin  30 mg Subcutaneous Q12H    levoFLOXacin  750 mg Per NG tube Daily    LIDOcaine  1 patch Transdermal Q24H    metoprolol tartrate  25 mg Oral BID    nystatin  500,000 Units Oral QID (WM & HS)    potassium chloride in water  40 mEq Intravenous Q1H        Continuous Infusions:   Amino acid 4.25% - dextrose 5% (CLINIMIX-E) solution (1L provides 42.5 gm AA, 50 gm CHO (170 kcal/L dextrose), Na 35, K 30, Mg 5, Ca 4.5, Acetate 70, Cl 39, Phos 15) 75 mL/hr at 08/25/22 0548        PRN Meds:  acetaminophen, albuterol-ipratropium, dextrose 50%, dextrose 50%, glucagon (human recombinant), glucose, glucose, HYDROcodone-acetaminophen, insulin aspart U-100, labetaloL, melatonin, metoclopramide HCl, metoprolol, morphine, ondansetron, sodium chloride 0.9%       VTE prophylaxis:     Patient condition:  Stable/Fair/Guarded/ Serious/ Critical    Anticipated discharge and Disposition:          All diagnosis and differential diagnosis have been reviewed; assessment and plan has been documented; I have personally reviewed the labs and test results that are presently available; I have reviewed the patients medication list; I have reviewed the consulting providers response and recommendations. I have reviewed or attempted to review medical records based upon their availability    All of the patient's questions have been  addressed and answered. Patient's is agreeable to the above stated plan. I will continue to monitor closely and make adjustments to medical management as needed.  _____________________________________________________________________    Nutrition Status:    Radiology:  X-Ray Abdomen AP 1 View  Narrative: EXAMINATION:  XR ABDOMEN AP 1 VIEW    CLINICAL HISTORY:  Abdomen Distended;    TECHNIQUE:  AP View(s) of the abdomen was performed.    COMPARISON:  08/23/2022    FINDINGS:  Enteric tube terminates at the stomach with side port at the GE junction.    Retained enteric contrast is seen within the colon.    Postop left hip arthroplasty.  Impression: Enteric tube with side port at the GE junction.    Retained enteric contrast in the colon.    Electronically signed by: Lise Holloway  Date:    08/25/2022  Time:    07:17      Yinka Cabral MD   08/25/2022

## 2022-08-26 LAB
ANION GAP SERPL CALC-SCNC: 7 MEQ/L
BUN SERPL-MCNC: 22.9 MG/DL (ref 9.8–20.1)
CALCIUM SERPL-MCNC: 7.8 MG/DL (ref 8.4–10.2)
CHLORIDE SERPL-SCNC: 104 MMOL/L (ref 98–111)
CO2 SERPL-SCNC: 28 MMOL/L (ref 23–31)
CREAT SERPL-MCNC: 0.52 MG/DL (ref 0.55–1.02)
CREAT/UREA NIT SERPL: 44
GFR SERPLBLD CREATININE-BSD FMLA CKD-EPI: >60 MLS/MIN/1.73/M2
GLUCOSE SERPL-MCNC: 108 MG/DL (ref 75–121)
MAGNESIUM SERPL-MCNC: 2.2 MG/DL (ref 1.6–2.6)
PATH REV: NORMAL
POCT GLUCOSE: 101 MG/DL (ref 70–110)
POTASSIUM SERPL-SCNC: 3.5 MMOL/L (ref 3.5–5.1)
SODIUM SERPL-SCNC: 139 MMOL/L (ref 132–146)

## 2022-08-26 PROCEDURE — 99900031 HC PATIENT EDUCATION (STAT)

## 2022-08-26 PROCEDURE — 21400001 HC TELEMETRY ROOM

## 2022-08-26 PROCEDURE — C9113 INJ PANTOPRAZOLE SODIUM, VIA: HCPCS | Performed by: INTERNAL MEDICINE

## 2022-08-26 PROCEDURE — 25000242 PHARM REV CODE 250 ALT 637 W/ HCPCS: Performed by: INTERNAL MEDICINE

## 2022-08-26 PROCEDURE — A4216 STERILE WATER/SALINE, 10 ML: HCPCS | Performed by: INTERNAL MEDICINE

## 2022-08-26 PROCEDURE — 80048 BASIC METABOLIC PNL TOTAL CA: CPT | Performed by: INTERNAL MEDICINE

## 2022-08-26 PROCEDURE — 94799 UNLISTED PULMONARY SVC/PX: CPT

## 2022-08-26 PROCEDURE — 36410 VNPNXR 3YR/> PHY/QHP DX/THER: CPT

## 2022-08-26 PROCEDURE — 63600175 PHARM REV CODE 636 W HCPCS: Performed by: INTERNAL MEDICINE

## 2022-08-26 PROCEDURE — 11000001 HC ACUTE MED/SURG PRIVATE ROOM

## 2022-08-26 PROCEDURE — 25000003 PHARM REV CODE 250: Performed by: INTERNAL MEDICINE

## 2022-08-26 PROCEDURE — 83735 ASSAY OF MAGNESIUM: CPT | Performed by: INTERNAL MEDICINE

## 2022-08-26 PROCEDURE — 99222 PR INITIAL HOSPITAL CARE,LEVL II: ICD-10-PCS | Mod: ,,, | Performed by: NURSE PRACTITIONER

## 2022-08-26 PROCEDURE — 94761 N-INVAS EAR/PLS OXIMETRY MLT: CPT

## 2022-08-26 PROCEDURE — 99222 1ST HOSP IP/OBS MODERATE 55: CPT | Mod: ,,, | Performed by: NURSE PRACTITIONER

## 2022-08-26 PROCEDURE — 97530 THERAPEUTIC ACTIVITIES: CPT | Mod: CO

## 2022-08-26 PROCEDURE — 27000221 HC OXYGEN, UP TO 24 HOURS

## 2022-08-26 PROCEDURE — 36415 COLL VENOUS BLD VENIPUNCTURE: CPT | Performed by: INTERNAL MEDICINE

## 2022-08-26 PROCEDURE — 63600175 PHARM REV CODE 636 W HCPCS: Performed by: NURSE PRACTITIONER

## 2022-08-26 PROCEDURE — 97164 PT RE-EVAL EST PLAN CARE: CPT

## 2022-08-26 PROCEDURE — 94640 AIRWAY INHALATION TREATMENT: CPT

## 2022-08-26 PROCEDURE — 25000003 PHARM REV CODE 250: Performed by: NURSE PRACTITIONER

## 2022-08-26 PROCEDURE — C1751 CATH, INF, PER/CENT/MIDLINE: HCPCS

## 2022-08-26 RX ORDER — SODIUM CHLORIDE 0.9 % (FLUSH) 0.9 %
10 SYRINGE (ML) INJECTION
Status: DISCONTINUED | OUTPATIENT
Start: 2022-08-26 | End: 2022-09-13 | Stop reason: HOSPADM

## 2022-08-26 RX ORDER — SODIUM CHLORIDE 0.9 % (FLUSH) 0.9 %
10 SYRINGE (ML) INJECTION EVERY 6 HOURS
Status: DISCONTINUED | OUTPATIENT
Start: 2022-08-26 | End: 2022-09-13 | Stop reason: HOSPADM

## 2022-08-26 RX ORDER — PANTOPRAZOLE SODIUM 40 MG/10ML
40 INJECTION, POWDER, LYOPHILIZED, FOR SOLUTION INTRAVENOUS 2 TIMES DAILY
Status: DISCONTINUED | OUTPATIENT
Start: 2022-08-26 | End: 2022-09-09

## 2022-08-26 RX ORDER — MORPHINE SULFATE 4 MG/ML
2 INJECTION, SOLUTION INTRAMUSCULAR; INTRAVENOUS
Status: DISCONTINUED | OUTPATIENT
Start: 2022-08-26 | End: 2022-09-13 | Stop reason: HOSPADM

## 2022-08-26 RX ORDER — SUCRALFATE 1 G/1
1 TABLET ORAL
Status: DISCONTINUED | OUTPATIENT
Start: 2022-08-26 | End: 2022-08-29

## 2022-08-26 RX ADMIN — LIDOCAINE 1 PATCH: 50 PATCH TOPICAL at 05:08

## 2022-08-26 RX ADMIN — SODIUM CHLORIDE, PRESERVATIVE FREE 10 ML: 5 INJECTION INTRAVENOUS at 06:08

## 2022-08-26 RX ADMIN — NYSTATIN 500000 UNITS: 100000 SUSPENSION ORAL at 09:08

## 2022-08-26 RX ADMIN — METOPROLOL TARTRATE 25 MG: 25 TABLET, FILM COATED ORAL at 08:08

## 2022-08-26 RX ADMIN — ENOXAPARIN SODIUM 30 MG: 30 INJECTION SUBCUTANEOUS at 09:08

## 2022-08-26 RX ADMIN — METOPROLOL TARTRATE 25 MG: 25 TABLET, FILM COATED ORAL at 09:08

## 2022-08-26 RX ADMIN — SUCRALFATE 1 G: 1 TABLET ORAL at 08:08

## 2022-08-26 RX ADMIN — NALOXEGOL OXALATE 25 MG: 25 TABLET, FILM COATED ORAL at 09:08

## 2022-08-26 RX ADMIN — PANTOPRAZOLE SODIUM 40 MG: 40 INJECTION, POWDER, FOR SOLUTION INTRAVENOUS at 08:08

## 2022-08-26 RX ADMIN — LEVOFLOXACIN 750 MG: 500 TABLET, FILM COATED ORAL at 09:08

## 2022-08-26 RX ADMIN — ENOXAPARIN SODIUM 30 MG: 30 INJECTION SUBCUTANEOUS at 08:08

## 2022-08-26 RX ADMIN — MORPHINE SULFATE 4 MG: 4 INJECTION INTRAVENOUS at 05:08

## 2022-08-26 RX ADMIN — NYSTATIN 500000 UNITS: 100000 SUSPENSION ORAL at 05:08

## 2022-08-26 RX ADMIN — BISACODYL 10 MG: 10 SUPPOSITORY RECTAL at 09:08

## 2022-08-26 RX ADMIN — IPRATROPIUM BROMIDE AND ALBUTEROL SULFATE 3 ML: 2.5; .5 SOLUTION RESPIRATORY (INHALATION) at 01:08

## 2022-08-26 RX ADMIN — LEUCINE, PHENYLALANINE, LYSINE, METHIONINE, ISOLEUCINE, VALINE, HISTIDINE, THREONINE, TRYPTOPHAN, ALANINE, GLYCINE, ARGININE, PROLINE, SERINE, TYROSINE, SODIUM ACETATE, DIBASIC POTASSIUM PHOSPHATE, MAGNESIUM CHLORIDE, SODIUM CHLORIDE, CALCIUM CHLORIDE, DEXTROSE
311; 238; 247; 170; 255; 247; 204; 179; 77; 880; 438; 489; 289; 213; 17; 297; 261; 51; 77; 33; 5 INJECTION INTRAVENOUS at 08:08

## 2022-08-26 RX ADMIN — IPRATROPIUM BROMIDE AND ALBUTEROL SULFATE 3 ML: 2.5; .5 SOLUTION RESPIRATORY (INHALATION) at 07:08

## 2022-08-26 RX ADMIN — ONDANSETRON 4 MG: 2 INJECTION INTRAMUSCULAR; INTRAVENOUS at 01:08

## 2022-08-26 RX ADMIN — METHYLNALTREXONE BROMIDE 12 MG: 12 INJECTION, SOLUTION SUBCUTANEOUS at 01:08

## 2022-08-26 RX ADMIN — PANTOPRAZOLE SODIUM 40 MG: 40 INJECTION, POWDER, FOR SOLUTION INTRAVENOUS at 01:08

## 2022-08-26 RX ADMIN — SUCRALFATE 1 G: 1 TABLET ORAL at 05:08

## 2022-08-26 RX ADMIN — DICLOFENAC SODIUM 4 G: 10 GEL TOPICAL at 09:08

## 2022-08-26 RX ADMIN — IPRATROPIUM BROMIDE AND ALBUTEROL SULFATE 3 ML: 2.5; .5 SOLUTION RESPIRATORY (INHALATION) at 08:08

## 2022-08-26 NOTE — PLAN OF CARE
Problem: Physical Therapy  Goal: Physical Therapy Goal  Description: Goals to be met by: 22     Patient will increase functional independence with mobility by performin. Supine to sit with MInimal Assistance  2. Sit to supine with MInimal Assistance  3. Sit to stand transfer with Minimal Assistance  4. Bed to chair transfer with Minimal Assistance using Rolling Walker  5. Sitting at edge of bed x10 minutes with Stand-by Assistance    2022 1355 by Vanessa Mcgraw PT  Outcome: Unable to Meet, Plan Revised

## 2022-08-26 NOTE — PROGRESS NOTES
[facility] Hospital Medicine Progress Note        Chief Complaint: Inpatient Follow-up for abdominal distension     HPI: 89 y.o. White female with a past medical history of hypertension, hyperlipidemia. The patient presented to [facility] on 8/16/2022 with a primary complaint of abdominal pain and falls due to syncope.  Patient reports having a fall on 8/12 while in her garage.  She states she was grabbing for the car door handle when she missed it and fell. Fall was unwitnessed and she reports loss of consciousness. On 08/15 she had a second fall while in the kitchen but denies any loss of consciousness.  Yesterday (08/16/2022) patient was going to the bathroom when she passed out.  Episode was witnessed by neighbor who is visiting her. She reports urinary frequency. Patient denies complaints of headache, vision changes, shortness of breath, cough.  She has been experiencing lower abdominal pain for the last several days. Granddaughter at bedside states that patient is dependent on laxatives have a bowel movement.  Although she uses laxatives she often has to manually disimpact herself . She normally goes every 4-5 days with last bowel movement being 5 days ago. She has been having increased belching.  Granddaughter also states patient has little to no appetite.  Patient lives alone, ambulates with walker, drives and completes activity daily living independently.  Family as it patient to be experiencing dementia over the last 6 months.     Upon presentation to the ED, patient afebrile, hemodynamically stable and SpO2 96% on room air.  Labs notable for WBC 28, potassium 2.6, glucose 161, lactic acid 3.9.  UA with 1+ leukocyte esterase, 2+ bilirubin, positive nitrites, trace ketones and protein.  Chest x-ray without acute processes.  CT abdomen pelvis revealed mild distention of the colon with liquefied stool, indeterminate bilateral adrenal nodules and pancreatic cysts.  While in the ED  patient received 40 mEq of IV potassium chloride, 2 g of magnesium sulfate and Rocephin was started for UTI.  Patient admitted to hospital medicine services for further medical management.     Patient was admitted on account of recurrent syncope and falls.  Found to have acute TTE high.  Colonic dilatation with lactic acidosis.   X-ray of the chest reveals left-sided atelectasis cannot rule out infiltrate.    KUB shows persistent colonic dilatation.  With a small sliding hiatal hernia, resume IV Zosyn  Inserted an NG tube and connect to low intermittent wall suction, keep NPO   IV Clinimix for nutrition  patient had small-bowel follow-through with normal transit time had ultrasound of the abdomen that showed no significant abnormality of liver small pancreatic cyst and right-sided pleural effusion     Interval Hx:   Patient seen and examined this morning   Patient continues to have diffuse abdominal pain and distension.  Patient is not making significant improvements.  Consult palliative for further input   Continue with IV PPI b.i.d., add on sucralfate if able to tolerate p.o. given NG tube connection to suction        Objective/physical exam:  General: In no acute distress, afebrile, on ng tube   Chest: Clear to auscultation bilaterally  Heart: RRR, +S1, S2, no appreciable murmur  Abdomen slightly distended, diffusely tender   MSK: Warm,  2 + lower extremity edema, no clubbing or cyanosis  Neurologic: Alert and oriented x4,     Assessment/Plan:  Colonic distension   Dyspepsia  Transaminitis, improving   Hypokalemia  Abdominal distention secondary to above   Small pancreatic cyst   Sliding hernia   Bilateral adrenal nodules repeat follow-up CT outpatient  Essential hypertension  Diastolic dysfunction   sinus tachycardia    hypokalemia           Plan   Consult palliative for further input   Continue with IV PPI b.i.d., add on sucralfate if able to tolerate p.o. given NG tube connection to suction   continue  metoprolol tartrate 25 mg t.i.d.   Iv lopressor prn with parameters > 120bpm  Leukocytosis is improving she is currently on IV levofloxacin day 3   Liver enzymes is trending down slowly.    NG tube is currently clamped to repeat KUB and watch out for abdominal distension if negative will be able to remove NG tube, await gi recs   Continue IV Clinimix  Patient's lower extremity noted to be swollen.  Elevate on 2 pillows.  Will need to discontinue IV fluids soon.     small bowel follow though has a normal transit time  Ultrasound of the abdomen showed no significant abnormality of the liver  Repeat blood work in a.m.      VTE prophylaxis:      Patient condition:  Stable/Fair/Guarded/ Serious/ Critical     Anticipated discharge and Disposition:          VITAL SIGNS: 24 HRS MIN & MAX LAST   Temp  Min: 97.8 °F (36.6 °C)  Max: 99.3 °F (37.4 °C) 98.3 °F (36.8 °C)   BP  Min: 114/59  Max: 140/74 135/75   Pulse  Min: 76  Max: 92  76   Resp  Min: 17  Max: 24 (!) 24   SpO2  Min: 88 %  Max: 98 % (!) 93 %       Recent Labs   Lab 08/22/22  0357 08/24/22  0437 08/25/22 0432   WBC 6.4 14.5* 11.6*   RBC 3.37* 3.85* 3.25*   HGB 10.5* 11.9* 10.3*   HCT 32.2* 35.2* 32.4*   MCV 95.5* 91.4 99.7*   MCH 31.2* 30.9 31.7*   MCHC 32.6* 33.8 31.8*   RDW 13.6 14.0 14.0    151 252   MPV 9.4 11.7* 9.7       Recent Labs   Lab 08/22/22  0358 08/24/22  0437 08/25/22  0432 08/26/22  0417   * 141 139 139   K 3.5 2.9* 3.0* 3.5   CO2 23 25 28 28   BUN 16.6 23.7* 23.9* 22.9*   CREATININE 0.52* 0.56 0.50* 0.52*   CALCIUM 7.3* 7.8* 7.9* 7.8*   MG 1.80 2.30  --  2.20   ALBUMIN 1.8* 2.0* 1.8*  --    ALKPHOS 123 327* 330*  --    * 209* 179*  --    * 419* 335*  --    BILITOT 0.4 0.4 0.3  --           Microbiology Results (last 7 days)     Procedure Component Value Units Date/Time    Blood Culture #1 **CANNOT BE ORDERED STAT** [[id number]]  (Normal) Collected: 08/17/22 0148    Order Status: Completed Specimen: Blood Updated: 08/22/22  0302     CULTURE, BLOOD ([facility]) No Growth at 5 days    Blood Culture #2 **CANNOT BE ORDERED STAT** [[id number]]  (Normal) Collected: 08/17/22 0148    Order Status: Completed Specimen: Blood Updated: 08/22/22 0302     CULTURE, BLOOD ([facility]) No Growth at 5 days    Urine Culture High Risk [[id number]] Collected: 08/18/22 1325    Order Status: Completed Specimen: Urine, Clean Catch Updated: 08/20/22 1026     Urine Culture No Growth    Stool Culture [[id number]]     Order Status: Canceled Specimen: Stool            See below for Radiology    Scheduled Med:   albuterol-ipratropium  3 mL Nebulization Q6H    bisacodyL  10 mg Rectal Daily    diclofenac sodium  4 g Topical (Top) Daily    enoxaparin  30 mg Subcutaneous Q12H    levoFLOXacin  750 mg Per NG tube Daily    LIDOcaine  1 patch Transdermal Q24H    methylnaltrexone  12 mg Subcutaneous Daily    metoprolol tartrate  25 mg Oral BID    nystatin  500,000 Units Oral QID (WM & HS)    sodium chloride 0.9%  10 mL Intravenous Q6H        Continuous Infusions:   Amino acid 4.25% - dextrose 5% (CLINIMIX-E) solution (1L provides 42.5 gm AA, 50 gm CHO (170 kcal/L dextrose), Na 35, K 30, Mg 5, Ca 4.5, Acetate 70, Cl 39, Phos 15) 75 mL/hr at 08/26/22 0800        PRN Meds:  acetaminophen, albuterol-ipratropium, dextrose 50%, dextrose 50%, glucagon (human recombinant), glucose, glucose, HYDROcodone-acetaminophen, insulin aspart U-100, labetaloL, melatonin, metoclopramide HCl, metoprolol, morphine, ondansetron, sodium chloride 0.9%, Flushing PICC Protocol **AND** sodium chloride 0.9% **AND** sodium chloride 0.9%         VTE prophylaxis:     Patient condition:  Stable/Fair/Guarded/ Serious/ Critical    Anticipated discharge and Disposition:         All diagnosis and differential diagnosis have been reviewed; assessment and plan has been documented; I have personally reviewed the labs and test results that are presently available; I have reviewed the patients medication list; I have  reviewed the consulting providers response and recommendations. I have reviewed or attempted to review medical records based upon their availability    All of the patient's questions have been  addressed and answered. Patient's is agreeable to the above stated plan. I will continue to monitor closely and make adjustments to medical management as needed.  _____________________________________________________________________    Nutrition Status:    Radiology:  X-Ray Abdomen AP 1 View  Narrative: EXAMINATION:  XR ABDOMEN AP 1 VIEW    CLINICAL HISTORY:  bowel distention, ileus;    TECHNIQUE:  AP X-RAY OF THE ABDOMEN:    CPT 39503    COMPARISON:  August 24, 2022    FINDINGS:  Persisting contrast identified throughout the colon.  Similar to what was seen on previous exams    Nasogastric tube is seen with the tip in the antrum of the stomach  Impression: Persistent prominence of the colonic loops with retained contrast similar to previous exam.    Nasogastric tube as above    Electronically signed by: Bharathi Gauthier  Date:    08/26/2022  Time:    09:42      Yinka Cabral MD   08/26/2022

## 2022-08-26 NOTE — PROGRESS NOTES
[facility]    Chief Complaint: Inpatient Follow-up for      Subjective:  Fidelina Darling is a 89 y.o. White female with a past medical history of hypertension, hyperlipidemia. The patient presented to [facility] on 8/16/2022 with a primary complaint of abdominal pain and falls due to syncope.  Patient reports having a fall on 8/12 while in her garage.  She states she was grabbing for the car door handle when she missed it and fell. Fall was unwitnessed and she reports loss of consciousness. On 08/15 she had a second fall while in the kitchen but denies any loss of consciousness.  Yesterday (08/16/2022) patient was going to the bathroom when she passed out.  Episode was witnessed by neighbor who is visiting her. She reports urinary frequency. Patient denies complaints of headache, vision changes, shortness of breath, cough.  She has been experiencing lower abdominal pain for the last several days. Granddaughter at bedside states that patient is dependent on laxatives have a bowel movement.  Although she uses laxatives she often has to manually disimpact herself . She normally goes every 4-5 days with last bowel movement being 5 days ago. She has been having increased belching.  Granddaughter also states patient has little to no appetite.  Patient lives alone, ambulates with walker, drives and completes activity daily living independently.       Upon presentation to the ED, patient afebrile, hemodynamically stable and SpO2 96% on room air.  Labs notable for WBC 28, potassium 2.6, glucose 161, lactic acid 3.9.  UA with 1+ leukocyte esterase, 2+ bilirubin, positive nitrites, trace ketones and protein.  Chest x-ray without acute processes.  CT abdomen pelvis revealed mild distention of the colon with liquefied stool, indeterminate bilateral adrenal nodules and pancreatic cysts.  While in the ED patient received 40 mEq of IV potassium chloride, 2 g of magnesium sulfate and Rocephin was started for  UTI.    Patient was admitted on account of recurrent syncope and falls. Colonic dilatation with lactic acidosis.   X-ray of the chest reveals left-sided atelectasis cannot rule out infiltrate.      8-21-22  Pt with persistent abd pain and nausea  Has NGT to LIWS  Belching  Minimal flatus  KUB with dilation 56.4mm to 76.8mm of colon    8/22/22:  NG still in place. Was on high suction. Placed on low suction.  Generalized mild abdominal discomfort  No flatus, no stool.  KUB pending.    8/23/22:  US ordered for today due to elevated LFTs  Patient having small liquid brown stool  NG in place with minimal output     8/24/22:  SBFT unremarkable- normal SB transit time  Abdominal U/S unremarkable  LFTs trending up   Patient continues to have stool (per nurse charting x5 stools yesterday) and endorses diffuse abdominal discomfort,   Denies N/V, fever/chills    8/25/22:  LFTS trending down. AST/ALT: 419/209---> 335/179  Patient has not had a BM today and continues to endorse diffuse abdominal pain.   Denies N/V, fever/chills    8/26/22:  Abdomen remains distended and quiet. NG replaced and to LIS and 600ml aspirated. Aspirate is dark.  Just had a small brown stool.  Turned her and c/o abdominal pain.  KUB revealed contrast in colon remains.  On Movantik and dulcolax supp daily.  LFTs decreasing     ROS:  General: in bed  CV: peripheral edema  GI: Abdominal pain and distention.   Information gathering limited by clinical condition    Objective    VITAL SIGNS: 24 HRS MIN & MAX LAST   Temp  Min: 97.8 °F (36.6 °C)  Max: 99.3 °F (37.4 °C) 97.8 °F (36.6 °C)   BP  Min: 113/62  Max: 140/74 (!) 140/74   Pulse  Min: 78  Max: 92  90   Resp  Min: 17  Max: 24 (!) 24   SpO2  Min: 88 %  Max: 98 % (!) 93 %     Physical exam:  General appearance: Well-developed, well-nourished female in Moderate distress when turned.  IN bed.  HEENT: Atraumatic head. Dry mucous membranes of oral cavity.  Eyes: anicteric  Lungs: Clear to auscultation  bilaterally.   Heart: Regular rate and rhythm. 3+ pitting edema in bilateral LE.  Abdomen:  firm, distended, generalized tenderness. Bowel sounds are quiet. NG clamped due to medication administration.  Extremities: No cyanosis, clubbing. No deformities.  Skin: No Rash. Warm and dry.  Neuro: Awake, lethargic. Motor and sensory exams grossly intact.  Psyc: calm and cooperative    Recent Labs   Lab 08/22/22 0357 08/24/22 0437 08/25/22 0432   WBC 6.4 14.5* 11.6*   RBC 3.37* 3.85* 3.25*   HGB 10.5* 11.9* 10.3*   HCT 32.2* 35.2* 32.4*   MCV 95.5* 91.4 99.7*   MCH 31.2* 30.9 31.7*   MCHC 32.6* 33.8 31.8*   RDW 13.6 14.0 14.0    151 252   MPV 9.4 11.7* 9.7       Recent Labs   Lab 08/22/22 0358 08/24/22 0437 08/25/22 0432 08/26/22 0417   * 141 139 139   K 3.5 2.9* 3.0* 3.5   CO2 23 25 28 28   BUN 16.6 23.7* 23.9* 22.9*   CREATININE 0.52* 0.56 0.50* 0.52*   CALCIUM 7.3* 7.8* 7.9* 7.8*   MG 1.80 2.30  --  2.20   ALBUMIN 1.8* 2.0* 1.8*  --    ALKPHOS 123 327* 330*  --    * 209* 179*  --    * 419* 335*  --    BILITOT 0.4 0.4 0.3  --            See below for Radiology    Scheduled Med:   albuterol-ipratropium  3 mL Nebulization Q6H    bisacodyL  10 mg Rectal Daily    diclofenac sodium  4 g Topical (Top) Daily    enoxaparin  30 mg Subcutaneous Q12H    levoFLOXacin  750 mg Per NG tube Daily    LIDOcaine  1 patch Transdermal Q24H    metoprolol tartrate  25 mg Oral BID    naloxegoL  25 mg Oral Daily    nystatin  500,000 Units Oral QID (WM & HS)        Continuous Infusions:   Amino acid 4.25% - dextrose 5% (CLINIMIX-E) solution (1L provides 42.5 gm AA, 50 gm CHO (170 kcal/L dextrose), Na 35, K 30, Mg 5, Ca 4.5, Acetate 70, Cl 39, Phos 15) 75 mL/hr at 08/26/22 0800        PRN Meds:  acetaminophen, albuterol-ipratropium, dextrose 50%, dextrose 50%, glucagon (human recombinant), glucose, glucose, HYDROcodone-acetaminophen, insulin aspart U-100, labetaloL, melatonin, metoclopramide HCl,  metoprolol, morphine, ondansetron, sodium chloride 0.9%       Radiology:  X-Ray Abdomen AP 1 View  Narrative: EXAMINATION:  XR ABDOMEN AP 1 VIEW    CLINICAL HISTORY:  Abdomen Distended;    TECHNIQUE:  AP View(s) of the abdomen was performed.    COMPARISON:  08/23/2022    FINDINGS:  Enteric tube terminates at the stomach with side port at the GE junction.    Retained enteric contrast is seen within the colon.    Postop left hip arthroplasty.  Impression: Enteric tube with side port at the GE junction.    Retained enteric contrast in the colon.    Electronically signed by: Lise Holloway  Date:    08/25/2022  Time:    07:17      Assessment/Plan:  1. Acute abdominal distention, having small volume stool    NGT to LIS  Current KUB shows contrast remaining in colon    Change Movantik to SQ relistor   Give Ducolax supp qd   Soaps suds enema   2.Hypokalemia    Replace per attending  3. Elevated LFTS- trending down     US with no significant findings.   Small bowel follow through showing normal small bowel transit time       89 yo female admitted for falls, found to have UTI, and ileus. NG tube now clamped for trial, pt on appropriate ax therapy    Meche Chan, DANTE acting as scribe for Jonn Austin MD  Gastroenterology  [facility]    The documentation recorded by the scribe/NP accurately reflects the service I personally performed and the decisions made by me.     Jonn Austin MD      Ileus appears to be resolving.  NGT clamped.  Continue current bowel regimen.  GI available if needed over the weekend - otherwise will check back on Monday.

## 2022-08-26 NOTE — PT/OT/SLP PROGRESS
Occupational Therapy  Treatment    Fidelina Darling   MRN: [id number]   Admitting Diagnosis: Abdominal cramping    OT Date of Treatment: 08/26/22   OT Start Time: 1000  OT Stop Time: 1017  OT Total Time (min): 17 min     Billable Minutes:  Therapeutic Activity 17  Total Minutes: 17     OT/CONSUELO: CONSUELO     CONSUELO Visit Number: 3    General Precautions: Standard, fall  Orthopedic Precautions: N/A  Braces: N/A    Spiritual, Cultural Beliefs, Jainism Practices, Values that Affect Care: no    Subjective:  Communicated with nurse prior to session.    Objective:  Patient found with: peripheral IV, pearson catheter, NG tube    Functional Mobility:  Bed Mobility:   Supine to sit: Maximum Assistance   Sit to supine: Maximum Assistance   Rolling: Maximum Assistance   Scooting: Maximum Assistance    Additional Treatment:  Static sitting balance edge of bed with Min A, patient yelling and screaming in pain, only able to tolerate 30 seconds sitting upright.  Patient complains of abdominal pain.    Patient left HOB elevated with call button in reach    ASSESSMENT:  Fidelina Darling is a 89 y.o. female with a medical diagnosis of Abdominal cramping and presents with increased pain, decreased ADL skills, decreased Ax tolerance and decreased functional mobility.    Rehab potential is poor    Activity tolerance: Poor    Discharge recommendations: nursing facility, skilled     Equipment recommendations: none     GOALS:   Multidisciplinary Problems     Occupational Therapy Goals        Problem: Occupational Therapy    Goal Priority Disciplines Outcome Interventions   Occupational Therapy Goal     OT, PT/OT Ongoing, Progressing    Description: Goals to be met by: 9/16/22     Patient will increase functional independence with ADLs by performing:    UE Dressing with Modified [place].  LE Dressing with Modified [place].  Grooming while standing with Modified [place].  Toileting from bedside commode with Modified [place] for hygiene   and clothing management.   Toilet transfer to bedside commode with Modified [place].progress  to toilet.                     Plan:  Patient to be seen 3 x/week, 5 x/week to address the above listed problems via self-care/home management, therapeutic activities, therapeutic exercises  Plan of Care expires: 09/16/22  Plan of Care reviewed with: patient         08/26/2022

## 2022-08-26 NOTE — PHYSICIAN QUERY
PT Name: Fidelina Darling  MR #: [id number]    DOCUMENTATION CLARIFICATION     CDS: Edouard COLLIER,RN        Contact information:[email or web address]    This form is a permanent document in the medical record.     Query Date: August 26, 2022    By submitting this query, we are merely seeking further clarification of documentation.. Please utilize your independent clinical judgment when addressing the question(s) below.    The medical record contains the following:   Indicators  Supporting Clinical Findings Location in Medical Record   x Energy Intake Intake: < 75% of estimated energy requirement for >/= 1 month  8/26 Adult Nutrition PN   x Weight Loss WT Readings from last 5 encounters:  08/17/22 61.2 kg (134 lb 14.7 oz)  1/15/19 64.9 kg (142 lb 15.9 oz) 8/26 Adult Nutrition PN   x Fat Loss Body Fat: mild depletion  Area of Body Fat Loss: orbital region  and upper arm region - triceps / biceps 8/26 Adult Nutrition PN   x Muscle Loss Muscle Mass Loss: mild depletion  Area of Muscle Mass Loss: temple region - temporalis muscle, clavicle bone region - pectoralis major, deltoid, trapezius muscles and dorsal hand - interosseous muscle 8/26 Adult Nutrition PN   x Edema/Fluid Accumulation Fluid Accumulation: mild  Edema: 2+ edema - mild and does not meet criteria 8/26 Adult Nutrition PN    Reduced  Strength (by dynamometer)     x Weight, BMI, Usual Body Weight  WT: 134 lb 14.7 oz  BMI:  20.5 8/26 Adult Nutrition PN    Delayed Wound Healing     x Registered Dietician Diagnosis Degree of Malnutrition: non-severe (moderate) malnutrition 8/26 Adult Nutrition PN   x Acute or Chronic Illness Colonic distension   Dyspepsia  Transaminitis, improving   Hypokalemia  Abdominal distention secondary to above   Small pancreatic cyst   Sliding hernia   Bilateral adrenal nodules repeat follow-up CT outpatient  Essential hypertension  Diastolic dysfunction   sinus tachycardia    hypokalemia          8/26 Adult Nutrition PN     Social or Environmental Circumstances     x Treatment Diet/PN Order: Diet NPO Except for: Ice Chips  Amino acid 4.25% - dextrose 5% (CLINIMIX-E) solution (1L provides 42.5 gm AA, 50 gm CHO (170 kcal/L dextrose), Na 35, K 30, Mg 5, Ca 4.5, Acetate 70, Cl 39, Phos 15)  Oral Supplement Order: none at this time and Boost Max  Tube Feeding Order: none at this time  Appetite/Oral Intake: NPO/NPO      8/26 Adult Nutrition PN   x Other pt's granddaughter, pt's appetite and wt has decreased over the last few years, more noticeably in the last 6 months. Granddaughter unsure of what weight was 6 months ago but states that the patient weighed ~175 lbs several years ago. Pt was on an appetite stimulant a few months ago which actually seemed to help but did not continue the regimen 2/2 price. Will send an oral supplement.    8/26 Adult Nutrition PN        Academy of Nutrition and Dietetics (Academy) and the American Society for Parenteral and Enteral Nutrition (A.S.P.E.N.) Clinical Characteristics to support Malnutrition   Malnutrition in the Context of Acute Illness or Injury Malnutrition in the Context of Chronic Illness or Injury Malnutrition in the Context of Social or Environmental Circumstances   Malnutrition Level Moderate Severe Moderate Severe   Moderate   Severe   Energy Intake <75%                   >7 days <50%                 >5 days <75%           >1 month <75%                      >1 month   <75% for >3 months   <50% for >1 month   Weight Loss   1-2% in 1 week >2% in 1 week 5% in 1 month >5% in 1 month 5% in 1 month >5% in 1 month    5% in 1 month >5% in 1 month 7.5% in 3 months >7.5% in 3 months 7.5% in 3 months >7.5% in 3 months    7.5% in 3 months >7.5% in 3 months 10% in 6 months >10% in 6 months 10% in 6 months >10% in 6 months        20% in 1 year                    >20% in 1 year                                                                  20% in 1 year                            >20% in 1 year                                                   Subcutaneous Fat Loss Mild  Moderate  Mild  Severe    Mild   Severe   Muscle Loss Mild depletion Moderate depletion  Mild depletion Severe Depletion   Mild   Severe   Edema/Fluid Accumulation Mild Moderate to severe  Mild  Severe   Mild   Severe   Reduced  Strength         (based on standards supplied by  of dynamometer) N/A Measurably reduced N/A Measurably reduced N/A Measurably reduced     Criteria for mild malnutrition is defined as 1 characteristic outlined above within the established moderate or severe parameters.  A minimum of 2 out of the 6 characteristics noted above are recommended for a diagnosis of moderate or severe malnutrition.  Chronic illness/injury is a disease/condition lasting 3 months or longer.    The noted clinical guidelines are only system guidelines and do not replace the providers clinical judgment.    Provider, please clarify/confirm the degree of  malnutrition associated with above clinical findings.    [ XXX ] Moderate Malnutrition - a minimum of 2 of the 6 moderate malnutrition characteristics noted above    [  ] Severe Malnutrition - a minimum of 2 of the 6 severe malnutrition characteristics noted above    [  ] Malnutrition, Unspecified degree   [  ] Other Nutritional Diagnosis (please specify): _______   [  ] Clinically Undetermined     Please document in your progress notes daily for the duration of treatment until resolved and  include in your discharge summary.      References:    SONIA Rodriguez, & MENG May (2022, April). Assessment and management of anorexia and cachexia in palliative care. Retrieved May 23, 2022, from [email or web address]     ROXI Fowler, PhD, RD, Yovani AVERY P., PhD, RN, JINNY Lucio MD, PhD, Venecia OSBORNE A., MS, RD, CNSC, PATTIE Scott, MS, RD, The Academy Malnutrition Work Group, The  A.S.P.E.N. Board of Directors. (2012). Consensus Statement: Academy of Nutrition and Dietetics and American Society for Parenteral and Enteral Nutrition: Characteristics Recommended for the Identification and Documentation of Adult Malnutrition (Undernutrition). Journal of Parenteral and Enteral Nutrition, 36(3), 275-283. doi:10.1177/0366644254174038     Form No. 41005

## 2022-08-26 NOTE — CONSULTS
Inpatient consult to Palliative Care  Consult performed by: REJI Amaro  Consult ordered by: Yinka Cabral MD         Patient Name: Fidelina Darling   MRN: [id number]   Admission Date: 8/16/2022   Hospital Length of Stay: 9   Attending Provider: Alex Whitfield MD   Consulting Provider: Danyell MENDOZA  Reason for Consult: Goals of Care  Primary Care Physician: Primary Doctor No     Principal Problem: Abdominal cramping     Patient information was obtained from ER records.      Final diagnoses:  [R10.9] Abdominal cramping (Primary)  [K59.00] Constipation, unspecified constipation type  [R10.9] Abdominal pain, unspecified abdominal location  [N39.0, R31.9] Urinary tract infection with hematuria, site unspecified  [D72.829] Leukocytosis, unspecified type  [K86.9] Pancreatic lesion  [E87.6] Hypokalemia  [E87.2] Lactic acidosis     Assessment/Plan:     I reviewed the patient and family's understanding of the seriousness of the illness and its expected prognosis. We discussed the patient's goals of care and treatment preferences.  I clarified current code status. I identified the surrogate decision maker or health care POA. I answered all questions and we formulated a plan including recommendations for symptom management and how to best achieve goals of care.    Met with patient--introduced service and had brief conversation concerning patient's history and current condition.   States that she is originally from [facility] and has a daughter and another adult child but could not tell me where they lived. 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  Could not tell me if she had a LW or POA.  Patient starts sentence and then does not finish.      Placed call to daughter and left VM to return call.  Will attempt to contact family as I am unable to have conversation with patient.       History of Present Illness:     89 yo female with a PMHx of HTN and HLDwho presented to the hospital  with abdominal pain, cramping, constipation, and nausea/vomiting and also reported multiple episodes of syncope and falls.   States she had a fall which was unwitnessedon 8/12 while reaching for her car door handle. ON 8/16 she passed out while going to the bathroom which was witnessed by neighbor and states that she had been experiencing lower abdominal pain for the last several days.  Granddaughter who was present at bedside states that patient is dependent on laxatives which she uses often and at times has to manually disimpact herself.  Granddaughter also informed the patient and has had decreasing appetite, lives alone, ambulates with walker, drives and completes all ADLs.  She states that family notices that patient has been experiencing symptoms of dementia over the past 6 months.    Upon presentation patient was hemodynamically stable and labs notable for WBCs 28, potassium 2.6, glucose 161, lactic acid 3.9 with UA revealing 1+ leuk esterase, 2+ bilirubin, positive nitrites, trace ketones and protein.  Chest x-ray revealed no abnormality, and CT of the abdomen/pelvis revealed mild distention of the colon with liquefied stool, indeterminate bilateral adrenal nodules and pancreatic cysts.  Patient was started on clinimix and currently has NG to suction in progress with dark drainage.  Abdominal ULS revealed no significant abnormality of liver, small pancreatic cyst and right-sided pleural effusion. Patient is lying in bed complaining of abdominal pain and tenderness.  Palliative care consulted to discuss goals of care and code status.     Active Ambulatory Problems     Diagnosis Date Noted    No Active Ambulatory Problems     Resolved Ambulatory Problems     Diagnosis Date Noted    No Resolved Ambulatory Problems     No Additional Past Medical History        History reviewed. No pertinent surgical history.     Review of patient's allergies indicates:  No Known Allergies       Current Facility-Administered  Medications:     acetaminophen tablet 650 mg, 650 mg, Oral, Q8H PRN, Umer Bynum MD    albuterol-ipratropium 2.5 mg-0.5 mg/3 mL nebulizer solution 3 mL, 3 mL, Nebulization, Q4H PRN, Taty Petit, AGACNP-BC, 3 mL at 08/20/22 0157    albuterol-ipratropium 2.5 mg-0.5 mg/3 mL nebulizer solution 3 mL, 3 mL, Nebulization, Q6H, Yinka Cabral MD, 3 mL at 08/26/22 0832    Amino acid 4.25% - dextrose 5% (CLINIMIX-E) solution (1L provides 42.5 gm AA, 50 gm CHO (170 kcal/L dextrose), Na 35, K 30, Mg 5, Ca 4.5, Acetate 70, Cl 39, Phos 15), , Intravenous, Continuous, Alex Whitfield MD, Last Rate: 75 mL/hr at 08/26/22 0800, New Bag at 08/26/22 0800    bisacodyL suppository 10 mg, 10 mg, Rectal, Daily, Kim Nevarez, ANP, 10 mg at 08/26/22 0910    dextrose 50% injection 12.5 g, 12.5 g, Intravenous, PRN, Neelima Duran MD    dextrose 50% injection 25 g, 25 g, Intravenous, PRN, Neelima Duran MD    diclofenac sodium 1 % gel 4 g, 4 g, Topical (Top), Daily, Yinka Cabral MD, 4 g at 08/26/22 0900    enoxaparin injection 30 mg, 30 mg, Subcutaneous, Q12H, Alex Whitfield MD, 30 mg at 08/26/22 0909    glucagon (human recombinant) injection 1 mg, 1 mg, Intramuscular, PRN, Neelima Duran MD    glucose chewable tablet 16 g, 16 g, Oral, PRN, Neelima Duran MD    glucose chewable tablet 24 g, 24 g, Oral, PRN, Neelima Duran MD    HYDROcodone-acetaminophen 5-325 mg per tablet 1 tablet, 1 tablet, Oral, Q6H PRN, Neelima Duran MD, 1 tablet at 08/19/22 2009    insulin aspart U-100 injection 0-5 Units, 0-5 Units, Subcutaneous, QID (AC + HS) PRN, Neelima Duran MD    labetaloL injection 20 mg, 20 mg, Intravenous, Q6H PRN, Yinka Cabral MD    levoFLOXacin tablet 750 mg, 750 mg, Per NG tube, Daily, Dawn Tate MD, 750 mg at 08/26/22 0910    LIDOcaine 5 % patch 1 patch, 1 patch, Transdermal, Q24H, Yinka Cabral MD, 1 patch at 08/25/22 1640    melatonin tablet 6 mg, 6 mg, Oral, Nightly PRN, Umer Bynum MD    " methylnaltrexone 12 mg/0.6 mL subcutaneous injection 12 mg, 12 mg, Subcutaneous, Daily, Kim Nevarez, LEXIS    metoclopramide HCl injection 5 mg, 5 mg, Intravenous, Q6H PRN, Yinka Cabral MD, 5 mg at 08/25/22 2219    metoprolol injection 5 mg, 5 mg, Intravenous, Q5 Min PRN, Yinka Cabral MD    metoprolol tartrate (LOPRESSOR) tablet 25 mg, 25 mg, Oral, BID, Yinka Cabral MD, 25 mg at 08/26/22 0910    morphine injection 4 mg, 4 mg, Intravenous, Q6H PRN, Neelima Duran MD, 4 mg at 08/26/22 0528    nystatin 100,000 unit/mL suspension 500,000 Units, 500,000 Units, Oral, QID (WM & HS), Yinka Cabral MD, 500,000 Units at 08/26/22 0910    ondansetron injection 4 mg, 4 mg, Intravenous, Q6H PRN, Yinka Cabral MD, 4 mg at 08/25/22 0941    pantoprazole injection 40 mg, 40 mg, Intravenous, BID, Yinka Cabral MD    sodium chloride 0.9% flush 10 mL, 10 mL, Intravenous, PRN, Umer Bynum MD    Flushing PICC Protocol, , , Until Discontinued **AND** sodium chloride 0.9% flush 10 mL, 10 mL, Intravenous, Q6H **AND** sodium chloride 0.9% flush 10 mL, 10 mL, Intravenous, PRN, Yinka Cabral MD    sucralfate tablet 1 g, 1 g, Oral, QID (AC & HS), Yinka Cabral MD     acetaminophen, albuterol-ipratropium, dextrose 50%, dextrose 50%, glucagon (human recombinant), glucose, glucose, HYDROcodone-acetaminophen, insulin aspart U-100, labetaloL, melatonin, metoclopramide HCl, metoprolol, morphine, ondansetron, sodium chloride 0.9%, Flushing PICC Protocol **AND** sodium chloride 0.9% **AND** sodium chloride 0.9%     History reviewed. No pertinent family history.     Review of Systems   Constitutional: Positive for fatigue.   Gastrointestinal: Positive for abdominal distention, abdominal pain, constipation and nausea.            Objective:   /75   Pulse 76   Temp 98.3 °F (36.8 °C) (Oral)   Resp (!) 24   Ht 5' 7.99" (1.727 m)   Wt 61.2 kg (134 lb 14.7 oz)   SpO2 (!) 93%   BMI 20.52 kg/m²      Physical " Exam  Constitutional:       Appearance: She is ill-appearing.   Cardiovascular:      Rate and Rhythm: Normal rate and regular rhythm.   Pulmonary:      Effort: Pulmonary effort is normal.      Breath sounds: Normal breath sounds.   Abdominal:      Comments: Firm,, distended   Musculoskeletal:      Comments: sarcopenia   Skin:     Coloration: Skin is pale.   Neurological:      Comments: To person and place           FAMILY CONTACTS:     Review of Symptoms  Review of Symptoms    Symptom Assessment (ESAS 0-10 Scale)  Pain:  0  Dyspnea:  0  Anxiety:  0  Nausea:  0  Depression:  0  Anorexia:  0  Fatigue:  0  Insomnia:  0  Restlessness:  0  Agitation:  0       Constipation:  Constipation    Bowel Management Plan (BMP):  Yes      Living Arrangements:  Lives alone    Psychosocial/Cultural: Per chart, patient has a daughter and granddaughter and was living independently.       Advance Care Planning   Advance Directives:   Living Will: No    Do Not Resuscitate Status: No    Medical Power of : No      Decision Making:  Patient answered questions          PAINAD: NA    Caregiver burden formerly assessed: yes        > 50% of 60 min of encounter was spent in chart review, face to face discussion of goals of care, symptom assessment, coordination of care and emotional support.         Danyell CHURCHP, [facility]  Palliative Medicine  [facility] Observation [facility]

## 2022-08-26 NOTE — NURSING
Soap jami enema given per GI orders. 850mL tolerated during admin. Pt had small, brown, liquid bowel movement afterwards.

## 2022-08-26 NOTE — NURSING
Nurses Note -- 4 Eyes      8/25/2022   10:00 AM      Skin assessed during: Q Shift Change      [x] No Pressure Injuries Present    [x]Prevention Measures Documented      [] Yes- Altered Skin Integrity Present or Discovered   [] LDA Added if Not in Epic (Describe Wound)   [] New Altered Skin Integrity was Present on Admit and Documented in LDA   [] Wound Image Taken    Wound Care Consulted? No    Attending Nurse:  Marvin Barajas RN     Second RN/Staff Member:  Vicky Vasquez

## 2022-08-26 NOTE — PROCEDURES
"Fidelina Darling is a 89 y.o. female patient.    Temp: 98.3 °F (36.8 °C) (08/26/22 1130)  Pulse: 76 (08/26/22 1130)  Resp: (!) 24 (08/26/22 0832)  BP: 135/75 (08/26/22 1130)  SpO2: (!) 93 % (08/26/22 1130)  Weight: 61.2 kg (134 lb 14.7 oz) (08/17/22 0733)  Height: 5' 7.99" (172.7 cm) (08/17/22 0733)    PICC  Date/Time: 8/26/2022 12:35 PM  Performed by: Eugene Lopez RN  Consent Done: Yes  Time out: Immediately prior to procedure a time out was called to verify the correct patient, procedure, equipment, support staff and site/side marked as required  Indications: med administration and vascular access  Local anesthetic: lidocaine 1% without epinephrine  Anesthetic Total (mL): 5  Preparation: skin prepped with ChloraPrep  Skin prep agent dried: skin prep agent completely dried prior to procedure  Sterile barriers: all five maximum sterile barriers used - cap, mask, sterile gown, sterile gloves, and large sterile sheet  Hand hygiene: hand hygiene performed prior to central venous catheter insertion  Location details: right basilic  Catheter type: single lumen  Catheter size: 4 Fr  Catheter Length: 11cm    Ultrasound guidance: yes  Vessel Caliber: medium and patent, compressibility normal  Needle advanced into vessel with real time Ultrasound guidance.  Guidewire confirmed in vessel.  Sterile sheath used.  Number of attempts: 1  Post-procedure: blood return through all ports, chlorhexidine patch and sterile dressing applied    Complications: none  Comments: Arm circumference 26cm          Eugene Lopez RN  8/26/2022  "

## 2022-08-26 NOTE — PROGRESS NOTES
Inpatient Nutrition Assessment    Admit Date: 8/16/2022   Length of Stay: 9 days  Nutrition Recommendation/Prescription     - Once medically appropriate, resume oral diet as tolerated. Assist with meals as needed.     - Continue PPN Clinimix until able to resume oral diet; if unable to resume diet in the next few days, consider starting TPN for nutrition; rec: Central Line Clinimix 5/25% @ 75ml/hr + IL 20% 250ml twice a week    - pt would need central line placed if TPN to start    Communication of Recommendations: reviewed with nurse    Nutrition Assessment     Malnutrition Assessment/Nutrition-Focused Physical Exam    Malnutrition in the context of chronic illness  Degree of Malnutrition: non-severe (moderate) malnutrition  Energy Intake: < 75% of estimated energy requirement for >/= 1 month  Interpretation of Weight Loss: unable to obtain  Body Fat: mild depletion  Area of Body Fat Loss: orbital region  and upper arm region - triceps / biceps  Muscle Mass Loss: mild depletion  Area of Muscle Mass Loss: temple region - temporalis muscle, clavicle bone region - pectoralis major, deltoid, trapezius muscles and dorsal hand - interosseous musle  Fluid Accumulation: mild  Edema: 2+ edema - mild and does not meet criteria  Reduced  Strength: unable to obtain  A minimum of two characteristics is recommended for diagnosis of either severe or non-severe malnutrition.    Chart Review    Reason Seen: malnutrition screening tool    Diagnosis:  Colonic distension   Dyspepsia  Transaminitis, improving   Hypokalemia  Abdominal distention secondary to above   Small pancreatic cyst   Sliding hernia   Bilateral adrenal nodules repeat follow-up CT outpatient  Essential hypertension  Diastolic dysfunction   sinus tachycardia    hypokalemia    Relevant Medical History: HTN, HLD    Nutrition-Related Medications: Cipro, SSI, Zofran PRN, Kcl  Calorie Containing IV Medications: Clinimix    Nutrition-Related Labs:  8/19: K 3.1, Glu  "119, GFR>60  8/24: K 2.9, BUN 23.7, glu 149, Ca 7.8, AlkPhos 327, ,     Diet/PN Order: Diet NPO Except for: Ice Chips  Amino acid 4.25% - dextrose 5% (CLINIMIX-E) solution (1L provides 42.5 gm AA, 50 gm CHO (170 kcal/L dextrose), Na 35, K 30, Mg 5, Ca 4.5, Acetate 70, Cl 39, Phos 15)  Oral Supplement Order: none at this time and Boost Max  Tube Feeding Order: none at this time  Appetite/Oral Intake: NPO/NPO  Factors Affecting Nutritional Intake: impaired cognitive status/motor control, constipation, decreased appetite and NPO  Food/Religion/Cultural Preferences: none reported    Skin Integrity: bruised (ecchymotic)  Wound(s):   none documented    Comments    8/19: Pt asleep during rounds; per pt's granddaughter, pt's appetite and wt has decreased over the last few years, more noticeably in the last 6 months. Granddaughter unsure of what weight was 6 months ago but states that the patient weighed ~175 lbs several years ago. Pt was on an appetite stimulant a few months ago which actually seemed to help but did not continue the regimen 2/2 price. Will send an oral supplement.     8/24: pt currently NPO on PPN for ileus with NG for suction; GI following    8/26: pt remains NPO on PPN with NG LIWS; nurse putting new IV due to current one malfunctioning; still with abdominal pain, distended abdomen, absent bowel sounds, contrast from SBFT on 8/24 still in colon per KUB; small amount of stool today    Anthropometrics    Height: 5' 7.99" (172.7 cm) Height Method: Stated  Weight: 61.2 kg (134 lb 14.7 oz) (08/17/22 0733) Weight Method: Bed Scale  BMI (Calculated): 20.5  BMI Classification: normal (BMI 18.5-24.9)  Ideal Body Weight (IBW), Female: 139.95 lb  % Ideal Body Weight, Female (lb): 96.41 %                       Usual Weight Provided By: not applicable    Wt Readings from Last 5 Encounters:   08/17/22 61.2 kg (134 lb 14.7 oz)   01/15/19 64.9 kg (142 lb 15.9 oz)     Weight Change(s) Since " Admission:  Admit Weight: 61.2 kg (135 lb) (08/16/22 1901)    Estimated Needs    Weight Used For Calorie Calculations: 61.2 kg (134 lb 14.7 oz)  Energy Calorie Requirements (kcal): 1512 kcal (MSJ x 1.4 SF)  Energy Need Method: [place]-St Jeor  Weight Used For Protein Calculations: 61.2 kg (134 lb 14.7 oz)  Protein Requirements: 80 gm (1.3g/kg)  Fluid Requirements (mL): 1836 mL (30mL/kg)  Temp: 98.3 °F (36.8 °C)       Enteral Nutrition    Patient not receiving enteral nutrition at this time.    Parenteral Nutrition    Standard Formula: Clinimix E 4.25/5  Custom Formula: not applicable  Additives: none  Rate/Volume: 75ml/lhr  Lipids: none  Total Nutrition Provided by Parenteral Nutrition:  Calories Provided  612 kcal/d, 40% needs   Protein Provided  77 g/d, 126% needs   Dextrose Provided  90 g/d,    Fluid Provided  1800 ml/d, 100% needs       Evaluation of Received Nutrient Intake    Calories: not meeting estimated needs  Protein: not meeting estimated needs    Patient Education    Not applicable.    Nutrition Diagnosis     PES: Malnutrition related to insufficient energy intake as evidenced by <75% est energy requirements met >1 month, physical evidence of mild muscle and fat depletion. (continues)    Interventions/Goals     Intervention(s): modified composition of meals/snacks, commercial beverage, multivitamin/mineral supplement therapy, prescription medication and collaboration with other providers  Goal: Meet greater than 75% of nutritional needs by follow-up. (goal progressing)    Monitoring & Evaluation     Dietitian will monitor energy intake and weight.  Nutrition Risk/Follow-Up: high (follow-up in 1-4 days)

## 2022-08-26 NOTE — PT/OT/SLP RE-EVAL
Physical Therapy Re-evaluation    Patient Name:  Fidelina Darling   MRN:  [id number]    Recommendations:     Discharge Recommendations:  nursing facility, skilled   Discharge Equipment Recommendations:  (TBD by next level of care)   Barriers to discharge: severity of deficits    Assessment:     Fidelina Darling is a 89 y.o. female admitted with a medical diagnosis of Abdominal cramping.  She presents with the following impairments/functional limitations:  weakness, impaired endurance, impaired functional mobility, impaired self care skills, gait instability, impaired balance, decreased lower extremity function, decreased safety awareness, pain. Requiring significant amount of assistance for mobility. Severe pain is limiting factor. Will need SNF placement.     Rehab Prognosis:  poor; patient would benefit from acute skilled PT services to address these deficits and reach maximum level of function.      Recent Surgery: * No surgery found *      Plan:     During this hospitalization, patient to be seen 5 x/week to address the above listed problems via gait training, therapeutic activities, therapeutic exercises, neuromuscular re-education  · Plan of Care Expires:  09/19/22   Plan of Care Reviewed with: patient    Subjective     Communicated with nurse prior to session.  Patient found HOB elevated with peripheral IV, pearson catheter, NG tube upon PT entry to room, agreeable to evaluation.      Chief Complaint: pain  Patient comments/goals: none  Pain/Comfort:  · Pain Rating 1: 10/10  · Location 1:  (abdominal)  · Pain Addressed 1: Cessation of Activity  · Pain Rating Post-Intervention 1: 10/10    Patients cultural, spiritual, Caodaism conflicts given the current situation: no      Objective:     Patient found with: peripheral IV, pearson catheter, NG tube     General Precautions: Standard, fall   Orthopedic Precautions:N/A   Braces: N/A  Respiratory Status: Room air    Exams:  · Cognitive Exam:  Patient is oriented to  Person  · Sensation:    · -       Intact  · RLE ROM: Right knee fused  · RLE Strength: -3/5 grossly  · LLE ROM: WFL  · LLE Strength: -3/5 grossly    Functional Mobility:  · Bed Mobility:     · Supine to Sit: maximal assistance  · Sit to Supine: maximal assistance  · Transfers:     · Sit to Stand:  maximal assistance and of 2 persons with rolling walker  · Balance: poor    AM-PAC 6 CLICK MOBILITY  Total Score:9       Patient left HOB elevated with all lines intact, call button in reach and nurse notified.    GOALS:   Multidisciplinary Problems     Physical Therapy Goals        Problem: Physical Therapy    Goal Priority Disciplines Outcome Goal Variances Interventions   Physical Therapy Goal     PT, PT/OT Unable to Meet, Plan Revised     Description: Goals to be met by: 22     Patient will increase functional independence with mobility by performin. Supine to sit with MInimal Assistance  2. Sit to supine with MInimal Assistance  3. Sit to stand transfer with Minimal Assistance  4. Bed to chair transfer with Minimal Assistance using Rolling Walker  5. Sitting at edge of bed x10 minutes with Stand-by Assistance                     History:     History reviewed. No pertinent past medical history.    History reviewed. No pertinent surgical history.    Time Tracking:     PT Received On: 22  PT Start Time: 0840     PT Stop Time: 0900  PT Total Time (min): 20 min     Billable Minutes: Re-eval 20 minutes      2022

## 2022-08-27 LAB
ALBUMIN SERPL-MCNC: 2 GM/DL (ref 3.4–4.8)
ALBUMIN/GLOB SERPL: 0.8 RATIO (ref 1.1–2)
ALP SERPL-CCNC: 329 UNIT/L (ref 40–150)
ALT SERPL-CCNC: 144 UNIT/L (ref 0–55)
AST SERPL-CCNC: 186 UNIT/L (ref 5–34)
BASOPHILS # BLD AUTO: 0.11 X10(3)/MCL (ref 0–0.2)
BASOPHILS NFR BLD AUTO: 1.1 %
BILIRUBIN DIRECT+TOT PNL SERPL-MCNC: 0.5 MG/DL
BUN SERPL-MCNC: 21.4 MG/DL (ref 9.8–20.1)
CALCIUM SERPL-MCNC: 7.9 MG/DL (ref 8.4–10.2)
CHLORIDE SERPL-SCNC: 103 MMOL/L (ref 98–111)
CO2 SERPL-SCNC: 27 MMOL/L (ref 23–31)
CREAT SERPL-MCNC: 0.52 MG/DL (ref 0.55–1.02)
EOSINOPHIL # BLD AUTO: 0.13 X10(3)/MCL (ref 0–0.9)
EOSINOPHIL NFR BLD AUTO: 1.3 %
ERYTHROCYTE [DISTWIDTH] IN BLOOD BY AUTOMATED COUNT: 14.1 % (ref 11.5–17)
GFR SERPLBLD CREATININE-BSD FMLA CKD-EPI: >60 MLS/MIN/1.73/M2
GLOBULIN SER-MCNC: 2.5 GM/DL (ref 2.4–3.5)
GLUCOSE SERPL-MCNC: 101 MG/DL (ref 75–121)
HCT VFR BLD AUTO: 34.7 % (ref 37–47)
HGB BLD-MCNC: 10.9 GM/DL (ref 12–16)
IMM GRANULOCYTES # BLD AUTO: 0.1 X10(3)/MCL (ref 0–0.04)
IMM GRANULOCYTES NFR BLD AUTO: 1 %
LYMPHOCYTES # BLD AUTO: 1.51 X10(3)/MCL (ref 0.6–4.6)
LYMPHOCYTES NFR BLD AUTO: 14.6 %
MCH RBC QN AUTO: 31.9 PG (ref 27–31)
MCHC RBC AUTO-ENTMCNC: 31.4 MG/DL (ref 33–36)
MCV RBC AUTO: 101.5 FL (ref 80–94)
MONOCYTES # BLD AUTO: 0.93 X10(3)/MCL (ref 0.1–1.3)
MONOCYTES NFR BLD AUTO: 9 %
NEUTROPHILS # BLD AUTO: 7.5 X10(3)/MCL (ref 2.1–9.2)
NEUTROPHILS NFR BLD AUTO: 73 %
NRBC BLD AUTO-RTO: 0 %
PLATELET # BLD AUTO: 223 X10(3)/MCL (ref 130–400)
PMV BLD AUTO: 10.2 FL (ref 7.4–10.4)
POCT GLUCOSE: 105 MG/DL (ref 70–110)
POCT GLUCOSE: 118 MG/DL (ref 70–110)
POTASSIUM SERPL-SCNC: 3.7 MMOL/L (ref 3.5–5.1)
PROT SERPL-MCNC: 4.5 GM/DL (ref 5.8–7.6)
RBC # BLD AUTO: 3.42 X10(6)/MCL (ref 4.2–5.4)
SODIUM SERPL-SCNC: 137 MMOL/L (ref 132–146)
WBC # SPEC AUTO: 10.3 X10(3)/MCL (ref 4.5–11.5)

## 2022-08-27 PROCEDURE — 99900035 HC TECH TIME PER 15 MIN (STAT)

## 2022-08-27 PROCEDURE — 94761 N-INVAS EAR/PLS OXIMETRY MLT: CPT

## 2022-08-27 PROCEDURE — 25000003 PHARM REV CODE 250: Performed by: INTERNAL MEDICINE

## 2022-08-27 PROCEDURE — 63600175 PHARM REV CODE 636 W HCPCS: Performed by: NURSE PRACTITIONER

## 2022-08-27 PROCEDURE — 63600175 PHARM REV CODE 636 W HCPCS: Performed by: INTERNAL MEDICINE

## 2022-08-27 PROCEDURE — 85025 COMPLETE CBC W/AUTO DIFF WBC: CPT | Performed by: INTERNAL MEDICINE

## 2022-08-27 PROCEDURE — 25000003 PHARM REV CODE 250: Performed by: NURSE PRACTITIONER

## 2022-08-27 PROCEDURE — 27000221 HC OXYGEN, UP TO 24 HOURS

## 2022-08-27 PROCEDURE — 21400001 HC TELEMETRY ROOM

## 2022-08-27 PROCEDURE — 94640 AIRWAY INHALATION TREATMENT: CPT

## 2022-08-27 PROCEDURE — A4216 STERILE WATER/SALINE, 10 ML: HCPCS | Performed by: INTERNAL MEDICINE

## 2022-08-27 PROCEDURE — 25000242 PHARM REV CODE 250 ALT 637 W/ HCPCS: Performed by: INTERNAL MEDICINE

## 2022-08-27 PROCEDURE — 80053 COMPREHEN METABOLIC PANEL: CPT | Performed by: INTERNAL MEDICINE

## 2022-08-27 PROCEDURE — 11000001 HC ACUTE MED/SURG PRIVATE ROOM

## 2022-08-27 PROCEDURE — C9113 INJ PANTOPRAZOLE SODIUM, VIA: HCPCS | Performed by: INTERNAL MEDICINE

## 2022-08-27 PROCEDURE — 36415 COLL VENOUS BLD VENIPUNCTURE: CPT | Performed by: INTERNAL MEDICINE

## 2022-08-27 RX ADMIN — NYSTATIN 500000 UNITS: 100000 SUSPENSION ORAL at 08:08

## 2022-08-27 RX ADMIN — IPRATROPIUM BROMIDE AND ALBUTEROL SULFATE 3 ML: 2.5; .5 SOLUTION RESPIRATORY (INHALATION) at 07:08

## 2022-08-27 RX ADMIN — METOPROLOL TARTRATE 25 MG: 25 TABLET, FILM COATED ORAL at 08:08

## 2022-08-27 RX ADMIN — BISACODYL 10 MG: 10 SUPPOSITORY RECTAL at 08:08

## 2022-08-27 RX ADMIN — PANTOPRAZOLE SODIUM 40 MG: 40 INJECTION, POWDER, FOR SOLUTION INTRAVENOUS at 08:08

## 2022-08-27 RX ADMIN — SODIUM CHLORIDE, PRESERVATIVE FREE 10 ML: 5 INJECTION INTRAVENOUS at 06:08

## 2022-08-27 RX ADMIN — LEVOFLOXACIN 750 MG: 500 TABLET, FILM COATED ORAL at 08:08

## 2022-08-27 RX ADMIN — ENOXAPARIN SODIUM 30 MG: 30 INJECTION SUBCUTANEOUS at 08:08

## 2022-08-27 RX ADMIN — SUCRALFATE 1 G: 1 TABLET ORAL at 08:08

## 2022-08-27 RX ADMIN — DICLOFENAC SODIUM 4 G: 10 GEL TOPICAL at 09:08

## 2022-08-27 RX ADMIN — LEUCINE, PHENYLALANINE, LYSINE, METHIONINE, ISOLEUCINE, VALINE, HISTIDINE, THREONINE, TRYPTOPHAN, ALANINE, GLYCINE, ARGININE, PROLINE, SERINE, TYROSINE, SODIUM ACETATE, DIBASIC POTASSIUM PHOSPHATE, MAGNESIUM CHLORIDE, SODIUM CHLORIDE, CALCIUM CHLORIDE, DEXTROSE
311; 238; 247; 170; 255; 247; 204; 179; 77; 880; 438; 489; 289; 213; 17; 297; 261; 51; 77; 33; 5 INJECTION INTRAVENOUS at 04:08

## 2022-08-27 RX ADMIN — SODIUM CHLORIDE, PRESERVATIVE FREE 10 ML: 5 INJECTION INTRAVENOUS at 12:08

## 2022-08-27 RX ADMIN — NYSTATIN 500000 UNITS: 100000 SUSPENSION ORAL at 12:08

## 2022-08-27 RX ADMIN — METOCLOPRAMIDE 5 MG: 5 INJECTION, SOLUTION INTRAMUSCULAR; INTRAVENOUS at 04:08

## 2022-08-27 RX ADMIN — LIDOCAINE 1 PATCH: 50 PATCH TOPICAL at 04:08

## 2022-08-27 RX ADMIN — LEUCINE, PHENYLALANINE, LYSINE, METHIONINE, ISOLEUCINE, VALINE, HISTIDINE, THREONINE, TRYPTOPHAN, ALANINE, GLYCINE, ARGININE, PROLINE, SERINE, TYROSINE, SODIUM ACETATE, DIBASIC POTASSIUM PHOSPHATE, MAGNESIUM CHLORIDE, SODIUM CHLORIDE, CALCIUM CHLORIDE, DEXTROSE
311; 238; 247; 170; 255; 247; 204; 179; 77; 880; 438; 489; 289; 213; 17; 297; 261; 51; 77; 33; 5 INJECTION INTRAVENOUS at 08:08

## 2022-08-27 RX ADMIN — SUCRALFATE 1 G: 1 TABLET ORAL at 05:08

## 2022-08-27 RX ADMIN — METHYLNALTREXONE BROMIDE 12 MG: 12 INJECTION, SOLUTION SUBCUTANEOUS at 08:08

## 2022-08-27 RX ADMIN — LEUCINE, PHENYLALANINE, LYSINE, METHIONINE, ISOLEUCINE, VALINE, HISTIDINE, THREONINE, TRYPTOPHAN, ALANINE, GLYCINE, ARGININE, PROLINE, SERINE, TYROSINE, SODIUM ACETATE, DIBASIC POTASSIUM PHOSPHATE, MAGNESIUM CHLORIDE, SODIUM CHLORIDE, CALCIUM CHLORIDE, DEXTROSE
311; 238; 247; 170; 255; 247; 204; 179; 77; 880; 438; 489; 289; 213; 17; 297; 261; 51; 77; 33; 5 INJECTION INTRAVENOUS at 05:08

## 2022-08-27 RX ADMIN — IPRATROPIUM BROMIDE AND ALBUTEROL SULFATE 3 ML: 2.5; .5 SOLUTION RESPIRATORY (INHALATION) at 08:08

## 2022-08-27 RX ADMIN — IPRATROPIUM BROMIDE AND ALBUTEROL SULFATE 3 ML: 2.5; .5 SOLUTION RESPIRATORY (INHALATION) at 12:08

## 2022-08-27 RX ADMIN — SUCRALFATE 1 G: 1 TABLET ORAL at 12:08

## 2022-08-27 RX ADMIN — SUCRALFATE 1 G: 1 TABLET ORAL at 04:08

## 2022-08-27 NOTE — PROGRESS NOTES
[facility] Hospital Medicine Progress Note        Chief Complaint: Inpatient Follow-up for abdominal distension     HPI: 89 y.o. White female with a past medical history of hypertension, hyperlipidemia. The patient presented to [facility] on 8/16/2022 with a primary complaint of abdominal pain and falls due to syncope.  Patient reports having a fall on 8/12 while in her garage.  She states she was grabbing for the car door handle when she missed it and fell. Fall was unwitnessed and she reports loss of consciousness. On 08/15 she had a second fall while in the kitchen but denies any loss of consciousness.  Yesterday (08/16/2022) patient was going to the bathroom when she passed out.  Episode was witnessed by neighbor who is visiting her. She reports urinary frequency. Patient denies complaints of headache, vision changes, shortness of breath, cough.  She has been experiencing lower abdominal pain for the last several days. Granddaughter at bedside states that patient is dependent on laxatives have a bowel movement.  Although she uses laxatives she often has to manually disimpact herself . She normally goes every 4-5 days with last bowel movement being 5 days ago. She has been having increased belching.  Granddaughter also states patient has little to no appetite.  Patient lives alone, ambulates with walker, drives and completes activity daily living independently.  Family as it patient to be experiencing dementia over the last 6 months.     Upon presentation to the ED, patient afebrile, hemodynamically stable and SpO2 96% on room air.  Labs notable for WBC 28, potassium 2.6, glucose 161, lactic acid 3.9.  UA with 1+ leukocyte esterase, 2+ bilirubin, positive nitrites, trace ketones and protein.  Chest x-ray without acute processes.  CT abdomen pelvis revealed mild distention of the colon with liquefied stool, indeterminate bilateral adrenal nodules and pancreatic cysts.  While in the ED  patient received 40 mEq of IV potassium chloride, 2 g of magnesium sulfate and Rocephin was started for UTI.  Patient admitted to hospital medicine services for further medical management.     Patient was admitted on account of recurrent syncope and falls.  Found to have acute TTE high.  Colonic dilatation with lactic acidosis.   X-ray of the chest reveals left-sided atelectasis cannot rule out infiltrate.    KUB shows persistent colonic dilatation.  With a small sliding hiatal hernia, resume IV Zosyn  Inserted an NG tube and connect to low intermittent wall suction, keep NPO   IV Clinimix for nutrition  patient had small-bowel follow-through with normal transit time had ultrasound of the abdomen that showed no significant abnormality of liver small pancreatic cyst and right-sided pleural effusion     Interval Hx:   Patient seen and examined this morning   Patient continues to have diffuse abdominal pain and distension.  No real improvement.  Continue with GI recs   Palliative has been consulted   Liver enzyme is improving  Leukocytosis has resolved  Abdominal pain and distension persist.          Objective/physical exam:  General: In no acute distress, afebrile, on ng tube   Chest: Clear to auscultation bilaterally  Heart: RRR, +S1, S2, no appreciable murmur  Abdomen slightly distended, diffusely tender   MSK: Warm,  2 + lower extremity edema, no clubbing or cyanosis  Neurologic: Alert and oriented x4,     Assessment/Plan:  Colonic distension   Dyspepsia  Transaminitis, improving   Hypokalemia  Abdominal distention secondary to above   Small pancreatic cyst   Sliding hernia   Bilateral adrenal nodules repeat follow-up CT outpatient  Essential hypertension  Diastolic dysfunction   sinus tachycardia    hypokalemia           Plan  Patient continues to have diffuse abdominal pain and distension.  No real improvement.  Continue with GI recs   Palliative has been consulted   Liver enzyme is improving  Leukocytosis has  resolved  Abdominal pain and distension persist.    Continue with IV PPI b.i.d., add on sucralfate if able to tolerate p.o. given NG tube connection to suction  continue metoprolol tartrate 25 mg t.i.d.   Iv lopressor prn with parameters > 120bpm  Leukocytosis is improving she is currently on IV levofloxacin day 4   Liver enzymes is trending down slowly.    NG tube is currently clamped to repeat KUB and watch out for abdominal distension if negative will be able to remove NG tube, await gi recs   Continue IV Clinimix  Patient's lower extremity noted to be swollen.  Elevate on 2 pillows.  Will need to discontinue IV fluids soon.     small bowel follow though has a normal transit time  Ultrasound of the abdomen showed no significant abnormality of the liver  Repeat blood work in a.m.      VTE prophylaxis:      Patient condition:  Stable/Fair/Guarded/ Serious/ Critical     Anticipated discharge and Disposition:          VITAL SIGNS: 24 HRS MIN & MAX LAST   Temp  Min: 97.9 °F (36.6 °C)  Max: 98.6 °F (37 °C) 98 °F (36.7 °C)   BP  Min: 133/75  Max: 147/75 133/75   Pulse  Min: 74  Max: 86  79   Resp  Min: 15  Max: 20 15   SpO2  Min: 93 %  Max: 97 % 96 %       Recent Labs   Lab 08/24/22 0437 08/25/22  0432 08/27/22  0632   WBC 14.5* 11.6* 10.3   RBC 3.85* 3.25* 3.42*   HGB 11.9* 10.3* 10.9*   HCT 35.2* 32.4* 34.7*   MCV 91.4 99.7* 101.5*   MCH 30.9 31.7* 31.9*   MCHC 33.8 31.8* 31.4*   RDW 14.0 14.0 14.1    252 223   MPV 11.7* 9.7 10.2       Recent Labs   Lab 08/22/22  0358 08/24/22 0437 08/25/22 0432 08/26/22  0417 08/27/22  0632   * 141 139 139 137   K 3.5 2.9* 3.0* 3.5 3.7   CO2 23 25 28 28 27   BUN 16.6 23.7* 23.9* 22.9* 21.4*   CREATININE 0.52* 0.56 0.50* 0.52* 0.52*   CALCIUM 7.3* 7.8* 7.9* 7.8* 7.9*   MG 1.80 2.30  --  2.20  --    ALBUMIN 1.8* 2.0* 1.8*  --  2.0*   ALKPHOS 123 327* 330*  --  329*   * 209* 179*  --  144*   * 419* 335*  --  186*   BILITOT 0.4 0.4 0.3  --  0.5           Microbiology Results (last 7 days)       Procedure Component Value Units Date/Time    Blood Culture #1 **CANNOT BE ORDERED STAT** [[id number]]  (Normal) Collected: 08/17/22 0148    Order Status: Completed Specimen: Blood Updated: 08/22/22 0302     CULTURE, BLOOD ([facility]) No Growth at 5 days    Blood Culture #2 **CANNOT BE ORDERED STAT** [[id number]]  (Normal) Collected: 08/17/22 0148    Order Status: Completed Specimen: Blood Updated: 08/22/22 0302     CULTURE, BLOOD ([facility]) No Growth at 5 days             See below for Radiology    Scheduled Med:   albuterol-ipratropium  3 mL Nebulization Q6H    bisacodyL  10 mg Rectal Daily    diclofenac sodium  4 g Topical (Top) Daily    enoxaparin  30 mg Subcutaneous Q12H    levoFLOXacin  750 mg Per NG tube Daily    LIDOcaine  1 patch Transdermal Q24H    methylnaltrexone  12 mg Subcutaneous Daily    metoprolol tartrate  25 mg Oral BID    nystatin  500,000 Units Oral QID (WM & HS)    pantoprazole  40 mg Intravenous BID    sodium chloride 0.9%  10 mL Intravenous Q6H    sucralfate  1 g Oral QID (AC & HS)        Continuous Infusions:   Amino acid 4.25% - dextrose 5% (CLINIMIX-E) solution (1L provides 42.5 gm AA, 50 gm CHO (170 kcal/L dextrose), Na 35, K 30, Mg 5, Ca 4.5, Acetate 70, Cl 39, Phos 15) 75 mL/hr at 08/27/22 0858    [START ON 8/28/2022] Amino acid 4.25% - dextrose 5% (CLINIMIX-E) solution (1L provides 42.5 gm AA, 50 gm CHO (170 kcal/L dextrose), Na 35, K 30, Mg 5, Ca 4.5, Acetate 70, Cl 39, Phos 15)          PRN Meds:  acetaminophen, albuterol-ipratropium, dextrose 50%, dextrose 50%, glucagon (human recombinant), glucose, glucose, HYDROcodone-acetaminophen, insulin aspart U-100, labetaloL, melatonin, metoclopramide HCl, metoprolol, morphine, ondansetron, sodium chloride 0.9%, Flushing PICC Protocol **AND** sodium chloride 0.9% **AND** sodium chloride 0.9%           VTE prophylaxis:     Patient condition:  Stable/Fair/Guarded/ Serious/ Critical    Anticipated discharge and  Disposition:         All diagnosis and differential diagnosis have been reviewed; assessment and plan has been documented; I have personally reviewed the labs and test results that are presently available; I have reviewed the patients medication list; I have reviewed the consulting providers response and recommendations. I have reviewed or attempted to review medical records based upon their availability    All of the patient's questions have been  addressed and answered. Patient's is agreeable to the above stated plan. I will continue to monitor closely and make adjustments to medical management as needed.  _____________________________________________________________________    Nutrition Status:    Radiology:  X-Ray Abdomen AP 1 View  Narrative: EXAMINATION:  XR ABDOMEN AP 1 VIEW    CLINICAL HISTORY:  bowel distention, ileus;    TECHNIQUE:  AP X-RAY OF THE ABDOMEN:    CPT 41431    COMPARISON:  August 24, 2022    FINDINGS:  Persisting contrast identified throughout the colon.  Similar to what was seen on previous exams    Nasogastric tube is seen with the tip in the antrum of the stomach  Impression: Persistent prominence of the colonic loops with retained contrast similar to previous exam.    Nasogastric tube as above    Electronically signed by: Bharathi Gauthier  Date:    08/26/2022  Time:    09:42      Yinka Cabral MD   08/27/2022

## 2022-08-28 LAB
POCT GLUCOSE: 119 MG/DL (ref 70–110)
POCT GLUCOSE: 88 MG/DL (ref 70–110)

## 2022-08-28 PROCEDURE — 25000003 PHARM REV CODE 250: Performed by: INTERNAL MEDICINE

## 2022-08-28 PROCEDURE — 27000221 HC OXYGEN, UP TO 24 HOURS

## 2022-08-28 PROCEDURE — 63600175 PHARM REV CODE 636 W HCPCS: Performed by: INTERNAL MEDICINE

## 2022-08-28 PROCEDURE — 94640 AIRWAY INHALATION TREATMENT: CPT

## 2022-08-28 PROCEDURE — 63600175 PHARM REV CODE 636 W HCPCS: Performed by: NURSE PRACTITIONER

## 2022-08-28 PROCEDURE — C9113 INJ PANTOPRAZOLE SODIUM, VIA: HCPCS | Performed by: INTERNAL MEDICINE

## 2022-08-28 PROCEDURE — 25000242 PHARM REV CODE 250 ALT 637 W/ HCPCS: Performed by: INTERNAL MEDICINE

## 2022-08-28 PROCEDURE — 21400001 HC TELEMETRY ROOM

## 2022-08-28 PROCEDURE — A4216 STERILE WATER/SALINE, 10 ML: HCPCS | Performed by: INTERNAL MEDICINE

## 2022-08-28 PROCEDURE — 11000001 HC ACUTE MED/SURG PRIVATE ROOM

## 2022-08-28 PROCEDURE — 25000003 PHARM REV CODE 250: Performed by: NURSE PRACTITIONER

## 2022-08-28 PROCEDURE — 94761 N-INVAS EAR/PLS OXIMETRY MLT: CPT

## 2022-08-28 PROCEDURE — 99900035 HC TECH TIME PER 15 MIN (STAT)

## 2022-08-28 PROCEDURE — 25000242 PHARM REV CODE 250 ALT 637 W/ HCPCS: Performed by: NURSE PRACTITIONER

## 2022-08-28 RX ADMIN — BISACODYL 10 MG: 10 SUPPOSITORY RECTAL at 09:08

## 2022-08-28 RX ADMIN — PANTOPRAZOLE SODIUM 40 MG: 40 INJECTION, POWDER, FOR SOLUTION INTRAVENOUS at 08:08

## 2022-08-28 RX ADMIN — LEUCINE, PHENYLALANINE, LYSINE, METHIONINE, ISOLEUCINE, VALINE, HISTIDINE, THREONINE, TRYPTOPHAN, ALANINE, GLYCINE, ARGININE, PROLINE, SERINE, TYROSINE, SODIUM ACETATE, DIBASIC POTASSIUM PHOSPHATE, MAGNESIUM CHLORIDE, SODIUM CHLORIDE, CALCIUM CHLORIDE, DEXTROSE
311; 238; 247; 170; 255; 247; 204; 179; 77; 880; 438; 489; 289; 213; 17; 297; 261; 51; 77; 33; 5 INJECTION INTRAVENOUS at 05:08

## 2022-08-28 RX ADMIN — NYSTATIN 500000 UNITS: 100000 SUSPENSION ORAL at 12:08

## 2022-08-28 RX ADMIN — SODIUM CHLORIDE, PRESERVATIVE FREE 10 ML: 5 INJECTION INTRAVENOUS at 12:08

## 2022-08-28 RX ADMIN — NYSTATIN 500000 UNITS: 100000 SUSPENSION ORAL at 04:08

## 2022-08-28 RX ADMIN — IPRATROPIUM BROMIDE AND ALBUTEROL SULFATE 3 ML: 2.5; .5 SOLUTION RESPIRATORY (INHALATION) at 12:08

## 2022-08-28 RX ADMIN — SUCRALFATE 1 G: 1 TABLET ORAL at 04:08

## 2022-08-28 RX ADMIN — METOPROLOL TARTRATE 25 MG: 25 TABLET, FILM COATED ORAL at 09:08

## 2022-08-28 RX ADMIN — SODIUM CHLORIDE, PRESERVATIVE FREE 10 ML: 5 INJECTION INTRAVENOUS at 06:08

## 2022-08-28 RX ADMIN — METOPROLOL TARTRATE 25 MG: 25 TABLET, FILM COATED ORAL at 08:08

## 2022-08-28 RX ADMIN — ENOXAPARIN SODIUM 30 MG: 30 INJECTION SUBCUTANEOUS at 09:08

## 2022-08-28 RX ADMIN — SODIUM CHLORIDE, PRESERVATIVE FREE 10 ML: 5 INJECTION INTRAVENOUS at 11:08

## 2022-08-28 RX ADMIN — ENOXAPARIN SODIUM 30 MG: 30 INJECTION SUBCUTANEOUS at 08:08

## 2022-08-28 RX ADMIN — METHYLNALTREXONE BROMIDE 12 MG: 12 INJECTION, SOLUTION SUBCUTANEOUS at 09:08

## 2022-08-28 RX ADMIN — SUCRALFATE 1 G: 1 TABLET ORAL at 08:08

## 2022-08-28 RX ADMIN — PANTOPRAZOLE SODIUM 40 MG: 40 INJECTION, POWDER, FOR SOLUTION INTRAVENOUS at 09:08

## 2022-08-28 RX ADMIN — SUCRALFATE 1 G: 1 TABLET ORAL at 05:08

## 2022-08-28 RX ADMIN — NYSTATIN 500000 UNITS: 100000 SUSPENSION ORAL at 08:08

## 2022-08-28 RX ADMIN — IPRATROPIUM BROMIDE AND ALBUTEROL SULFATE 3 ML: 2.5; .5 SOLUTION RESPIRATORY (INHALATION) at 02:08

## 2022-08-28 RX ADMIN — IPRATROPIUM BROMIDE AND ALBUTEROL SULFATE 3 ML: 2.5; .5 SOLUTION RESPIRATORY (INHALATION) at 07:08

## 2022-08-28 RX ADMIN — LIDOCAINE 1 PATCH: 50 PATCH TOPICAL at 04:08

## 2022-08-28 RX ADMIN — DICLOFENAC SODIUM 4 G: 10 GEL TOPICAL at 09:08

## 2022-08-28 RX ADMIN — NYSTATIN 500000 UNITS: 100000 SUSPENSION ORAL at 09:08

## 2022-08-28 RX ADMIN — SUCRALFATE 1 G: 1 TABLET ORAL at 12:08

## 2022-08-28 RX ADMIN — LEVOFLOXACIN 750 MG: 500 TABLET, FILM COATED ORAL at 09:08

## 2022-08-28 NOTE — PROGRESS NOTES
[facility] Hospital Medicine Progress Note        Chief Complaint: Inpatient Follow-up for abdominal distension     HPI: 89 y.o. White female with a past medical history of hypertension, hyperlipidemia. The patient presented to [facility] on 8/16/2022 with a primary complaint of abdominal pain and falls due to syncope.  Patient reports having a fall on 8/12 while in her garage.  She states she was grabbing for the car door handle when she missed it and fell. Fall was unwitnessed and she reports loss of consciousness. On 08/15 she had a second fall while in the kitchen but denies any loss of consciousness.  Yesterday (08/16/2022) patient was going to the bathroom when she passed out.  Episode was witnessed by neighbor who is visiting her. She reports urinary frequency. Patient denies complaints of headache, vision changes, shortness of breath, cough.  She has been experiencing lower abdominal pain for the last several days. Granddaughter at bedside states that patient is dependent on laxatives have a bowel movement.  Although she uses laxatives she often has to manually disimpact herself . She normally goes every 4-5 days with last bowel movement being 5 days ago. She has been having increased belching.  Granddaughter also states patient has little to no appetite.  Patient lives alone, ambulates with walker, drives and completes activity daily living independently.  Family as it patient to be experiencing dementia over the last 6 months.     Upon presentation to the ED, patient afebrile, hemodynamically stable and SpO2 96% on room air.  Labs notable for WBC 28, potassium 2.6, glucose 161, lactic acid 3.9.  UA with 1+ leukocyte esterase, 2+ bilirubin, positive nitrites, trace ketones and protein.  Chest x-ray without acute processes.  CT abdomen pelvis revealed mild distention of the colon with liquefied stool, indeterminate bilateral adrenal nodules and pancreatic cysts.  While in the ED  patient received 40 mEq of IV potassium chloride, 2 g of magnesium sulfate and Rocephin was started for UTI.  Patient admitted to hospital medicine services for further medical management.     Patient was admitted on account of recurrent syncope and falls.  Found to have acute TTE high.  Colonic dilatation with lactic acidosis.   X-ray of the chest reveals left-sided atelectasis cannot rule out infiltrate.    KUB shows persistent colonic dilatation.  With a small sliding hiatal hernia, resume IV Zosyn  Inserted an NG tube and connect to low intermittent wall suction, keep NPO   IV Clinimix for nutrition  patient had small-bowel follow-through with normal transit time had ultrasound of the abdomen that showed no significant abnormality of liver small pancreatic cyst and right-sided pleural effusion     Interval Hx:   Patient seen and examined this morning   Patient continues to have diffuse abdominal pain and distension.  Palliative has been consulted   Abdominal pain and distension persist.     poor prognosis         Objective/physical exam:  General: In no acute distress, afebrile, on ng tube   Chest: Clear to auscultation bilaterally  Heart: RRR, +S1, S2, no appreciable murmur  Abdomen slightly distended, diffusely tender   MSK: Warm,  2 + lower extremity edema, no clubbing or cyanosis  Neurologic: Alert and oriented x4,     Assessment/Plan:  Colonic distension   Dyspepsia  Transaminitis, improving   Hypokalemia  Abdominal distention secondary to above   Small pancreatic cyst   Sliding hernia   Bilateral adrenal nodules repeat follow-up CT outpatient  Essential hypertension  Diastolic dysfunction   sinus tachycardia    hypokalemia           Plan  Patient continues to have diffuse abdominal pain and distension.  Continue with GI recs   Palliative has been consulted   Abdominal pain and distension persist.    Continue with IV PPI b.i.d., add on sucralfate if able to tolerate p.o. given NG tube connection to  suction  continue metoprolol tartrate 25 mg t.i.d.   Iv lopressor prn with parameters > 120bpm  Leukocytosis is improving she is currently on IV levofloxacin day 4   Liver enzymes is trending down slowly.    NG tube is currently clamped to repeat KUB and watch out for abdominal distension if negative will be able to remove NG tube, await gi recs   Continue IV Clinimix  Patient's lower extremity noted to be swollen.  Elevate on 2 pillows.  Will need to discontinue IV fluids soon.     small bowel follow though has a normal transit time  Ultrasound of the abdomen showed no significant abnormality of the liver  Repeat blood work in a.m.      VTE prophylaxis:      Patient condition:  Stable/Fair/Guarded/ Serious/ Critical     Anticipated discharge and Disposition:       VITAL SIGNS: 24 HRS MIN & MAX LAST   Temp  Min: 98.2 °F (36.8 °C)  Max: 99.1 °F (37.3 °C) 98.2 °F (36.8 °C)   BP  Min: 122/62  Max: 140/77 122/62   Pulse  Min: 71  Max: 79  75   Resp  Min: 16  Max: 21 16   SpO2  Min: 95 %  Max: 98 % 95 %       Recent Labs   Lab 08/24/22  0437 08/25/22  0432 08/27/22  0632   WBC 14.5* 11.6* 10.3   RBC 3.85* 3.25* 3.42*   HGB 11.9* 10.3* 10.9*   HCT 35.2* 32.4* 34.7*   MCV 91.4 99.7* 101.5*   MCH 30.9 31.7* 31.9*   MCHC 33.8 31.8* 31.4*   RDW 14.0 14.0 14.1    252 223   MPV 11.7* 9.7 10.2       Recent Labs   Lab 08/22/22  0358 08/24/22  0437 08/25/22  0432 08/26/22  0417 08/27/22  0632   * 141 139 139 137   K 3.5 2.9* 3.0* 3.5 3.7   CO2 23 25 28 28 27   BUN 16.6 23.7* 23.9* 22.9* 21.4*   CREATININE 0.52* 0.56 0.50* 0.52* 0.52*   CALCIUM 7.3* 7.8* 7.9* 7.8* 7.9*   MG 1.80 2.30  --  2.20  --    ALBUMIN 1.8* 2.0* 1.8*  --  2.0*   ALKPHOS 123 327* 330*  --  329*   * 209* 179*  --  144*   * 419* 335*  --  186*   BILITOT 0.4 0.4 0.3  --  0.5          Microbiology Results (last 7 days)       Procedure Component Value Units Date/Time    Blood Culture #1 **CANNOT BE ORDERED STAT** [[id number]]  (Normal)  Collected: 08/17/22 0148    Order Status: Completed Specimen: Blood Updated: 08/22/22 0302     CULTURE, BLOOD ([facility]) No Growth at 5 days    Blood Culture #2 **CANNOT BE ORDERED STAT** [[id number]]  (Normal) Collected: 08/17/22 0148    Order Status: Completed Specimen: Blood Updated: 08/22/22 0302     CULTURE, BLOOD ([facility]) No Growth at 5 days             See below for Radiology    Scheduled Med:   albuterol-ipratropium  3 mL Nebulization Q6H    bisacodyL  10 mg Rectal Daily    diclofenac sodium  4 g Topical (Top) Daily    enoxaparin  30 mg Subcutaneous Q12H    levoFLOXacin  750 mg Per NG tube Daily    LIDOcaine  1 patch Transdermal Q24H    metoprolol tartrate  25 mg Oral BID    nystatin  500,000 Units Oral QID (WM & HS)    pantoprazole  40 mg Intravenous BID    sodium chloride 0.9%  10 mL Intravenous Q6H    sucralfate  1 g Oral QID (AC & HS)        Continuous Infusions:   Amino acid 4.25% - dextrose 5% (CLINIMIX-E) solution (1L provides 42.5 gm AA, 50 gm CHO (170 kcal/L dextrose), Na 35, K 30, Mg 5, Ca 4.5, Acetate 70, Cl 39, Phos 15) 75 mL/hr at 08/28/22 0549        PRN Meds:  acetaminophen, albuterol-ipratropium, dextrose 50%, dextrose 50%, glucagon (human recombinant), glucose, glucose, HYDROcodone-acetaminophen, insulin aspart U-100, labetaloL, melatonin, metoclopramide HCl, metoprolol, morphine, ondansetron, sodium chloride 0.9%, Flushing PICC Protocol **AND** sodium chloride 0.9% **AND** sodium chloride 0.9%           VTE prophylaxis:     Patient condition:  Stable/Fair/Guarded/ Serious/ Critical    Anticipated discharge and Disposition:         All diagnosis and differential diagnosis have been reviewed; assessment and plan has been documented; I have personally reviewed the labs and test results that are presently available; I have reviewed the patients medication list; I have reviewed the consulting providers response and recommendations. I have reviewed or attempted to review medical records based upon their  availability    All of the patient's questions have been  addressed and answered. Patient's is agreeable to the above stated plan. I will continue to monitor closely and make adjustments to medical management as needed.  _____________________________________________________________________    Nutrition Status:    Radiology:  X-Ray Abdomen AP 1 View  Narrative: EXAMINATION:  XR ABDOMEN AP 1 VIEW    CLINICAL HISTORY:  bowel distention, ileus;    TECHNIQUE:  AP X-RAY OF THE ABDOMEN:    CPT 22878    COMPARISON:  August 24, 2022    FINDINGS:  Persisting contrast identified throughout the colon.  Similar to what was seen on previous exams    Nasogastric tube is seen with the tip in the antrum of the stomach  Impression: Persistent prominence of the colonic loops with retained contrast similar to previous exam.    Nasogastric tube as above    Electronically signed by: Bharathi Gauthier  Date:    08/26/2022  Time:    09:42      Yinka Cabral MD   08/28/2022

## 2022-08-29 LAB
ABS NEUT (OLG): 4.74 X10(3)/MCL (ref 2.1–9.2)
ALBUMIN SERPL-MCNC: 1.7 GM/DL (ref 3.4–4.8)
ALBUMIN/GLOB SERPL: 0.7 RATIO (ref 1.1–2)
ALP SERPL-CCNC: 208 UNIT/L (ref 40–150)
ALT SERPL-CCNC: 79 UNIT/L (ref 0–55)
AST SERPL-CCNC: 98 UNIT/L (ref 5–34)
BASOPHILS NFR BLD MANUAL: 0.06 X10(3)/MCL (ref 0–0.2)
BASOPHILS NFR BLD MANUAL: 1 %
BILIRUBIN DIRECT+TOT PNL SERPL-MCNC: 0.4 MG/DL
BUN SERPL-MCNC: 20.6 MG/DL (ref 9.8–20.1)
BURR CELLS (OLG): ABNORMAL
CALCIUM SERPL-MCNC: 7.8 MG/DL (ref 8.4–10.2)
CHLORIDE SERPL-SCNC: 101 MMOL/L (ref 98–111)
CO2 SERPL-SCNC: 28 MMOL/L (ref 23–31)
CREAT SERPL-MCNC: 0.45 MG/DL (ref 0.55–1.02)
EOSINOPHIL NFR BLD MANUAL: 0.24 X10(3)/MCL (ref 0–0.9)
EOSINOPHIL NFR BLD MANUAL: 4 %
ERYTHROCYTE [DISTWIDTH] IN BLOOD BY AUTOMATED COUNT: 13.7 % (ref 11.5–17)
GFR SERPLBLD CREATININE-BSD FMLA CKD-EPI: >60 MLS/MIN/1.73/M2
GLOBULIN SER-MCNC: 2.3 GM/DL (ref 2.4–3.5)
GLUCOSE SERPL-MCNC: 99 MG/DL (ref 75–121)
HCT VFR BLD AUTO: 30 % (ref 37–47)
HGB BLD-MCNC: 10.1 GM/DL (ref 12–16)
IMM GRANULOCYTES # BLD AUTO: 0.07 X10(3)/MCL (ref 0–0.04)
IMM GRANULOCYTES NFR BLD AUTO: 1.2 %
INSTRUMENT WBC (OLG): 6 X10(3)/MCL
LYMPHOCYTES NFR BLD MANUAL: 0.78 X10(3)/MCL
LYMPHOCYTES NFR BLD MANUAL: 13 %
MAGNESIUM SERPL-MCNC: 2 MG/DL (ref 1.6–2.6)
MCH RBC QN AUTO: 32 PG (ref 27–31)
MCHC RBC AUTO-ENTMCNC: 33.7 MG/DL (ref 33–36)
MCV RBC AUTO: 94.9 FL (ref 80–94)
MONOCYTES NFR BLD MANUAL: 0.24 X10(3)/MCL (ref 0.1–1.3)
MONOCYTES NFR BLD MANUAL: 4 %
NEUTROPHILS NFR BLD MANUAL: 79 %
NRBC BLD AUTO-RTO: 0 %
PLATELET # BLD AUTO: 225 X10(3)/MCL (ref 130–400)
PLATELET # BLD EST: NORMAL 10*3/UL
PMV BLD AUTO: 10.4 FL (ref 7.4–10.4)
POCT GLUCOSE: 110 MG/DL (ref 70–110)
POCT GLUCOSE: 119 MG/DL (ref 70–110)
POCT GLUCOSE: 90 MG/DL (ref 70–110)
POIKILOCYTOSIS BLD QL SMEAR: ABNORMAL
POTASSIUM SERPL-SCNC: 3.6 MMOL/L (ref 3.5–5.1)
PROT SERPL-MCNC: 4 GM/DL (ref 5.8–7.6)
RBC # BLD AUTO: 3.16 X10(6)/MCL (ref 4.2–5.4)
RBC MORPH BLD: ABNORMAL
SODIUM SERPL-SCNC: 135 MMOL/L (ref 132–146)
WBC # SPEC AUTO: 6 X10(3)/MCL (ref 4.5–11.5)

## 2022-08-29 PROCEDURE — 99900031 HC PATIENT EDUCATION (STAT)

## 2022-08-29 PROCEDURE — 94761 N-INVAS EAR/PLS OXIMETRY MLT: CPT

## 2022-08-29 PROCEDURE — 63600175 PHARM REV CODE 636 W HCPCS: Performed by: NURSE PRACTITIONER

## 2022-08-29 PROCEDURE — 25000003 PHARM REV CODE 250: Performed by: INTERNAL MEDICINE

## 2022-08-29 PROCEDURE — 94640 AIRWAY INHALATION TREATMENT: CPT

## 2022-08-29 PROCEDURE — 97530 THERAPEUTIC ACTIVITIES: CPT | Mod: CQ

## 2022-08-29 PROCEDURE — 25000242 PHARM REV CODE 250 ALT 637 W/ HCPCS: Performed by: NURSE PRACTITIONER

## 2022-08-29 PROCEDURE — 99497 PR ADVNCD CARE PLAN 30 MIN: ICD-10-PCS | Mod: ,,, | Performed by: NURSE PRACTITIONER

## 2022-08-29 PROCEDURE — 63600175 PHARM REV CODE 636 W HCPCS: Performed by: INTERNAL MEDICINE

## 2022-08-29 PROCEDURE — 25000003 PHARM REV CODE 250: Performed by: NURSE PRACTITIONER

## 2022-08-29 PROCEDURE — 83735 ASSAY OF MAGNESIUM: CPT | Performed by: INTERNAL MEDICINE

## 2022-08-29 PROCEDURE — 27000221 HC OXYGEN, UP TO 24 HOURS

## 2022-08-29 PROCEDURE — 99498 PR ADVNCD CARE PLAN ADDL 30 MIN: ICD-10-PCS | Mod: ,,, | Performed by: NURSE PRACTITIONER

## 2022-08-29 PROCEDURE — 85025 COMPLETE CBC W/AUTO DIFF WBC: CPT | Performed by: INTERNAL MEDICINE

## 2022-08-29 PROCEDURE — C9113 INJ PANTOPRAZOLE SODIUM, VIA: HCPCS | Performed by: INTERNAL MEDICINE

## 2022-08-29 PROCEDURE — 99498 ADVNCD CARE PLAN ADDL 30 MIN: CPT | Mod: ,,, | Performed by: NURSE PRACTITIONER

## 2022-08-29 PROCEDURE — 99233 PR SUBSEQUENT HOSPITAL CARE,LEVL III: ICD-10-PCS | Mod: ,,, | Performed by: NURSE PRACTITIONER

## 2022-08-29 PROCEDURE — 80053 COMPREHEN METABOLIC PANEL: CPT | Performed by: INTERNAL MEDICINE

## 2022-08-29 PROCEDURE — 36415 COLL VENOUS BLD VENIPUNCTURE: CPT | Performed by: INTERNAL MEDICINE

## 2022-08-29 PROCEDURE — 99233 SBSQ HOSP IP/OBS HIGH 50: CPT | Mod: ,,, | Performed by: NURSE PRACTITIONER

## 2022-08-29 PROCEDURE — 21400001 HC TELEMETRY ROOM

## 2022-08-29 PROCEDURE — 25000242 PHARM REV CODE 250 ALT 637 W/ HCPCS: Performed by: INTERNAL MEDICINE

## 2022-08-29 PROCEDURE — 99497 ADVNCD CARE PLAN 30 MIN: CPT | Mod: ,,, | Performed by: NURSE PRACTITIONER

## 2022-08-29 PROCEDURE — A4216 STERILE WATER/SALINE, 10 ML: HCPCS | Performed by: INTERNAL MEDICINE

## 2022-08-29 RX ORDER — BISACODYL 10 MG
10 SUPPOSITORY, RECTAL RECTAL 2 TIMES DAILY
Status: DISCONTINUED | OUTPATIENT
Start: 2022-08-29 | End: 2022-09-01

## 2022-08-29 RX ADMIN — LEUCINE, PHENYLALANINE, LYSINE, METHIONINE, ISOLEUCINE, VALINE, HISTIDINE, THREONINE, TRYPTOPHAN, ALANINE, GLYCINE, ARGININE, PROLINE, SERINE, TYROSINE, SODIUM ACETATE, DIBASIC POTASSIUM PHOSPHATE, MAGNESIUM CHLORIDE, SODIUM CHLORIDE, CALCIUM CHLORIDE, DEXTROSE
311; 238; 247; 170; 255; 247; 204; 179; 77; 880; 438; 489; 289; 213; 17; 297; 261; 51; 77; 33; 5 INJECTION INTRAVENOUS at 12:08

## 2022-08-29 RX ADMIN — DICLOFENAC SODIUM 4 G: 10 GEL TOPICAL at 10:08

## 2022-08-29 RX ADMIN — BISACODYL 10 MG: 10 SUPPOSITORY RECTAL at 10:08

## 2022-08-29 RX ADMIN — NYSTATIN 500000 UNITS: 100000 SUSPENSION ORAL at 09:08

## 2022-08-29 RX ADMIN — SUCRALFATE 1 G: 1 TABLET ORAL at 10:08

## 2022-08-29 RX ADMIN — MORPHINE SULFATE 2 MG: 4 INJECTION INTRAVENOUS at 12:08

## 2022-08-29 RX ADMIN — NYSTATIN 500000 UNITS: 100000 SUSPENSION ORAL at 04:08

## 2022-08-29 RX ADMIN — BISACODYL 10 MG: 10 SUPPOSITORY RECTAL at 09:08

## 2022-08-29 RX ADMIN — SODIUM CHLORIDE, PRESERVATIVE FREE 10 ML: 5 INJECTION INTRAVENOUS at 12:08

## 2022-08-29 RX ADMIN — ENOXAPARIN SODIUM 30 MG: 30 INJECTION SUBCUTANEOUS at 10:08

## 2022-08-29 RX ADMIN — PANTOPRAZOLE SODIUM 40 MG: 40 INJECTION, POWDER, FOR SOLUTION INTRAVENOUS at 10:08

## 2022-08-29 RX ADMIN — PANTOPRAZOLE SODIUM 40 MG: 40 INJECTION, POWDER, FOR SOLUTION INTRAVENOUS at 09:08

## 2022-08-29 RX ADMIN — IPRATROPIUM BROMIDE AND ALBUTEROL SULFATE 3 ML: 2.5; .5 SOLUTION RESPIRATORY (INHALATION) at 02:08

## 2022-08-29 RX ADMIN — NYSTATIN 500000 UNITS: 100000 SUSPENSION ORAL at 12:08

## 2022-08-29 RX ADMIN — IPRATROPIUM BROMIDE AND ALBUTEROL SULFATE 3 ML: 2.5; .5 SOLUTION RESPIRATORY (INHALATION) at 09:08

## 2022-08-29 RX ADMIN — LIDOCAINE 1 PATCH: 50 PATCH TOPICAL at 05:08

## 2022-08-29 RX ADMIN — SODIUM CHLORIDE, PRESERVATIVE FREE 10 ML: 5 INJECTION INTRAVENOUS at 06:08

## 2022-08-29 RX ADMIN — IPRATROPIUM BROMIDE AND ALBUTEROL SULFATE 3 ML: 2.5; .5 SOLUTION RESPIRATORY (INHALATION) at 12:08

## 2022-08-29 RX ADMIN — IPRATROPIUM BROMIDE AND ALBUTEROL SULFATE 3 ML: 2.5; .5 SOLUTION RESPIRATORY (INHALATION) at 08:08

## 2022-08-29 RX ADMIN — SODIUM CHLORIDE, PRESERVATIVE FREE 10 ML: 5 INJECTION INTRAVENOUS at 05:08

## 2022-08-29 RX ADMIN — METOPROLOL TARTRATE 25 MG: 25 TABLET, FILM COATED ORAL at 10:08

## 2022-08-29 RX ADMIN — METOPROLOL TARTRATE 25 MG: 25 TABLET, FILM COATED ORAL at 09:08

## 2022-08-29 RX ADMIN — HYDROCODONE BITARTRATE AND ACETAMINOPHEN 1 TABLET: 5; 325 TABLET ORAL at 12:08

## 2022-08-29 RX ADMIN — NYSTATIN 500000 UNITS: 100000 SUSPENSION ORAL at 10:08

## 2022-08-29 RX ADMIN — SUCRALFATE 1 G: 1 TABLET ORAL at 06:08

## 2022-08-29 RX ADMIN — ENOXAPARIN SODIUM 30 MG: 30 INJECTION SUBCUTANEOUS at 09:08

## 2022-08-29 RX ADMIN — HYDROCODONE BITARTRATE AND ACETAMINOPHEN 1 TABLET: 5; 325 TABLET ORAL at 09:08

## 2022-08-29 RX ADMIN — LEVOFLOXACIN 750 MG: 500 TABLET, FILM COATED ORAL at 12:08

## 2022-08-29 NOTE — PROGRESS NOTES
[facility]    Chief Complaint: Inpatient Follow-up for      Subjective:  Fidelina Darling is a 89 y.o. White female with a past medical history of hypertension, hyperlipidemia. The patient presented to [facility] on 8/16/2022 with a primary complaint of abdominal pain and falls due to syncope.  Patient reports having a fall on 8/12 while in her garage.  She states she was grabbing for the car door handle when she missed it and fell. Fall was unwitnessed and she reports loss of consciousness. On 08/15 she had a second fall while in the kitchen but denies any loss of consciousness.  Yesterday (08/16/2022) patient was going to the bathroom when she passed out.  Episode was witnessed by neighbor who is visiting her. She reports urinary frequency. Patient denies complaints of headache, vision changes, shortness of breath, cough.  She has been experiencing lower abdominal pain for the last several days. Granddaughter at bedside states that patient is dependent on laxatives have a bowel movement.  Although she uses laxatives she often has to manually disimpact herself . She normally goes every 4-5 days with last bowel movement being 5 days ago. She has been having increased belching.  Granddaughter also states patient has little to no appetite.  Patient lives alone, ambulates with walker, drives and completes activity daily living independently.       Upon presentation to the ED, patient afebrile, hemodynamically stable and SpO2 96% on room air.  Labs notable for WBC 28, potassium 2.6, glucose 161, lactic acid 3.9.  UA with 1+ leukocyte esterase, 2+ bilirubin, positive nitrites, trace ketones and protein.  Chest x-ray without acute processes.  CT abdomen pelvis revealed mild distention of the colon with liquefied stool, indeterminate bilateral adrenal nodules and pancreatic cysts.  While in the ED patient received 40 mEq of IV potassium chloride, 2 g of magnesium sulfate and Rocephin was started for  UTI.    Patient was admitted on account of recurrent syncope and falls. Colonic dilatation with lactic acidosis.   X-ray of the chest reveals left-sided atelectasis cannot rule out infiltrate.      8-21-22  Pt with persistent abd pain and nausea  Has NGT to LIWS  Belching  Minimal flatus  KUB with dilation 56.4mm to 76.8mm of colon    8/22/22:  NG still in place. Was on high suction. Placed on low suction.  Generalized mild abdominal discomfort  No flatus, no stool.  KUB pending.    8/23/22:  US ordered for today due to elevated LFTs  Patient having small liquid brown stool  NG in place with minimal output     8/24/22:  SBFT unremarkable- normal SB transit time  Abdominal U/S unremarkable  LFTs trending up   Patient continues to have stool (per nurse charting x5 stools yesterday) and endorses diffuse abdominal discomfort,   Denies N/V, fever/chills    8/25/22:  LFTS trending down. AST/ALT: 419/209---> 335/179  Patient has not had a BM today and continues to endorse diffuse abdominal pain.   Denies N/V, fever/chills    8/26/22:  Abdomen remains distended and quiet. NG replaced and to LIS and 600ml aspirated. Aspirate is dark.  Just had a small brown stool.  Turned her and c/o abdominal pain.  KUB revealed contrast in colon remains.  On Movantik and dulcolax supp daily.  LFTs decreasing    8/29/22:  Abdomen distended and tender   Pain about the same, not worsening in nature  NG in place and connected to suction but turned off.   Has not had a BM yet today, unable to recall her most recent BM  She denies N/V, and currently has no appetite  Elevated LFTS have improved, 98/79 this am   Currently on Relistor        ROS:  General: in bed  CV: peripheral edema  GI: Abdominal pain and distention.   Information gathering limited by clinical condition    Objective    VITAL SIGNS: 24 HRS MIN & MAX LAST   Temp  Min: 97.7 °F (36.5 °C)  Max: 99 °F (37.2 °C) 97.7 °F (36.5 °C)   BP  Min: 110/66  Max: 135/73 114/74   Pulse  Min: 74   Max: 86  74   Resp  Min: 15  Max: 18 18   SpO2  Min: 94 %  Max: 100 % 100 %     Physical exam:  General appearance: Well-developed, well-nourished female in Moderate distress when turned.  IN bed.  HEENT: Atraumatic head. Dry mucous membranes of oral cavity.  Eyes: anicteric  Lungs: Clear to auscultation bilaterally.   Heart: Regular rate and rhythm. 3+ pitting edema in bilateral LE.  Abdomen:  firm, distended, generalized tenderness. Bowel sounds are quiet. NG in place but not set to suction.   Extremities: No cyanosis, clubbing. No deformities.  Skin: No Rash. Warm and dry.  Neuro: Awake, lethargic. Motor and sensory exams grossly intact.  Psyc: calm and cooperative    Recent Labs   Lab 08/25/22  0432 08/27/22  0632 08/29/22  0451   WBC 11.6* 10.3 6.0   RBC 3.25* 3.42* 3.16*   HGB 10.3* 10.9* 10.1*   HCT 32.4* 34.7* 30.0*   MCV 99.7* 101.5* 94.9*   MCH 31.7* 31.9* 32.0*   MCHC 31.8* 31.4* 33.7   RDW 14.0 14.1 13.7    223 225   MPV 9.7 10.2 10.4         Recent Labs   Lab 08/24/22  0437 08/25/22  0432 08/26/22  0417 08/27/22  0632 08/29/22  0451    139 139 137 135   K 2.9* 3.0* 3.5 3.7 3.6   CO2 25 28 28 27 28   BUN 23.7* 23.9* 22.9* 21.4* 20.6*   CREATININE 0.56 0.50* 0.52* 0.52* 0.45*   CALCIUM 7.8* 7.9* 7.8* 7.9* 7.8*   MG 2.30  --  2.20  --  2.00   ALBUMIN 2.0* 1.8*  --  2.0* 1.7*   ALKPHOS 327* 330*  --  329* 208*   * 179*  --  144* 79*   * 335*  --  186* 98*   BILITOT 0.4 0.3  --  0.5 0.4             See below for Radiology    Scheduled Med:   albuterol-ipratropium  3 mL Nebulization Q6H    bisacodyL  10 mg Rectal Daily    diclofenac sodium  4 g Topical (Top) Daily    enoxaparin  30 mg Subcutaneous Q12H    levoFLOXacin  750 mg Per NG tube Daily    LIDOcaine  1 patch Transdermal Q24H    metoprolol tartrate  25 mg Oral BID    nystatin  500,000 Units Oral QID (WM & HS)    pantoprazole  40 mg Intravenous BID    sodium chloride 0.9%  10 mL Intravenous Q6H    sucralfate  1 g Oral QID (AC &  HS)        Continuous Infusions:   Amino acid 4.25% - dextrose 5% (CLINIMIX-E) solution (1L provides 42.5 gm AA, 50 gm CHO (170 kcal/L dextrose), Na 35, K 30, Mg 5, Ca 4.5, Acetate 70, Cl 39, Phos 15)          PRN Meds:  acetaminophen, albuterol-ipratropium, dextrose 50%, dextrose 50%, glucagon (human recombinant), glucose, glucose, HYDROcodone-acetaminophen, insulin aspart U-100, labetaloL, melatonin, metoclopramide HCl, metoprolol, morphine, ondansetron, sodium chloride 0.9%, Flushing PICC Protocol **AND** sodium chloride 0.9% **AND** sodium chloride 0.9%       Radiology:  X-Ray Abdomen AP 1 View  Narrative: EXAMINATION:  XR ABDOMEN AP 1 VIEW    CLINICAL HISTORY:  bowel distention, ileus;    TECHNIQUE:  AP X-RAY OF THE ABDOMEN:    CPT 92333    COMPARISON:  August 24, 2022    FINDINGS:  Persisting contrast identified throughout the colon.  Similar to what was seen on previous exams    Nasogastric tube is seen with the tip in the antrum of the stomach  Impression: Persistent prominence of the colonic loops with retained contrast similar to previous exam.    Nasogastric tube as above    Electronically signed by: Bharathi Gauthier  Date:    08/26/2022  Time:    09:42      Assessment/Plan:  1. Acute abdominal distention, having small volume stool    NGT in place but not set to suction.   Most recent  KUB (8/26)shows contrast remaining in colon    Continue SQ relistor   Give Ducolax supp bid   Soaps suds enema   2. Elevated LFTS- trending down     US with no significant findings.   Small bowel follow through showing normal small bowel transit time (8/25)     Continue with bowel regimen. 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  Discontinue sucralfate due to major side effect of constipation       REJI Pitts acting as scribe for Torrey Garcia MD  Gastroenterology  [facility]

## 2022-08-29 NOTE — PROGRESS NOTES
[facility] Hospital Medicine Progress Note        Chief Complaint: Inpatient Follow-up for abdominal distension     HPI: 89 y.o. White female with a past medical history of hypertension, hyperlipidemia. The patient presented to [facility] on 8/16/2022 with a primary complaint of abdominal pain and falls due to syncope.  Patient reports having a fall on 8/12 while in her garage.  She states she was grabbing for the car door handle when she missed it and fell. Fall was unwitnessed and she reports loss of consciousness. On 08/15 she had a second fall while in the kitchen but denies any loss of consciousness.  Yesterday (08/16/2022) patient was going to the bathroom when she passed out.  Episode was witnessed by neighbor who is visiting her. She reports urinary frequency. Patient denies complaints of headache, vision changes, shortness of breath, cough.  She has been experiencing lower abdominal pain for the last several days. Granddaughter at bedside states that patient is dependent on laxatives have a bowel movement.  Although she uses laxatives she often has to manually disimpact herself . She normally goes every 4-5 days with last bowel movement being 5 days ago. She has been having increased belching.  Granddaughter also states patient has little to no appetite.  Patient lives alone, ambulates with walker, drives and completes activity daily living independently.  Family as it patient to be experiencing dementia over the last 6 months.     Upon presentation to the ED, patient afebrile, hemodynamically stable and SpO2 96% on room air.  Labs notable for WBC 28, potassium 2.6, glucose 161, lactic acid 3.9.  UA with 1+ leukocyte esterase, 2+ bilirubin, positive nitrites, trace ketones and protein.  Chest x-ray without acute processes.  CT abdomen pelvis revealed mild distention of the colon with liquefied stool, indeterminate bilateral adrenal nodules and pancreatic cysts.  While in the ED  patient received 40 mEq of IV potassium chloride, 2 g of magnesium sulfate and Rocephin was started for UTI.  Patient admitted to hospital medicine services for further medical management.     Patient was admitted on account of recurrent syncope and falls.  Found to have acute TTE high.  Colonic dilatation with lactic acidosis.   X-ray of the chest reveals left-sided atelectasis cannot rule out infiltrate.    KUB shows persistent colonic dilatation.  With a small sliding hiatal hernia, resume IV Zosyn  Inserted an NG tube and connect to low intermittent wall suction, keep NPO   IV Clinimix for nutrition  patient had small-bowel follow-through with normal transit time had ultrasound of the abdomen that showed no significant abnormality of liver small pancreatic cyst and right-sided pleural effusion     Interval Hx:   Patient seen and examined this morning   Patient continues to have diffuse abdominal pain and distension.  Palliative has been consulted   Abdominal pain and distension persist.     poor prognosis   Abd with lots of gas in the lower quadrant - the  ngtube to suction will help her colonic distension .         Objective/physical exam:  General: In no acute distress, afebrile, on ng tube   Chest: Clear to auscultation bilaterally  Heart: RRR, +S1, S2, no appreciable murmur  Abdomen slightly distended, diffusely tender   MSK: Warm,  2 + lower extremity edema, no clubbing or cyanosis  Neurologic: Alert and oriented x4,     Assessment/Plan:  Colonic distension   Dyspepsia  Transaminitis, improving   Hypokalemia  Abdominal distention secondary to above   Small pancreatic cyst   Sliding hernia   Bilateral adrenal nodules repeat follow-up CT outpatient  Essential hypertension  Diastolic dysfunction   sinus tachycardia    hypokalemia           Plan  Abd with lots of gas in the lower quadrant - the  ngtube to suction will help her colonic distension .   Patient continues to have diffuse abdominal pain and  distension.  Continue with GI recs   Palliative has been consulted   Abdominal pain and distension persist.    Continue with IV PPI b.i.d. given NG tube connection to suction  continue metoprolol tartrate 25 mg t.i.d.   Iv lopressor prn with parameters > 120bpm  Leukocytosis is improving she is currently on IV levofloxacin day 4   Liver enzymes is trending down slowly.    NG tube is currently clamped to repeat KUB and watch out for abdominal distension if negative will be able to remove NG tube, await gi recs   Continue IV Clinimix  Patient's lower extremity noted to be swollen.  Elevate on 2 pillows.  Will need to discontinue IV fluids soon.     small bowel follow though has a normal transit time  Ultrasound of the abdomen showed no significant abnormality of the liver  Repeat blood work in a.m.      VTE prophylaxis:      Patient condition:  Stable/Fair/Guarded/ Serious/ Critical     Anticipated discharge and Disposition:      VITAL SIGNS: 24 HRS MIN & MAX LAST   Temp  Min: 97.7 °F (36.5 °C)  Max: 99 °F (37.2 °C) 97.7 °F (36.5 °C)   BP  Min: 110/66  Max: 135/73 114/74   Pulse  Min: 74  Max: 86  74   Resp  Min: 15  Max: 20 20   SpO2  Min: 94 %  Max: 100 % 100 %       Recent Labs   Lab 08/25/22 0432 08/27/22  0632 08/29/22  0451   WBC 11.6* 10.3 6.0   RBC 3.25* 3.42* 3.16*   HGB 10.3* 10.9* 10.1*   HCT 32.4* 34.7* 30.0*   MCV 99.7* 101.5* 94.9*   MCH 31.7* 31.9* 32.0*   MCHC 31.8* 31.4* 33.7   RDW 14.0 14.1 13.7    223 225   MPV 9.7 10.2 10.4       Recent Labs   Lab 08/24/22  0437 08/25/22 0432 08/26/22 0417 08/27/22  0632 08/29/22  0451    139 139 137 135   K 2.9* 3.0* 3.5 3.7 3.6   CO2 25 28 28 27 28   BUN 23.7* 23.9* 22.9* 21.4* 20.6*   CREATININE 0.56 0.50* 0.52* 0.52* 0.45*   CALCIUM 7.8* 7.9* 7.8* 7.9* 7.8*   MG 2.30  --  2.20  --  2.00   ALBUMIN 2.0* 1.8*  --  2.0* 1.7*   ALKPHOS 327* 330*  --  329* 208*   * 179*  --  144* 79*   * 335*  --  186* 98*   BILITOT 0.4 0.3  --  0.5 0.4           Microbiology Results (last 7 days)       ** No results found for the last 168 hours. **             See below for Radiology    Scheduled Med:   albuterol-ipratropium  3 mL Nebulization Q6H    bisacodyL  10 mg Rectal BID    diclofenac sodium  4 g Topical (Top) Daily    enoxaparin  30 mg Subcutaneous Q12H    levoFLOXacin  750 mg Per NG tube Daily    LIDOcaine  1 patch Transdermal Q24H    metoprolol tartrate  25 mg Oral BID    nystatin  500,000 Units Oral QID (WM & HS)    pantoprazole  40 mg Intravenous BID    sodium chloride 0.9%  10 mL Intravenous Q6H        Continuous Infusions:   Amino acid 4.25% - dextrose 5% (CLINIMIX-E) solution (1L provides 42.5 gm AA, 50 gm CHO (170 kcal/L dextrose), Na 35, K 30, Mg 5, Ca 4.5, Acetate 70, Cl 39, Phos 15) 75 mL/hr at 08/29/22 1210        PRN Meds:  acetaminophen, albuterol-ipratropium, dextrose 50%, dextrose 50%, glucagon (human recombinant), glucose, glucose, HYDROcodone-acetaminophen, insulin aspart U-100, labetaloL, melatonin, metoclopramide HCl, metoprolol, morphine, ondansetron, sodium chloride 0.9%, Flushing PICC Protocol **AND** sodium chloride 0.9% **AND** sodium chloride 0.9%       Assessment/Plan    VTE prophylaxis:     Patient condition:  Stable/Fair/Guarded/ Serious/ Critical    Anticipated discharge and Disposition:         All diagnosis and differential diagnosis have been reviewed; assessment and plan has been documented; I have personally reviewed the labs and test results that are presently available; I have reviewed the patients medication list; I have reviewed the consulting providers response and recommendations. I have reviewed or attempted to review medical records based upon their availability    All of the patient's questions have been  addressed and answered. Patient's is agreeable to the above stated plan. I will continue to monitor closely and make adjustments to medical management as  needed.  _____________________________________________________________________    Nutrition Status:    Radiology:  X-Ray Abdomen AP 1 View  Narrative: EXAMINATION:  XR ABDOMEN AP 1 VIEW    CLINICAL HISTORY:  bowel distention, ileus;    TECHNIQUE:  AP X-RAY OF THE ABDOMEN:    CPT 48673    COMPARISON:  August 24, 2022    FINDINGS:  Persisting contrast identified throughout the colon.  Similar to what was seen on previous exams    Nasogastric tube is seen with the tip in the antrum of the stomach  Impression: Persistent prominence of the colonic loops with retained contrast similar to previous exam.    Nasogastric tube as above    Electronically signed by: Bharathi Gauthier  Date:    08/26/2022  Time:    09:42      Yinka Cabral MD   08/29/2022

## 2022-08-29 NOTE — PT/OT/SLP PROGRESS
Physical Therapy Treatment    Patient Name:  Fidelina Darling   MRN:  [id number]    Recommendations:     Discharge Recommendations:  nursing facility, skilled   Discharge Equipment Recommendations:  (TBD by next level of care)     Subjective     Patient awake.     Objective:     General Precautions: Standard, fall   Orthopedic Precautions:N/A   Braces:    Respiratory Status:      Communicated with nurse prior to treatment.     Functional Mobility:    Rolling:Minimal Assistance  Supine to sit:Maximum Assistance  Sit to stand transfer: Activity did not occur  Bed to chair transfer:Activity did not occur  Pt sat EOB and attempted to perform LE PRE's. Poor sit balance and cues to correct posterior lean. We address midline and righting reactions to improve OOB activities.       AM-PAC 6 CLICK MOBILITY        Patient left left sidelying with call button in reach..    GOALS:   Multidisciplinary Problems       Physical Therapy Goals          Problem: Physical Therapy    Goal Priority Disciplines Outcome Goal Variances Interventions   Physical Therapy Goal     PT, PT/OT Unable to Meet, Plan Revised     Description: Goals to be met by: 22     Patient will increase functional independence with mobility by performin. Supine to sit with MInimal Assistance  2. Sit to supine with MInimal Assistance  3. Sit to stand transfer with Minimal Assistance  4. Bed to chair transfer with Minimal Assistance using Rolling Walker  5. Sitting at edge of bed x10 minutes with Stand-by Assistance                         Assessment:     Fidelina Darling is a 89 y.o. female admitted with a medical diagnosis of Abdominal cramping.     Rehab Prognosis: Good; patient would benefit from acute skilled PT services to address these deficits and reach maximum level of function.    Recent Surgery: * No surgery found *      Plan:     During this hospitalization, patient to be seen 5 x/week to address the identified rehab impairments via gait training,  therapeutic activities, therapeutic exercises, neuromuscular re-education and progress toward the following goals:    Plan of Care Expires:  09/19/22    Billable Minutes     Billable Minutes: Therapeutic Activity 24    Treatment Type: Treatment  PT/PTA: PTA     PTA Visit Number: 1     08/29/2022

## 2022-08-29 NOTE — CONSULTS
"    Consults  Patient Name: Fidelina Darling   MRN: [id number]   Admission Date: 8/16/2022   Hospital Length of Stay: 12   Attending Provider: Alex Whitfield MD   Consulting Provider: Danyell MENDOZA  Reason for Consult: Goals of Care  Primary Care Physician:  Primary Doctor No     Principal Problem: Abdominal cramping       Final diagnoses:  [R10.9] Abdominal cramping (Primary)  [K59.00] Constipation, unspecified constipation type  [R10.9] Abdominal pain, unspecified abdominal location  [N39.0, R31.9] Urinary tract infection with hematuria, site unspecified  [D72.829] Leukocytosis, unspecified type  [K86.9] Pancreatic lesion  [E87.6] Hypokalemia  [E87.2] Lactic acidosis      Assessment/Plan:     I reviewed the patient and family's understanding of the seriousness of the illness and its expected prognosis. We discussed the patient's goals of care and treatment preferences.        Called daughter and left VM for her to return call.  Daughter then presented to nursing station with multiple questions concerning mother's condition.     Met with daughter Ayana and had extensive conversation concerning patient's history and current condition.  Ayana states that patient was living independently, driving and walking with help of walker d/t leg pain and instability after having knee replacement with repeat surgery for infection years ago and breaking hip of "good leg" after that time.  States that patient has been living with leg pain for which she took OTC medications.  Daughter       I asked daughter about POA or LW, to which daughter responded that patient wants resuscitation and defibrillation but does not want "her ribs cracked " with CPR.  Discussed resuscitation at length with necessity for CPR, medications, defibrillation and intubation.  Daughter asking why ribs are broken during CPR--explained that CPR requires forceful compressions to be effective.  Daughter then informed that patient does not have heart problems.  " "Discussed with daughter that the best time to have advanced directive conversations is when patient is stable and not during an emergency.  Discussed the likely poor outcomes that would result considering patient's age and debility and that often patients are left with poorer quality of life.    Daughter expressed that her 2 brothers live out of state and she was in the process of getting medical POA.  Discussed with daughter that palliative team helps to educate patient and families concerning resuscitation.  Encouraged daughter to have more conversation with family and I could facilitate family meeting if necessary.      Daughter states that her goal is for patient to return to her prior functioning and be able to live independently.  She informed that patient appetite has decreased and is trying to get her to eat.  Discussed that hunger and thirst signals are often blunted in the geriatric population.  Discussed that although patient was living independently, her appetite has decreased, she has experienced falls and is high risk for falling d/t mobility issues.      Discussed with daughter that it is beneficial to have plan whether patient makes improvement or decline.  Daughter informed that patient cannot go to a nursing home because of "the [facility] family trust."  I discussed that patient may need sitter help or rehab after this hospital stay.  Discussed rehab and SNF services.  Daughter concerned that medical team is recommending hospice.  Explained to daughter that patient does not have hospice diagnosis and that is not being recommended at this time.  However, patient is advanced in age and code status discussions and goals of care are appropriate considering her decline in mobility and short-term memory loss.     She expressed concern about patient's deconditioning and  states that she was more alert when she was admitted.  Discussed the effects of prolonged hospitalization in the elderly.  Daughter " expressed that she wants physical therapy to get patient out of bed and states that patient has had chronic pain to lower legs for years.      Daughter asking if there is anything medical team can do to quicken patient's elimination.  Discussed that medical interventions need to be performed safely especially considering patient's age.  I asked daughter if she would like to speak with any member of medical team--she informed that she cannot get to hospital before the afternoon and does not answer calls from numbers she does not recognize.  Informed her that I will leave  so that families know a hospital employee is calling to speak with them.   She also informed that her brother would arriving from out of state over the weekend.     Informed that I will obtain copy of medical POA document for her review and that I will speak with GI about their plan.  Offered support and informed I would continue to follow.       Interval History:     Patient lying in bed with daughter Ayana at bedside.  NG tube remains for persistent abdominal distention and gas in the lower quadrants.  GI following and initiated relistor.  I am continuing to follow for assistance with plan of care.       Active Ambulatory Problems     Diagnosis Date Noted    No Active Ambulatory Problems     Resolved Ambulatory Problems     Diagnosis Date Noted    No Resolved Ambulatory Problems     No Additional Past Medical History        History reviewed. No pertinent surgical history.     Review of patient's allergies indicates:  No Known Allergies       Current Facility-Administered Medications:     acetaminophen tablet 650 mg, 650 mg, Oral, Q8H PRN, Umer Bynum MD    albuterol-ipratropium 2.5 mg-0.5 mg/3 mL nebulizer solution 3 mL, 3 mL, Nebulization, Q4H PRN, Taty Petit, AGACNP-BC, 3 mL at 08/29/22 0901    albuterol-ipratropium 2.5 mg-0.5 mg/3 mL nebulizer solution 3 mL, 3 mL, Nebulization, Q6H, Yinka Cabral MD, 3 mL at 08/29/22 1436    Amino  acid 4.25% - dextrose 5% (CLINIMIX-E) solution (1L provides 42.5 gm AA, 50 gm CHO (170 kcal/L dextrose), Na 35, K 30, Mg 5, Ca 4.5, Acetate 70, Cl 39, Phos 15), , Intravenous, Continuous, Yinka Cabral MD, Last Rate: 75 mL/hr at 08/29/22 1210, New Bag at 08/29/22 1210    bisacodyL suppository 10 mg, 10 mg, Rectal, BID, Kim Fair, REJI    dextrose 50% injection 12.5 g, 12.5 g, Intravenous, PRN, Neelima Duran MD    dextrose 50% injection 25 g, 25 g, Intravenous, PRN, Neelima Duran MD    diclofenac sodium 1 % gel 4 g, 4 g, Topical (Top), Daily, Yinka Cabral MD, 4 g at 08/29/22 1023    enoxaparin injection 30 mg, 30 mg, Subcutaneous, Q12H, Alex Whitfield MD, 30 mg at 08/29/22 1011    glucagon (human recombinant) injection 1 mg, 1 mg, Intramuscular, PRN, Neelima Duran MD    glucose chewable tablet 16 g, 16 g, Oral, PRN, Neelima Duran MD    glucose chewable tablet 24 g, 24 g, Oral, PRN, Neelima Duran MD    HYDROcodone-acetaminophen 5-325 mg per tablet 1 tablet, 1 tablet, Oral, Q6H PRN, Neelima Duran MD, 1 tablet at 08/29/22 1227    insulin aspart U-100 injection 0-5 Units, 0-5 Units, Subcutaneous, QID (AC + HS) PRN, Neelima Duran MD    labetaloL injection 20 mg, 20 mg, Intravenous, Q6H PRN, Yinka Cabral MD    levoFLOXacin tablet 750 mg, 750 mg, Per NG tube, Daily, Dawn Tate MD, 750 mg at 08/29/22 1210    LIDOcaine 5 % patch 1 patch, 1 patch, Transdermal, Q24H, Yinka Cabral MD, 1 patch at 08/29/22 1700    melatonin tablet 6 mg, 6 mg, Oral, Nightly PRN, Umer Bynum MD    metoclopramide HCl injection 5 mg, 5 mg, Intravenous, Q6H PRN, Yinka Cabral MD, 5 mg at 08/27/22 0413    metoprolol injection 5 mg, 5 mg, Intravenous, Q5 Min PRN, Yinka Cabral MD    metoprolol tartrate (LOPRESSOR) tablet 25 mg, 25 mg, Oral, BID, Yinka Cabral MD, 25 mg at 08/29/22 1011    morphine injection 2 mg, 2 mg, Intravenous, Q2H PRN, REJI Amaro, 2 mg at 08/29/22 0037    nystatin 100,000  "unit/mL suspension 500,000 Units, 500,000 Units, Oral, QID (WM & HS), Yinka Cabral MD, 500,000 Units at 08/29/22 1659    ondansetron injection 4 mg, 4 mg, Intravenous, Q6H PRN, Yinka Cabral MD, 4 mg at 08/26/22 1358    pantoprazole injection 40 mg, 40 mg, Intravenous, BID, Yinka Cabral MD, 40 mg at 08/29/22 1038    sodium chloride 0.9% flush 10 mL, 10 mL, Intravenous, PRN, Umer Bynum MD    Flushing PICC Protocol, , , Until Discontinued **AND** sodium chloride 0.9% flush 10 mL, 10 mL, Intravenous, Q6H, 10 mL at 08/29/22 1701 **AND** sodium chloride 0.9% flush 10 mL, 10 mL, Intravenous, PRN, Yinka Cabral MD     acetaminophen, albuterol-ipratropium, dextrose 50%, dextrose 50%, glucagon (human recombinant), glucose, glucose, HYDROcodone-acetaminophen, insulin aspart U-100, labetaloL, melatonin, metoclopramide HCl, metoprolol, morphine, ondansetron, sodium chloride 0.9%, Flushing PICC Protocol **AND** sodium chloride 0.9% **AND** sodium chloride 0.9%     History reviewed. No pertinent family history.       Review of Systems   Constitutional:  Positive for activity change, appetite change and fatigue.   Gastrointestinal:  Positive for abdominal distention and abdominal pain.          Objective:   /74   Pulse 88   Temp 97.7 °F (36.5 °C) (Oral)   Resp 18   Ht 5' 7.99" (1.727 m)   Wt 61.2 kg (134 lb 14.7 oz)   SpO2 100%   BMI 20.52 kg/m²      Physical Exam   Constitutional: She appears ill.   Eyes: Pupils are equal, round, and reactive to light.   Cardiovascular: Normal rate and regular rhythm. Pulmonary:      Effort: Pulmonary effort is normal.      Breath sounds: Normal breath sounds.     Abdominal: Soft. She exhibits distension.   NG tube intact, hypoactive BS   Neurological: She is alert.   To person, and place, forgetful   Skin: There is pallor.        Review of Symptoms  Review of Symptoms      Symptom Assessment (ESAS 0-10 Scale)  Pain:  0  Dyspnea:  0  Anxiety:  0  Nausea:  " 0  Depression:  0  Anorexia:  0  Fatigue:  0  Insomnia:  0  Restlessness:  0  Agitation:  0         Performance Status:  30    Living Arrangements:  Lives alone    Advance Care Planning   Advance Directives:   Living Will: No    Do Not Resuscitate Status: No    Medical Power of : No      Decision Making:  Family answered questions        PAINAD: NA    Caregiver burden formerly assessed: yes      No results displayed because visit has over 200 results.               > 50% of 35 min of encounter was spent in chart review, face to face discussion of goals of care, symptom assessment, coordination of care and emotional support.    55 minutes spent in ACP discussion    Danyell CHURCHP, [facility]  Palliative Medicine  [facility]

## 2022-08-30 LAB
POCT GLUCOSE: 115 MG/DL (ref 70–110)
POCT GLUCOSE: 94 MG/DL (ref 70–110)
POCT GLUCOSE: 95 MG/DL (ref 70–110)

## 2022-08-30 PROCEDURE — C9113 INJ PANTOPRAZOLE SODIUM, VIA: HCPCS | Performed by: INTERNAL MEDICINE

## 2022-08-30 PROCEDURE — 25000003 PHARM REV CODE 250: Performed by: INTERNAL MEDICINE

## 2022-08-30 PROCEDURE — 63600175 PHARM REV CODE 636 W HCPCS: Performed by: NURSE PRACTITIONER

## 2022-08-30 PROCEDURE — 97530 THERAPEUTIC ACTIVITIES: CPT | Mod: CO

## 2022-08-30 PROCEDURE — 25000242 PHARM REV CODE 250 ALT 637 W/ HCPCS: Performed by: INTERNAL MEDICINE

## 2022-08-30 PROCEDURE — A4216 STERILE WATER/SALINE, 10 ML: HCPCS | Performed by: INTERNAL MEDICINE

## 2022-08-30 PROCEDURE — 25000003 PHARM REV CODE 250: Performed by: NURSE PRACTITIONER

## 2022-08-30 PROCEDURE — 63600175 PHARM REV CODE 636 W HCPCS: Performed by: INTERNAL MEDICINE

## 2022-08-30 PROCEDURE — 94640 AIRWAY INHALATION TREATMENT: CPT

## 2022-08-30 PROCEDURE — 94761 N-INVAS EAR/PLS OXIMETRY MLT: CPT

## 2022-08-30 PROCEDURE — 27000221 HC OXYGEN, UP TO 24 HOURS

## 2022-08-30 PROCEDURE — 25000003 PHARM REV CODE 250: Performed by: STUDENT IN AN ORGANIZED HEALTH CARE EDUCATION/TRAINING PROGRAM

## 2022-08-30 PROCEDURE — 99233 PR SUBSEQUENT HOSPITAL CARE,LEVL III: ICD-10-PCS | Mod: ,,, | Performed by: NURSE PRACTITIONER

## 2022-08-30 PROCEDURE — 97530 THERAPEUTIC ACTIVITIES: CPT | Mod: CQ

## 2022-08-30 PROCEDURE — 21400001 HC TELEMETRY ROOM

## 2022-08-30 PROCEDURE — 99233 SBSQ HOSP IP/OBS HIGH 50: CPT | Mod: ,,, | Performed by: NURSE PRACTITIONER

## 2022-08-30 PROCEDURE — 97535 SELF CARE MNGMENT TRAINING: CPT | Mod: CO

## 2022-08-30 RX ORDER — SENNOSIDES 8.8 MG/5ML
10 LIQUID ORAL ONCE
Status: COMPLETED | OUTPATIENT
Start: 2022-08-30 | End: 2022-08-30

## 2022-08-30 RX ORDER — METOCLOPRAMIDE HYDROCHLORIDE 5 MG/ML
5 INJECTION INTRAMUSCULAR; INTRAVENOUS EVERY 6 HOURS
Status: DISCONTINUED | OUTPATIENT
Start: 2022-08-30 | End: 2022-09-13 | Stop reason: HOSPADM

## 2022-08-30 RX ADMIN — METOPROLOL TARTRATE 25 MG: 25 TABLET, FILM COATED ORAL at 09:08

## 2022-08-30 RX ADMIN — ENOXAPARIN SODIUM 30 MG: 30 INJECTION SUBCUTANEOUS at 09:08

## 2022-08-30 RX ADMIN — SODIUM CHLORIDE, PRESERVATIVE FREE 10 ML: 5 INJECTION INTRAVENOUS at 01:08

## 2022-08-30 RX ADMIN — METOCLOPRAMIDE 5 MG: 5 INJECTION, SOLUTION INTRAMUSCULAR; INTRAVENOUS at 09:08

## 2022-08-30 RX ADMIN — ENOXAPARIN SODIUM 30 MG: 30 INJECTION SUBCUTANEOUS at 10:08

## 2022-08-30 RX ADMIN — IPRATROPIUM BROMIDE AND ALBUTEROL SULFATE 3 ML: 2.5; .5 SOLUTION RESPIRATORY (INHALATION) at 09:08

## 2022-08-30 RX ADMIN — LEUCINE, PHENYLALANINE, LYSINE, METHIONINE, ISOLEUCINE, VALINE, HISTIDINE, THREONINE, TRYPTOPHAN, ALANINE, GLYCINE, ARGININE, PROLINE, SERINE, TYROSINE, SODIUM ACETATE, DIBASIC POTASSIUM PHOSPHATE, MAGNESIUM CHLORIDE, SODIUM CHLORIDE, CALCIUM CHLORIDE, DEXTROSE
311; 238; 247; 170; 255; 247; 204; 179; 77; 880; 438; 489; 289; 213; 17; 297; 261; 51; 77; 33; 5 INJECTION INTRAVENOUS at 10:08

## 2022-08-30 RX ADMIN — PANTOPRAZOLE SODIUM 40 MG: 40 INJECTION, POWDER, FOR SOLUTION INTRAVENOUS at 09:08

## 2022-08-30 RX ADMIN — METOPROLOL TARTRATE 25 MG: 25 TABLET, FILM COATED ORAL at 10:08

## 2022-08-30 RX ADMIN — SODIUM CHLORIDE, PRESERVATIVE FREE 10 ML: 5 INJECTION INTRAVENOUS at 06:08

## 2022-08-30 RX ADMIN — PANTOPRAZOLE SODIUM 40 MG: 40 INJECTION, POWDER, FOR SOLUTION INTRAVENOUS at 10:08

## 2022-08-30 RX ADMIN — IPRATROPIUM BROMIDE AND ALBUTEROL SULFATE 3 ML: 2.5; .5 SOLUTION RESPIRATORY (INHALATION) at 07:08

## 2022-08-30 RX ADMIN — LEUCINE, PHENYLALANINE, LYSINE, METHIONINE, ISOLEUCINE, VALINE, HISTIDINE, THREONINE, TRYPTOPHAN, ALANINE, GLYCINE, ARGININE, PROLINE, SERINE, TYROSINE, SODIUM ACETATE, DIBASIC POTASSIUM PHOSPHATE, MAGNESIUM CHLORIDE, SODIUM CHLORIDE, CALCIUM CHLORIDE, DEXTROSE
311; 238; 247; 170; 255; 247; 204; 179; 77; 880; 438; 489; 289; 213; 17; 297; 261; 51; 77; 33; 5 INJECTION INTRAVENOUS at 01:08

## 2022-08-30 RX ADMIN — NYSTATIN 500000 UNITS: 100000 SUSPENSION ORAL at 01:08

## 2022-08-30 RX ADMIN — SODIUM CHLORIDE, PRESERVATIVE FREE 10 ML: 5 INJECTION INTRAVENOUS at 07:08

## 2022-08-30 RX ADMIN — NYSTATIN 500000 UNITS: 100000 SUSPENSION ORAL at 06:08

## 2022-08-30 RX ADMIN — NYSTATIN 500000 UNITS: 100000 SUSPENSION ORAL at 07:08

## 2022-08-30 RX ADMIN — IPRATROPIUM BROMIDE AND ALBUTEROL SULFATE 3 ML: 2.5; .5 SOLUTION RESPIRATORY (INHALATION) at 01:08

## 2022-08-30 RX ADMIN — DICLOFENAC SODIUM 4 G: 10 GEL TOPICAL at 10:08

## 2022-08-30 RX ADMIN — SENNOSIDES 10 ML: 8.8 LIQUID ORAL at 04:08

## 2022-08-30 RX ADMIN — LIDOCAINE 1 PATCH: 50 PATCH TOPICAL at 04:08

## 2022-08-30 RX ADMIN — MELATONIN TAB 3 MG 6 MG: 3 TAB at 09:08

## 2022-08-30 RX ADMIN — MORPHINE SULFATE 2 MG: 4 INJECTION INTRAVENOUS at 09:08

## 2022-08-30 NOTE — PROGRESS NOTES
[facility]    Chief Complaint: Inpatient Follow-up for      Subjective:  Fidelina Darling is a 89 y.o. White female with a past medical history of hypertension, hyperlipidemia. The patient presented to [facility] on 8/16/2022 with a primary complaint of abdominal pain and falls due to syncope.  Patient reports having a fall on 8/12 while in her garage.  She states she was grabbing for the car door handle when she missed it and fell. Fall was unwitnessed and she reports loss of consciousness. On 08/15 she had a second fall while in the kitchen but denies any loss of consciousness.  Yesterday (08/16/2022) patient was going to the bathroom when she passed out.  Episode was witnessed by neighbor who is visiting her. She reports urinary frequency. Patient denies complaints of headache, vision changes, shortness of breath, cough.  She has been experiencing lower abdominal pain for the last several days. Granddaughter at bedside states that patient is dependent on laxatives have a bowel movement.  Although she uses laxatives she often has to manually disimpact herself . She normally goes every 4-5 days with last bowel movement being 5 days ago. She has been having increased belching.  Granddaughter also states patient has little to no appetite.  Patient lives alone, ambulates with walker, drives and completes activity daily living independently.       Upon presentation to the ED, patient afebrile, hemodynamically stable and SpO2 96% on room air.  Labs notable for WBC 28, potassium 2.6, glucose 161, lactic acid 3.9.  UA with 1+ leukocyte esterase, 2+ bilirubin, positive nitrites, trace ketones and protein.  Chest x-ray without acute processes.  CT abdomen pelvis revealed mild distention of the colon with liquefied stool, indeterminate bilateral adrenal nodules and pancreatic cysts.  While in the ED patient received 40 mEq of IV potassium chloride, 2 g of magnesium sulfate and Rocephin was started for  UTI.    Patient was admitted on account of recurrent syncope and falls. Colonic dilatation with lactic acidosis.   X-ray of the chest reveals left-sided atelectasis cannot rule out infiltrate.      8-21-22  Pt with persistent abd pain and nausea  Has NGT to LIWS  Belching  Minimal flatus  KUB with dilation 56.4mm to 76.8mm of colon    8/22/22:  NG still in place. Was on high suction. Placed on low suction.  Generalized mild abdominal discomfort  No flatus, no stool.  KUB pending.    8/23/22:  US ordered for today due to elevated LFTs  Patient having small liquid brown stool  NG in place with minimal output     8/24/22:  SBFT unremarkable- normal SB transit time  Abdominal U/S unremarkable  LFTs trending up   Patient continues to have stool (per nurse charting x5 stools yesterday) and endorses diffuse abdominal discomfort,   Denies N/V, fever/chills    8/25/22:  LFTS trending down. AST/ALT: 419/209---> 335/179  Patient has not had a BM today and continues to endorse diffuse abdominal pain.   Denies N/V, fever/chills    8/26/22:  Abdomen remains distended and quiet. NG replaced and to LIS and 600ml aspirated. Aspirate is dark.  Just had a small brown stool.  Turned her and c/o abdominal pain.  KUB revealed contrast in colon remains.  On Movantik and dulcolax supp daily.  LFTs decreasing    8/29/22:  Abdomen distended and tender   Pain about the same, not worsening in nature  NG in place and connected to suction but turned off.   Has not had a BM yet today, unable to recall her most recent BM  She denies N/V, and currently has no appetite  Elevated LFTS have improved, 98/79 this am   Currently on Relistor     8/30/22:  Still with abdominal distention  NG in place but suction is turned off. Placed to LIS.  Patient moans when abdomen palpated.  Has extensive peripheral edema.  Urine orange/red color       ROS:  General: in bed  CV: peripheral edema  GI: Abdominal pain and distention.   Information gathering limited by  clinical condition    Objective    VITAL SIGNS: 24 HRS MIN & MAX LAST   Temp  Min: 97.9 °F (36.6 °C)  Max: 99.1 °F (37.3 °C) 98.7 °F (37.1 °C)   BP  Min: 100/65  Max: 135/72 100/65   Pulse  Min: 71  Max: 88  84   Resp  Min: 16  Max: 18 16   SpO2  Min: 97 %  Max: 98 % 98 %     Physical exam:  General appearance: Well-developed, well-nourished female in no distress.  IN bed.  HEENT: Atraumatic head. Dry mucous membranes of oral cavity.  Eyes: anicteric  Lungs: Clear to auscultation bilaterally.   Heart: Regular rate and rhythm. 3+ pitting edema in bilateral LE.  Abdomen:  firm, distended, generalized tenderness. Bowel sounds are quiet. NG in place but not set to suction.   Extremities: No cyanosis, clubbing. No deformities.  Skin: No Rash. Warm and dry.  Neuro: Awake, lethargic. Motor and sensory exams grossly intact.  Psyc: calm and cooperative    Recent Labs   Lab 08/25/22 0432 08/27/22  0632 08/29/22  0451   WBC 11.6* 10.3 6.0   RBC 3.25* 3.42* 3.16*   HGB 10.3* 10.9* 10.1*   HCT 32.4* 34.7* 30.0*   MCV 99.7* 101.5* 94.9*   MCH 31.7* 31.9* 32.0*   MCHC 31.8* 31.4* 33.7   RDW 14.0 14.1 13.7    223 225   MPV 9.7 10.2 10.4         Recent Labs   Lab 08/24/22  0437 08/25/22  0432 08/26/22  0417 08/27/22  0632 08/29/22  0451    139 139 137 135   K 2.9* 3.0* 3.5 3.7 3.6   CO2 25 28 28 27 28   BUN 23.7* 23.9* 22.9* 21.4* 20.6*   CREATININE 0.56 0.50* 0.52* 0.52* 0.45*   CALCIUM 7.8* 7.9* 7.8* 7.9* 7.8*   MG 2.30  --  2.20  --  2.00   ALBUMIN 2.0* 1.8*  --  2.0* 1.7*   ALKPHOS 327* 330*  --  329* 208*   * 179*  --  144* 79*   * 335*  --  186* 98*   BILITOT 0.4 0.3  --  0.5 0.4             See below for Radiology    Scheduled Med:   albuterol-ipratropium  3 mL Nebulization Q6H    bisacodyL  10 mg Rectal BID    diclofenac sodium  4 g Topical (Top) Daily    enoxaparin  30 mg Subcutaneous Q12H    LIDOcaine  1 patch Transdermal Q24H    metoclopramide HCl  5 mg Intravenous Q6H    metoprolol tartrate   25 mg Oral BID    nystatin  500,000 Units Oral QID (WM & HS)    pantoprazole  40 mg Intravenous BID    sennosides 8.8 mg/5 ml  10 mL Oral Once    sodium chloride 0.9%  10 mL Intravenous Q6H        Continuous Infusions:         PRN Meds:  acetaminophen, albuterol-ipratropium, dextrose 50%, dextrose 50%, glucagon (human recombinant), glucose, glucose, HYDROcodone-acetaminophen, insulin aspart U-100, labetaloL, melatonin, metoclopramide HCl, metoprolol, morphine, ondansetron, sodium chloride 0.9%, Flushing PICC Protocol **AND** sodium chloride 0.9% **AND** sodium chloride 0.9%       Radiology:  X-Ray Abdomen AP 1 View  Narrative: EXAMINATION:  XR ABDOMEN AP 1 VIEW    CLINICAL HISTORY:  persistent colonic dilatation;    TECHNIQUE:  AP X-RAY OF THE ABDOMEN:    CPT 65937    COMPARISON:  August 26, 2022    FINDINGS:  Examination reveals persistent gases distension of the abdomen persistent residual contrast throughout the colon in a distribution similar to what was seen on the previous exam of August 26, 2022  Impression: No significant change    Electronically signed by: Bharathi Gauthier  Date:    08/30/2022  Time:    10:16      Assessment/Plan:  1. Acute abdominal distention, having small volume stool    NGT put to LIS   Low dose IV reglan   One dose of Senna per NG   Rectal tube for 30 minutes 4 times a day  Most recent  KUB (8/26)shows contrast remaining in colon    Give Ducolax supp bid  2. Elevated LFTS- trending down     US with no significant findings.   Small bowel follow through showing normal small bowel transit time (8/25)     Continue with bowel regimen. 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      Attending asking if endoscopic decompression would be an option.      Elaine Chan NP acting as scribe for Torrey Garcia MD  Gastroenterology  [facility]    Reviewed.  Given persistence of symptoms,  endoscopic decompression is  reasonable, however its effectiveness may be limited given her clinical condition.  Also, always a risk of perforation but given no improvement, reasonable to attempt.

## 2022-08-30 NOTE — PROGRESS NOTES
Inpatient Nutrition Assessment    Admit Date: 8/16/2022   Length of Stay: 13 days  Nutrition Recommendation/Prescription     - Once medically appropriate, resume oral diet as tolerated. Assist with meals as needed.     - noted palliative care consulted    - Continue PPN Clinimix until able to resume oral diet; if if aggressive care desired, consider starting TPN for nutrition; rec: Central Line Clinimix 5/25% @ 75ml/hr + IL 20% 250ml twice a week    - pt would need central line placed if TPN to start    Communication of Recommendations: reviewed with nurse    Nutrition Assessment     Malnutrition Assessment/Nutrition-Focused Physical Exam    Malnutrition in the context of chronic illness  Degree of Malnutrition: non-severe (moderate) malnutrition  Energy Intake: < 75% of estimated energy requirement for >/= 1 month  Interpretation of Weight Loss: unable to obtain  Body Fat: mild depletion  Area of Body Fat Loss: orbital region  and upper arm region - triceps / biceps  Muscle Mass Loss: mild depletion  Area of Muscle Mass Loss: temple region - temporalis muscle, clavicle bone region - pectoralis major, deltoid, trapezius muscles and dorsal hand - interosseous musle  Fluid Accumulation: mild  Edema: 2+ edema - mild and does not meet criteria  Reduced  Strength: unable to obtain  A minimum of two characteristics is recommended for diagnosis of either severe or non-severe malnutrition.    Chart Review    Reason Seen: malnutrition screening tool    Diagnosis:  Colonic distension   Dyspepsia  Transaminitis, improving   Hypokalemia  Abdominal distention secondary to above   Small pancreatic cyst   Sliding hernia   Bilateral adrenal nodules repeat follow-up CT outpatient  Essential hypertension  Diastolic dysfunction   sinus tachycardia    hypokalemia    Relevant Medical History: HTN, HLD    Nutrition-Related Medications: Cipro, SSI, Zofran PRN, Kcl  Calorie Containing IV Medications: Clinimix    Nutrition-Related  "Labs:  8/19: K 3.1, Glu 119, GFR>60  8/24: K 2.9, BUN 23.7, glu 149, Ca 7.8, AlkPhos 327, ,   8/29: BUN 20.6, Crea 0.45, Ca 7.8, AlkPhos 208    Diet/PN Order: Diet NPO Except for: Ice Chips  Diet NPO  Oral Supplement Order: none at this time  Tube Feeding Order: none at this time  Appetite/Oral Intake: NPO/NPO  Factors Affecting Nutritional Intake: impaired cognitive status/motor control, constipation, decreased appetite and NPO  Food/Hindu/Cultural Preferences: none reported    Skin Integrity: bruised (ecchymotic)  Wound(s):   none documented    Comments    8/19: Pt asleep during rounds; per pt's granddaughter, pt's appetite and wt has decreased over the last few years, more noticeably in the last 6 months. Granddaughter unsure of what weight was 6 months ago but states that the patient weighed ~175 lbs several years ago. Pt was on an appetite stimulant a few months ago which actually seemed to help but did not continue the regimen 2/2 price. Will send an oral supplement.     8/24: pt currently NPO on PPN for ileus with NG for suction; GI following    8/26: pt remains NPO on PPN with NG LIWS; nurse putting new IV due to current one malfunctioning; still with abdominal pain, distended abdomen, absent bowel sounds, contrast from SBFT on 8/24 still in colon per KUB; small amount of stool today    8/30: Pt still having abdominal pain, NG but suction off, NPO with PPN; palliative care consulted    Anthropometrics    Height: 5' 7.99" (172.7 cm) Height Method: Stated  Weight: 61.2 kg (134 lb 14.7 oz) (08/17/22 0733) Weight Method: Bed Scale  BMI (Calculated): 20.5  BMI Classification: normal (BMI 18.5-24.9)  Ideal Body Weight (IBW), Female: 139.95 lb  % Ideal Body Weight, Female (lb): 96.41 %                       Usual Weight Provided By: not applicable    Wt Readings from Last 5 Encounters:   08/17/22 61.2 kg (134 lb 14.7 oz)   01/15/19 64.9 kg (142 lb 15.9 oz)     Weight Change(s) Since " Admission:  Admit Weight: 61.2 kg (135 lb) (08/16/22 1901)    Estimated Needs    Weight Used For Calorie Calculations: 61.2 kg (134 lb 14.7 oz)  Energy Calorie Requirements (kcal): 1512 kcal (MSJ x 1.4 SF)  Energy Need Method: [place]-St Jeor  Weight Used For Protein Calculations: 61.2 kg (134 lb 14.7 oz)  Protein Requirements: 80 gm (1.3g/kg)  Fluid Requirements (mL): 1836 mL (30mL/kg)  Temp: 98.2 °F (36.8 °C)       Enteral Nutrition    Patient not receiving enteral nutrition at this time.    Parenteral Nutrition    Standard Formula: Clinimix E 4.25/5  Custom Formula: not applicable  Additives: none  Rate/Volume: 75ml/lhr  Lipids: none  Total Nutrition Provided by Parenteral Nutrition:  Calories Provided  612 kcal/d, 40% needs   Protein Provided  77 g/d, 126% needs   Dextrose Provided  90 g/d,    Fluid Provided  1800 ml/d, 100% needs       Evaluation of Received Nutrient Intake    Calories: not meeting estimated needs  Protein: not meeting estimated needs    Patient Education    Not applicable.    Nutrition Diagnosis     PES: Malnutrition related to insufficient energy intake as evidenced by <75% est energy requirements met >1 month, physical evidence of mild muscle and fat depletion. (continues)    Interventions/Goals     Intervention(s): modified composition of meals/snacks, commercial beverage, multivitamin/mineral supplement therapy, prescription medication and collaboration with other providers  Goal: Meet greater than 75% of nutritional needs by follow-up. (goal not met)    Monitoring & Evaluation     Dietitian will monitor energy intake and weight.  Nutrition Risk/Follow-Up: high (follow-up in 1-4 days)

## 2022-08-30 NOTE — PT/OT/SLP PROGRESS
Physical Therapy Treatment    Patient Name:  Fidelina Darling   MRN:  [id number]    Recommendations:     Discharge Recommendations:  nursing facility, skilled   Discharge Equipment Recommendations:  (TBD by next level of care)   Barriers to discharge: Decreased caregiver support, impaired functional mobility    Assessment:     Fidelina Darling is a 89 y.o. female admitted with a medical diagnosis of Abdominal cramping.  She presents with the following impairments/functional limitations:  weakness, impaired endurance, impaired self care skills, impaired functional mobility, gait instability, impaired balance, impaired cognition, decreased upper extremity function, decreased lower extremity function, decreased safety awareness, pain, decreased ROM, impaired skin, edema.    Rehab Prognosis: Fair; patient would benefit from acute skilled PT services to address these deficits and reach maximum level of function.    Recent Surgery: Procedure(s) (LRB):  SIGMOIDOSCOPY (N/A)      Plan:     During this hospitalization, patient to be seen 5 x/week to address the identified rehab impairments via gait training, therapeutic activities, therapeutic exercises, neuromuscular re-education and progress toward the following goals:    Plan of Care Expires:  09/19/22    Subjective     Chief Complaint: increased c/o abdominal pain.    Pain/Comfort:  Pain Rating 1: 7/10  Location - Orientation 1: lower  Location 1: abdomen  Pain Addressed 1: Reposition, Distraction  Pain Rating Post-Intervention 1: 7/10      Objective:     Communicated with NSG prior to session.  Patient found HOB elevated with peripheral IV, PureWick upon PT entry to room.     General Precautions: Standard, fall, NPO   Orthopedic Precautions:N/A   Braces: N/A  Respiratory Status: Room air     Functional Mobility:  Bed Mobility:     Supine to Sit: total assistance  Sit to Supine: total assistance  Transfers:     Sit to Stand:  total assistance with hand-held assist  Bed to  Chair: total assistance with  hand-held assist  using  Squat Pivot x2 persons assist for all.    Therapeutic Activities and Exercises:   Pt had a massive, loose, and watery BM during t/f to recliner and was t/f back to bed for perineal care.  TotalA for toileting hygiene.  NSG and CNA present to assist.    Patient left HOB elevated with all lines intact and call button in reach..    GOALS:   Multidisciplinary Problems       Physical Therapy Goals          Problem: Physical Therapy    Goal Priority Disciplines Outcome Goal Variances Interventions   Physical Therapy Goal     PT, PT/OT Unable to Meet, Plan Revised     Description: Goals to be met by: 22     Patient will increase functional independence with mobility by performin. Supine to sit with MInimal Assistance  2. Sit to supine with MInimal Assistance  3. Sit to stand transfer with Minimal Assistance  4. Bed to chair transfer with Minimal Assistance using Rolling Walker  5. Sitting at edge of bed x10 minutes with Stand-by Assistance                         Time Tracking:     PT Received On: 22  PT Start Time: 923     PT Stop Time: 1005  PT Total Time (min): 42 min     Billable Minutes: Therapeutic Activity 42    Treatment Type: Treatment  PT/PTA: PTA     PTA Visit Number: 1     2022

## 2022-08-30 NOTE — PT/OT/SLP PROGRESS
Occupational Therapy  Treatment    Fidelina Darling   MRN: [id number]   Admitting Diagnosis: Abdominal cramping    OT Date of Treatment: 08/30/22   OT Start Time: 0923  OT Stop Time: 1005  OT Total Time (min): 42 min     Billable Minutes:  Self Care/Home Management 27 and Therapeutic Activity 15  Total Minutes: 42     OT/CONSUELO: CONSUELO     CONSUELO Visit Number: 4    General Precautions: Standard, fall, NPO  Orthopedic Precautions: N/A  Braces: N/A    Spiritual, Cultural Beliefs, Catholic Practices, Values that Affect Care: no    Subjective:  Communicated with nurse prior to session.    Objective:  Patient found with: peripheral IV, PureWick, NG tube    Functional Mobility:  Bed Mobility:   Supine to sit: Maximum Assistance   Sit to supine: Maximum Assistance   Rolling: Maximum Assistance   Scooting: Maximum Assistance    Transfer Training:   Transfer bed to chair and chair to bed with Total A, unable to remain in chair secondary to BM    Bathing:  Total A supine and side lying in bed    Toilet Training:  Total A side lying and supine in bed    Balance:   Static Sit: 0: Needs MAXIMAL assist to maintain sitting without back support  Dynamic Sit:  POOR: N/A    Patient left HOB elevated with all lines intact, call button in reach, and nurse and CNA present    ASSESSMENT:  Fidelina Darling is a 89 y.o. female with a medical diagnosis of Abdominal cramping and presents with decreased sitting tolerance, decreased functional mobility and decreased ADL skills.    Rehab potential is poor    Activity tolerance: Poor    Discharge recommendations: nursing facility, skilled     Equipment recommendations: none     GOALS:   Multidisciplinary Problems       Occupational Therapy Goals          Problem: Occupational Therapy    Goal Priority Disciplines Outcome Interventions   Occupational Therapy Goal     OT, PT/OT Ongoing, Progressing    Description: Goals to be met by: 9/16/22     Patient will increase functional independence with ADLs by  performing:    UE Dressing with Modified Becker.  LE Dressing with Modified Becker.  Grooming while standing with Modified Becker.  Toileting from bedside commode with Modified Becker for hygiene  and clothing management.   Toilet transfer to bedside commode with Modified Becker.progress  to toilet.                         Plan:  Patient to be seen 3 x/week, 5 x/week to address the above listed problems via self-care/home management, therapeutic activities, therapeutic exercises  Plan of Care expires: 09/16/22  Plan of Care reviewed with: patient         08/30/2022

## 2022-08-30 NOTE — CONSULTS
"Consults    Patient Name: Fidelina Darling   MRN: [id number]   Admission Date: 8/16/2022   Hospital Length of Stay: 13   Attending Provider: Alex Whitfield MD   Consulting Provider: Danyell MENDOZA  Reason for Consult: Goals of Care  Primary Care Physician:  Primary Doctor No     Principal Problem: Abdominal cramping       Final diagnoses:  [R10.9] Abdominal cramping (Primary)  [K59.00] Constipation, unspecified constipation type  [R10.9] Abdominal pain, unspecified abdominal location  [N39.0, R31.9] Urinary tract infection with hematuria, site unspecified  [D72.829] Leukocytosis, unspecified type  [K86.9] Pancreatic lesion  [E87.6] Hypokalemia  [E87.2] Lactic acidosis      Assessment/Plan:     I reviewed the patient and family's understanding of the seriousness of the illness and its expected prognosis. We discussed the patient's goals of care and treatment preferences.        Patient lying in bed with no family present and states she feels "miserable" d/t abdominal pain and upset.  Discussed case with Olena VU NP who will be speaking with physician for further recs.  Informed that family had questions and that daughter visits in the afternoon.      Discussed case with Dr. Cabral as well and gave update on my conversation with patient's daughter.     Left copy of medical POA documents in room and called daughter Ayana to leave  informing her of this and my conversation with GI.        Interval History:   Patient lying in bed with complaint of abdominal pain with NG tube in place.  Has persistent distention and gas for which GI is following.  Therapy working with patient who is requiring max assist to sit at side of bed.       Active Ambulatory Problems     Diagnosis Date Noted    No Active Ambulatory Problems     Resolved Ambulatory Problems     Diagnosis Date Noted    No Resolved Ambulatory Problems     No Additional Past Medical History        History reviewed. No pertinent surgical history.     Review of " patient's allergies indicates:  No Known Allergies       Current Facility-Administered Medications:     acetaminophen tablet 650 mg, 650 mg, Oral, Q8H PRN, Umer Bynum MD    albuterol-ipratropium 2.5 mg-0.5 mg/3 mL nebulizer solution 3 mL, 3 mL, Nebulization, Q4H PRN, Taty Petit, AGACNP-BC, 3 mL at 08/29/22 0901    albuterol-ipratropium 2.5 mg-0.5 mg/3 mL nebulizer solution 3 mL, 3 mL, Nebulization, Q6H, Yinka Cabral MD, 3 mL at 08/30/22 0722    bisacodyL suppository 10 mg, 10 mg, Rectal, BID, Kim Fair, FNP, 10 mg at 08/29/22 2127    dextrose 50% injection 12.5 g, 12.5 g, Intravenous, PRN, Neelima Duran MD    dextrose 50% injection 25 g, 25 g, Intravenous, PRN, Neelima Duran MD    diclofenac sodium 1 % gel 4 g, 4 g, Topical (Top), Daily, Yinka Cabral MD, 4 g at 08/30/22 1005    enoxaparin injection 30 mg, 30 mg, Subcutaneous, Q12H, Alex Whitfield MD, 30 mg at 08/30/22 1006    glucagon (human recombinant) injection 1 mg, 1 mg, Intramuscular, PRN, Neelima Duran MD    glucose chewable tablet 16 g, 16 g, Oral, PRN, Neelima Duran MD    glucose chewable tablet 24 g, 24 g, Oral, PRN, Neelima Duran MD    HYDROcodone-acetaminophen 5-325 mg per tablet 1 tablet, 1 tablet, Oral, Q6H PRN, Neelima Duran MD, 1 tablet at 08/29/22 2127    insulin aspart U-100 injection 0-5 Units, 0-5 Units, Subcutaneous, QID (AC + HS) PRN, Neelima Duran MD    labetaloL injection 20 mg, 20 mg, Intravenous, Q6H PRN, Yinka Cabral MD    LIDOcaine 5 % patch 1 patch, 1 patch, Transdermal, Q24H, Yinka Cabral MD, 1 patch at 08/29/22 1700    melatonin tablet 6 mg, 6 mg, Oral, Nightly PRN, Umer Bynum MD    metoclopramide HCl injection 5 mg, 5 mg, Intravenous, Q6H PRN, Yinka Cabral MD, 5 mg at 08/27/22 0413    metoprolol injection 5 mg, 5 mg, Intravenous, Q5 Min PRN, Yinka Cabral MD    metoprolol tartrate (LOPRESSOR) tablet 25 mg, 25 mg, Oral, BID, Yinka Cabral MD, 25 mg at 08/30/22 1005    morphine injection  "2 mg, 2 mg, Intravenous, Q2H PRN, Danyell Simons, FNP, 2 mg at 08/29/22 0037    nystatin 100,000 unit/mL suspension 500,000 Units, 500,000 Units, Oral, QID (WM & HS), Yinka Cabral MD, 500,000 Units at 08/30/22 0601    ondansetron injection 4 mg, 4 mg, Intravenous, Q6H PRN, Yinka Cabral MD, 4 mg at 08/26/22 1358    pantoprazole injection 40 mg, 40 mg, Intravenous, BID, Yinka Cabral MD, 40 mg at 08/30/22 1031    sodium chloride 0.9% flush 10 mL, 10 mL, Intravenous, PRN, Umer Bynum MD    Flushing PICC Protocol, , , Until Discontinued **AND** sodium chloride 0.9% flush 10 mL, 10 mL, Intravenous, Q6H, 10 mL at 08/30/22 0602 **AND** sodium chloride 0.9% flush 10 mL, 10 mL, Intravenous, PRN, Yinka Cabral MD     acetaminophen, albuterol-ipratropium, dextrose 50%, dextrose 50%, glucagon (human recombinant), glucose, glucose, HYDROcodone-acetaminophen, insulin aspart U-100, labetaloL, melatonin, metoclopramide HCl, metoprolol, morphine, ondansetron, sodium chloride 0.9%, Flushing PICC Protocol **AND** sodium chloride 0.9% **AND** sodium chloride 0.9%     History reviewed. No pertinent family history.       Review of Systems         Objective:   /65   Pulse 84   Temp 98.7 °F (37.1 °C) (Oral)   Resp 16   Ht 5' 7.99" (1.727 m)   Wt 61.2 kg (134 lb 14.7 oz)   SpO2 98%   BMI 20.52 kg/m²      Physical Exam   Constitutional: She appears ill.   Eyes: Pupils are equal, round, and reactive to light.   Cardiovascular: Normal rate, regular rhythm and normal pulses. Pulmonary:      Effort: Pulmonary effort is normal.      Breath sounds: Normal breath sounds.     Abdominal: She exhibits distension.   Slightly firm   Musculoskeletal:      Comments: sarcopenia   Neurological: She is alert.   To person   Skin: There is pallor.        Review of Symptoms  Review of Symptoms      Symptom Assessment (ESAS 0-10 Scale)  Pain:  0  Dyspnea:  0  Anxiety:  0  Nausea:  0  Depression:  0  Anorexia:  0  Fatigue:  " 0  Insomnia:  0  Restlessness:  0  Agitation:  0         Bowel Management Plan (BMP):  Yes      Performance Status:  30    Advance Care Planning   Advance Directives:   Living Will: No    Do Not Resuscitate Status: No    Medical Power of : No          PAINAD: NA    Caregiver burden formerly assessed: yes      No results displayed because visit has over 200 results.               > 50% of 40 min of encounter was spent in chart review, face to face discussion of goals of care, symptom assessment, coordination of care and emotional support.    Danyell CHURCHP, [facility]  Palliative Medicine  [facility]

## 2022-08-30 NOTE — PROGRESS NOTES
[facility] Hospital Medicine Progress Note        Chief Complaint: Inpatient Follow-up for abdominal distension     HPI: 89 y.o. White female with a past medical history of hypertension, hyperlipidemia. The patient presented to [facility] on 8/16/2022 with a primary complaint of abdominal pain and falls due to syncope.  Patient reports having a fall on 8/12 while in her garage.  She states she was grabbing for the car door handle when she missed it and fell. Fall was unwitnessed and she reports loss of consciousness. On 08/15 she had a second fall while in the kitchen but denies any loss of consciousness.  Yesterday (08/16/2022) patient was going to the bathroom when she passed out.  Episode was witnessed by neighbor who is visiting her. She reports urinary frequency. Patient denies complaints of headache, vision changes, shortness of breath, cough.  She has been experiencing lower abdominal pain for the last several days. Granddaughter at bedside states that patient is dependent on laxatives have a bowel movement.  Although she uses laxatives she often has to manually disimpact herself . She normally goes every 4-5 days with last bowel movement being 5 days ago. She has been having increased belching.  Granddaughter also states patient has little to no appetite.  Patient lives alone, ambulates with walker, drives and completes activity daily living independently.  Family as it patient to be experiencing dementia over the last 6 months.     Upon presentation to the ED, patient afebrile, hemodynamically stable and SpO2 96% on room air.  Labs notable for WBC 28, potassium 2.6, glucose 161, lactic acid 3.9.  UA with 1+ leukocyte esterase, 2+ bilirubin, positive nitrites, trace ketones and protein.  Chest x-ray without acute processes.  CT abdomen pelvis revealed mild distention of the colon with liquefied stool, indeterminate bilateral adrenal nodules and pancreatic cysts.  While in the ED  patient received 40 mEq of IV potassium chloride, 2 g of magnesium sulfate and Rocephin was started for UTI.  Patient admitted to hospital medicine services for further medical management.     Patient was admitted on account of recurrent syncope and falls.  Found to have acute TTE high.  Colonic dilatation with lactic acidosis.   X-ray of the chest reveals left-sided atelectasis cannot rule out infiltrate.    KUB shows persistent colonic dilatation.  With a small sliding hiatal hernia, resume IV Zosyn  Inserted an NG tube and connect to low intermittent wall suction, keep NPO   IV Clinimix for nutrition  patient had small-bowel follow-through with normal transit time had ultrasound of the abdomen that showed no significant abnormality of liver small pancreatic cyst and right-sided pleural effusion. Palliative has been consulted      Interval Hx:   Patient seen and examined this morning   Patient continues to have diffuse abdominal pain and distension.  Abd with lots of gas in the lower quadrant - the  ngtube to suction will help her colonic distension .   Check KUB  D/C Levaquin   Continue IV Clinimix      Objective/physical exam:  General: In no acute distress, afebrile, on ng tube   Chest: Clear to auscultation bilaterally  Heart: RRR, +S1, S2, no appreciable murmur  Abdomen slightly distended, diffusely tender   MSK: Warm,  2 + lower extremity edema, no clubbing or cyanosis  Neurologic: Alert and oriented x4,     Assessment/Plan:  Colonic distension   Dyspepsia  Transaminitis, improving   Hypokalemia  Abdominal distention secondary to above   Small pancreatic cyst   Sliding hernia   Bilateral adrenal nodules repeat follow-up CT outpatient  Essential hypertension  Diastolic dysfunction   sinus tachycardia    hypokalemia     Plan  Patient continues to have diffuse abdominal pain and distension.  Abd with lots of gas in the lower quadrant - the  ngtube to suction will help her colonic distension .   Check KUB  D/C  Levaquin   Continue IV Clinimix   Abd with lots of gas in the lower quadrant - the  ngtube to suction will help her colonic distension .   Continue with GI recs   Palliative has been consulted   Continue with IV PPI b.i.d. given NG tube connection to suction  continue metoprolol tartrate 25 mg t.i.d.   Iv lopressor prn with parameters > 120bpm  Liver enzymes is trending down slowly.    Patient's lower extremity noted to be swollen.  Elevate on 2 pillows.  Will need to discontinue IV fluids soon.     small bowel follow though has a normal transit time  Ultrasound of the abdomen showed no significant abnormality of the liver  Repeat blood work in a.m.      VTE prophylaxis:      Patient condition:  Stable/Fair/Guarded/ Serious/ Critical     Anticipated discharge and Disposition:         VITAL SIGNS: 24 HRS MIN & MAX LAST   Temp  Min: 97.9 °F (36.6 °C)  Max: 99.1 °F (37.3 °C) 98 °F (36.7 °C)   BP  Min: 100/65  Max: 135/72 123/79   Pulse  Min: 71  Max: 88  74   Resp  Min: 16  Max: 18 16   SpO2  Min: 94 %  Max: 98 % (!) 94 %         Recent Labs   Lab 08/25/22  0432 08/27/22  0632 08/29/22  0451   WBC 11.6* 10.3 6.0   RBC 3.25* 3.42* 3.16*   HGB 10.3* 10.9* 10.1*   HCT 32.4* 34.7* 30.0*   MCV 99.7* 101.5* 94.9*   MCH 31.7* 31.9* 32.0*   MCHC 31.8* 31.4* 33.7   RDW 14.0 14.1 13.7    223 225   MPV 9.7 10.2 10.4       Recent Labs   Lab 08/24/22  0437 08/25/22  0432 08/26/22  0417 08/27/22  0632 08/29/22  0451    139 139 137 135   K 2.9* 3.0* 3.5 3.7 3.6   CO2 25 28 28 27 28   BUN 23.7* 23.9* 22.9* 21.4* 20.6*   CREATININE 0.56 0.50* 0.52* 0.52* 0.45*   CALCIUM 7.8* 7.9* 7.8* 7.9* 7.8*   MG 2.30  --  2.20  --  2.00   ALBUMIN 2.0* 1.8*  --  2.0* 1.7*   ALKPHOS 327* 330*  --  329* 208*   * 179*  --  144* 79*   * 335*  --  186* 98*   BILITOT 0.4 0.3  --  0.5 0.4          Microbiology Results (last 7 days)       ** No results found for the last 168 hours. **             See below for  Radiology    Scheduled Med:   albuterol-ipratropium  3 mL Nebulization Q6H    bisacodyL  10 mg Rectal BID    diclofenac sodium  4 g Topical (Top) Daily    enoxaparin  30 mg Subcutaneous Q12H    LIDOcaine  1 patch Transdermal Q24H    metoclopramide HCl  5 mg Intravenous Q6H    metoprolol tartrate  25 mg Oral BID    nystatin  500,000 Units Oral QID (WM & HS)    pantoprazole  40 mg Intravenous BID    sennosides 8.8 mg/5 ml  10 mL Oral Once    sodium chloride 0.9%  10 mL Intravenous Q6H        Continuous Infusions:       PRN Meds:  acetaminophen, albuterol-ipratropium, dextrose 50%, dextrose 50%, glucagon (human recombinant), glucose, glucose, HYDROcodone-acetaminophen, insulin aspart U-100, labetaloL, melatonin, metoclopramide HCl, metoprolol, morphine, ondansetron, sodium chloride 0.9%, Flushing PICC Protocol **AND** sodium chloride 0.9% **AND** sodium chloride 0.9%       VTE prophylaxis:     Patient condition:  Stable/Fair/Guarded/ Serious/ Critical    Anticipated discharge and Disposition:         All diagnosis and differential diagnosis have been reviewed; assessment and plan has been documented; I have personally reviewed the labs and test results that are presently available; I have reviewed the patients medication list; I have reviewed the consulting providers response and recommendations. I have reviewed or attempted to review medical records based upon their availability    All of the patient's questions have been  addressed and answered. Patient's is agreeable to the above stated plan. I will continue to monitor closely and make adjustments to medical management as needed.  _____________________________________________________________________    Nutrition Status:    Radiology:  X-Ray Abdomen AP 1 View  Narrative: EXAMINATION:  XR ABDOMEN AP 1 VIEW    CLINICAL HISTORY:  persistent colonic dilatation;    TECHNIQUE:  AP X-RAY OF THE ABDOMEN:    CPT 53078    COMPARISON:  August 26, 2022    FINDINGS:  Examination  reveals persistent gases distension of the abdomen persistent residual contrast throughout the colon in a distribution similar to what was seen on the previous exam of August 26, 2022  Impression: No significant change    Electronically signed by: Bharathi Gauthier  Date:    08/30/2022  Time:    10:16      Yinka Cabral MD   08/30/2022

## 2022-08-31 ENCOUNTER — ANESTHESIA (OUTPATIENT)
Dept: ENDOSCOPY | Facility: HOSPITAL | Age: 87
DRG: 392 | End: 2022-08-31
Payer: MEDICARE

## 2022-08-31 ENCOUNTER — ANESTHESIA EVENT (OUTPATIENT)
Dept: ENDOSCOPY | Facility: HOSPITAL | Age: 87
DRG: 392 | End: 2022-08-31
Payer: MEDICARE

## 2022-08-31 LAB
ABS NEUT (OLG): 3.55 X10(3)/MCL (ref 2.1–9.2)
ANION GAP SERPL CALC-SCNC: 2 MEQ/L
BASOPHILS NFR BLD MANUAL: 0.15 X10(3)/MCL (ref 0–0.2)
BASOPHILS NFR BLD MANUAL: 3 %
BUN SERPL-MCNC: 19.3 MG/DL (ref 9.8–20.1)
BURR CELLS (OLG): ABNORMAL
CALCIUM SERPL-MCNC: 7.6 MG/DL (ref 8.4–10.2)
CHLORIDE SERPL-SCNC: 100 MMOL/L (ref 98–111)
CO2 SERPL-SCNC: 28 MMOL/L (ref 23–31)
CREAT SERPL-MCNC: 0.46 MG/DL (ref 0.55–1.02)
CREAT/UREA NIT SERPL: 42
EOSINOPHIL NFR BLD MANUAL: 0.2 X10(3)/MCL (ref 0–0.9)
EOSINOPHIL NFR BLD MANUAL: 4 %
ERYTHROCYTE [DISTWIDTH] IN BLOOD BY AUTOMATED COUNT: 13.8 % (ref 11.5–17)
GFR SERPLBLD CREATININE-BSD FMLA CKD-EPI: >60 MLS/MIN/1.73/M2
GLUCOSE SERPL-MCNC: 103 MG/DL (ref 75–121)
HCT VFR BLD AUTO: 29.1 % (ref 37–47)
HGB BLD-MCNC: 9.4 GM/DL (ref 12–16)
IMM GRANULOCYTES # BLD AUTO: 0.05 X10(3)/MCL (ref 0–0.04)
IMM GRANULOCYTES NFR BLD AUTO: 1 %
INSTRUMENT WBC (OLG): 5 X10(3)/MCL
LYMPHOCYTES NFR BLD MANUAL: 0.8 X10(3)/MCL
LYMPHOCYTES NFR BLD MANUAL: 16 %
MAGNESIUM SERPL-MCNC: 2 MG/DL (ref 1.6–2.6)
MCH RBC QN AUTO: 31 PG (ref 27–31)
MCHC RBC AUTO-ENTMCNC: 32.3 MG/DL (ref 33–36)
MCV RBC AUTO: 96 FL (ref 80–94)
MICROCYTES BLD QL SMEAR: ABNORMAL
MONOCYTES NFR BLD MANUAL: 0.3 X10(3)/MCL (ref 0.1–1.3)
MONOCYTES NFR BLD MANUAL: 6 %
NEUTROPHILS NFR BLD MANUAL: 71 %
NRBC BLD AUTO-RTO: 0 %
PAPPENHEIMER BODIES (OLG): ABNORMAL
PLATELET # BLD AUTO: 283 X10(3)/MCL (ref 130–400)
PLATELET # BLD EST: ADEQUATE 10*3/UL
PMV BLD AUTO: 9.7 FL (ref 7.4–10.4)
POCT GLUCOSE: 106 MG/DL (ref 70–110)
POCT GLUCOSE: 108 MG/DL (ref 70–110)
POCT GLUCOSE: 113 MG/DL (ref 70–110)
POIKILOCYTOSIS BLD QL SMEAR: ABNORMAL
POTASSIUM SERPL-SCNC: 3.6 MMOL/L (ref 3.5–5.1)
RBC # BLD AUTO: 3.03 X10(6)/MCL (ref 4.2–5.4)
RBC MORPH BLD: ABNORMAL
SODIUM SERPL-SCNC: 130 MMOL/L (ref 132–146)
WBC # SPEC AUTO: 5.2 X10(3)/MCL (ref 4.5–11.5)

## 2022-08-31 PROCEDURE — 99233 PR SUBSEQUENT HOSPITAL CARE,LEVL III: ICD-10-PCS | Mod: ,,, | Performed by: NURSE PRACTITIONER

## 2022-08-31 PROCEDURE — 94640 AIRWAY INHALATION TREATMENT: CPT

## 2022-08-31 PROCEDURE — 63600175 PHARM REV CODE 636 W HCPCS: Performed by: INTERNAL MEDICINE

## 2022-08-31 PROCEDURE — 85025 COMPLETE CBC W/AUTO DIFF WBC: CPT | Performed by: INTERNAL MEDICINE

## 2022-08-31 PROCEDURE — 36415 COLL VENOUS BLD VENIPUNCTURE: CPT | Performed by: INTERNAL MEDICINE

## 2022-08-31 PROCEDURE — 94761 N-INVAS EAR/PLS OXIMETRY MLT: CPT

## 2022-08-31 PROCEDURE — 25000003 PHARM REV CODE 250: Performed by: INTERNAL MEDICINE

## 2022-08-31 PROCEDURE — 25000003 PHARM REV CODE 250: Performed by: NURSE ANESTHETIST, CERTIFIED REGISTERED

## 2022-08-31 PROCEDURE — 21400001 HC TELEMETRY ROOM

## 2022-08-31 PROCEDURE — 83735 ASSAY OF MAGNESIUM: CPT | Performed by: INTERNAL MEDICINE

## 2022-08-31 PROCEDURE — 99233 SBSQ HOSP IP/OBS HIGH 50: CPT | Mod: ,,, | Performed by: NURSE PRACTITIONER

## 2022-08-31 PROCEDURE — 80048 BASIC METABOLIC PNL TOTAL CA: CPT | Performed by: INTERNAL MEDICINE

## 2022-08-31 PROCEDURE — 25000242 PHARM REV CODE 250 ALT 637 W/ HCPCS: Performed by: INTERNAL MEDICINE

## 2022-08-31 PROCEDURE — 63600175 PHARM REV CODE 636 W HCPCS: Performed by: NURSE ANESTHETIST, CERTIFIED REGISTERED

## 2022-08-31 PROCEDURE — 99900035 HC TECH TIME PER 15 MIN (STAT)

## 2022-08-31 PROCEDURE — 37000009 HC ANESTHESIA EA ADD 15 MINS: Performed by: INTERNAL MEDICINE

## 2022-08-31 PROCEDURE — 45337 SIGMOIDOSCOPY & DECOMPRESS: CPT | Performed by: INTERNAL MEDICINE

## 2022-08-31 PROCEDURE — 37000008 HC ANESTHESIA 1ST 15 MINUTES: Performed by: INTERNAL MEDICINE

## 2022-08-31 PROCEDURE — 27000221 HC OXYGEN, UP TO 24 HOURS

## 2022-08-31 PROCEDURE — 63600175 PHARM REV CODE 636 W HCPCS: Performed by: NURSE PRACTITIONER

## 2022-08-31 PROCEDURE — 25000003 PHARM REV CODE 250: Performed by: NURSE PRACTITIONER

## 2022-08-31 PROCEDURE — C9113 INJ PANTOPRAZOLE SODIUM, VIA: HCPCS | Performed by: INTERNAL MEDICINE

## 2022-08-31 PROCEDURE — A4216 STERILE WATER/SALINE, 10 ML: HCPCS | Performed by: INTERNAL MEDICINE

## 2022-08-31 RX ORDER — ETOMIDATE 2 MG/ML
INJECTION INTRAVENOUS
Status: DISCONTINUED | OUTPATIENT
Start: 2022-08-31 | End: 2022-08-31

## 2022-08-31 RX ORDER — SODIUM CHLORIDE, SODIUM GLUCONATE, SODIUM ACETATE, POTASSIUM CHLORIDE AND MAGNESIUM CHLORIDE 30; 37; 368; 526; 502 MG/100ML; MG/100ML; MG/100ML; MG/100ML; MG/100ML
1000 INJECTION, SOLUTION INTRAVENOUS CONTINUOUS
Status: CANCELLED | OUTPATIENT
Start: 2022-08-31 | End: 2022-09-30

## 2022-08-31 RX ORDER — FUROSEMIDE 10 MG/ML
20 INJECTION INTRAMUSCULAR; INTRAVENOUS ONCE
Status: COMPLETED | OUTPATIENT
Start: 2022-08-31 | End: 2022-08-31

## 2022-08-31 RX ORDER — PROPOFOL 10 MG/ML
VIAL (ML) INTRAVENOUS
Status: COMPLETED
Start: 2022-08-31 | End: 2022-08-31

## 2022-08-31 RX ORDER — PROPOFOL 10 MG/ML
VIAL (ML) INTRAVENOUS
Status: DISCONTINUED | OUTPATIENT
Start: 2022-08-31 | End: 2022-08-31

## 2022-08-31 RX ORDER — ETOMIDATE 2 MG/ML
INJECTION INTRAVENOUS
Status: COMPLETED
Start: 2022-08-31 | End: 2022-08-31

## 2022-08-31 RX ORDER — LIDOCAINE HYDROCHLORIDE 10 MG/ML
1 INJECTION, SOLUTION EPIDURAL; INFILTRATION; INTRACAUDAL; PERINEURAL ONCE
Status: CANCELLED | OUTPATIENT
Start: 2022-08-31 | End: 2022-08-31

## 2022-08-31 RX ORDER — PHENYLEPHRINE HYDROCHLORIDE 10 MG/ML
INJECTION INTRAVENOUS
Status: DISPENSED
Start: 2022-08-31 | End: 2022-08-31

## 2022-08-31 RX ADMIN — METOCLOPRAMIDE 5 MG: 5 INJECTION, SOLUTION INTRAMUSCULAR; INTRAVENOUS at 04:08

## 2022-08-31 RX ADMIN — LIDOCAINE 1 PATCH: 50 PATCH TOPICAL at 05:08

## 2022-08-31 RX ADMIN — IPRATROPIUM BROMIDE AND ALBUTEROL SULFATE 3 ML: 2.5; .5 SOLUTION RESPIRATORY (INHALATION) at 12:08

## 2022-08-31 RX ADMIN — HYDROCODONE BITARTRATE AND ACETAMINOPHEN 1 TABLET: 5; 325 TABLET ORAL at 01:08

## 2022-08-31 RX ADMIN — ENOXAPARIN SODIUM 30 MG: 30 INJECTION SUBCUTANEOUS at 08:08

## 2022-08-31 RX ADMIN — IPRATROPIUM BROMIDE AND ALBUTEROL SULFATE 3 ML: 2.5; .5 SOLUTION RESPIRATORY (INHALATION) at 02:08

## 2022-08-31 RX ADMIN — ETOMIDATE 6 MG: 2 INJECTION INTRAVENOUS at 10:08

## 2022-08-31 RX ADMIN — BISACODYL 10 MG: 10 SUPPOSITORY RECTAL at 08:08

## 2022-08-31 RX ADMIN — PANTOPRAZOLE SODIUM 40 MG: 40 INJECTION, POWDER, FOR SOLUTION INTRAVENOUS at 01:08

## 2022-08-31 RX ADMIN — SODIUM CHLORIDE, PRESERVATIVE FREE 10 ML: 5 INJECTION INTRAVENOUS at 01:08

## 2022-08-31 RX ADMIN — LEUCINE, PHENYLALANINE, LYSINE, METHIONINE, ISOLEUCINE, VALINE, HISTIDINE, THREONINE, TRYPTOPHAN, ALANINE, GLYCINE, ARGININE, PROLINE, SERINE, TYROSINE, SODIUM ACETATE, DIBASIC POTASSIUM PHOSPHATE, MAGNESIUM CHLORIDE, SODIUM CHLORIDE, CALCIUM CHLORIDE, DEXTROSE
311; 238; 247; 170; 255; 247; 204; 179; 77; 880; 438; 489; 289; 213; 17; 297; 261; 51; 77; 33; 5 INJECTION INTRAVENOUS at 05:08

## 2022-08-31 RX ADMIN — NYSTATIN 500000 UNITS: 100000 SUSPENSION ORAL at 09:08

## 2022-08-31 RX ADMIN — ENOXAPARIN SODIUM 30 MG: 30 INJECTION SUBCUTANEOUS at 01:08

## 2022-08-31 RX ADMIN — METOCLOPRAMIDE 5 MG: 5 INJECTION, SOLUTION INTRAMUSCULAR; INTRAVENOUS at 08:08

## 2022-08-31 RX ADMIN — METOCLOPRAMIDE 5 MG: 5 INJECTION, SOLUTION INTRAMUSCULAR; INTRAVENOUS at 01:08

## 2022-08-31 RX ADMIN — SODIUM CHLORIDE, PRESERVATIVE FREE 10 ML: 5 INJECTION INTRAVENOUS at 12:08

## 2022-08-31 RX ADMIN — PROPOFOL 30 MG: 10 INJECTION, EMULSION INTRAVENOUS at 10:08

## 2022-08-31 RX ADMIN — HYDROCODONE BITARTRATE AND ACETAMINOPHEN 1 TABLET: 5; 325 TABLET ORAL at 08:08

## 2022-08-31 RX ADMIN — METOPROLOL TARTRATE 25 MG: 25 TABLET, FILM COATED ORAL at 01:08

## 2022-08-31 RX ADMIN — SODIUM CHLORIDE: 9 INJECTION, SOLUTION INTRAVENOUS at 10:08

## 2022-08-31 RX ADMIN — IPRATROPIUM BROMIDE AND ALBUTEROL SULFATE 3 ML: 2.5; .5 SOLUTION RESPIRATORY (INHALATION) at 08:08

## 2022-08-31 RX ADMIN — SODIUM CHLORIDE, PRESERVATIVE FREE 10 ML: 5 INJECTION INTRAVENOUS at 11:08

## 2022-08-31 RX ADMIN — METOPROLOL TARTRATE 25 MG: 25 TABLET, FILM COATED ORAL at 08:08

## 2022-08-31 RX ADMIN — FUROSEMIDE 20 MG: 10 INJECTION, SOLUTION INTRAMUSCULAR; INTRAVENOUS at 01:08

## 2022-08-31 RX ADMIN — SODIUM CHLORIDE, PRESERVATIVE FREE 10 ML: 5 INJECTION INTRAVENOUS at 06:08

## 2022-08-31 RX ADMIN — PANTOPRAZOLE SODIUM 40 MG: 40 INJECTION, POWDER, FOR SOLUTION INTRAVENOUS at 08:08

## 2022-08-31 RX ADMIN — SODIUM CHLORIDE, PRESERVATIVE FREE 10 ML: 5 INJECTION INTRAVENOUS at 05:08

## 2022-08-31 RX ADMIN — DICLOFENAC SODIUM 4 G: 10 GEL TOPICAL at 01:08

## 2022-08-31 RX ADMIN — IPRATROPIUM BROMIDE AND ALBUTEROL SULFATE 3 ML: 2.5; .5 SOLUTION RESPIRATORY (INHALATION) at 07:08

## 2022-08-31 NOTE — ANESTHESIA POSTPROCEDURE EVALUATION
Anesthesia Post Evaluation    Patient: Fidelina Darling    Procedure(s) Performed: Procedure(s) (LRB):  SIGMOIDOSCOPY (N/A)  COLECTOMY, SIGMOID    Final Anesthesia Type: general      Patient location during evaluation: PACU  Patient participation: Yes- Able to Participate  Level of consciousness: awake and alert  Post-procedure vital signs: reviewed and stable  Pain management: adequate  Airway patency: patent    PONV status at discharge: No PONV, nausea (controlled) and vomiting (controlled)  Anesthetic complications: no      Cardiovascular status: blood pressure returned to baseline and stable  Respiratory status: unassisted  Hydration status: euvolemic  Follow-up not needed.          Vitals Value Taken Time   /76 08/31/22 1059   Temp 37 08/31/22 1233   Pulse 78 08/31/22 1059   Resp 22 08/31/22 1059   SpO2 96 % 08/31/22 1059         Event Time   Out of Recovery 08/31/2022 10:22:44         Pain/Jesse Score: Pain Rating Prior to Med Admin: 7 (8/30/2022  9:55 PM)  Pain Rating Post Med Admin: 0 (8/30/2022 10:25 PM)  Jesse Score: 9 (8/31/2022 10:30 AM)

## 2022-08-31 NOTE — ANESTHESIA PREPROCEDURE EVALUATION
"                                                                                                             08/31/2022  Fidelina Darling is a 89 y.o., female presents with Lower Abdominal pain and  Chronic constipation.  lower abdominal pain for the last several days. Granddaughter at bedside states that patient is dependent on laxatives have a bowel movement.  Although she uses laxatives she often has to manually disimpact herself . She normally goes every 4-5 days with last bowel movement being 5 days ago. She has been having increased belching.  Granddaughter also states patient has little to no appetite.     She presented to [facility] on 8/16/2022 with a primary complaint of abdominal pain and falls due to syncope.   Presentation to ED she was Hypokalemic, elevated LFT's and Anemic.  Diagnosis: Constipation, unspecified constipation type    She comes to [facility] for the noted procedure under GA/TIVA w/ IV Propofol.  Procedure:   SIGMOIDOSCOPY (Abdomen)    PMHx:  Hypertension  Hyperlipidemia      PSHx:  No surgical history recorded        Lab Data:         Vital signs:  Pre Vitals  Current as of 08/31/22 0924  BP: 137/64 Pulse: 78   Resp: 14 SpO2: 99   Temp: 36.2 °C (97.2 °F)   Height: 5' 7.99" (1.727 m) (08/17/22) Weight: 61.2 kg (134 lb 14.7 oz) (08/17/22)   BMI: 20.5 IBW: 63.9 kg (140 lb 13.3 oz)   Last edited 08/31/22 0916 by TH          Pre-op Assessment    I have reviewed the Patient Summary Reports.     I have reviewed the Nursing Notes. I have reviewed the NPO Status.   I have reviewed the Medications.     Review of Systems  Anesthesia Hx:  No problems with previous Anesthesia    Social:  Non-Smoker    Hematology/Oncology:  Hematology Normal   Oncology Normal     EENT/Dental:EENT/Dental Normal   Cardiovascular:  Cardiovascular Normal Exercise tolerance: good   Functional Capacity good / => 4 METS    Pulmonary:  Pulmonary Normal    Renal/:  Renal/ Normal     Hepatic/GI:  Hepatic/GI Normal  "   Musculoskeletal:  Musculoskeletal Normal    Neurological:  Neurology Normal    Endocrine:  Endocrine Normal    Dermatological:  Skin Normal    Psych:  Psychiatric Normal           Physical Exam  General: Alert, Oriented, Well nourished and Cooperative    Airway:  Mallampati: II   Mouth Opening: Normal  TM Distance: Normal  Tongue: Normal  Neck ROM: Normal ROM    Dental:  Intact    Chest/Lungs:  Clear to auscultation, Normal Respiratory Rate    Heart:  Rate: Normal  Rhythm: Regular Rhythm        Anesthesia Plan  Type of Anesthesia, risks & benefits discussed:    Anesthesia Type: Gen Natural Airway  Intra-op Monitoring Plan: Standard ASA Monitors  Induction:  IV  Informed Consent: Informed consent signed with the Patient and all parties understand the risks and agree with anesthesia plan.  All questions answered.   ASA Score: 2  Day of Surgery Review of History & Physical: H&P Update referred to the surgeon/provider.    Ready For Surgery From Anesthesia Perspective.     .

## 2022-08-31 NOTE — TRANSFER OF CARE
"Anesthesia Transfer of Care Note    Patient: Fidelina Darling    Procedure(s) Performed: Procedure(s) (LRB):  SIGMOIDOSCOPY (N/A)  COLECTOMY, SIGMOID    Patient location: PACU    Anesthesia Type: general    Transport from OR: Transported from OR on room air with adequate spontaneous ventilation    Post pain: adequate analgesia    Post assessment: no apparent anesthetic complications    Post vital signs: stable    Level of consciousness: sedated, awake and responds to stimulation    Nausea/Vomiting: no nausea/vomiting    Complications: none    Transfer of care protocol was followed      Last vitals:   Visit Vitals  /64   Pulse 78   Temp 36.2 °C (97.2 °F)   Resp 14   Ht 5' 7.99" (1.727 m)   Wt 61.2 kg (134 lb 14.7 oz)   SpO2 99%   BMI 20.52 kg/m²     "

## 2022-08-31 NOTE — PROVATION PATIENT INSTRUCTIONS
Discharge Summary/Instructions after an Endoscopic Procedure  Patient Name: Fidelina Draling  Patient MRN: [id number]  Patient YOB: 1932 Wednesday, August 31, 2022  Torrey Garcia MD  Dear patient,  As a result of recent federal legislation (The Federal Cures Act), you may   receive lab or pathology results from your procedure in your [facility]   account before your physician is able to contact you. Your physician or   their representative will relay the results to you with their   recommendations at their soonest availability.  Thank you,  RESTRICTIONS:  During your procedure today, you received medications for sedation.  These   medications may affect your judgment, balance and coordination.  Therefore,   for 24 hours, you have the following restrictions:   - DO NOT drive a car, operate machinery, make legal/financial decisions,   sign important papers or drink alcohol.    ACTIVITY:  Today: no heavy lifting, straining or running due to procedural   sedation/anesthesia.  The following day: return to full activity including work.  DIET:  Eat and drink normally unless instructed otherwise.     TREATMENT FOR COMMON SIDE EFFECTS:  - Mild abdominal pain, nausea, belching, bloating or excessive gas:  rest,   eat lightly and use a heating pad.  - Sore Throat: treat with throat lozenges and/or gargle with warm salt   water.  - Because air was used during the procedure, expelling large amounts of air   from your rectum or belching is normal.  - If a bowel prep was taken, you may not have a bowel movement for 1-3 days.    This is normal.  SYMPTOMS TO WATCH FOR AND REPORT TO YOUR PHYSICIAN:  1. Abdominal pain or bloating, other than gas cramps.  2. Chest pain.  3. Back pain.  4. Signs of infection such as: chills or fever occurring within 24 hours   after the procedure.  5. Rectal bleeding, which would show as bright red, maroon, or black stools.   (A tablespoon of blood from the rectum is not serious,  especially if   hemorrhoids are present.)  6. Vomiting.  7. Weakness or dizziness.  GO DIRECTLY TO THE NEAREST EMERGENCY ROOM IF YOU HAVE ANY OF THE FOLLOWING:      Difficulty breathing              Chills and/or fever over 101 F   Persistent vomiting and/or vomiting blood   Severe abdominal pain   Severe chest pain   Black, tarry stools   Bleeding- more than one tablespoon   Any other symptom or condition that you feel may need urgent attention  Your doctor recommends these additional instructions:  If any biopsies were taken, your doctors clinic will contact you in 1 to 2   weeks with any results.  - Return patient to [facility] for [facility] care.   - Encourage out of bed to chair  - Potentially clamp NG and allow ice chips and see how she does  For questions, problems or results please call your physician - Torrey Garcia MD at Work:  [phone].  [facility], EMERGENCY ROOM PHONE NUMBER: [phone]  IF A COMPLICATION OR EMERGENCY SITUATION ARISES AND YOU ARE UNABLE TO REACH   YOUR PHYSICIAN - GO DIRECTLY TO THE EMERGENCY ROOM.  Torrey Garcia MD  8/31/2022 10:26:36 AM  This report has been verified and signed electronically.  Dear patient,  As a result of recent federal legislation (The Federal Cures Act), you may   receive lab or pathology results from your procedure in your [facility]   account before your physician is able to contact you. Your physician or   their representative will relay the results to you with their   recommendations at their soonest availability.  Thank you,  PROVATION

## 2022-08-31 NOTE — PROGRESS NOTES
[facility] Hospital Medicine Progress Note        Chief Complaint: Inpatient Follow-up for abdominal distension     HPI: 89 y.o. White female with a past medical history of hypertension, hyperlipidemia. The patient presented to [facility] on 8/16/2022 with a primary complaint of abdominal pain and falls due to syncope.  Patient reports having a fall on 8/12 while in her garage.  She states she was grabbing for the car door handle when she missed it and fell. Fall was unwitnessed and she reports loss of consciousness. On 08/15 she had a second fall while in the kitchen but denies any loss of consciousness.  Yesterday (08/16/2022) patient was going to the bathroom when she passed out.  Episode was witnessed by neighbor who is visiting her. She reports urinary frequency. Patient denies complaints of headache, vision changes, shortness of breath, cough.  She has been experiencing lower abdominal pain for the last several days. Granddaughter at bedside states that patient is dependent on laxatives have a bowel movement.  Although she uses laxatives she often has to manually disimpact herself . She normally goes every 4-5 days with last bowel movement being 5 days ago. She has been having increased belching.  Granddaughter also states patient has little to no appetite.  Patient lives alone, ambulates with walker, drives and completes activity daily living independently.  Family as it patient to be experiencing dementia over the last 6 months.     Upon presentation to the ED, patient afebrile, hemodynamically stable and SpO2 96% on room air.  Labs notable for WBC 28, potassium 2.6, glucose 161, lactic acid 3.9.  UA with 1+ leukocyte esterase, 2+ bilirubin, positive nitrites, trace ketones and protein.  Chest x-ray without acute processes.  CT abdomen pelvis revealed mild distention of the colon with liquefied stool, indeterminate bilateral adrenal nodules and pancreatic cysts.  While in the ED  patient received 40 mEq of IV potassium chloride, 2 g of magnesium sulfate and Rocephin was started for UTI.  Patient admitted to hospital medicine services for further medical management.     Patient was admitted on account of recurrent syncope and falls.  Found to have acute TTE high.  Colonic dilatation with lactic acidosis.   X-ray of the chest reveals left-sided atelectasis cannot rule out infiltrate.    KUB shows persistent colonic dilatation.  With a small sliding hiatal hernia, resume IV Zosyn  Inserted an NG tube and connect to low intermittent wall suction, keep NPO   IV Clinimix for nutrition  patient had small-bowel follow-through with normal transit time had ultrasound of the abdomen that showed no significant abnormality of liver small pancreatic cyst and right-sided pleural effusion. Palliative has been consulted      Interval Hx:   GI taking for endoscopic treatment this morning   Add on IV lasix 20 x1 to help diurese give b/l LE edema   Still on clinimix - as source of nutrition  Elevate LE on 2-3 pillows      Objective/physical exam:  General: In no acute distress, afebrile, on ng tube   Chest: Clear to auscultation bilaterally  Heart: RRR, +S1, S2, no appreciable murmur  Abdomen slightly distended, diffusely tender   MSK: Warm,  2 + lower extremity edema, no clubbing or cyanosis  Neurologic: Alert and oriented x4,     Assessment/Plan:  Colonic distension   Dyspepsia  Transaminitis, improving   Hypokalemia  Abdominal distention secondary to above   B/L LE edema bilateral  Small pancreatic cyst   Sliding hernia   Bilateral adrenal nodules repeat follow-up CT outpatient  Essential hypertension  Diastolic dysfunction   sinus tachycardia    hypokalemia     Plan  GI taking for endoscopic treatment this morning   Add on IV lasix 20 x1 to help diurese give b/l LE edema   Still on clinimix - as source of nutrition  Elevate LE on 2-3 pillows   Abd with lots of gas in the lower quadrant - the  ngtube to  suction will not help her colonic distension .   Continue with GI recs   Palliative has been consulted   Continue with IV PPI b.i.d.   continue metoprolol tartrate 25 mg t.i.d.   Iv lopressor prn with parameters > 120bpm  Liver enzymes is trending down slowly.    Patient's lower extremity noted to be swollen.  Elevate on 2 pillows.  Will need to discontinue IV fluids soon once she has other form of nutrition.     small bowel follow though has a normal transit time  Ultrasound of the abdomen showed no significant abnormality of the liver       VTE prophylaxis:      Patient condition:  Stable/Fair/Guarded/ Serious/ Critical     Anticipated discharge and Disposition:          VITAL SIGNS: 24 HRS MIN & MAX LAST   Temp  Min: 97.2 °F (36.2 °C)  Max: 98.3 °F (36.8 °C) 97.2 °F (36.2 °C)   BP  Min: 99/48  Max: 144/77 137/64   Pulse  Min: 70  Max: 78  78   Resp  Min: 14  Max: 20 14   SpO2  Min: 92 %  Max: 99 % 99 %       Recent Labs   Lab 08/27/22  0632 08/29/22  0451 08/31/22  0415   WBC 10.3 6.0 5.2   RBC 3.42* 3.16* 3.03*   HGB 10.9* 10.1* 9.4*   HCT 34.7* 30.0* 29.1*   .5* 94.9* 96.0*   MCH 31.9* 32.0* 31.0   MCHC 31.4* 33.7 32.3*   RDW 14.1 13.7 13.8    225 283   MPV 10.2 10.4 9.7       Recent Labs   Lab 08/25/22  0432 08/26/22  0417 08/27/22  0632 08/29/22  0451 08/31/22  0415    139 137 135 130*   K 3.0* 3.5 3.7 3.6 3.6   CO2 28 28 27 28 28   BUN 23.9* 22.9* 21.4* 20.6* 19.3   CREATININE 0.50* 0.52* 0.52* 0.45* 0.46*   CALCIUM 7.9* 7.8* 7.9* 7.8* 7.6*   MG  --  2.20  --  2.00 2.00   ALBUMIN 1.8*  --  2.0* 1.7*  --    ALKPHOS 330*  --  329* 208*  --    *  --  144* 79*  --    *  --  186* 98*  --    BILITOT 0.3  --  0.5 0.4  --           Microbiology Results (last 7 days)       ** No results found for the last 168 hours. **             See below for Radiology    Scheduled Med:   albuterol-ipratropium  3 mL Nebulization Q6H    bisacodyL  10 mg Rectal BID    diclofenac sodium  4 g Topical  (Top) Daily    enoxaparin  30 mg Subcutaneous Q12H    furosemide (LASIX) injection  20 mg Intravenous Once    LIDOcaine  1 patch Transdermal Q24H    metoclopramide HCl  5 mg Intravenous Q6H    metoprolol tartrate  25 mg Oral BID    nystatin  500,000 Units Oral QID (WM & HS)    pantoprazole  40 mg Intravenous BID    PHENYLephrine        sodium chloride 0.9%  10 mL Intravenous Q6H        Continuous Infusions:   Amino acid 4.25% - dextrose 5% (CLINIMIX-E) solution (1L provides 42.5 gm AA, 50 gm CHO (170 kcal/L dextrose), Na 35, K 30, Mg 5, Ca 4.5, Acetate 70, Cl 39, Phos 15) 75 mL/hr at 08/30/22 2210        PRN Meds:  acetaminophen, albuterol-ipratropium, dextrose 50%, dextrose 50%, glucagon (human recombinant), glucose, glucose, HYDROcodone-acetaminophen, insulin aspart U-100, labetaloL, melatonin, metoclopramide HCl, metoprolol, morphine, ondansetron, sodium chloride 0.9%, Flushing PICC Protocol **AND** sodium chloride 0.9% **AND** sodium chloride 0.9%         VTE prophylaxis:     Patient condition:  Stable/Fair/Guarded/ Serious/ Critical    Anticipated discharge and Disposition:         All diagnosis and differential diagnosis have been reviewed; assessment and plan has been documented; I have personally reviewed the labs and test results that are presently available; I have reviewed the patients medication list; I have reviewed the consulting providers response and recommendations. I have reviewed or attempted to review medical records based upon their availability    All of the patient's questions have been  addressed and answered. Patient's is agreeable to the above stated plan. I will continue to monitor closely and make adjustments to medical management as needed.  _____________________________________________________________________    Nutrition Status:    Radiology:  X-Ray Abdomen AP 1 View  Narrative: EXAMINATION:  XR ABDOMEN AP 1 VIEW    CLINICAL HISTORY:  persistent colonic dilatation;    TECHNIQUE:  AP  X-RAY OF THE ABDOMEN:    CPT 16386    COMPARISON:  August 26, 2022    FINDINGS:  Examination reveals persistent gases distension of the abdomen persistent residual contrast throughout the colon in a distribution similar to what was seen on the previous exam of August 26, 2022  Impression: No significant change    Electronically signed by: Bharathi Gauthier  Date:    08/30/2022  Time:    10:16      Yinka Cabral MD   08/31/2022

## 2022-09-01 LAB
POCT GLUCOSE: 111 MG/DL (ref 70–110)
POCT GLUCOSE: 112 MG/DL (ref 70–110)
POCT GLUCOSE: 118 MG/DL (ref 70–110)

## 2022-09-01 PROCEDURE — 25000242 PHARM REV CODE 250 ALT 637 W/ HCPCS: Performed by: INTERNAL MEDICINE

## 2022-09-01 PROCEDURE — 63600175 PHARM REV CODE 636 W HCPCS: Performed by: NURSE PRACTITIONER

## 2022-09-01 PROCEDURE — C9113 INJ PANTOPRAZOLE SODIUM, VIA: HCPCS | Performed by: INTERNAL MEDICINE

## 2022-09-01 PROCEDURE — 25000003 PHARM REV CODE 250: Performed by: INTERNAL MEDICINE

## 2022-09-01 PROCEDURE — 99900031 HC PATIENT EDUCATION (STAT)

## 2022-09-01 PROCEDURE — 97535 SELF CARE MNGMENT TRAINING: CPT | Mod: CO

## 2022-09-01 PROCEDURE — A4216 STERILE WATER/SALINE, 10 ML: HCPCS | Performed by: INTERNAL MEDICINE

## 2022-09-01 PROCEDURE — 21400001 HC TELEMETRY ROOM

## 2022-09-01 PROCEDURE — B4185 PARENTERAL SOL 10 GM LIPIDS: HCPCS | Performed by: NURSE PRACTITIONER

## 2022-09-01 PROCEDURE — 63600175 PHARM REV CODE 636 W HCPCS: Performed by: INTERNAL MEDICINE

## 2022-09-01 PROCEDURE — 94640 AIRWAY INHALATION TREATMENT: CPT

## 2022-09-01 PROCEDURE — 25000003 PHARM REV CODE 250: Performed by: NURSE PRACTITIONER

## 2022-09-01 PROCEDURE — 97530 THERAPEUTIC ACTIVITIES: CPT | Mod: CQ

## 2022-09-01 PROCEDURE — 94761 N-INVAS EAR/PLS OXIMETRY MLT: CPT

## 2022-09-01 PROCEDURE — 99900035 HC TECH TIME PER 15 MIN (STAT)

## 2022-09-01 PROCEDURE — 27000221 HC OXYGEN, UP TO 24 HOURS

## 2022-09-01 PROCEDURE — 25000003 PHARM REV CODE 250: Performed by: STUDENT IN AN ORGANIZED HEALTH CARE EDUCATION/TRAINING PROGRAM

## 2022-09-01 RX ORDER — BISACODYL 10 MG
10 SUPPOSITORY, RECTAL RECTAL DAILY
Status: COMPLETED | OUTPATIENT
Start: 2022-09-02 | End: 2022-09-04

## 2022-09-01 RX ADMIN — ENOXAPARIN SODIUM 30 MG: 30 INJECTION SUBCUTANEOUS at 10:09

## 2022-09-01 RX ADMIN — THERA TABS 1 TABLET: TAB at 03:09

## 2022-09-01 RX ADMIN — I.V. FAT EMULSION 250 ML: 20 EMULSION INTRAVENOUS at 12:09

## 2022-09-01 RX ADMIN — IPRATROPIUM BROMIDE AND ALBUTEROL SULFATE 3 ML: 2.5; .5 SOLUTION RESPIRATORY (INHALATION) at 12:09

## 2022-09-01 RX ADMIN — LEUCINE, PHENYLALANINE, LYSINE, METHIONINE, ISOLEUCINE, VALINE, HISTIDINE, THREONINE, TRYPTOPHAN, ALANINE, GLYCINE, ARGININE, PROLINE, SERINE, TYROSINE, SODIUM ACETATE, DIBASIC POTASSIUM PHOSPHATE, MAGNESIUM CHLORIDE, SODIUM CHLORIDE, CALCIUM CHLORIDE, DEXTROSE
311; 238; 247; 170; 255; 247; 204; 179; 77; 880; 438; 489; 289; 213; 17; 297; 261; 51; 77; 33; 5 INJECTION INTRAVENOUS at 06:09

## 2022-09-01 RX ADMIN — IPRATROPIUM BROMIDE AND ALBUTEROL SULFATE 3 ML: 2.5; .5 SOLUTION RESPIRATORY (INHALATION) at 07:09

## 2022-09-01 RX ADMIN — DICLOFENAC SODIUM 4 G: 10 GEL TOPICAL at 09:09

## 2022-09-01 RX ADMIN — METOCLOPRAMIDE 5 MG: 5 INJECTION, SOLUTION INTRAMUSCULAR; INTRAVENOUS at 03:09

## 2022-09-01 RX ADMIN — LIDOCAINE 1 PATCH: 50 PATCH TOPICAL at 04:09

## 2022-09-01 RX ADMIN — METOPROLOL TARTRATE 25 MG: 25 TABLET, FILM COATED ORAL at 10:09

## 2022-09-01 RX ADMIN — NYSTATIN 500000 UNITS: 100000 SUSPENSION ORAL at 12:09

## 2022-09-01 RX ADMIN — SODIUM CHLORIDE, PRESERVATIVE FREE 10 ML: 5 INJECTION INTRAVENOUS at 05:09

## 2022-09-01 RX ADMIN — NYSTATIN 500000 UNITS: 100000 SUSPENSION ORAL at 08:09

## 2022-09-01 RX ADMIN — SODIUM CHLORIDE, PRESERVATIVE FREE 10 ML: 5 INJECTION INTRAVENOUS at 12:09

## 2022-09-01 RX ADMIN — SODIUM CHLORIDE, PRESERVATIVE FREE 10 ML: 5 INJECTION INTRAVENOUS at 06:09

## 2022-09-01 RX ADMIN — ENOXAPARIN SODIUM 30 MG: 30 INJECTION SUBCUTANEOUS at 08:09

## 2022-09-01 RX ADMIN — PANTOPRAZOLE SODIUM 40 MG: 40 INJECTION, POWDER, FOR SOLUTION INTRAVENOUS at 08:09

## 2022-09-01 RX ADMIN — METOCLOPRAMIDE 5 MG: 5 INJECTION, SOLUTION INTRAMUSCULAR; INTRAVENOUS at 08:09

## 2022-09-01 RX ADMIN — METOCLOPRAMIDE 5 MG: 5 INJECTION, SOLUTION INTRAMUSCULAR; INTRAVENOUS at 10:09

## 2022-09-01 RX ADMIN — NYSTATIN 500000 UNITS: 100000 SUSPENSION ORAL at 04:09

## 2022-09-01 RX ADMIN — HYDROCODONE BITARTRATE AND ACETAMINOPHEN 1 TABLET: 5; 325 TABLET ORAL at 10:09

## 2022-09-01 RX ADMIN — METOPROLOL TARTRATE 25 MG: 25 TABLET, FILM COATED ORAL at 08:09

## 2022-09-01 RX ADMIN — PANTOPRAZOLE SODIUM 40 MG: 40 INJECTION, POWDER, FOR SOLUTION INTRAVENOUS at 10:09

## 2022-09-01 RX ADMIN — METOCLOPRAMIDE 5 MG: 5 INJECTION, SOLUTION INTRAMUSCULAR; INTRAVENOUS at 02:09

## 2022-09-01 RX ADMIN — MELATONIN TAB 3 MG 6 MG: 3 TAB at 08:09

## 2022-09-01 RX ADMIN — HYDROCODONE BITARTRATE AND ACETAMINOPHEN 1 TABLET: 5; 325 TABLET ORAL at 08:09

## 2022-09-01 NOTE — PLAN OF CARE
Problem: Adult Inpatient Plan of Care  Goal: Plan of Care Review  Outcome: Ongoing, Progressing  Goal: Patient-Specific Goal (Individualized)  Outcome: Ongoing, Progressing  Goal: Absence of Hospital-Acquired Illness or Injury  Outcome: Ongoing, Progressing  Goal: Optimal Comfort and Wellbeing  Outcome: Ongoing, Progressing  Goal: Readiness for Transition of Care  Outcome: Ongoing, Progressing     Problem: Skin Injury Risk Increased  Goal: Skin Health and Integrity  Outcome: Ongoing, Progressing     Problem: Fall Injury Risk  Goal: Absence of Fall and Fall-Related Injury  Outcome: Ongoing, Progressing     Problem: Infection  Goal: Absence of Infection Signs and Symptoms  Outcome: Ongoing, Progressing     Problem: Coping Ineffective  Goal: Effective Coping  Outcome: Ongoing, Progressing

## 2022-09-01 NOTE — PROGRESS NOTES
[facility]    Chief Complaint: Inpatient Follow-up for      Subjective:  Fidelina Darling is a 89 y.o. White female with a past medical history of hypertension, hyperlipidemia. The patient presented to [facility] on 8/16/2022 with a primary complaint of abdominal pain and falls due to syncope.  Patient reports having a fall on 8/12 while in her garage.  She states she was grabbing for the car door handle when she missed it and fell. Fall was unwitnessed and she reports loss of consciousness. On 08/15 she had a second fall while in the kitchen but denies any loss of consciousness.  Yesterday (08/16/2022) patient was going to the bathroom when she passed out.  Episode was witnessed by neighbor who is visiting her. She reports urinary frequency. Patient denies complaints of headache, vision changes, shortness of breath, cough.  She has been experiencing lower abdominal pain for the last several days. Granddaughter at bedside states that patient is dependent on laxatives have a bowel movement.  Although she uses laxatives she often has to manually disimpact herself . She normally goes every 4-5 days with last bowel movement being 5 days ago. She has been having increased belching.  Granddaughter also states patient has little to no appetite.  Patient lives alone, ambulates with walker, drives and completes activity daily living independently.       Upon presentation to the ED, patient afebrile, hemodynamically stable and SpO2 96% on room air.  Labs notable for WBC 28, potassium 2.6, glucose 161, lactic acid 3.9.  UA with 1+ leukocyte esterase, 2+ bilirubin, positive nitrites, trace ketones and protein.  Chest x-ray without acute processes.  CT abdomen pelvis revealed mild distention of the colon with liquefied stool, indeterminate bilateral adrenal nodules and pancreatic cysts.  While in the ED patient received 40 mEq of IV potassium chloride, 2 g of magnesium sulfate and Rocephin was started for  UTI.    Patient was admitted on account of recurrent syncope and falls. Colonic dilatation with lactic acidosis.   X-ray of the chest reveals left-sided atelectasis cannot rule out infiltrate.      8-21-22  Pt with persistent abd pain and nausea  Has NGT to LIWS  Belching  Minimal flatus  KUB with dilation 56.4mm to 76.8mm of colon    8/22/22:  NG still in place. Was on high suction. Placed on low suction.  Generalized mild abdominal discomfort  No flatus, no stool.  KUB pending.    8/23/22:  US ordered for today due to elevated LFTs  Patient having small liquid brown stool  NG in place with minimal output     8/24/22:  SBFT unremarkable- normal SB transit time  Abdominal U/S unremarkable  LFTs trending up   Patient continues to have stool (per nurse charting x5 stools yesterday) and endorses diffuse abdominal discomfort,   Denies N/V, fever/chills    8/25/22:  LFTS trending down. AST/ALT: 419/209---> 335/179  Patient has not had a BM today and continues to endorse diffuse abdominal pain.   Denies N/V, fever/chills    8/26/22:  Abdomen remains distended and quiet. NG replaced and to LIS and 600ml aspirated. Aspirate is dark.  Just had a small brown stool.  Turned her and c/o abdominal pain.  KUB revealed contrast in colon remains.  On Movantik and dulcolax supp daily.  LFTs decreasing    8/29/22:  Abdomen distended and tender   Pain about the same, not worsening in nature  NG in place and connected to suction but turned off.   Has not had a BM yet today, unable to recall her most recent BM  She denies N/V, and currently has no appetite  Elevated LFTS have improved, 98/79 this am   Currently on Relistor     8/30/22:  Still with abdominal distention  NG in place but suction is turned off. Placed to LIS.  Patient moans when abdomen palpated.  Has extensive peripheral edema.  Urine orange/red color    8/31/22:  Flex Sig:  Findings:        Hemorrhoids were found on perianal exam.        The lumen of the sigmoid colon was  mildly dilated. Decompression was        successful.   Impression:    - Preparation of the colon was poor.                          - Hemorrhoids found on perianal exam.                          - Mild dilation of the examined colon with                          significant amount of stool.                          - Overall, her abdomen is soft based on exam, and                          it seems as though ileus vs sbo is more a                          contributor to her symptoms than                          pseudo-obstruction.                          - No specimens collected  9/1/22:  Patient had a loose BM this am- no associated pain or discomfort, brown in color  Abdomen remains significantly distended   NG was in place to LIS with very little output, so NG was clamped and we will try ice chips.  Patient denies N/V, notes that her overall abdominal discomfort is slightly improved        ROS:  General: in bed  CV: peripheral edema  GI: Abdominal pain and distention.   Information gathering limited by clinical condition    Objective    VITAL SIGNS: 24 HRS MIN & MAX LAST   Temp  Min: 97.6 °F (36.4 °C)  Max: 97.8 °F (36.6 °C) 97.6 °F (36.4 °C)   BP  Min: 96/55  Max: 150/77 (!) 148/78   Pulse  Min: 69  Max: 90  80   Resp  Min: 14  Max: 22 18   SpO2  Min: 87 %  Max: 99 % 95 %     Physical exam:  General appearance: Well-developed, well-nourished female in no distress.  IN bed.  HEENT: Atraumatic head. Dry mucous membranes of oral cavity.  Eyes: anicteric  Lungs: Clear to auscultation bilaterally.   Heart: Regular rate and rhythm. 3+ pitting edema in bilateral LE.  Abdomen:  firm, distended, generalized tenderness. Bowel sounds are quiet. NG in place, clamped.    Extremities: No cyanosis, clubbing. No deformities.  Skin: No Rash. Warm and dry.  Neuro: Awake, lethargic. Motor and sensory exams grossly intact.  Psyc: calm and cooperative    Recent Labs   Lab 08/27/22  0632 08/29/22  0451 08/31/22  0415   WBC 10.3 6.0  5.2   RBC 3.42* 3.16* 3.03*   HGB 10.9* 10.1* 9.4*   HCT 34.7* 30.0* 29.1*   .5* 94.9* 96.0*   MCH 31.9* 32.0* 31.0   MCHC 31.4* 33.7 32.3*   RDW 14.1 13.7 13.8    225 283   MPV 10.2 10.4 9.7       Recent Labs   Lab 08/26/22  0417 08/27/22  0632 08/29/22  0451 08/31/22  0415    137 135 130*   K 3.5 3.7 3.6 3.6   CO2 28 27 28 28   BUN 22.9* 21.4* 20.6* 19.3   CREATININE 0.52* 0.52* 0.45* 0.46*   CALCIUM 7.8* 7.9* 7.8* 7.6*   MG 2.20  --  2.00 2.00   ALBUMIN  --  2.0* 1.7*  --    ALKPHOS  --  329* 208*  --    ALT  --  144* 79*  --    AST  --  186* 98*  --    BILITOT  --  0.5 0.4  --              See below for Radiology    Scheduled Med:   albuterol-ipratropium  3 mL Nebulization Q6H    bisacodyL  10 mg Rectal BID    diclofenac sodium  4 g Topical (Top) Daily    enoxaparin  30 mg Subcutaneous Q12H    LIDOcaine  1 patch Transdermal Q24H    metoclopramide HCl  5 mg Intravenous Q6H    metoprolol tartrate  25 mg Oral BID    nystatin  500,000 Units Oral QID (WM & HS)    pantoprazole  40 mg Intravenous BID    sodium chloride 0.9%  10 mL Intravenous Q6H        Continuous Infusions:   Amino acid 4.25% - dextrose 5% (CLINIMIX-E) solution (1L provides 42.5 gm AA, 50 gm CHO (170 kcal/L dextrose), Na 35, K 30, Mg 5, Ca 4.5, Acetate 70, Cl 39, Phos 15) 75 mL/hr at 09/01/22 0642          PRN Meds:  acetaminophen, albuterol-ipratropium, dextrose 50%, dextrose 50%, glucagon (human recombinant), glucose, glucose, HYDROcodone-acetaminophen, insulin aspart U-100, labetaloL, melatonin, metoclopramide HCl, metoprolol, morphine, ondansetron, sodium chloride 0.9%, Flushing PICC Protocol **AND** sodium chloride 0.9% **AND** sodium chloride 0.9%       Radiology:  X-Ray Abdomen AP 1 View  Narrative: EXAMINATION:  XR ABDOMEN AP 1 VIEW    CLINICAL HISTORY:  persistent colonic dilatation;    TECHNIQUE:  AP X-RAY OF THE ABDOMEN:    CPT 10054    COMPARISON:  August 26, 2022    FINDINGS:  Examination reveals persistent gases  distension of the abdomen persistent residual contrast throughout the colon in a distribution similar to what was seen on the previous exam of August 26, 2022  Impression: No significant change    Electronically signed by: Bharathi Gauthier  Date:    08/30/2022  Time:    10:16      Assessment/Plan:  1. Acute abdominal distention, having small volume stool    NGT clamped    Low dose IV reglan   One dose of Senna per NG   Small bowel follow through showing normal small bowel transit time (8/25)  Most recent  KUB (8/26)shows contrast remaining in colon    Give Ducolax supp qd (decreased from bid)   Flex Sig 8/31 w/ decompression- showing mild dilation of colon, no signs of pseudo obstruction  Continue with ice chips  Continue to stress importance of mobilization in order to encourage BMs    2. Elevated LFTS- trending down     US with no significant findings.     3. Malnutrition due to lack of intake   -Begin multivitamin supplementation    - Begin intralipids to supplement clinimix  - Check triglycerides tomorrow and check albumin level. If low, consider possible supplementation (most recent albumin on 8/29: 1.7)       Continue with bowel regimen. 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        Elaine Chan, RN acting as scribe for Torrey Garcia MD  Gastroenterology  [facility]    Patient seen and examined.  Some clinical improvement and attempting to clamp NG and try ice chips.  Needs to move and get in chair if possible.      MD Radha

## 2022-09-01 NOTE — PT/OT/SLP PROGRESS
Occupational Therapy  Treatment    Fidelina Darling   MRN: [id number]   Admitting Diagnosis: Abdominal cramping    OT Date of Treatment: 09/01/22   OT Start Time: 0910  OT Stop Time: 0946  Total Time: 36 minutes     Billable Minutes:  Self Care/Home Management 21 and Therapeutic Activity 15  Total Minutes: 38     OT/CONSUELO: CONSUELO     CONSUELO Visit Number: 5    General Precautions: Standard, fall, NPO  Orthopedic Precautions: N/A  Braces: N/A    Spiritual, Cultural Beliefs, Confucianist Practices, Values that Affect Care: no    Subjective:  Communicated with nurse prior to session.    Objective:  Patient found with: peripheral IV, NG tube, telemetry, oxygen    Functional Mobility:  Bed Mobility:   Supine to sit: Total Assistance   Sit to supine: Total Assistance   Rolling: Total Assistance   Scooting: Total Assistance    Toilet Training:  Soiled, performed toileting in bed in supine and side lying, total A for tileting. Bathing and bed mobility    Balance:   Static Sit: 0: Needs MAXIMAL assist to maintain sitting without back support  Dynamic Sit:  POOR: N/A    Patient left HOB elevated with all lines intact and call button in reach    ASSESSMENT:  Fidelina Darling is a 89 y.o. female with a medical diagnosis of Abdominal cramping and presents with decreased ADL skills, decreased Ax tolerance and decreased functional mobility.    Rehab potential is poor    Activity tolerance: Poor    Discharge recommendations: nursing facility, skilled     Equipment recommendations: none     GOALS:   Multidisciplinary Problems       Occupational Therapy Goals          Problem: Occupational Therapy    Goal Priority Disciplines Outcome Interventions   Occupational Therapy Goal     OT, PT/OT Ongoing, Progressing    Description: Goals to be met by: 9/16/22     Patient will increase functional independence with ADLs by performing:    UE Dressing with Modified [place].  LE Dressing with Modified [place].  Grooming while standing with Modified  [place].  Toileting from bedside commode with Modified [place] for hygiene  and clothing management.   Toilet transfer to bedside commode with Modified [place].progress  to toilet.                         Plan:  Patient to be seen 3 x/week, 5 x/week to address the above listed problems via self-care/home management, therapeutic activities, therapeutic exercises  Plan of Care expires: 09/16/22  Plan of Care reviewed with: patient         09/01/2022

## 2022-09-01 NOTE — PT/OT/SLP PROGRESS
Physical Therapy Treatment    Patient Name:  Fidelina Darling   MRN:  [id number]    Recommendations:     Discharge Recommendations:  nursing facility, skilled   Discharge Equipment Recommendations:  (TBD by next level of care)   Barriers to discharge: Decreased caregiver support    Assessment:     Fidelina Darling is a 89 y.o. female admitted with a medical diagnosis of Abdominal cramping.  She presents with the following impairments/functional limitations:  weakness, impaired endurance, impaired functional mobility, impaired self care skills, impaired balance, impaired cognition, decreased upper extremity function, decreased lower extremity function, decreased safety awareness, pain, decreased ROM, impaired skin, edema.    Rehab Prognosis: Fair; patient would benefit from acute skilled PT services to address these deficits and reach maximum level of function.    Recent Surgery: Procedure(s) (LRB):  SIGMOIDOSCOPY (N/A)  COLECTOMY, SIGMOID 1 Day Post-Op    Plan:     During this hospitalization, patient to be seen 5 x/week to address the identified rehab impairments via gait training, therapeutic activities, therapeutic exercises, neuromuscular re-education and progress toward the following goals:    Plan of Care Expires:  09/19/22    Subjective     Chief Complaint: Pt with increased c/o pain at her knees.    Pain/Comfort:  Pain Rating 1: other (see comments) (Unable to verbalize pain number)      Objective:     Communicated with NSG prior to session.  Patient found HOB elevated with peripheral IV, NG tube, telemetry, oxygen upon PT entry to room.     General Precautions: Standard, fall, NPO   Orthopedic Precautions:N/A   Braces: N/A  Respiratory Status: Nasal cannula, flow 2 L/min     Functional Mobility:  Bed Mobility:     Supine to Sit: total assistance and of 2 persons  Sit to Supine: total assistance and of 2 persons  Stand pivot t/f attempted, however, as soon as pt was in half-stance position during mid-transition of  squat pivot, pt started having (+)loose BM and was sat back down.  Pt was returned back to bed and rolling was performed [place] directions with x3 persons to assist pt with perineal care.  While seated EOB in static sitting, pt able to maintain with SBA.  Pt also performed knee ext BLE while waiting for GORDO Huynh to come back from getting more supplies prior to returning pt back to bed.      AM-PAC 6 CLICK MOBILITY  Turning over in bed (including adjusting bedclothes, sheets and blankets)?: 2  Sitting down on and standing up from a chair with arms (e.g., wheelchair, bedside commode, etc.): 1  Moving from lying on back to sitting on the side of the bed?: 1  Moving to and from a bed to a chair (including a wheelchair)?: 1  Need to walk in hospital room?: 1  Climbing 3-5 steps with a railing?: 1  Basic Mobility Total Score: 7     Patient left HOB elevated with all lines intact and call button in reach..    GOALS:   Multidisciplinary Problems       Physical Therapy Goals          Problem: Physical Therapy    Goal Priority Disciplines Outcome Goal Variances Interventions   Physical Therapy Goal     PT, PT/OT Unable to Meet, Plan Revised     Description: Goals to be met by: 22     Patient will increase functional independence with mobility by performin. Supine to sit with MInimal Assistance  2. Sit to supine with MInimal Assistance  3. Sit to stand transfer with Minimal Assistance  4. Bed to chair transfer with Minimal Assistance using Rolling Walker  5. Sitting at edge of bed x10 minutes with Stand-by Assistance                         Time Tracking:     PT Received On: 22  PT Start Time: 910     PT Stop Time: 946  PT Total Time (min): 36 min     Billable Minutes: Therapeutic Activity 36    Treatment Type: Treatment  PT/PTA: PTA     PTA Visit Number: 3     2022

## 2022-09-01 NOTE — CONSULTS
"Consults    Patient Name: Fidelina Darling   MRN: [id number]   Admission Date: 8/16/2022   Hospital Length of Stay: 15   Attending Provider: Alex Whitfield MD   Consulting Provider: Danyell EMNDOZA  Reason for Consult: Goals of Care  Primary Care Physician:  Primary Doctor No     Principal Problem: Abdominal cramping       Final diagnoses:  [R10.9] Abdominal cramping (Primary)  [K59.00] Constipation, unspecified constipation type  [R10.9] Abdominal pain, unspecified abdominal location  [N39.0, R31.9] Urinary tract infection with hematuria, site unspecified  [D72.829] Leukocytosis, unspecified type  [K86.9] Pancreatic lesion  [E87.6] Hypokalemia  [E87.2] Lactic acidosis      Assessment/Plan:     I reviewed the patient and family's understanding of the seriousness of the illness and its expected prognosis. We discussed the patient's goals of care and treatment preferences.        Met with daughter at bedside and discussed plan.   Discussed that it would be beneficial to discuss medical plan with her brothers to which daughter informed that one of her brothers is having surgery for kidney cancer and the other brother may be in town for weekend.  She informed that her nephew would be arriving also this weekend.  Daughter informed that her brothers would agree to her decisions, to which I informed that without medical POA, medical decisions would have to be discussed as a family unless patient has decision making capacity which she has not over the past few days.     Daughter reiterated that family goal is for patient to return home to live independently.  Discussed rehab to which daughter informs that patient has had to attend rehab in the past.  Discussed patient's debility, falls at home, pain and syncopal episodes.  Daughter related that patient did not "pass out" at home, but fell because her blood pressure dropped.  She also stated that she falls because "she is weak because she doesn't eat."  Discussed decreased " "appetite in the geriatric population and discussed that patient is high risk for falls with leg pain and mobility issues.  Daughter states that her mother is "tough" and "works through the pain." Discussed that is her appetite is decreased, she will not eat, not because she is doing so purposefully, but doesn't feel hunger normally.  Daughter states she has been encouraging her to participate in therapy.  Discussed that therapy may be beneficial, but patient's pain needs to be controlled as well so she can participate.  Discussed that medical team and  would be speaking with them concerning rehab/SNF when they feel patient is nearing discharge.  Discussed that next goal would be to evaluate patient's swallowing.  She verbalized understanding.  Offered support and informed I would continue to follow.     Continue norco for pain      Interval History:     Patient underwent decompression through endoscopy today with good results.  Received IV lasix for bilateral LL edema.  Remains with NG to low suction and clinimix in progress.  I am continuing to follow to discuss goals of care.       Active Ambulatory Problems     Diagnosis Date Noted    No Active Ambulatory Problems     Resolved Ambulatory Problems     Diagnosis Date Noted    No Resolved Ambulatory Problems     No Additional Past Medical History        History reviewed. No pertinent surgical history.     Review of patient's allergies indicates:  No Known Allergies       Current Facility-Administered Medications:     acetaminophen tablet 650 mg, 650 mg, Oral, Q8H PRN, Umer Bynum MD    albuterol-ipratropium 2.5 mg-0.5 mg/3 mL nebulizer solution 3 mL, 3 mL, Nebulization, Q4H PRN, Taty Petit, AGACNP-BC, 3 mL at 08/29/22 0901    albuterol-ipratropium 2.5 mg-0.5 mg/3 mL nebulizer solution 3 mL, 3 mL, Nebulization, Q6H, Yinka Cabral MD, 3 mL at 09/01/22 1231    Amino acid 4.25% - dextrose 5% (CLINIMIX-E) solution (1L provides 42.5 gm AA, 50 gm CHO " (170 kcal/L dextrose), Na 35, K 30, Mg 5, Ca 4.5, Acetate 70, Cl 39, Phos 15), , Intravenous, Continuous, Alex Whitfield MD, Last Rate: 75 mL/hr at 09/01/22 0642, New Bag at 09/01/22 0642    [START ON 9/2/2022] bisacodyL suppository 10 mg, 10 mg, Rectal, Daily, LEXIS Goldstein    dextrose 50% injection 12.5 g, 12.5 g, Intravenous, PRN, Neelima Duran MD    dextrose 50% injection 25 g, 25 g, Intravenous, PRN, Neelima Duran MD    diclofenac sodium 1 % gel 4 g, 4 g, Topical (Top), Daily, Yinka Cabral MD, 4 g at 09/01/22 0900    enoxaparin injection 30 mg, 30 mg, Subcutaneous, Q12H, Alex Whitfield MD, 30 mg at 09/01/22 1016    fat emulsion 20% infusion 250 mL, 250 mL, Intravenous, Daily, LEXIS Goldstein, Last Rate: 20.8 mL/hr at 09/01/22 1213, 250 mL at 09/01/22 1213    glucagon (human recombinant) injection 1 mg, 1 mg, Intramuscular, PRN, Neelima Duran MD    glucose chewable tablet 16 g, 16 g, Oral, PRN, Neelima Duran MD    glucose chewable tablet 24 g, 24 g, Oral, PRN, Neelima Duran MD    HYDROcodone-acetaminophen 5-325 mg per tablet 1 tablet, 1 tablet, Oral, Q6H PRN, Neelima Duran MD, 1 tablet at 09/01/22 1015    insulin aspart U-100 injection 0-5 Units, 0-5 Units, Subcutaneous, QID (AC + HS) PRN, Neelima Duran MD    labetaloL injection 20 mg, 20 mg, Intravenous, Q6H PRN, Yinka Cabral MD    LIDOcaine 5 % patch 1 patch, 1 patch, Transdermal, Q24H, Yinka Cabral MD, 1 patch at 08/31/22 1730    melatonin tablet 6 mg, 6 mg, Oral, Nightly PRN, Umer Bynum MD, 6 mg at 08/30/22 2156    metoclopramide HCl injection 5 mg, 5 mg, Intravenous, Q6H PRN, Yinka Cabral MD, 5 mg at 08/27/22 0413    metoclopramide HCl injection 5 mg, 5 mg, Intravenous, Q6H, LEXIS Goldstein, 5 mg at 09/01/22 1016    metoprolol injection 5 mg, 5 mg, Intravenous, Q5 Min PRN, Yinka Cabral MD    metoprolol tartrate (LOPRESSOR) tablet 25 mg, 25 mg, Oral, BID, Yinka Cabral MD, 25 mg at 09/01/22 1016    morphine  "injection 2 mg, 2 mg, Intravenous, Q2H PRN, Danyell Simons, LYNNETTEP, 2 mg at 08/30/22 2155    multivitamin tablet, 1 tablet, Oral, Daily, LEXIS Goldstein    nystatin 100,000 unit/mL suspension 500,000 Units, 500,000 Units, Oral, QID (WM & HS), Yinka Cabral MD, 500,000 Units at 09/01/22 1200    ondansetron injection 4 mg, 4 mg, Intravenous, Q6H PRN, Yinka Cabral MD, 4 mg at 08/26/22 1358    pantoprazole injection 40 mg, 40 mg, Intravenous, BID, Yinka Cabral MD, 40 mg at 09/01/22 1016    sodium chloride 0.9% flush 10 mL, 10 mL, Intravenous, PRN, Umer Bynum MD    Flushing PICC Protocol, , , Until Discontinued **AND** sodium chloride 0.9% flush 10 mL, 10 mL, Intravenous, Q6H, 10 mL at 09/01/22 1200 **AND** sodium chloride 0.9% flush 10 mL, 10 mL, Intravenous, PRN, Yinka Cabral MD     acetaminophen, albuterol-ipratropium, dextrose 50%, dextrose 50%, glucagon (human recombinant), glucose, glucose, HYDROcodone-acetaminophen, insulin aspart U-100, labetaloL, melatonin, metoclopramide HCl, metoprolol, morphine, ondansetron, sodium chloride 0.9%, Flushing PICC Protocol **AND** sodium chloride 0.9% **AND** sodium chloride 0.9%     History reviewed. No pertinent family history.       Review of Systems   Constitutional:  Positive for fatigue.   Gastrointestinal:  Positive for abdominal distention and abdominal pain.          Objective:   /77   Pulse 74   Temp 97.6 °F (36.4 °C) (Oral)   Resp 18   Ht 5' 7.99" (1.727 m)   Wt 61.2 kg (134 lb 14.7 oz)   SpO2 96%   BMI 20.52 kg/m²      Physical Exam   HENT:   Mouth/Throat: Mucous membranes are moist.   Nose: NG in place  Eyes: Pupils are equal, round, and reactive to light.   Cardiovascular: Normal rate, regular rhythm and normal heart sounds. Pulmonary:      Effort: Pulmonary effort is normal.      Breath sounds: Normal breath sounds.     Abdominal: Soft. She exhibits distension.   Musculoskeletal:      Right lower leg: Edema present.      Left " lower leg: Edema present.   Neurological:   To person and place   Skin: There is pallor.        Review of Symptoms  Review of Symptoms      Symptom Assessment (ESAS 0-10 Scale)  Pain:  0  Dyspnea:  0  Anxiety:  0  Nausea:  0  Depression:  0  Anorexia:  0  Fatigue:  0  Insomnia:  0  Restlessness:  0  Agitation:  0         Performance Status:  30    Advance Care Planning   Advance Directives:   Living Will: No    Do Not Resuscitate Status: No    Medical Power of : No      Decision Making:  Family answered questions        PAINAD:  Appears comfortable    Caregiver burden formerly assessed: yes      No results displayed because visit has over 200 results.               > 50% of 40 min of encounter was spent in chart review, face to face discussion of goals of care, symptom assessment, coordination of care and emotional support.    Danyell CHURCHP, [facility] HospitalPN  Palliative Medicine  [facility]ier[facility]

## 2022-09-02 LAB
ALBUMIN SERPL-MCNC: 1.9 GM/DL (ref 3.4–4.8)
ALP SERPL-CCNC: 192 UNIT/L (ref 40–150)
ALT SERPL-CCNC: 46 UNIT/L (ref 0–55)
ANION GAP SERPL CALC-SCNC: 7 MEQ/L
AST SERPL-CCNC: 57 UNIT/L (ref 5–34)
BILIRUBIN DIRECT+TOT PNL SERPL-MCNC: 0.2 MG/DL (ref 0–0.5)
BILIRUBIN DIRECT+TOT PNL SERPL-MCNC: 0.2 MG/DL (ref 0–0.8)
BILIRUBIN DIRECT+TOT PNL SERPL-MCNC: 0.4 MG/DL
BUN SERPL-MCNC: 18.5 MG/DL (ref 9.8–20.1)
CALCIUM SERPL-MCNC: 7.6 MG/DL (ref 8.4–10.2)
CHLORIDE SERPL-SCNC: 97 MMOL/L (ref 98–111)
CO2 SERPL-SCNC: 25 MMOL/L (ref 23–31)
CREAT SERPL-MCNC: 0.46 MG/DL (ref 0.55–1.02)
CREAT/UREA NIT SERPL: 40
GFR SERPLBLD CREATININE-BSD FMLA CKD-EPI: >60 MLS/MIN/1.73/M2
GLUCOSE SERPL-MCNC: 107 MG/DL (ref 75–121)
PATH REV: NORMAL
POCT GLUCOSE: 111 MG/DL (ref 70–110)
POCT GLUCOSE: 119 MG/DL (ref 70–110)
POCT GLUCOSE: 86 MG/DL (ref 70–110)
POCT GLUCOSE: 98 MG/DL (ref 70–110)
POTASSIUM SERPL-SCNC: 4 MMOL/L (ref 3.5–5.1)
PROT SERPL-MCNC: 4.1 GM/DL (ref 5.8–7.6)
SODIUM SERPL-SCNC: 129 MMOL/L (ref 132–146)
TRIGL SERPL-MCNC: 83 MG/DL (ref 37–140)

## 2022-09-02 PROCEDURE — 63600175 PHARM REV CODE 636 W HCPCS: Performed by: INTERNAL MEDICINE

## 2022-09-02 PROCEDURE — 25000003 PHARM REV CODE 250: Performed by: STUDENT IN AN ORGANIZED HEALTH CARE EDUCATION/TRAINING PROGRAM

## 2022-09-02 PROCEDURE — 80053 COMPREHEN METABOLIC PANEL: CPT | Performed by: STUDENT IN AN ORGANIZED HEALTH CARE EDUCATION/TRAINING PROGRAM

## 2022-09-02 PROCEDURE — 27000221 HC OXYGEN, UP TO 24 HOURS

## 2022-09-02 PROCEDURE — 25000003 PHARM REV CODE 250: Performed by: INTERNAL MEDICINE

## 2022-09-02 PROCEDURE — 97168 OT RE-EVAL EST PLAN CARE: CPT

## 2022-09-02 PROCEDURE — B4185 PARENTERAL SOL 10 GM LIPIDS: HCPCS | Performed by: INTERNAL MEDICINE

## 2022-09-02 PROCEDURE — 94761 N-INVAS EAR/PLS OXIMETRY MLT: CPT

## 2022-09-02 PROCEDURE — 63600175 PHARM REV CODE 636 W HCPCS: Performed by: NURSE PRACTITIONER

## 2022-09-02 PROCEDURE — 99900031 HC PATIENT EDUCATION (STAT)

## 2022-09-02 PROCEDURE — 36415 COLL VENOUS BLD VENIPUNCTURE: CPT | Performed by: STUDENT IN AN ORGANIZED HEALTH CARE EDUCATION/TRAINING PROGRAM

## 2022-09-02 PROCEDURE — 21400001 HC TELEMETRY ROOM

## 2022-09-02 PROCEDURE — 94640 AIRWAY INHALATION TREATMENT: CPT

## 2022-09-02 PROCEDURE — C9113 INJ PANTOPRAZOLE SODIUM, VIA: HCPCS | Performed by: INTERNAL MEDICINE

## 2022-09-02 PROCEDURE — 82248 BILIRUBIN DIRECT: CPT | Performed by: NURSE PRACTITIONER

## 2022-09-02 PROCEDURE — 99900035 HC TECH TIME PER 15 MIN (STAT)

## 2022-09-02 PROCEDURE — 25000242 PHARM REV CODE 250 ALT 637 W/ HCPCS: Performed by: INTERNAL MEDICINE

## 2022-09-02 PROCEDURE — 97535 SELF CARE MNGMENT TRAINING: CPT

## 2022-09-02 PROCEDURE — A4216 STERILE WATER/SALINE, 10 ML: HCPCS | Performed by: INTERNAL MEDICINE

## 2022-09-02 PROCEDURE — 97530 THERAPEUTIC ACTIVITIES: CPT | Mod: CQ

## 2022-09-02 PROCEDURE — 84478 ASSAY OF TRIGLYCERIDES: CPT | Performed by: NURSE PRACTITIONER

## 2022-09-02 PROCEDURE — 25000003 PHARM REV CODE 250: Performed by: NURSE PRACTITIONER

## 2022-09-02 RX ORDER — SODIUM CHLORIDE 9 MG/ML
INJECTION, SOLUTION INTRAVENOUS CONTINUOUS
Status: DISCONTINUED | OUTPATIENT
Start: 2022-09-02 | End: 2022-09-09

## 2022-09-02 RX ADMIN — IPRATROPIUM BROMIDE AND ALBUTEROL SULFATE 3 ML: 2.5; .5 SOLUTION RESPIRATORY (INHALATION) at 12:09

## 2022-09-02 RX ADMIN — DICLOFENAC SODIUM 4 G: 10 GEL TOPICAL at 09:09

## 2022-09-02 RX ADMIN — METOPROLOL TARTRATE 25 MG: 25 TABLET, FILM COATED ORAL at 08:09

## 2022-09-02 RX ADMIN — METOCLOPRAMIDE 5 MG: 5 INJECTION, SOLUTION INTRAMUSCULAR; INTRAVENOUS at 01:09

## 2022-09-02 RX ADMIN — METOCLOPRAMIDE 5 MG: 5 INJECTION, SOLUTION INTRAMUSCULAR; INTRAVENOUS at 09:09

## 2022-09-02 RX ADMIN — SODIUM CHLORIDE, PRESERVATIVE FREE 10 ML: 5 INJECTION INTRAVENOUS at 12:09

## 2022-09-02 RX ADMIN — NYSTATIN 500000 UNITS: 100000 SUSPENSION ORAL at 09:09

## 2022-09-02 RX ADMIN — METOCLOPRAMIDE 5 MG: 5 INJECTION, SOLUTION INTRAMUSCULAR; INTRAVENOUS at 02:09

## 2022-09-02 RX ADMIN — PANTOPRAZOLE SODIUM 40 MG: 40 INJECTION, POWDER, FOR SOLUTION INTRAVENOUS at 09:09

## 2022-09-02 RX ADMIN — SODIUM CHLORIDE, PRESERVATIVE FREE 10 ML: 5 INJECTION INTRAVENOUS at 06:09

## 2022-09-02 RX ADMIN — I.V. FAT EMULSION 250 ML: 20 EMULSION INTRAVENOUS at 09:09

## 2022-09-02 RX ADMIN — ENOXAPARIN SODIUM 30 MG: 30 INJECTION SUBCUTANEOUS at 09:09

## 2022-09-02 RX ADMIN — IPRATROPIUM BROMIDE AND ALBUTEROL SULFATE 3 ML: 2.5; .5 SOLUTION RESPIRATORY (INHALATION) at 01:09

## 2022-09-02 RX ADMIN — SODIUM CHLORIDE, PRESERVATIVE FREE 10 ML: 5 INJECTION INTRAVENOUS at 05:09

## 2022-09-02 RX ADMIN — MORPHINE SULFATE 2 MG: 4 INJECTION INTRAVENOUS at 08:09

## 2022-09-02 RX ADMIN — NYSTATIN 500000 UNITS: 100000 SUSPENSION ORAL at 05:09

## 2022-09-02 RX ADMIN — MELATONIN TAB 3 MG 6 MG: 3 TAB at 08:09

## 2022-09-02 RX ADMIN — IPRATROPIUM BROMIDE AND ALBUTEROL SULFATE 3 ML: 2.5; .5 SOLUTION RESPIRATORY (INHALATION) at 08:09

## 2022-09-02 RX ADMIN — SODIUM CHLORIDE: 9 INJECTION, SOLUTION INTRAVENOUS at 04:09

## 2022-09-02 RX ADMIN — METOPROLOL TARTRATE 25 MG: 25 TABLET, FILM COATED ORAL at 09:09

## 2022-09-02 RX ADMIN — ENOXAPARIN SODIUM 30 MG: 30 INJECTION SUBCUTANEOUS at 08:09

## 2022-09-02 RX ADMIN — BISACODYL 10 MG: 10 SUPPOSITORY RECTAL at 09:09

## 2022-09-02 RX ADMIN — LIDOCAINE 1 PATCH: 50 PATCH TOPICAL at 04:09

## 2022-09-02 RX ADMIN — PANTOPRAZOLE SODIUM 40 MG: 40 INJECTION, POWDER, FOR SOLUTION INTRAVENOUS at 08:09

## 2022-09-02 RX ADMIN — THERA TABS 1 TABLET: TAB at 01:09

## 2022-09-02 RX ADMIN — IPRATROPIUM BROMIDE AND ALBUTEROL SULFATE 3 ML: 2.5; .5 SOLUTION RESPIRATORY (INHALATION) at 09:09

## 2022-09-02 RX ADMIN — METOCLOPRAMIDE 5 MG: 5 INJECTION, SOLUTION INTRAMUSCULAR; INTRAVENOUS at 08:09

## 2022-09-02 RX ADMIN — HYDROCODONE BITARTRATE AND ACETAMINOPHEN 1 TABLET: 5; 325 TABLET ORAL at 09:09

## 2022-09-02 RX ADMIN — NYSTATIN 500000 UNITS: 100000 SUSPENSION ORAL at 12:09

## 2022-09-02 RX ADMIN — MORPHINE SULFATE 2 MG: 4 INJECTION INTRAVENOUS at 03:09

## 2022-09-02 RX ADMIN — NYSTATIN 500000 UNITS: 100000 SUSPENSION ORAL at 08:09

## 2022-09-02 NOTE — PT/OT/SLP RE-EVAL
Occupational Therapy   Re-evaluation    Name: Fidelina Darling  MRN: [id number]  Admitting Diagnosis:  Abdominal cramping  Recent Surgery: Procedure(s) (LRB):  SIGMOIDOSCOPY (N/A)  COLECTOMY, SIGMOID 2 Days Post-Op    Recommendations:     Discharge Recommendations: nursing facility, skilled (pending progress)  Discharge Equipment Recommendations:     Barriers to discharge:  Decreased caregiver support, None    Assessment:     Fidelina Darling is a 89 y.o. female with a medical diagnosis of Abdominal cramping.  She presents with some mild improvement in tolerance and balance EOB but still requires max assist x2 sup to sit and needs encouragement for participation and activities d/t c/o pain.  Performance deficits affecting function are weakness, impaired endurance, impaired self care skills, impaired functional mobility, decreased lower extremity function, decreased safety awareness, impaired cognition, pain.      Rehab Prognosis:  fair; patient would benefit from acute skilled OT services to address these deficits and reach maximum level of function.       Plan:     Patient to be seen 3 x/week, 5 x/week to address the above listed problems via self-care/home management, therapeutic activities, therapeutic exercises  Plan of Care Expires: 09/16/22  Plan of Care Reviewed with: patient    Subjective     Chief Complaint: c/o abdominal pain and R thigh pain  Patient/Family stated goals: none stated   Communicated with: nsg and PTA prior to session.  Pain/Comfort:  Location 1: abdomen (u/a to give a number)  Pain Addressed 1: Distraction, Reposition    Objective:     Communicated with: nsg prior to session.  Patient found supine with: NG tube, telemetry, oxygen, peripheral IV, PureWick upon OT entry to room.    General Precautions: Standard, fall, NPO   Orthopedic Precautions:N/A   Braces:    Respiratory Status: Nasal cannula, flow 2 L/min    Occupational Performance:    Bed Mobility:    Patient completed Supine to Sit with  maximal assistance x2    Functional Mobility/Transfers:  U/a to stand this date  Functional Mobility:     Activities of Daily Living:  Applied lotion to BLE's with max assist, min/mod assist balance and wt. Shifts during tasks  Grooming to comb hair with mod assist, EOB, min/CGA balance  Toileting total assist in supine    Cognitive/Visual Perceptual:  Oriented to person, place and year  Decreased recall and memory    Physical Exam:  Grossly wfl BUE's    AMPAC 6 Click:  [facility] Total Score:      Treatment & Education:  Performed BUE AAROM exercises, reaching activities with wt. Shifts, POC    Patient left  up iin bed chair position  with call button in reach and nsg/CNA  present    GOALS:   Multidisciplinary Problems       Occupational Therapy Goals          Problem: Occupational Therapy    Goal Priority Disciplines Outcome Interventions   Occupational Therapy Goal     OT, PT/OT Ongoing, Progressing    Description: Goals to be met by: 9/16/22     Patient will increase functional independence with ADLs by performing:    UE Dressing with Modified [place].  LE Dressing with Modified [place].  Grooming while standing with Modified [place].  Toileting from bedside commode with Modified [place] for hygiene  and clothing management.   Toilet transfer to bedside commode with Modified [place].progress  to toilet.                         History:     History reviewed. No pertinent past medical history.      Past Surgical History:   Procedure Laterality Date    FLEXIBLE SIGMOIDOSCOPY N/A 8/31/2022    Procedure: SIGMOIDOSCOPY;  Surgeon: Torrey Garcia MD;  Location: [facility] ENDOSCOPY;  Service: Gastroenterology;  Laterality: N/A;       Time Tracking:     OT Date of Treatment: 09/02/22  OT Start Time: 0932  OT Stop Time: 1004  OT Total Time (min): 32 min    Billable Minutes:Re-eval 20 min  Self Care/Home Management 12 min    9/2/2022

## 2022-09-02 NOTE — PLAN OF CARE
Problem: Occupational Therapy  Goal: Occupational Therapy Goal  Description: Goals to be met by: 9/16/22     Patient will increase functional independence with ADLs by performing:    UE Dressing with Modified [place].  LE Dressing with Modified [place].  Grooming while standing with Modified [place].  Toileting from bedside commode with Modified [place] for hygiene  and clothing management.   Toilet transfer to bedside commode with Modified [place].progress  to toilet.    Outcome: Ongoing, Progressing

## 2022-09-02 NOTE — PROGRESS NOTES
[facility]  Hospital Medicine Progress Note        Chief Complaint: abdominal pain    HPI:   89 y.o. White female with a past medical history of hypertension, hyperlipidemia. The patient presented to [facility] on 8/16/2022 with a primary complaint of abdominal pain and falls due to syncope.  Patient reports having a fall on 8/12 while in her garage.  She states she was grabbing for the car door handle when she missed it and fell. Fall was unwitnessed and she reports loss of consciousness. On 08/15 she had a second fall while in the kitchen but denies any loss of consciousness.  Yesterday (08/16/2022) patient was going to the bathroom when she passed out.  Episode was witnessed by neighbor who is visiting her. She reports urinary frequency. Patient denies complaints of headache, vision changes, shortness of breath, cough.  She has been experiencing lower abdominal pain for the last several days. Granddaughter at bedside states that patient is dependent on laxatives have a bowel movement.  Although she uses laxatives she often has to manually disimpact herself . She normally goes every 4-5 days with last bowel movement being 5 days ago. She has been having increased belching.  Granddaughter also states patient has little to no appetite.  Patient lives alone, ambulates with walker, drives and completes activity daily living independently.  Family as it patient to be experiencing dementia over the last 6 months.     Upon presentation to the ED, patient afebrile, hemodynamically stable and SpO2 96% on room air.  Labs notable for WBC 28, potassium 2.6, glucose 161, lactic acid 3.9.  UA with 1+ leukocyte esterase, 2+ bilirubin, positive nitrites, trace ketones and protein.  Chest x-ray without acute processes.  CT abdomen pelvis revealed mild distention of the colon with liquefied stool, indeterminate bilateral adrenal nodules and pancreatic cysts.  While in the ED patient received 40 mEq of IV  potassium chloride, 2 g of magnesium sulfate and Rocephin was started for UTI.  Patient admitted to hospital medicine services for further medical management.    Interval Hx:   No family present at bedside.  No overnight events reported.    Patient still with NG tube in place.   Has not been moving around; unsure if she is passing flatus    Objective/physical exam:  General: Appears comfortable, no acute distress.  Integumentary: Warm, dry, intact.  Neuro:  Awake, alert, comprehension appears intact.  Musculoskeletal: Purposeful movement noted.     GI: Distended. No abdominal tenderness to touch, no bowel sounds present, no rebound no guarding.    Respiratory: No accessory muscle use. Breath sounds are equal.  Cardiovascular: Regular rate.  +1 peripheral edema    VITAL SIGNS: 24 HRS MIN & MAX LAST   Temp  Min: 96.7 °F (35.9 °C)  Max: 98.1 °F (36.7 °C) 97.5 °F (36.4 °C)   BP  Min: 123/77  Max: 156/83 123/77     Pulse  Min: 67  Max: 84  78   Resp  Min: 16  Max: 18 18   SpO2  Min: 96 %  Max: 99 % 98 %     X-Ray Abdomen AP 1 View  Narrative: EXAMINATION:  XR ABDOMEN AP 1 VIEW    CLINICAL HISTORY:  persistent colonic dilatation;    TECHNIQUE:  AP X-RAY OF THE ABDOMEN:    CPT 93189    COMPARISON:  August 26, 2022    FINDINGS:  Examination reveals persistent gases distension of the abdomen persistent residual contrast throughout the colon in a distribution similar to what was seen on the previous exam of August 26, 2022  Impression: No significant change    Electronically signed by: Bharathi Gauthier  Date:    08/30/2022  Time:    10:16    Recent Labs   Lab 08/27/22  0632 08/29/22 0451 08/31/22 0415   WBC 10.3 6.0 5.2   RBC 3.42* 3.16* 3.03*   HGB 10.9* 10.1* 9.4*   HCT 34.7* 30.0* 29.1*   .5* 94.9* 96.0*   MCH 31.9* 32.0* 31.0   MCHC 31.4* 33.7 32.3*   RDW 14.1 13.7 13.8    225 283   MPV 10.2 10.4 9.7       Recent Labs   Lab 08/27/22  0632 08/29/22 0451 08/31/22  0415 09/02/22  0432    135 130* 129*    K 3.7 3.6 3.6 4.0   CO2 27 28 28 25   BUN 21.4* 20.6* 19.3 18.5   CREATININE 0.52* 0.45* 0.46* 0.46*   CALCIUM 7.9* 7.8* 7.6* 7.6*   MG  --  2.00 2.00  --    ALBUMIN 2.0* 1.7*  --  1.9*   ALKPHOS 329* 208*  --  192*   * 79*  --  46   * 98*  --  57*   BILITOT 0.5 0.4  --  0.4          Microbiology Results (last 7 days)       ** No results found for the last 168 hours. **             See below for Radiology    Scheduled Med:   albuterol-ipratropium  3 mL Nebulization Q6H    bisacodyL  10 mg Rectal Daily    diclofenac sodium  4 g Topical (Top) Daily    enoxaparin  30 mg Subcutaneous Q12H    fat emulsion  250 mL Intravenous Every Mon, Fri    LIDOcaine  1 patch Transdermal Q24H    metoclopramide HCl  5 mg Intravenous Q6H    metoprolol tartrate  25 mg Oral BID    multivitamin  1 tablet Oral Daily    nystatin  500,000 Units Oral QID (WM & HS)    pantoprazole  40 mg Intravenous BID    sodium chloride 0.9%  10 mL Intravenous Q6H        PRN Meds:  acetaminophen, albuterol-ipratropium, dextrose 50%, dextrose 50%, glucagon (human recombinant), glucose, glucose, HYDROcodone-acetaminophen, insulin aspart U-100, labetaloL, melatonin, metoclopramide HCl, metoprolol, morphine, ondansetron, sodium chloride 0.9%, Flushing PICC Protocol **AND** sodium chloride 0.9% **AND** sodium chloride 0.9%       Assessment/Plan:  Colonic distension   Dyspepsia  Transaminitis, improving   B/L LE edema bilateral  Small pancreatic cyst   Sliding hernia -nonstrangulated   Bilateral adrenal nodules repeat follow-up CT outpatient  Essential hypertension  Diastolic dysfunction   sinus tachycardia     Plan  Continue IV Reglan, IV Protonix, suppository twice daily.    Continue supportive care.   Remain NPO, NG tube in place.  Slightly hyponatremic,---add gentle IV hydration.     Continue PT/OT.encourage up to chair with assistance.    Anticipated discharge and Disposition:  Pending. SNF    All diagnosis and differential diagnosis have been  reviewed; assessment and plan has been documented; I have personally reviewed the labs and test results that are presently available; I have reviewed the patients medication list; I have reviewed the consulting providers response and recommendations. I have reviewed or attempted to review medical records based upon their availability      I will continue to monitor closely and make adjustments to medical management as needed.      Sherri [place], DO   09/02/2022        This note was created with the assistance of Dragon voice recognition software. There may be transcription errors as a result of using this technology however minimal. Effort has been made to assure accuracy of transcription but any obvious errors or omissions should be clarified with the author of the document.

## 2022-09-02 NOTE — PROGRESS NOTES
Inpatient Nutrition Assessment    Admit Date: 8/16/2022   Length of Stay: 16 days  Nutrition Recommendation/Prescription     - Once medically appropriate, resume oral diet as tolerated. Assist with meals as needed.     - noted palliative care consulted    - Continue PPN Clinimix until able to resume oral diet; if if aggressive care desired, consider starting TPN for nutrition; rec: Central Line Clinimix 5/25% @ 75ml/hr + IL 20% 250ml twice a week    - pt would need central line placed if TPN to start    - Will order lipids twice per week now with PPN for an additional 1000 kcal per week and to prevent fatty acid deficiency.    Communication of Recommendations: reviewed with nurse    Nutrition Assessment     Malnutrition Assessment/Nutrition-Focused Physical Exam    Malnutrition in the context of chronic illness  Degree of Malnutrition: non-severe (moderate) malnutrition  Energy Intake: < 75% of estimated energy requirement for >/= 1 month  Interpretation of Weight Loss: unable to obtain  Body Fat: mild depletion  Area of Body Fat Loss: orbital region  and upper arm region - triceps / biceps  Muscle Mass Loss: mild depletion  Area of Muscle Mass Loss: temple region - temporalis muscle, clavicle bone region - pectoralis major, deltoid, trapezius muscles and dorsal hand - interosseous musle  Fluid Accumulation: mild  Edema: 2+ edema - mild and does not meet criteria  Reduced  Strength: unable to obtain  A minimum of two characteristics is recommended for diagnosis of either severe or non-severe malnutrition.    Chart Review    Reason Seen: follow-up    Diagnosis:  Colonic distension   Dyspepsia  Transaminitis, improving   Hypokalemia  Abdominal distention secondary to above   Small pancreatic cyst   Sliding hernia   Bilateral adrenal nodules repeat follow-up CT outpatient  Essential hypertension  Diastolic dysfunction   sinus tachycardia    hypokalemia    Relevant Medical History: HTN, HLD    Nutrition-Related  "Medications: SSI, Reglan, MVI  Calorie Containing IV Medications: Clinimix    Nutrition-Related Labs:  8/19: K 3.1, Glu 119, GFR>60  8/24: K 2.9, BUN 23.7, glu 149, Ca 7.8, AlkPhos 327, ,   8/29: BUN 20.6, Crea 0.45, Ca 7.8, AlkPhos 208  9/2: Na 129, Glu 107, GFR>60    Diet/PN Order: Diet clear liquid  Oral Supplement Order: none at this time  Tube Feeding Order: none at this time  Appetite/Oral Intake: poor/0-25% of meals  Factors Affecting Nutritional Intake: impaired cognitive status/motor control, clear liquid diet, constipation, and decreased appetite  Food/Roman [employer]/Cultural Preferences: none reported    Skin Integrity: intact  Wound(s):   none documented    Comments    8/19: Pt asleep during rounds; per pt's granddaughter, pt's appetite and wt has decreased over the last few years, more noticeably in the last 6 months. Granddaughter unsure of what weight was 6 months ago but states that the patient weighed ~175 lbs several years ago. Pt was on an appetite stimulant a few months ago which actually seemed to help but did not continue the regimen 2/2 price. Will send an oral supplement.     8/24: pt currently NPO on PPN for ileus with NG for suction; GI following    8/26: pt remains NPO on PPN with NG LIWS; nurse putting new IV due to current one malfunctioning; still with abdominal pain, distended abdomen, absent bowel sounds, contrast from SBFT on 8/24 still in colon per KUB; small amount of stool today    8/30: Pt still having abdominal pain, NG but suction off, NPO with PPN; palliative care consulted    9/2: Pt's diet recently advanced to clears- NG tube clamped. Noted PPN was hanging, but not running during rounds.     Anthropometrics    Height: 5' 7.99" (172.7 cm) Height Method: Stated  Weight: 61.2 kg (134 lb 14.7 oz) (08/17/22 2563) Weight Method: Bed Scale  BMI (Calculated): 20.5  BMI Classification: normal (BMI 18.5-24.9)  Ideal Body Weight (IBW), Female: 139.95 lb  % Ideal Body Weight, " Female (lb): 96.41 %                       Usual Weight Provided By: not applicable    Wt Readings from Last 5 Encounters:   08/17/22 61.2 kg (134 lb 14.7 oz)   01/15/19 64.9 kg (142 lb 15.9 oz)     Weight Change(s) Since Admission:  Admit Weight: 61.2 kg (135 lb) (08/16/22 1901)    Estimated Needs    Weight Used For Calorie Calculations: 61.2 kg (134 lb 14.7 oz)  Energy Calorie Requirements (kcal): 1512 kcal (MSJ x 1.4 SF)  Energy Need Method: Baker-St Jeor  Weight Used For Protein Calculations: 61.2 kg (134 lb 14.7 oz)  Protein Requirements: 80 gm (1.3g/kg)  Fluid Requirements (mL): 1836 mL (30mL/kg)  Temp: 97.5 °F (36.4 °C)       Enteral Nutrition    Patient not receiving enteral nutrition at this time.    Parenteral Nutrition    Standard Formula: Clinimix E 4.25/5  Custom Formula: not applicable  Additives: none  Rate/Volume: 75ml/lhr  Lipids: none  Total Nutrition Provided by Parenteral Nutrition:  Calories Provided  612 kcal/d, 40% needs   Protein Provided  77 g/d, 126% needs   Dextrose Provided  90 g/d,    Fluid Provided  1800 ml/d, 100% needs       Evaluation of Received Nutrient Intake    Calories: not meeting estimated needs  Protein: not meeting estimated needs    Patient Education    Not applicable.    Nutrition Diagnosis     PES: Malnutrition related to insufficient energy intake as evidenced by <75% est energy requirements met >1 month, physical evidence of mild muscle and fat depletion. (continues)    Interventions/Goals     Intervention(s): modified composition of meals/snacks, commercial beverage, multivitamin/mineral supplement therapy, prescription medication and collaboration with other providers  Goal: Meet greater than 75% of nutritional needs by follow-up. (goal not met)    Monitoring & Evaluation     Dietitian will monitor energy intake and weight.  Nutrition Risk/Follow-Up: high (follow-up in 1-4 days)

## 2022-09-02 NOTE — PROGRESS NOTES
[facility]    Chief Complaint: Inpatient Follow-up for      Subjective:  Fidelina Darling is a 89 y.o. White female with a past medical history of hypertension, hyperlipidemia. The patient presented to [facility] on 8/16/2022 with a primary complaint of abdominal pain and falls due to syncope.  Patient reports having a fall on 8/12 while in her garage.  She states she was grabbing for the car door handle when she missed it and fell. Fall was unwitnessed and she reports loss of consciousness. On 08/15 she had a second fall while in the kitchen but denies any loss of consciousness.  Yesterday (08/16/2022) patient was going to the bathroom when she passed out.  Episode was witnessed by neighbor who is visiting her. She reports urinary frequency. Patient denies complaints of headache, vision changes, shortness of breath, cough.  She has been experiencing lower abdominal pain for the last several days. Granddaughter at bedside states that patient is dependent on laxatives have a bowel movement.  Although she uses laxatives she often has to manually disimpact herself . She normally goes every 4-5 days with last bowel movement being 5 days ago. She has been having increased belching.  Granddaughter also states patient has little to no appetite.  Patient lives alone, ambulates with walker, drives and completes activity daily living independently.       Upon presentation to the ED, patient afebrile, hemodynamically stable and SpO2 96% on room air.  Labs notable for WBC 28, potassium 2.6, glucose 161, lactic acid 3.9.  UA with 1+ leukocyte esterase, 2+ bilirubin, positive nitrites, trace ketones and protein.  Chest x-ray without acute processes.  CT abdomen pelvis revealed mild distention of the colon with liquefied stool, indeterminate bilateral adrenal nodules and pancreatic cysts.  While in the ED patient received 40 mEq of IV potassium chloride, 2 g of magnesium sulfate and Rocephin was started for  UTI.    Patient was admitted on account of recurrent syncope and falls. Colonic dilatation with lactic acidosis.   X-ray of the chest reveals left-sided atelectasis cannot rule out infiltrate.      8-21-22  Pt with persistent abd pain and nausea  Has NGT to LIWS  Belching  Minimal flatus  KUB with dilation 56.4mm to 76.8mm of colon    8/22/22:  NG still in place. Was on high suction. Placed on low suction.  Generalized mild abdominal discomfort  No flatus, no stool.  KUB pending.    8/23/22:  US ordered for today due to elevated LFTs  Patient having small liquid brown stool  NG in place with minimal output     8/24/22:  SBFT unremarkable- normal SB transit time  Abdominal U/S unremarkable  LFTs trending up   Patient continues to have stool (per nurse charting x5 stools yesterday) and endorses diffuse abdominal discomfort,   Denies N/V, fever/chills    8/25/22:  LFTS trending down. AST/ALT: 419/209---> 335/179  Patient has not had a BM today and continues to endorse diffuse abdominal pain.   Denies N/V, fever/chills    8/26/22:  Abdomen remains distended and quiet. NG replaced and to LIS and 600ml aspirated. Aspirate is dark.  Just had a small brown stool.  Turned her and c/o abdominal pain.  KUB revealed contrast in colon remains.  On Movantik and dulcolax supp daily.  LFTs decreasing    8/29/22:  Abdomen distended and tender   Pain about the same, not worsening in nature  NG in place and connected to suction but turned off.   Has not had a BM yet today, unable to recall her most recent BM  She denies N/V, and currently has no appetite  Elevated LFTS have improved, 98/79 this am   Currently on Relistor     8/30/22:  Still with abdominal distention  NG in place but suction is turned off. Placed to LIS.  Patient moans when abdomen palpated.  Has extensive peripheral edema.  Urine orange/red color    8/31/22:  Flex Sig:  Findings:        Hemorrhoids were found on perianal exam.        The lumen of the sigmoid colon was  mildly dilated. Decompression was        successful.   Impression:    - Preparation of the colon was poor.                          - Hemorrhoids found on perianal exam.                          - Mild dilation of the examined colon with                          significant amount of stool.                          - Overall, her abdomen is soft based on exam, and                          it seems as though ileus vs sbo is more a                          contributor to her symptoms than                          pseudo-obstruction.                          - No specimens collected  9/1/22:  Patient had a loose BM this am- no associated pain or discomfort, brown in color  Abdomen remains significantly distended   NG was in place to LIS with very little output, so NG was clamped and we will try ice chips.  Patient denies N/V, notes that her overall abdominal discomfort is slightly improved     9/2/22  Pt abdomen remains distended  No guarding or tenderness on palpation  NG remains clamped  NO nausea  1 small bm  Will try clears         ROS:  General: in bed  CV: peripheral edema  GI: Abdominal pain and distention.   Information gathering limited by clinical condition    Objective    VITAL SIGNS: 24 HRS MIN & MAX LAST   Temp  Min: 97.6 °F (36.4 °C)  Max: 98.1 °F (36.7 °C) 97.6 °F (36.4 °C)   BP  Min: 128/77  Max: 156/83 (!) 140/81   Pulse  Min: 67  Max: 102  75   Resp  Min: 16  Max: 18 17   SpO2  Min: 95 %  Max: 99 % 97 %     Physical exam:  General appearance: Well-developed, well-nourished female in no distress.  IN bed.  HEENT: Atraumatic head. Dry mucous membranes of oral cavity.  Eyes: anicteric  Lungs: Clear to auscultation bilaterally.   Heart: Regular rate and rhythm. 3+ pitting edema in bilateral LE.  Abdomen:  firm, distended, generalized tenderness. Bowel sounds are quiet. NG in place, clamped.    Extremities: No cyanosis, clubbing. No deformities.  Skin: No Rash. Warm and dry.  Neuro: Awake, lethargic. Motor  and sensory exams grossly intact.  Psyc: calm and cooperative    Recent Labs   Lab 08/27/22  0632 08/29/22  0451 08/31/22  0415   WBC 10.3 6.0 5.2   RBC 3.42* 3.16* 3.03*   HGB 10.9* 10.1* 9.4*   HCT 34.7* 30.0* 29.1*   .5* 94.9* 96.0*   MCH 31.9* 32.0* 31.0   MCHC 31.4* 33.7 32.3*   RDW 14.1 13.7 13.8    225 283   MPV 10.2 10.4 9.7         Recent Labs   Lab 08/27/22  0632 08/29/22 0451 08/31/22 0415 09/02/22  0432    135 130* 129*   K 3.7 3.6 3.6 4.0   CO2 27 28 28 25   BUN 21.4* 20.6* 19.3 18.5   CREATININE 0.52* 0.45* 0.46* 0.46*   CALCIUM 7.9* 7.8* 7.6* 7.6*   MG  --  2.00 2.00  --    ALBUMIN 2.0* 1.7*  --  1.9*   ALKPHOS 329* 208*  --  192*   * 79*  --  46   * 98*  --  57*   BILITOT 0.5 0.4  --  0.4             See below for Radiology    Scheduled Med:   albuterol-ipratropium  3 mL Nebulization Q6H    bisacodyL  10 mg Rectal Daily    diclofenac sodium  4 g Topical (Top) Daily    enoxaparin  30 mg Subcutaneous Q12H    LIDOcaine  1 patch Transdermal Q24H    metoclopramide HCl  5 mg Intravenous Q6H    metoprolol tartrate  25 mg Oral BID    multivitamin  1 tablet Oral Daily    nystatin  500,000 Units Oral QID (WM & HS)    pantoprazole  40 mg Intravenous BID    sodium chloride 0.9%  10 mL Intravenous Q6H        Continuous Infusions:   Amino acid 4.25% - dextrose 5% (CLINIMIX-E) solution (1L provides 42.5 gm AA, 50 gm CHO (170 kcal/L dextrose), Na 35, K 30, Mg 5, Ca 4.5, Acetate 70, Cl 39, Phos 15) 75 mL/hr at 09/01/22 0642          PRN Meds:  acetaminophen, albuterol-ipratropium, dextrose 50%, dextrose 50%, glucagon (human recombinant), glucose, glucose, HYDROcodone-acetaminophen, insulin aspart U-100, labetaloL, melatonin, metoclopramide HCl, metoprolol, morphine, ondansetron, sodium chloride 0.9%, Flushing PICC Protocol **AND** sodium chloride 0.9% **AND** sodium chloride 0.9%       Radiology:  X-Ray Abdomen AP 1 View  Narrative: EXAMINATION:  XR ABDOMEN AP 1 VIEW    CLINICAL  HISTORY:  persistent colonic dilatation;    TECHNIQUE:  AP X-RAY OF THE ABDOMEN:    CPT 11374    COMPARISON:  August 26, 2022    FINDINGS:  Examination reveals persistent gases distension of the abdomen persistent residual contrast throughout the colon in a distribution similar to what was seen on the previous exam of August 26, 2022  Impression: No significant change    Electronically signed by: Bharathi Gauthier  Date:    08/30/2022  Time:    10:16      Assessment/Plan:  1. Acute abdominal distention, having small volume stool    NGT clamped    Low dose IV reglan   One dose of Senna per NG   Small bowel follow through showing normal small bowel transit time (8/25)  Most recent  KUB (8/26)shows contrast remaining in colon    Give Ducolax supp qd (decreased from bid)   Flex Sig 8/31 w/ decompression- showing mild dilation of colon, no signs of pseudo obstruction  Continue with ice chips and add clear liquids  Continue to stress importance of mobilization in order to encourage BMs    2. Elevated LFTS- trending down     US with no significant findings.     3. Malnutrition due to lack of intake   -Begin multivitamin supplementation    - Begin intralipids to supplement clinimix  - Check triglycerides tomorrow and check albumin level. If low, consider possible supplementation (most recent albumin on 8/29: 1.7)       Continue with bowel regimen. 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        Kim Nevarez NP acting as scribe for Torrey Garcia MD  Gastroenterology  [facility]

## 2022-09-03 LAB
ANION GAP SERPL CALC-SCNC: 2 MEQ/L
BUN SERPL-MCNC: 10.5 MG/DL (ref 9.8–20.1)
CALCIUM SERPL-MCNC: 7.7 MG/DL (ref 8.4–10.2)
CHLORIDE SERPL-SCNC: 104 MMOL/L (ref 98–111)
CO2 SERPL-SCNC: 28 MMOL/L (ref 23–31)
CREAT SERPL-MCNC: 0.48 MG/DL (ref 0.55–1.02)
CREAT/UREA NIT SERPL: 22
GFR SERPLBLD CREATININE-BSD FMLA CKD-EPI: >60 MLS/MIN/1.73/M2
GLUCOSE SERPL-MCNC: 77 MG/DL (ref 75–121)
POCT GLUCOSE: 79 MG/DL (ref 70–110)
POCT GLUCOSE: 99 MG/DL (ref 70–110)
POTASSIUM SERPL-SCNC: 3.8 MMOL/L (ref 3.5–5.1)
SODIUM SERPL-SCNC: 134 MMOL/L (ref 132–146)

## 2022-09-03 PROCEDURE — 25000003 PHARM REV CODE 250: Performed by: INTERNAL MEDICINE

## 2022-09-03 PROCEDURE — C9113 INJ PANTOPRAZOLE SODIUM, VIA: HCPCS | Performed by: INTERNAL MEDICINE

## 2022-09-03 PROCEDURE — 25000242 PHARM REV CODE 250 ALT 637 W/ HCPCS: Performed by: INTERNAL MEDICINE

## 2022-09-03 PROCEDURE — 63600175 PHARM REV CODE 636 W HCPCS: Performed by: INTERNAL MEDICINE

## 2022-09-03 PROCEDURE — 94640 AIRWAY INHALATION TREATMENT: CPT

## 2022-09-03 PROCEDURE — 63600175 PHARM REV CODE 636 W HCPCS: Performed by: STUDENT IN AN ORGANIZED HEALTH CARE EDUCATION/TRAINING PROGRAM

## 2022-09-03 PROCEDURE — 36415 COLL VENOUS BLD VENIPUNCTURE: CPT | Performed by: STUDENT IN AN ORGANIZED HEALTH CARE EDUCATION/TRAINING PROGRAM

## 2022-09-03 PROCEDURE — 94761 N-INVAS EAR/PLS OXIMETRY MLT: CPT

## 2022-09-03 PROCEDURE — 25000003 PHARM REV CODE 250: Performed by: NURSE PRACTITIONER

## 2022-09-03 PROCEDURE — A4216 STERILE WATER/SALINE, 10 ML: HCPCS | Performed by: INTERNAL MEDICINE

## 2022-09-03 PROCEDURE — 27000221 HC OXYGEN, UP TO 24 HOURS

## 2022-09-03 PROCEDURE — 63600175 PHARM REV CODE 636 W HCPCS: Performed by: NURSE PRACTITIONER

## 2022-09-03 PROCEDURE — 99900031 HC PATIENT EDUCATION (STAT)

## 2022-09-03 PROCEDURE — 99900035 HC TECH TIME PER 15 MIN (STAT)

## 2022-09-03 PROCEDURE — 80048 BASIC METABOLIC PNL TOTAL CA: CPT | Performed by: STUDENT IN AN ORGANIZED HEALTH CARE EDUCATION/TRAINING PROGRAM

## 2022-09-03 PROCEDURE — 21400001 HC TELEMETRY ROOM

## 2022-09-03 RX ORDER — FUROSEMIDE 10 MG/ML
20 INJECTION INTRAMUSCULAR; INTRAVENOUS
Status: DISCONTINUED | OUTPATIENT
Start: 2022-09-03 | End: 2022-09-08

## 2022-09-03 RX ADMIN — SODIUM CHLORIDE, PRESERVATIVE FREE 10 ML: 5 INJECTION INTRAVENOUS at 05:09

## 2022-09-03 RX ADMIN — METOCLOPRAMIDE 5 MG: 5 INJECTION, SOLUTION INTRAMUSCULAR; INTRAVENOUS at 09:09

## 2022-09-03 RX ADMIN — BISACODYL 10 MG: 10 SUPPOSITORY RECTAL at 09:09

## 2022-09-03 RX ADMIN — METOCLOPRAMIDE 5 MG: 5 INJECTION, SOLUTION INTRAMUSCULAR; INTRAVENOUS at 02:09

## 2022-09-03 RX ADMIN — METOPROLOL TARTRATE 25 MG: 25 TABLET, FILM COATED ORAL at 09:09

## 2022-09-03 RX ADMIN — FUROSEMIDE 20 MG: 10 INJECTION, SOLUTION INTRAMUSCULAR; INTRAVENOUS at 02:09

## 2022-09-03 RX ADMIN — ENOXAPARIN SODIUM 30 MG: 30 INJECTION SUBCUTANEOUS at 09:09

## 2022-09-03 RX ADMIN — PANTOPRAZOLE SODIUM 40 MG: 40 INJECTION, POWDER, FOR SOLUTION INTRAVENOUS at 09:09

## 2022-09-03 RX ADMIN — NYSTATIN 500000 UNITS: 100000 SUSPENSION ORAL at 09:09

## 2022-09-03 RX ADMIN — ONDANSETRON 4 MG: 2 INJECTION INTRAMUSCULAR; INTRAVENOUS at 11:09

## 2022-09-03 RX ADMIN — IPRATROPIUM BROMIDE AND ALBUTEROL SULFATE 3 ML: 2.5; .5 SOLUTION RESPIRATORY (INHALATION) at 01:09

## 2022-09-03 RX ADMIN — DICLOFENAC SODIUM 4 G: 10 GEL TOPICAL at 09:09

## 2022-09-03 RX ADMIN — IPRATROPIUM BROMIDE AND ALBUTEROL SULFATE 3 ML: 2.5; .5 SOLUTION RESPIRATORY (INHALATION) at 07:09

## 2022-09-03 RX ADMIN — NYSTATIN 500000 UNITS: 100000 SUSPENSION ORAL at 05:09

## 2022-09-03 RX ADMIN — MORPHINE SULFATE 2 MG: 4 INJECTION INTRAVENOUS at 01:09

## 2022-09-03 RX ADMIN — LIDOCAINE 1 PATCH: 50 PATCH TOPICAL at 05:09

## 2022-09-03 RX ADMIN — HYDROCODONE BITARTRATE AND ACETAMINOPHEN 1 TABLET: 5; 325 TABLET ORAL at 10:09

## 2022-09-03 RX ADMIN — SODIUM CHLORIDE, PRESERVATIVE FREE 10 ML: 5 INJECTION INTRAVENOUS at 01:09

## 2022-09-03 RX ADMIN — METOCLOPRAMIDE 5 MG: 5 INJECTION, SOLUTION INTRAMUSCULAR; INTRAVENOUS at 11:09

## 2022-09-03 RX ADMIN — PANTOPRAZOLE SODIUM 40 MG: 40 INJECTION, POWDER, FOR SOLUTION INTRAVENOUS at 11:09

## 2022-09-03 RX ADMIN — NYSTATIN 500000 UNITS: 100000 SUSPENSION ORAL at 01:09

## 2022-09-03 RX ADMIN — SODIUM CHLORIDE, PRESERVATIVE FREE 10 ML: 5 INJECTION INTRAVENOUS at 12:09

## 2022-09-03 RX ADMIN — THERA TABS 1 TABLET: TAB at 09:09

## 2022-09-03 NOTE — PROGRESS NOTES
[facility]  Hospital Medicine Progress Note        Chief Complaint: abdominal pain    HPI:   89 y.o. White female with a past medical history of hypertension, hyperlipidemia. The patient presented to [facility] on 8/16/2022 with a primary complaint of abdominal pain and falls due to syncope.  Patient reports having a fall on 8/12 while in her garage.  She states she was grabbing for the car door handle when she missed it and fell. Fall was unwitnessed and she reports loss of consciousness. On 08/15 she had a second fall while in the kitchen but denies any loss of consciousness.  Yesterday (08/16/2022) patient was going to the bathroom when she passed out.  Episode was witnessed by neighbor who is visiting her. She reports urinary frequency. Patient denies complaints of headache, vision changes, shortness of breath, cough.  She has been experiencing lower abdominal pain for the last several days. Granddaughter at bedside states that patient is dependent on laxatives have a bowel movement.  Although she uses laxatives she often has to manually disimpact herself . She normally goes every 4-5 days with last bowel movement being 5 days ago. She has been having increased belching.  Granddaughter also states patient has little to no appetite.  Patient lives alone, ambulates with walker, drives and completes activity daily living independently.  Family as it patient to be experiencing dementia over the last 6 months.     Upon presentation to the ED, patient afebrile, hemodynamically stable and SpO2 96% on room air.  Labs notable for WBC 28, potassium 2.6, glucose 161, lactic acid 3.9.  UA with 1+ leukocyte esterase, 2+ bilirubin, positive nitrites, trace ketones and protein.  Chest x-ray without acute processes.  CT abdomen pelvis revealed mild distention of the colon with liquefied stool, indeterminate bilateral adrenal nodules and pancreatic cysts.  While in the ED patient received 40 mEq of IV  potassium chloride, 2 g of magnesium sulfate and Rocephin was started for UTI.  Patient admitted to hospital medicine services for further medical management.    Interval Hx:     Patient is a very poor historian but she is able to tell me that she is currently having a bowel movement.  No family present at bedside.  No overnight events reported.    Patient still with NG tube in place.    Objective/physical exam:  General: Appears comfortable, no acute distress.  Integumentary: Warm, dry, intact.  Neuro:  Awake, alert, comprehension appears intact.  Musculoskeletal: Purposeful movement noted.     GI: Distended. No abdominal tenderness to touch, no bowel sounds present, no rebound no guarding.    Respiratory: No accessory muscle use. Breath sounds are equal.  Cardiovascular: Regular rate.  +1 peripheral edema    VITAL SIGNS: 24 HRS MIN & MAX LAST   Temp  Min: 96.9 °F (36.1 °C)  Max: 97.9 °F (36.6 °C) 97.4 °F (36.3 °C)   BP  Min: 109/71  Max: 136/73 136/73     Pulse  Min: 68  Max: 78  72   Resp  Min: 13  Max: 18 16   SpO2  Min: 95 %  Max: 99 % 98 %     X-Ray Abdomen AP 1 View  Narrative: EXAMINATION:  XR ABDOMEN AP 1 VIEW    CLINICAL HISTORY:  persistent colonic dilatation;    TECHNIQUE:  AP X-RAY OF THE ABDOMEN:    CPT 01140    COMPARISON:  August 26, 2022    FINDINGS:  Examination reveals persistent gases distension of the abdomen persistent residual contrast throughout the colon in a distribution similar to what was seen on the previous exam of August 26, 2022  Impression: No significant change    Electronically signed by: Bharathi Gauthier  Date:    08/30/2022  Time:    10:16    Recent Labs   Lab 08/29/22 0451 08/31/22 0415   WBC 6.0 5.2   RBC 3.16* 3.03*   HGB 10.1* 9.4*   HCT 30.0* 29.1*   MCV 94.9* 96.0*   MCH 32.0* 31.0   MCHC 33.7 32.3*   RDW 13.7 13.8    283   MPV 10.4 9.7       Recent Labs   Lab 08/29/22 0451 08/31/22 0415 09/02/22 0432    130* 129*   K 3.6 3.6 4.0   CO2 28 28 25   BUN  20.6* 19.3 18.5   CREATININE 0.45* 0.46* 0.46*   CALCIUM 7.8* 7.6* 7.6*   MG 2.00 2.00  --    ALBUMIN 1.7*  --  1.9*   ALKPHOS 208*  --  192*   ALT 79*  --  46   AST 98*  --  57*   BILITOT 0.4  --  0.4          Microbiology Results (last 7 days)       ** No results found for the last 168 hours. **             See below for Radiology    Scheduled Med:   albuterol-ipratropium  3 mL Nebulization Q6H    bisacodyL  10 mg Rectal Daily    diclofenac sodium  4 g Topical (Top) Daily    enoxaparin  30 mg Subcutaneous Q12H    LIDOcaine  1 patch Transdermal Q24H    metoclopramide HCl  5 mg Intravenous Q6H    metoprolol tartrate  25 mg Oral BID    multivitamin  1 tablet Oral Daily    nystatin  500,000 Units Oral QID (WM & HS)    pantoprazole  40 mg Intravenous BID    sodium chloride 0.9%  10 mL Intravenous Q6H        PRN Meds:  acetaminophen, albuterol-ipratropium, dextrose 50%, dextrose 50%, glucagon (human recombinant), glucose, glucose, HYDROcodone-acetaminophen, insulin aspart U-100, labetaloL, melatonin, metoclopramide HCl, metoprolol, morphine, ondansetron, sodium chloride 0.9%, Flushing PICC Protocol **AND** sodium chloride 0.9% **AND** sodium chloride 0.9%       Assessment/Plan:  Colonic distension   Dyspepsia  Transaminitis, improving   B/L LE edema bilateral  Small pancreatic cyst   Sliding hernia -nonstrangulated   Bilateral adrenal nodules repeat follow-up CT outpatient  Essential hypertension  Diastolic dysfunction   sinus tachycardia     Plan  Colonic distension-improving for now continue IV Reglan, Protonix, suppository twice daily.    Patient to remain NPO.  NG tube in place, low suction for decompression.      Still with significant bilateral lower extremity edema, will add lasix.   Continue gentle IV hydration, in the setting of hyponatremia.    Will repeat BMP in follow-up accordingly.  Strongly encourage more mobility.    Up to chair x3 daily.    Anticipated discharge and Disposition:  Pending. SNF    All  diagnosis and differential diagnosis have been reviewed; assessment and plan has been documented; I have personally reviewed the labs and test results that are presently available; I have reviewed the patients medication list; I have reviewed the consulting providers response and recommendations. I have reviewed or attempted to review medical records based upon their availability      I will continue to monitor closely and make adjustments to medical management as needed.      Sherri [place], DO   09/03/2022        This note was created with the assistance of Dragon voice recognition software. There may be transcription errors as a result of using this technology however minimal. Effort has been made to assure accuracy of transcription but any obvious errors or omissions should be clarified with the author of the document.

## 2022-09-04 LAB
ANION GAP SERPL CALC-SCNC: 6 MEQ/L
BASOPHILS # BLD AUTO: 0.04 X10(3)/MCL (ref 0–0.2)
BASOPHILS NFR BLD AUTO: 0.6 %
BUN SERPL-MCNC: 9.6 MG/DL (ref 9.8–20.1)
CALCIUM SERPL-MCNC: 7.5 MG/DL (ref 8.4–10.2)
CHLORIDE SERPL-SCNC: 101 MMOL/L (ref 98–111)
CO2 SERPL-SCNC: 27 MMOL/L (ref 23–31)
CREAT SERPL-MCNC: 0.49 MG/DL (ref 0.55–1.02)
CREAT/UREA NIT SERPL: 20
EOSINOPHIL # BLD AUTO: 0.15 X10(3)/MCL (ref 0–0.9)
EOSINOPHIL NFR BLD AUTO: 2.3 %
ERYTHROCYTE [DISTWIDTH] IN BLOOD BY AUTOMATED COUNT: 13.7 % (ref 11.5–17)
GFR SERPLBLD CREATININE-BSD FMLA CKD-EPI: >60 MLS/MIN/1.73/M2
GLUCOSE SERPL-MCNC: 81 MG/DL (ref 75–121)
HCT VFR BLD AUTO: 29.8 % (ref 37–47)
HGB BLD-MCNC: 10 GM/DL (ref 12–16)
IMM GRANULOCYTES # BLD AUTO: 0.25 X10(3)/MCL (ref 0–0.04)
IMM GRANULOCYTES NFR BLD AUTO: 3.9 %
LYMPHOCYTES # BLD AUTO: 1.55 X10(3)/MCL (ref 0.6–4.6)
LYMPHOCYTES NFR BLD AUTO: 24.1 %
MAGNESIUM SERPL-MCNC: 1.7 MG/DL (ref 1.6–2.6)
MCH RBC QN AUTO: 31.3 PG (ref 27–31)
MCHC RBC AUTO-ENTMCNC: 33.6 MG/DL (ref 33–36)
MCV RBC AUTO: 93.1 FL (ref 80–94)
MONOCYTES # BLD AUTO: 0.74 X10(3)/MCL (ref 0.1–1.3)
MONOCYTES NFR BLD AUTO: 11.5 %
NEUTROPHILS # BLD AUTO: 3.7 X10(3)/MCL (ref 2.1–9.2)
NEUTROPHILS NFR BLD AUTO: 57.6 %
NRBC BLD AUTO-RTO: 0 %
PHOSPHATE SERPL-MCNC: 2.4 MG/DL (ref 2.3–4.7)
PLATELET # BLD AUTO: 305 X10(3)/MCL (ref 130–400)
PMV BLD AUTO: 10.2 FL (ref 7.4–10.4)
POTASSIUM SERPL-SCNC: 3.3 MMOL/L (ref 3.5–5.1)
RBC # BLD AUTO: 3.2 X10(6)/MCL (ref 4.2–5.4)
SODIUM SERPL-SCNC: 134 MMOL/L (ref 132–146)
WBC # SPEC AUTO: 6.4 X10(3)/MCL (ref 4.5–11.5)

## 2022-09-04 PROCEDURE — 63600175 PHARM REV CODE 636 W HCPCS: Performed by: INTERNAL MEDICINE

## 2022-09-04 PROCEDURE — 63600175 PHARM REV CODE 636 W HCPCS: Performed by: NURSE PRACTITIONER

## 2022-09-04 PROCEDURE — 27000221 HC OXYGEN, UP TO 24 HOURS

## 2022-09-04 PROCEDURE — C9113 INJ PANTOPRAZOLE SODIUM, VIA: HCPCS | Performed by: INTERNAL MEDICINE

## 2022-09-04 PROCEDURE — 25000003 PHARM REV CODE 250: Performed by: INTERNAL MEDICINE

## 2022-09-04 PROCEDURE — A4216 STERILE WATER/SALINE, 10 ML: HCPCS | Performed by: INTERNAL MEDICINE

## 2022-09-04 PROCEDURE — 25000242 PHARM REV CODE 250 ALT 637 W/ HCPCS: Performed by: INTERNAL MEDICINE

## 2022-09-04 PROCEDURE — 94640 AIRWAY INHALATION TREATMENT: CPT

## 2022-09-04 PROCEDURE — 63600175 PHARM REV CODE 636 W HCPCS: Performed by: STUDENT IN AN ORGANIZED HEALTH CARE EDUCATION/TRAINING PROGRAM

## 2022-09-04 PROCEDURE — 85025 COMPLETE CBC W/AUTO DIFF WBC: CPT | Performed by: STUDENT IN AN ORGANIZED HEALTH CARE EDUCATION/TRAINING PROGRAM

## 2022-09-04 PROCEDURE — 84100 ASSAY OF PHOSPHORUS: CPT | Performed by: STUDENT IN AN ORGANIZED HEALTH CARE EDUCATION/TRAINING PROGRAM

## 2022-09-04 PROCEDURE — 83735 ASSAY OF MAGNESIUM: CPT | Performed by: STUDENT IN AN ORGANIZED HEALTH CARE EDUCATION/TRAINING PROGRAM

## 2022-09-04 PROCEDURE — 36415 COLL VENOUS BLD VENIPUNCTURE: CPT | Performed by: STUDENT IN AN ORGANIZED HEALTH CARE EDUCATION/TRAINING PROGRAM

## 2022-09-04 PROCEDURE — 25000003 PHARM REV CODE 250: Performed by: NURSE PRACTITIONER

## 2022-09-04 PROCEDURE — 99900035 HC TECH TIME PER 15 MIN (STAT)

## 2022-09-04 PROCEDURE — 21400001 HC TELEMETRY ROOM

## 2022-09-04 PROCEDURE — 80048 BASIC METABOLIC PNL TOTAL CA: CPT | Performed by: STUDENT IN AN ORGANIZED HEALTH CARE EDUCATION/TRAINING PROGRAM

## 2022-09-04 PROCEDURE — 94761 N-INVAS EAR/PLS OXIMETRY MLT: CPT

## 2022-09-04 PROCEDURE — 25000003 PHARM REV CODE 250: Performed by: STUDENT IN AN ORGANIZED HEALTH CARE EDUCATION/TRAINING PROGRAM

## 2022-09-04 RX ADMIN — BISACODYL 10 MG: 10 SUPPOSITORY RECTAL at 08:09

## 2022-09-04 RX ADMIN — DICLOFENAC SODIUM 4 G: 10 GEL TOPICAL at 09:09

## 2022-09-04 RX ADMIN — IPRATROPIUM BROMIDE AND ALBUTEROL SULFATE 3 ML: 2.5; .5 SOLUTION RESPIRATORY (INHALATION) at 07:09

## 2022-09-04 RX ADMIN — SODIUM CHLORIDE, PRESERVATIVE FREE 10 ML: 5 INJECTION INTRAVENOUS at 12:09

## 2022-09-04 RX ADMIN — SODIUM CHLORIDE, PRESERVATIVE FREE 10 ML: 5 INJECTION INTRAVENOUS at 06:09

## 2022-09-04 RX ADMIN — NYSTATIN 500000 UNITS: 100000 SUSPENSION ORAL at 09:09

## 2022-09-04 RX ADMIN — METOPROLOL TARTRATE 25 MG: 25 TABLET, FILM COATED ORAL at 08:09

## 2022-09-04 RX ADMIN — ENOXAPARIN SODIUM 30 MG: 30 INJECTION SUBCUTANEOUS at 08:09

## 2022-09-04 RX ADMIN — NYSTATIN 500000 UNITS: 100000 SUSPENSION ORAL at 08:09

## 2022-09-04 RX ADMIN — NYSTATIN 500000 UNITS: 100000 SUSPENSION ORAL at 11:09

## 2022-09-04 RX ADMIN — METOCLOPRAMIDE 5 MG: 5 INJECTION, SOLUTION INTRAMUSCULAR; INTRAVENOUS at 09:09

## 2022-09-04 RX ADMIN — FUROSEMIDE 20 MG: 10 INJECTION, SOLUTION INTRAMUSCULAR; INTRAVENOUS at 12:09

## 2022-09-04 RX ADMIN — METOCLOPRAMIDE 5 MG: 5 INJECTION, SOLUTION INTRAMUSCULAR; INTRAVENOUS at 01:09

## 2022-09-04 RX ADMIN — PANTOPRAZOLE SODIUM 40 MG: 40 INJECTION, POWDER, FOR SOLUTION INTRAVENOUS at 11:09

## 2022-09-04 RX ADMIN — SODIUM CHLORIDE: 9 INJECTION, SOLUTION INTRAVENOUS at 12:09

## 2022-09-04 RX ADMIN — PANTOPRAZOLE SODIUM 40 MG: 40 INJECTION, POWDER, FOR SOLUTION INTRAVENOUS at 09:09

## 2022-09-04 RX ADMIN — IPRATROPIUM BROMIDE AND ALBUTEROL SULFATE 3 ML: 2.5; .5 SOLUTION RESPIRATORY (INHALATION) at 12:09

## 2022-09-04 RX ADMIN — FUROSEMIDE 20 MG: 10 INJECTION, SOLUTION INTRAMUSCULAR; INTRAVENOUS at 01:09

## 2022-09-04 RX ADMIN — SODIUM CHLORIDE, PRESERVATIVE FREE 10 ML: 5 INJECTION INTRAVENOUS at 11:09

## 2022-09-04 RX ADMIN — LIDOCAINE 1 PATCH: 50 PATCH TOPICAL at 05:09

## 2022-09-04 RX ADMIN — METOCLOPRAMIDE 5 MG: 5 INJECTION, SOLUTION INTRAMUSCULAR; INTRAVENOUS at 11:09

## 2022-09-04 RX ADMIN — MORPHINE SULFATE 2 MG: 4 INJECTION INTRAVENOUS at 09:09

## 2022-09-04 RX ADMIN — NYSTATIN 500000 UNITS: 100000 SUSPENSION ORAL at 05:09

## 2022-09-04 RX ADMIN — ENOXAPARIN SODIUM 30 MG: 30 INJECTION SUBCUTANEOUS at 09:09

## 2022-09-04 RX ADMIN — IPRATROPIUM BROMIDE AND ALBUTEROL SULFATE 3 ML: 2.5; .5 SOLUTION RESPIRATORY (INHALATION) at 08:09

## 2022-09-04 RX ADMIN — IPRATROPIUM BROMIDE AND ALBUTEROL SULFATE 3 ML: 2.5; .5 SOLUTION RESPIRATORY (INHALATION) at 01:09

## 2022-09-04 RX ADMIN — HYDROCODONE BITARTRATE AND ACETAMINOPHEN 1 TABLET: 5; 325 TABLET ORAL at 12:09

## 2022-09-04 RX ADMIN — THERA TABS 1 TABLET: TAB at 08:09

## 2022-09-04 RX ADMIN — METOPROLOL TARTRATE 25 MG: 25 TABLET, FILM COATED ORAL at 09:09

## 2022-09-04 NOTE — PROGRESS NOTES
[facility]  Hospital Medicine Progress Note        Chief Complaint: abdominal pain    HPI:   89 y.o. White female with a past medical history of hypertension, hyperlipidemia. The patient presented to [facility] on 8/16/2022 with a primary complaint of abdominal pain and falls due to syncope.  Patient reports having a fall on 8/12 while in her garage.  She states she was grabbing for the car door handle when she missed it and fell. Fall was unwitnessed and she reports loss of consciousness. On 08/15 she had a second fall while in the kitchen but denies any loss of consciousness.  Yesterday (08/16/2022) patient was going to the bathroom when she passed out.  Episode was witnessed by neighbor who is visiting her. She reports urinary frequency. Patient denies complaints of headache, vision changes, shortness of breath, cough.  She has been experiencing lower abdominal pain for the last several days. Granddaughter at bedside states that patient is dependent on laxatives have a bowel movement.  Although she uses laxatives she often has to manually disimpact herself . She normally goes every 4-5 days with last bowel movement being 5 days ago. She has been having increased belching.  Granddaughter also states patient has little to no appetite.  Patient lives alone, ambulates with walker, drives and completes activity daily living independently.  Family as it patient to be experiencing dementia over the last 6 months.     Upon presentation to the ED, patient afebrile, hemodynamically stable and SpO2 96% on room air.  Labs notable for WBC 28, potassium 2.6, glucose 161, lactic acid 3.9.  UA with 1+ leukocyte esterase, 2+ bilirubin, positive nitrites, trace ketones and protein.  Chest x-ray without acute processes.  CT abdomen pelvis revealed mild distention of the colon with liquefied stool, indeterminate bilateral adrenal nodules and pancreatic cysts.  While in the ED patient received 40 mEq of IV  potassium chloride, 2 g of magnesium sulfate and Rocephin was started for UTI.  Patient admitted to hospital medicine services for further medical management.    Interval Hx:       Patient's daughters present at bedside , she is agreeable with rehabilitation and states that  [facility] has a rehab that patient has been to before. KUB has been reviewed and shows persistent distention of colon with air and contrast still present.    NG tube is in place.    Objective/physical exam:  General: Appears comfortable, no acute distress.  Integumentary: Warm, dry, intact.  Neuro:  Awake, alert, comprehension appears intact.  Musculoskeletal: Purposeful movement noted.     GI: Distended. No abdominal tenderness to touch, no bowel sounds present, no rebound no guarding.    Respiratory: No accessory muscle use. Breath sounds are equal.  Cardiovascular: Regular rate.  +1 peripheral edema    VITAL SIGNS: 24 HRS MIN & MAX LAST   Temp  Min: 97.2 °F (36.2 °C)  Max: 97.5 °F (36.4 °C) 97.5 °F (36.4 °C)   BP  Min: 108/65  Max: 153/81 (!) 153/81     Pulse  Min: 72  Max: 110  78   Resp  Min: 16  Max: 20 16   SpO2  Min: 96 %  Max: 99 % 99 %     X-Ray Abdomen AP 1 View  Narrative: EXAMINATION:  Single radiographic views of the ABDOMEN.    CLINICAL HISTORY:  colonic distention;    TECHNIQUE:  Single radiographic views of the ABDOMEN.    COMPARISON:  KUB 08/30/2022.    FINDINGS:  Three supine views of the abdomen demonstrate a nasogastric tube in the gastric antrum.  There is persistent gaseous and contrast distension of the colon throughout the entirety of the abdomen.  There is a nonobstructive bowel gas pattern.  Impression: Persistent distension of the colon with air and contrast as above.    Electronically signed by: Torrey Knight MD  Date:    09/03/2022  Time:    12:12    Recent Labs   Lab 08/29/22  0451 08/31/22  0415 09/04/22  0445   WBC 6.0 5.2 6.4   RBC 3.16* 3.03* 3.20*   HGB 10.1* 9.4* 10.0*   HCT 30.0* 29.1* 29.8*   MCV 94.9*  96.0* 93.1   MCH 32.0* 31.0 31.3*   MCHC 33.7 32.3* 33.6   RDW 13.7 13.8 13.7    283 305   MPV 10.4 9.7 10.2       Recent Labs   Lab 08/29/22  0451 08/31/22  0415 09/02/22  0432 09/03/22  1423 09/04/22  0445    130* 129* 134 134   K 3.6 3.6 4.0 3.8 3.3*   CO2 28 28 25 28 27   BUN 20.6* 19.3 18.5 10.5 9.6*   CREATININE 0.45* 0.46* 0.46* 0.48* 0.49*   CALCIUM 7.8* 7.6* 7.6* 7.7* 7.5*   MG 2.00 2.00  --   --  1.70   ALBUMIN 1.7*  --  1.9*  --   --    ALKPHOS 208*  --  192*  --   --    ALT 79*  --  46  --   --    AST 98*  --  57*  --   --    BILITOT 0.4  --  0.4  --   --           Microbiology Results (last 7 days)       ** No results found for the last 168 hours. **             See below for Radiology    Scheduled Med:   albuterol-ipratropium  3 mL Nebulization Q6H    diclofenac sodium  4 g Topical (Top) Daily    enoxaparin  30 mg Subcutaneous Q12H    furosemide (LASIX) injection  20 mg Intravenous Q12H    LIDOcaine  1 patch Transdermal Q24H    metoclopramide HCl  5 mg Intravenous Q6H    metoprolol tartrate  25 mg Oral BID    multivitamin  1 tablet Oral Daily    nystatin  500,000 Units Oral QID (WM & HS)    pantoprazole  40 mg Intravenous BID    sodium chloride 0.9%  10 mL Intravenous Q6H        PRN Meds:  acetaminophen, albuterol-ipratropium, dextrose 50%, dextrose 50%, glucagon (human recombinant), glucose, glucose, HYDROcodone-acetaminophen, insulin aspart U-100, labetaloL, melatonin, metoclopramide HCl, metoprolol, morphine, ondansetron, sodium chloride 0.9%, Flushing PICC Protocol **AND** sodium chloride 0.9% **AND** sodium chloride 0.9%       Assessment/Plan:  Colonic distension   Dyspepsia  Transaminitis, improving   B/L LE edema bilateral  Small pancreatic cyst   Sliding hernia -nonstrangulated   Bilateral adrenal nodules repeat follow-up CT outpatient  Essential hypertension  Diastolic dysfunction   sinus tachycardia     Plan  Colonic distension-improving for now continue IV Reglan, Protonix,  suppository twice daily.     KUB was ordered that shows persistent distension -continue supportive care.     Patient has been started on a clear liquid diet this may need to be held.   NG tube in place, low suction for decompression.      Bilateral peripheral edema significantly improved will continue IV Lasix 20 mg b.I.d..      Continue gentle IV hydration.    Hyponatremia has resolved.  Up to chair x3 daily.    Anticipated discharge and Disposition:  Pending. SNF    All diagnosis and differential diagnosis have been reviewed; assessment and plan has been documented; I have personally reviewed the labs and test results that are presently available; I have reviewed the patients medication list; I have reviewed the consulting providers response and recommendations. I have reviewed or attempted to review medical records based upon their availability      I will continue to monitor closely and make adjustments to medical management as needed.      Sherri Colorado,    09/04/2022        This note was created with the assistance of Dragon voice recognition software. There may be transcription errors as a result of using this technology however minimal. Effort has been made to assure accuracy of transcription but any obvious errors or omissions should be clarified with the author of the document.

## 2022-09-05 LAB
ANION GAP SERPL CALC-SCNC: 8 MEQ/L
BASOPHILS # BLD AUTO: 0.09 X10(3)/MCL (ref 0–0.2)
BASOPHILS NFR BLD AUTO: 1.1 %
BUN SERPL-MCNC: 11.4 MG/DL (ref 9.8–20.1)
CALCIUM SERPL-MCNC: 7.9 MG/DL (ref 8.4–10.2)
CHLORIDE SERPL-SCNC: 103 MMOL/L (ref 98–111)
CO2 SERPL-SCNC: 24 MMOL/L (ref 23–31)
CREAT SERPL-MCNC: 0.53 MG/DL (ref 0.55–1.02)
CREAT/UREA NIT SERPL: 22
EOSINOPHIL # BLD AUTO: 0.11 X10(3)/MCL (ref 0–0.9)
EOSINOPHIL NFR BLD AUTO: 1.3 %
ERYTHROCYTE [DISTWIDTH] IN BLOOD BY AUTOMATED COUNT: 14.1 % (ref 11.5–17)
GFR SERPLBLD CREATININE-BSD FMLA CKD-EPI: >60 MLS/MIN/1.73/M2
GLUCOSE SERPL-MCNC: 83 MG/DL (ref 75–121)
HCT VFR BLD AUTO: 32.2 % (ref 37–47)
HGB BLD-MCNC: 10.9 GM/DL (ref 12–16)
IMM GRANULOCYTES # BLD AUTO: 0.32 X10(3)/MCL (ref 0–0.04)
IMM GRANULOCYTES NFR BLD AUTO: 3.7 %
LYMPHOCYTES # BLD AUTO: 1.86 X10(3)/MCL (ref 0.6–4.6)
LYMPHOCYTES NFR BLD AUTO: 21.7 %
MAGNESIUM SERPL-MCNC: 1.7 MG/DL (ref 1.6–2.6)
MCH RBC QN AUTO: 31.4 PG (ref 27–31)
MCHC RBC AUTO-ENTMCNC: 33.9 MG/DL (ref 33–36)
MCV RBC AUTO: 92.8 FL (ref 80–94)
MONOCYTES # BLD AUTO: 1.04 X10(3)/MCL (ref 0.1–1.3)
MONOCYTES NFR BLD AUTO: 12.1 %
NEUTROPHILS # BLD AUTO: 5.1 X10(3)/MCL (ref 2.1–9.2)
NEUTROPHILS NFR BLD AUTO: 60.1 %
NRBC BLD AUTO-RTO: 0 %
PHOSPHATE SERPL-MCNC: 2.3 MG/DL (ref 2.3–4.7)
PLATELET # BLD AUTO: 416 X10(3)/MCL (ref 130–400)
PMV BLD AUTO: 10.5 FL (ref 7.4–10.4)
POTASSIUM SERPL-SCNC: 3 MMOL/L (ref 3.5–5.1)
RBC # BLD AUTO: 3.47 X10(6)/MCL (ref 4.2–5.4)
SODIUM SERPL-SCNC: 135 MMOL/L (ref 132–146)
WBC # SPEC AUTO: 8.6 X10(3)/MCL (ref 4.5–11.5)

## 2022-09-05 PROCEDURE — C9113 INJ PANTOPRAZOLE SODIUM, VIA: HCPCS | Performed by: INTERNAL MEDICINE

## 2022-09-05 PROCEDURE — 83735 ASSAY OF MAGNESIUM: CPT | Performed by: STUDENT IN AN ORGANIZED HEALTH CARE EDUCATION/TRAINING PROGRAM

## 2022-09-05 PROCEDURE — 25000003 PHARM REV CODE 250: Performed by: INTERNAL MEDICINE

## 2022-09-05 PROCEDURE — 36415 COLL VENOUS BLD VENIPUNCTURE: CPT | Performed by: STUDENT IN AN ORGANIZED HEALTH CARE EDUCATION/TRAINING PROGRAM

## 2022-09-05 PROCEDURE — 99900035 HC TECH TIME PER 15 MIN (STAT)

## 2022-09-05 PROCEDURE — 25000003 PHARM REV CODE 250: Performed by: NURSE PRACTITIONER

## 2022-09-05 PROCEDURE — 63600175 PHARM REV CODE 636 W HCPCS: Performed by: NURSE PRACTITIONER

## 2022-09-05 PROCEDURE — A4216 STERILE WATER/SALINE, 10 ML: HCPCS | Performed by: INTERNAL MEDICINE

## 2022-09-05 PROCEDURE — 97530 THERAPEUTIC ACTIVITIES: CPT | Mod: CQ

## 2022-09-05 PROCEDURE — 25000003 PHARM REV CODE 250: Performed by: STUDENT IN AN ORGANIZED HEALTH CARE EDUCATION/TRAINING PROGRAM

## 2022-09-05 PROCEDURE — 94761 N-INVAS EAR/PLS OXIMETRY MLT: CPT

## 2022-09-05 PROCEDURE — 63600175 PHARM REV CODE 636 W HCPCS: Performed by: INTERNAL MEDICINE

## 2022-09-05 PROCEDURE — 21400001 HC TELEMETRY ROOM

## 2022-09-05 PROCEDURE — 84100 ASSAY OF PHOSPHORUS: CPT | Performed by: STUDENT IN AN ORGANIZED HEALTH CARE EDUCATION/TRAINING PROGRAM

## 2022-09-05 PROCEDURE — 94640 AIRWAY INHALATION TREATMENT: CPT

## 2022-09-05 PROCEDURE — 63600175 PHARM REV CODE 636 W HCPCS: Performed by: STUDENT IN AN ORGANIZED HEALTH CARE EDUCATION/TRAINING PROGRAM

## 2022-09-05 PROCEDURE — 80048 BASIC METABOLIC PNL TOTAL CA: CPT | Performed by: STUDENT IN AN ORGANIZED HEALTH CARE EDUCATION/TRAINING PROGRAM

## 2022-09-05 PROCEDURE — 85025 COMPLETE CBC W/AUTO DIFF WBC: CPT | Performed by: STUDENT IN AN ORGANIZED HEALTH CARE EDUCATION/TRAINING PROGRAM

## 2022-09-05 PROCEDURE — 25000242 PHARM REV CODE 250 ALT 637 W/ HCPCS: Performed by: INTERNAL MEDICINE

## 2022-09-05 RX ADMIN — METOCLOPRAMIDE 5 MG: 5 INJECTION, SOLUTION INTRAMUSCULAR; INTRAVENOUS at 02:09

## 2022-09-05 RX ADMIN — FUROSEMIDE 20 MG: 10 INJECTION, SOLUTION INTRAMUSCULAR; INTRAVENOUS at 01:09

## 2022-09-05 RX ADMIN — METOPROLOL TARTRATE 25 MG: 25 TABLET, FILM COATED ORAL at 09:09

## 2022-09-05 RX ADMIN — SODIUM CHLORIDE, PRESERVATIVE FREE 10 ML: 5 INJECTION INTRAVENOUS at 06:09

## 2022-09-05 RX ADMIN — METOCLOPRAMIDE 5 MG: 5 INJECTION, SOLUTION INTRAMUSCULAR; INTRAVENOUS at 10:09

## 2022-09-05 RX ADMIN — NYSTATIN 500000 UNITS: 100000 SUSPENSION ORAL at 04:09

## 2022-09-05 RX ADMIN — THERA TABS 1 TABLET: TAB at 09:09

## 2022-09-05 RX ADMIN — ENOXAPARIN SODIUM 30 MG: 30 INJECTION SUBCUTANEOUS at 09:09

## 2022-09-05 RX ADMIN — PANTOPRAZOLE SODIUM 40 MG: 40 INJECTION, POWDER, FOR SOLUTION INTRAVENOUS at 10:09

## 2022-09-05 RX ADMIN — SODIUM CHLORIDE, PRESERVATIVE FREE 10 ML: 5 INJECTION INTRAVENOUS at 12:09

## 2022-09-05 RX ADMIN — METOCLOPRAMIDE 5 MG: 5 INJECTION, SOLUTION INTRAMUSCULAR; INTRAVENOUS at 01:09

## 2022-09-05 RX ADMIN — IPRATROPIUM BROMIDE AND ALBUTEROL SULFATE 3 ML: 2.5; .5 SOLUTION RESPIRATORY (INHALATION) at 01:09

## 2022-09-05 RX ADMIN — DICLOFENAC SODIUM 4 G: 10 GEL TOPICAL at 12:09

## 2022-09-05 RX ADMIN — NYSTATIN 500000 UNITS: 100000 SUSPENSION ORAL at 09:09

## 2022-09-05 RX ADMIN — NYSTATIN 500000 UNITS: 100000 SUSPENSION ORAL at 12:09

## 2022-09-05 RX ADMIN — METOCLOPRAMIDE 5 MG: 5 INJECTION, SOLUTION INTRAMUSCULAR; INTRAVENOUS at 09:09

## 2022-09-05 RX ADMIN — LIDOCAINE 1 PATCH: 50 PATCH TOPICAL at 04:09

## 2022-09-05 RX ADMIN — SODIUM CHLORIDE: 9 INJECTION, SOLUTION INTRAVENOUS at 12:09

## 2022-09-05 RX ADMIN — SODIUM CHLORIDE, PRESERVATIVE FREE 10 ML: 5 INJECTION INTRAVENOUS at 11:09

## 2022-09-05 RX ADMIN — SODIUM CHLORIDE, PRESERVATIVE FREE 10 ML: 5 INJECTION INTRAVENOUS at 02:09

## 2022-09-05 RX ADMIN — FUROSEMIDE 20 MG: 10 INJECTION, SOLUTION INTRAMUSCULAR; INTRAVENOUS at 02:09

## 2022-09-05 RX ADMIN — IPRATROPIUM BROMIDE AND ALBUTEROL SULFATE 3 ML: 2.5; .5 SOLUTION RESPIRATORY (INHALATION) at 08:09

## 2022-09-05 RX ADMIN — PANTOPRAZOLE SODIUM 40 MG: 40 INJECTION, POWDER, FOR SOLUTION INTRAVENOUS at 09:09

## 2022-09-05 NOTE — PROGRESS NOTES
[facility]  Hospital Medicine Progress Note        Chief Complaint: abdominal pain    HPI:   89 y.o. White female with a past medical history of hypertension, hyperlipidemia. The patient presented to [facility] on 8/16/2022 with a primary complaint of abdominal pain and falls due to syncope.  Patient reports having a fall on 8/12 while in her garage.  She states she was grabbing for the car door handle when she missed it and fell. Fall was unwitnessed and she reports loss of consciousness. On 08/15 she had a second fall while in the kitchen but denies any loss of consciousness.  Yesterday (08/16/2022) patient was going to the bathroom when she passed out.  Episode was witnessed by neighbor who is visiting her. She reports urinary frequency. Patient denies complaints of headache, vision changes, shortness of breath, cough.  She has been experiencing lower abdominal pain for the last several days. Granddaughter at bedside states that patient is dependent on laxatives have a bowel movement.  Although she uses laxatives she often has to manually disimpact herself . She normally goes every 4-5 days with last bowel movement being 5 days ago. She has been having increased belching.  Granddaughter also states patient has little to no appetite.  Patient lives alone, ambulates with walker, drives and completes activity daily living independently.  Family as it patient to be experiencing dementia over the last 6 months.     Upon presentation to the ED, patient afebrile, hemodynamically stable and SpO2 96% on room air.  Labs notable for WBC 28, potassium 2.6, glucose 161, lactic acid 3.9.  UA with 1+ leukocyte esterase, 2+ bilirubin, positive nitrites, trace ketones and protein.  Chest x-ray without acute processes.  CT abdomen pelvis revealed mild distention of the colon with liquefied stool, indeterminate bilateral adrenal nodules and pancreatic cysts.  While in the ED patient received 40 mEq of IV  potassium chloride, 2 g of magnesium sulfate and Rocephin was started for UTI.  Patient admitted to hospital medicine services for further medical management.  Discussed discharge care planning with family present at bedside.  Patient/family is agreeable with rehabilitation and states that  [facility] has a rehab that patient has been to before. KUB has been reviewed and shows persistent distention of colon with air and contrast still present.    NG tube is in place.    Interval Hx:   No overnight events.  No new complaints.  Patient still with NG tube in place.  Daughter tells me she is trying to make her mother eat but she is unwilling, patient remains on clear liquid diet will make patient NPO for now.  Still with a lot of liquid stools no formed stool present.      Objective/physical exam:  General: Appears comfortable, no acute distress.  Integumentary: Warm, dry, intact.  Neuro:  Awake, alert, comprehension appears intact.  Musculoskeletal: Purposeful movement noted.     GI: Distended. No abdominal tenderness to touch, no bowel sounds present, no rebound no guarding.    Respiratory: No accessory muscle use. Breath sounds are equal.  Cardiovascular: Regular rate.  +1 peripheral edema    VITAL SIGNS: 24 HRS MIN & MAX LAST   Temp  Min: 97.6 °F (36.4 °C)  Max: 98.6 °F (37 °C) 97.6 °F (36.4 °C)   BP  Min: 109/60  Max: 151/67 124/80     Pulse  Min: 73  Max: 114  87   Resp  Min: 14  Max: 20 15   SpO2  Min: 90 %  Max: 94 % (!) 94 %     X-Ray Abdomen AP 1 View  Narrative: EXAMINATION:  Single radiographic views of the ABDOMEN.    CLINICAL HISTORY:  colonic distention;    TECHNIQUE:  Single radiographic views of the ABDOMEN.    COMPARISON:  KUB 08/30/2022.    FINDINGS:  Three supine views of the abdomen demonstrate a nasogastric tube in the gastric antrum.  There is persistent gaseous and contrast distension of the colon throughout the entirety of the abdomen.  There is a nonobstructive bowel gas  pattern.  Impression: Persistent distension of the colon with air and contrast as above.    Electronically signed by: Torrey Knight MD  Date:    09/03/2022  Time:    12:12    Recent Labs   Lab 08/31/22 0415 09/04/22 0445 09/05/22 0403   WBC 5.2 6.4 8.6   RBC 3.03* 3.20* 3.47*   HGB 9.4* 10.0* 10.9*   HCT 29.1* 29.8* 32.2*   MCV 96.0* 93.1 92.8   MCH 31.0 31.3* 31.4*   MCHC 32.3* 33.6 33.9   RDW 13.8 13.7 14.1    305 416*   MPV 9.7 10.2 10.5*       Recent Labs   Lab 08/31/22 0415 09/02/22 0432 09/03/22 1423 09/04/22 0445 09/05/22  0403   * 129* 134 134 135   K 3.6 4.0 3.8 3.3* 3.0*   CO2 28 25 28 27 24   BUN 19.3 18.5 10.5 9.6* 11.4   CREATININE 0.46* 0.46* 0.48* 0.49* 0.53*   CALCIUM 7.6* 7.6* 7.7* 7.5* 7.9*   MG 2.00  --   --  1.70 1.70   ALBUMIN  --  1.9*  --   --   --    ALKPHOS  --  192*  --   --   --    ALT  --  46  --   --   --    AST  --  57*  --   --   --    BILITOT  --  0.4  --   --   --           Microbiology Results (last 7 days)       ** No results found for the last 168 hours. **             See below for Radiology    Scheduled Med:   albuterol-ipratropium  3 mL Nebulization Q6H    diclofenac sodium  4 g Topical (Top) Daily    enoxaparin  30 mg Subcutaneous Q12H    furosemide (LASIX) injection  20 mg Intravenous Q12H    LIDOcaine  1 patch Transdermal Q24H    metoclopramide HCl  5 mg Intravenous Q6H    metoprolol tartrate  25 mg Oral BID    multivitamin  1 tablet Oral Daily    nystatin  500,000 Units Oral QID (WM & HS)    pantoprazole  40 mg Intravenous BID    sodium chloride 0.9%  10 mL Intravenous Q6H        PRN Meds:  acetaminophen, albuterol-ipratropium, dextrose 50%, dextrose 50%, glucagon (human recombinant), glucose, glucose, HYDROcodone-acetaminophen, insulin aspart U-100, labetaloL, melatonin, metoclopramide HCl, metoprolol, morphine, ondansetron, sodium chloride 0.9%, Flushing PICC Protocol **AND** sodium chloride 0.9% **AND** sodium chloride 0.9%        Assessment/Plan:  Colonic distension   Dyspepsia  Transaminitis, improving   B/L LE edema bilateral  Small pancreatic cyst   Sliding hernia -nonstrangulated   Bilateral adrenal nodules repeat follow-up CT outpatient  Essential hypertension  Diastolic dysfunction   sinus tachycardia     Plan  Colonic distension-without improvement  continue IV Reglan, Protonix, suppository twice daily.   Status post colonoscopic decompression several days ago without any improvement.  Will consult surgical hospitalist for possible surgical decompression?  Discontinue clear liquid diet.  Maintain NPO status.  Continue NG to for continue decompression.     Bilateral peripheral edema significantly improved will continue IV Lasix 20 mg b.I.d..    Hyponatremia has resolved.  Replete potassium.  Up to chair x3 daily.    Anticipated discharge and Disposition:  Pending. SNF    All diagnosis and differential diagnosis have been reviewed; assessment and plan has been documented; I have personally reviewed the labs and test results that are presently available; I have reviewed the patients medication list; I have reviewed the consulting providers response and recommendations. I have reviewed or attempted to review medical records based upon their availability      I will continue to monitor closely and make adjustments to medical management as needed.    Sherri Colorado,    09/05/2022        This note was created with the assistance of Dragon voice recognition software. There may be transcription errors as a result of using this technology however minimal. Effort has been made to assure accuracy of transcription but any obvious errors or omissions should be clarified with the author of the document.

## 2022-09-05 NOTE — PT/OT/SLP PROGRESS
Physical Therapy Treatment    Patient Name:  Fidelina Darling   MRN:  [id number]    Recommendations:     Discharge Recommendations:  nursing facility, skilled   Discharge Equipment Recommendations:  (TBD by next level of care)   Barriers to discharge: Decreased caregiver support    Assessment:     Fidelina Darling is a 89 y.o. female admitted with a medical diagnosis of Abdominal cramping.  She presents with the following impairments/functional limitations:  impaired endurance, weakness, impaired functional mobility, impaired self care skills, impaired balance, decreased lower extremity function, decreased safety awareness, pain, decreased ROM.    Rehab Prognosis: Fair; patient would benefit from acute skilled PT services to address these deficits and reach maximum level of function.    Recent Surgery: Procedure(s) (LRB):  SIGMOIDOSCOPY (N/A)  COLECTOMY, SIGMOID 5 Days Post-Op    Plan:     During this hospitalization, patient to be seen 5 x/week to address the identified rehab impairments via gait training, therapeutic activities, therapeutic exercises, neuromuscular re-education and progress toward the following goals:    Plan of Care Expires:  09/19/22    Subjective     Chief Complaint: Pt reports that she is very tired and that her abdomen as well as her knees hurt.    Pain/Comfort:  Pain Rating 1: other (see comments) (Pt unable to verbalize pain number)  Location 1: abdomen  Pain Addressed 1: Distraction, Reposition      Objective:     Communicated with NSG prior to session.  Patient found up in chair with NG tube, peripheral IV upon PT entry to room.     General Precautions: Standard, fall   Orthopedic Precautions:N/A   Braces: N/A  Respiratory Status: Nasal cannula, flow 2 L/min     Functional Mobility:  Bed Mobility:     Rolling Left:  maximal assistance  Rolling Right: maximal assistance  Sit to Supine: maximal assistance  Transfers:     Bed to Chair: maximal assistance with  no AD  using  Squat Pivot      AM-PAC  6 CLICK MOBILITY  Turning over in bed (including adjusting bedclothes, sheets and blankets)?: 2  Sitting down on and standing up from a chair with arms (e.g., wheelchair, bedside commode, etc.): 2  Moving from lying on back to sitting on the side of the bed?: 2  Moving to and from a bed to a chair (including a wheelchair)?: 2  Need to walk in hospital room?: 1  Climbing 3-5 steps with a railing?: 1  Basic Mobility Total Score: 10       Therapeutic Activities and Exercises:  Rolling performed in bed for toileting hygiene d/t (+) BM while in chair.     BLE therex performed in bed while bed in chair position to improve LE strength and ROM.    Patient left with bed in chair position with all lines intact and call button in reach..    GOALS:   Multidisciplinary Problems       Physical Therapy Goals          Problem: Physical Therapy    Goal Priority Disciplines Outcome Goal Variances Interventions   Physical Therapy Goal     PT, PT/OT Unable to Meet, Plan Revised     Description: Goals to be met by: 22     Patient will increase functional independence with mobility by performin. Supine to sit with MInimal Assistance  2. Sit to supine with MInimal Assistance  3. Sit to stand transfer with Minimal Assistance  4. Bed to chair transfer with Minimal Assistance using Rolling Walker  5. Sitting at edge of bed x10 minutes with Stand-by Assistance                         Time Tracking:     PT Received On: 22  PT Start Time: 1010     PT Stop Time: 1030  PT Total Time (min): 20 min     Billable Minutes: Therapeutic Activity 20    Treatment Type: Treatment  PT/PTA: PTA     PTA Visit Number: 5     2022

## 2022-09-06 LAB
POCT GLUCOSE: 102 MG/DL (ref 70–110)
POCT GLUCOSE: 62 MG/DL (ref 70–110)
POCT GLUCOSE: 89 MG/DL (ref 70–110)
POCT GLUCOSE: 96 MG/DL (ref 70–110)

## 2022-09-06 PROCEDURE — 63600175 PHARM REV CODE 636 W HCPCS: Performed by: INTERNAL MEDICINE

## 2022-09-06 PROCEDURE — 25000003 PHARM REV CODE 250: Performed by: STUDENT IN AN ORGANIZED HEALTH CARE EDUCATION/TRAINING PROGRAM

## 2022-09-06 PROCEDURE — 25000003 PHARM REV CODE 250: Performed by: INTERNAL MEDICINE

## 2022-09-06 PROCEDURE — 63600175 PHARM REV CODE 636 W HCPCS: Performed by: STUDENT IN AN ORGANIZED HEALTH CARE EDUCATION/TRAINING PROGRAM

## 2022-09-06 PROCEDURE — C9113 INJ PANTOPRAZOLE SODIUM, VIA: HCPCS | Performed by: INTERNAL MEDICINE

## 2022-09-06 PROCEDURE — 94640 AIRWAY INHALATION TREATMENT: CPT

## 2022-09-06 PROCEDURE — 97164 PT RE-EVAL EST PLAN CARE: CPT

## 2022-09-06 PROCEDURE — 99900035 HC TECH TIME PER 15 MIN (STAT)

## 2022-09-06 PROCEDURE — A4216 STERILE WATER/SALINE, 10 ML: HCPCS | Performed by: INTERNAL MEDICINE

## 2022-09-06 PROCEDURE — 99900031 HC PATIENT EDUCATION (STAT)

## 2022-09-06 PROCEDURE — 25000242 PHARM REV CODE 250 ALT 637 W/ HCPCS: Performed by: INTERNAL MEDICINE

## 2022-09-06 PROCEDURE — 21400001 HC TELEMETRY ROOM

## 2022-09-06 PROCEDURE — 97530 THERAPEUTIC ACTIVITIES: CPT | Mod: CO

## 2022-09-06 PROCEDURE — 63600175 PHARM REV CODE 636 W HCPCS: Performed by: NURSE PRACTITIONER

## 2022-09-06 PROCEDURE — 94761 N-INVAS EAR/PLS OXIMETRY MLT: CPT

## 2022-09-06 RX ADMIN — METOCLOPRAMIDE 5 MG: 5 INJECTION, SOLUTION INTRAMUSCULAR; INTRAVENOUS at 10:09

## 2022-09-06 RX ADMIN — NYSTATIN 500000 UNITS: 100000 SUSPENSION ORAL at 10:09

## 2022-09-06 RX ADMIN — HYDROCODONE BITARTRATE AND ACETAMINOPHEN 1 TABLET: 5; 325 TABLET ORAL at 10:09

## 2022-09-06 RX ADMIN — PANTOPRAZOLE SODIUM 40 MG: 40 INJECTION, POWDER, FOR SOLUTION INTRAVENOUS at 09:09

## 2022-09-06 RX ADMIN — IPRATROPIUM BROMIDE AND ALBUTEROL SULFATE 3 ML: 2.5; .5 SOLUTION RESPIRATORY (INHALATION) at 02:09

## 2022-09-06 RX ADMIN — SODIUM CHLORIDE, PRESERVATIVE FREE 10 ML: 5 INJECTION INTRAVENOUS at 04:09

## 2022-09-06 RX ADMIN — PANTOPRAZOLE SODIUM 40 MG: 40 INJECTION, POWDER, FOR SOLUTION INTRAVENOUS at 10:09

## 2022-09-06 RX ADMIN — Medication 16 G: at 05:09

## 2022-09-06 RX ADMIN — DICLOFENAC SODIUM 4 G: 10 GEL TOPICAL at 10:09

## 2022-09-06 RX ADMIN — FUROSEMIDE 20 MG: 10 INJECTION, SOLUTION INTRAMUSCULAR; INTRAVENOUS at 04:09

## 2022-09-06 RX ADMIN — IPRATROPIUM BROMIDE AND ALBUTEROL SULFATE 3 ML: 2.5; .5 SOLUTION RESPIRATORY (INHALATION) at 12:09

## 2022-09-06 RX ADMIN — LIDOCAINE 1 PATCH: 50 PATCH TOPICAL at 04:09

## 2022-09-06 RX ADMIN — IPRATROPIUM BROMIDE AND ALBUTEROL SULFATE 3 ML: 2.5; .5 SOLUTION RESPIRATORY (INHALATION) at 07:09

## 2022-09-06 RX ADMIN — METOPROLOL TARTRATE 25 MG: 25 TABLET, FILM COATED ORAL at 10:09

## 2022-09-06 RX ADMIN — HYDROCODONE BITARTRATE AND ACETAMINOPHEN 1 TABLET: 5; 325 TABLET ORAL at 04:09

## 2022-09-06 RX ADMIN — METOCLOPRAMIDE 5 MG: 5 INJECTION, SOLUTION INTRAMUSCULAR; INTRAVENOUS at 09:09

## 2022-09-06 RX ADMIN — IPRATROPIUM BROMIDE AND ALBUTEROL SULFATE 3 ML: 2.5; .5 SOLUTION RESPIRATORY (INHALATION) at 08:09

## 2022-09-06 RX ADMIN — FUROSEMIDE 20 MG: 10 INJECTION, SOLUTION INTRAMUSCULAR; INTRAVENOUS at 01:09

## 2022-09-06 RX ADMIN — SODIUM CHLORIDE, PRESERVATIVE FREE 10 ML: 5 INJECTION INTRAVENOUS at 05:09

## 2022-09-06 RX ADMIN — ENOXAPARIN SODIUM 30 MG: 30 INJECTION SUBCUTANEOUS at 10:09

## 2022-09-06 RX ADMIN — METOCLOPRAMIDE 5 MG: 5 INJECTION, SOLUTION INTRAMUSCULAR; INTRAVENOUS at 01:09

## 2022-09-06 RX ADMIN — SODIUM CHLORIDE: 9 INJECTION, SOLUTION INTRAVENOUS at 06:09

## 2022-09-06 RX ADMIN — NYSTATIN 500000 UNITS: 100000 SUSPENSION ORAL at 04:09

## 2022-09-06 RX ADMIN — METOCLOPRAMIDE 5 MG: 5 INJECTION, SOLUTION INTRAMUSCULAR; INTRAVENOUS at 04:09

## 2022-09-06 RX ADMIN — SODIUM CHLORIDE, PRESERVATIVE FREE 10 ML: 5 INJECTION INTRAVENOUS at 10:09

## 2022-09-06 NOTE — CONSULTS
"Consults  Patient Name: Fidelina Darling   MRN: [id number]   Admission Date: 8/16/2022   Hospital Length of Stay: 20   Attending Provider: Alex Whitfield MD   Consulting Provider: Nisha Mendez RN  Reason for Consult: Goals of Care  Primary Care Physician:  Primary Doctor No     Principal Problem: Abdominal cramping       Final diagnoses:  [R10.9] Abdominal cramping (Primary)  [K59.00] Constipation, unspecified constipation type  [R10.9] Abdominal pain, unspecified abdominal location  [N39.0, R31.9] Urinary tract infection with hematuria, site unspecified  [D72.829] Leukocytosis, unspecified type  [K86.9] Pancreatic lesion  [E87.6] Hypokalemia  [E87.2] Lactic acidosis      Assessment/Plan:     I reviewed the patient and family's understanding of the seriousness of the illness and its expected prognosis. We discussed the patient's goals of care and treatment preferences.        I met with Mrs. Darling alone in her room. She is oriented to person and place. In general and simplistic terms we discussed the purpose of the NGT and that it does not allow her to eat/drink by mouth when it is in place. We then discussed the potential for needing a way to obtain nutrition if she is unable to eat/drink. I explained that there may be a need to put a feeding tube through the skin of the abdomen into her stomach. I asked if she would allow that. She tentatively replied, "I don't know about that." I asked if her daughter Ayana makes the decisions about her medical care to which she replied yes. I asked, "If Ayana says you should get the tube into your stomach to give you nutrition, would you do it?", to which she replied "yes".    I asked about her other children and if she knew any of their phone numbers. She thought and said that she didn't know them. I asked if the phone numbers might be in her phone and she replied, "They might be."     I offered a supportive presence and let her know the Palliative Medicine team will continue " to look in on her. She thanked me.    Interval History:    The NGT is clamped. Patient denies pain at present. No acute events since last Palliative Medicine visit. Will continue to follow.        Active Ambulatory Problems     Diagnosis Date Noted    No Active Ambulatory Problems     Resolved Ambulatory Problems     Diagnosis Date Noted    No Resolved Ambulatory Problems     No Additional Past Medical History        Past Surgical History:   Procedure Laterality Date    FLEXIBLE SIGMOIDOSCOPY N/A 8/31/2022    Procedure: SIGMOIDOSCOPY;  Surgeon: Torrey Garcia MD;  Location: [facility] ENDOSCOPY;  Service: Gastroenterology;  Laterality: N/A;        Review of patient's allergies indicates:  No Known Allergies       Current Facility-Administered Medications:     0.9%  NaCl infusion, , Intravenous, Continuous, Sherri Colorado DO, Last Rate: 50 mL/hr at 09/06/22 0617, New Bag at 09/06/22 0617    acetaminophen tablet 650 mg, 650 mg, Oral, Q8H PRN, Umer Bynum MD    albuterol-ipratropium 2.5 mg-0.5 mg/3 mL nebulizer solution 3 mL, 3 mL, Nebulization, Q4H PRN, Taty Petit, AGACNP-BC, 3 mL at 08/29/22 0901    albuterol-ipratropium 2.5 mg-0.5 mg/3 mL nebulizer solution 3 mL, 3 mL, Nebulization, Q6H, Yinka Cabral MD, 3 mL at 09/06/22 0754    dextrose 50% injection 12.5 g, 12.5 g, Intravenous, PRN, Neelima Duran MD    dextrose 50% injection 25 g, 25 g, Intravenous, PRN, Neelima Duran MD    diclofenac sodium 1 % gel 4 g, 4 g, Topical (Top), Daily, Yinka Cabral MD, 4 g at 09/05/22 1254    enoxaparin injection 30 mg, 30 mg, Subcutaneous, Q12H, Alex Whitfield MD, 30 mg at 09/05/22 2158    furosemide injection 20 mg, 20 mg, Intravenous, Q12H, Sherri Colorado DO, 20 mg at 09/06/22 0118    glucagon (human recombinant) injection 1 mg, 1 mg, Intramuscular, PRN, Neelima Duran MD    glucose chewable tablet 16 g, 16 g, Oral, PRN, Neelima Duran MD, 16 g at 09/06/22 0531    glucose chewable tablet 24 g, 24 g,  Oral, PRN, Neelima Duran MD    HYDROcodone-acetaminophen 5-325 mg per tablet 1 tablet, 1 tablet, Oral, Q6H PRN, Neelima Duran MD, 1 tablet at 09/04/22 1245    insulin aspart U-100 injection 0-5 Units, 0-5 Units, Subcutaneous, QID (AC + HS) PRN, Neelima Duran MD    labetaloL injection 20 mg, 20 mg, Intravenous, Q6H PRN, Yinka Cabral MD    LIDOcaine 5 % patch 1 patch, 1 patch, Transdermal, Q24H, Yinka Cabral MD, 1 patch at 09/05/22 1627    melatonin tablet 6 mg, 6 mg, Oral, Nightly PRN, Umer Bynum MD, 6 mg at 09/02/22 2045    metoclopramide HCl injection 5 mg, 5 mg, Intravenous, Q6H PRN, Yinka Cabral MD, 5 mg at 09/03/22 1408    metoclopramide HCl injection 5 mg, 5 mg, Intravenous, Q6H, LEXIS Goldstein, 5 mg at 09/06/22 0118    metoprolol injection 5 mg, 5 mg, Intravenous, Q5 Min PRN, Yinka Cabral MD    metoprolol tartrate (LOPRESSOR) tablet 25 mg, 25 mg, Oral, BID, Yinka Cabral MD, 25 mg at 09/05/22 2158    morphine injection 2 mg, 2 mg, Intravenous, Q2H PRN, REJI Amaro, 2 mg at 09/04/22 2139    multivitamin tablet, 1 tablet, Oral, Daily, LEXIS Goldstein, 1 tablet at 09/05/22 0909    nystatin 100,000 unit/mL suspension 500,000 Units, 500,000 Units, Oral, QID (WM & HS), Yinka Cabral MD, 500,000 Units at 09/05/22 2152    ondansetron injection 4 mg, 4 mg, Intravenous, Q6H PRN, Yinka Cabral MD, 4 mg at 09/03/22 2319    pantoprazole injection 40 mg, 40 mg, Intravenous, BID, Yinka Cabral MD, 40 mg at 09/05/22 2152    potassium bicarbonate disintegrating tablet 20 mEq, 20 mEq, Oral, Daily, Sherri Colorado,     sodium chloride 0.9% flush 10 mL, 10 mL, Intravenous, PRN, Umer Bynum MD    Flushing PICC Protocol, , , Until Discontinued **AND** sodium chloride 0.9% flush 10 mL, 10 mL, Intravenous, Q6H, 10 mL at 09/06/22 0536 **AND** sodium chloride 0.9% flush 10 mL, 10 mL, Intravenous, PRN, Yinka Cabral MD     acetaminophen, albuterol-ipratropium, dextrose  "50%, dextrose 50%, glucagon (human recombinant), glucose, glucose, HYDROcodone-acetaminophen, insulin aspart U-100, labetaloL, melatonin, metoclopramide HCl, metoprolol, morphine, ondansetron, sodium chloride 0.9%, Flushing PICC Protocol **AND** sodium chloride 0.9% **AND** sodium chloride 0.9%     History reviewed. No pertinent family history.       Review of Systems   Gastrointestinal:  Positive for abdominal distention, abdominal pain and constipation.          Objective:   /66   Pulse 72   Temp 97.6 °F (36.4 °C) (Oral)   Resp 18   Ht 5' 7.99" (1.727 m)   Wt 83 kg (183 lb)   SpO2 (!) 94%   BMI 27.83 kg/m²      Physical Exam   HENT:   Head: Normocephalic.   Right Ear: External ear normal.   Left Ear: External ear normal.   Mouth/Throat: Mucous membranes are moist.   Nose: NGT present  Eyes: Pupils are equal, round, and reactive to light.   Cardiovascular: Normal rate and regular rhythm. Pulmonary:      Effort: Pulmonary effort is normal.      Comments: Diminished breath sounds    Abdominal: She exhibits distension.   Musculoskeletal:         General: Swelling present.      Right lower leg: Edema present.      Left lower leg: Edema present.      Comments: Pitting edema to LUE and BLE   Neurological: She is alert. She displays weakness.   Oriented to person and place   Skin: Skin is dry.   Nursing note reviewed.       Review of Symptoms  Review of Symptoms      Symptom Assessment (ESAS 0-10 Scale)  Pain:  0  Dyspnea:  0  Anxiety:  0  Nausea:  0  Depression:  0  Anorexia:  0  Fatigue:  0  Insomnia:  0  Restlessness:  0  Agitation:  0     CAM / Delirium:  Negative  Constipation:  Negative  Diarrhea:  Positive    Constipation:  Constipation    Bowel Management Plan (BMP):  No      Performance Status:  30     Time-Based Charting:  No    Advance Care Planning   Advance Directives:   Do Not Resuscitate Status: No      Decision Making:  Patient answered questions        PAINAD: N/A    Caregiver burden formerly " assessed: yes      No results displayed because visit has over 200 results.           Nisha Mendez RN  Palliative Medicine  [facility]

## 2022-09-06 NOTE — PT/OT/SLP PROGRESS
Occupational Therapy  Treatment    Fidelina Darling   MRN: [id number]   Admitting Diagnosis: Abdominal cramping    OT Date of Treatment: 09/06/22   OT Start Time: 1501  OT Stop Time: 1527  OT Total Time (min): 26 min     Billable Minutes:  Therapeutic Activity 26  Total Minutes: 26     OT/CONSUELO: CONSUELO     CONSUELO Visit Number: 1    General Precautions: Standard, fall, NPO  Orthopedic Precautions:    Braces:      Spiritual, Cultural Beliefs, Methodist Practices, Values that Affect Care: no    Subjective:  Communicated with RN prior to session.         Objective:       Functional Mobility:  Bed Mobility:   Supine to sit: Moderate Assistance   Sit to supine: Moderate Assistance  Patient sitting to EOB requiring Mod A to maintain balance unable to tolerate sitting more than 5 minutes 2/2 patient becoming nauseous and belching. Patient lowered back to supine position.    Balance:   Static Sit: FAIR: Maintains without assist, but unable to take any challenges     Patient left HOB elevated with all lines intact and call button in reach    ASSESSMENT:  Fidelina Darling is a 89 y.o. female with a medical diagnosis of Abdominal cramping.    Rehab potential is fair    Activity tolerance: Fair    Discharge recommendations: nursing facility, skilled, nursing facility, basic     Equipment recommendations:       GOALS:   Multidisciplinary Problems       Occupational Therapy Goals          Problem: Occupational Therapy    Goal Priority Disciplines Outcome Interventions   Occupational Therapy Goal     OT, PT/OT Ongoing, Progressing    Description: Goals to be met by: 9/16/22     Patient will increase functional independence with ADLs by performing:    UE Dressing with Modified [place].  LE Dressing with Modified [place].  Grooming while standing with Modified [place].  Toileting from bedside commode with Modified [place] for hygiene  and clothing management.   Toilet transfer to bedside commode with Modified  [place].progress  to toilet.                         Plan:  Patient to be seen 3 x/week, 5 x/week to address the above listed problems via self-care/home management, therapeutic activities, therapeutic exercises  Plan of Care expires: 09/16/22  Plan of Care reviewed with: patient         09/06/2022

## 2022-09-06 NOTE — PT/OT/SLP RE-EVAL
Physical Therapy Re-evaluation    Patient Name:  Fidelina Darling   MRN:  [id number]    Recommendations:     Discharge Recommendations:  nursing facility, skilled   Discharge Equipment Recommendations: other (see comments) (pending progress)   Barriers to discharge:  impaired mobility    Assessment:     Fidelina Darling is a 89 y.o. female admitted with a medical diagnosis of Abdominal cramping, colonic distension, dyspepsia, BLE edema  She presents with the following impairments/functional limitations:  weakness, impaired endurance, impaired functional mobility, gait instability, impaired balance, decreased lower extremity function, decreased safety awareness, pain. Patient tolerated PT re-evaluation fairly. Upon assessment of BLEs, patient noted to have redness and pain in bilateral distal LE, R>L. For this reason, patient not mobilized to EOB, tolerated long sit in bed well with modA Marlene for sitting balance. Attending physician and nurse notified of patient's calf pain. Pt is appropriate for continued acute PT services to improve activity tolerance and reduce fall risk with mobility.     Rehab Prognosis:  Fair; patient would benefit from acute skilled PT services to address these deficits and reach maximum level of function.      Recent Surgery: Procedure(s) (LRB):  SIGMOIDOSCOPY (N/A)  COLECTOMY, SIGMOID 6 Days Post-Op    Plan:     During this hospitalization, patient to be seen 5 x/week to address the above listed problems via gait training, therapeutic activities, therapeutic exercises, neuromuscular re-education  Plan of Care Expires:  09/30/22  Plan of Care Reviewed with: patient    Subjective     Communicated with RN prior to session.  Patient found HOB elevated with PureWick, peripheral IV, NG tube upon PT entry to room, agreeable to evaluation.      Chief Complaint: pain in legs  Patient comments/goals: to get stronger  Pain/Comfort:  Pain Rating 1:  (Pt with c/o in lower RLE with attempted movement, also  sensitive to touch.)  Pain Addressed 1: Nurse notified, Cessation of Activity    Patients cultural, spiritual, L[employer]-day conflicts given the current situation: no      Objective:     Patient found with: PureWick, peripheral IV, NG tube     General Precautions: Standard, NPO   Orthopedic Precautions:N/A   Braces: N/A  Respiratory Status: Room air    Exams:  Cognitive Exam:  Patient is oriented to Person and Place  RLE ROM: Not fully tested due to new findings of calf pain, redness.  RLE Strength: Pt wiggles toes and moves foot, otherwise not fully tested 2/2 pain.   LLE ROM: Appears to be WFL, not fully tested.   LLE Strength: Pt moves LLE against gravity, c/o pain in heel.     Functional Mobility:  Bed Mobility:     Supine to Sit: moderate assistance  Balance: Pt maintained sitting balance in long sit position w/ Marlene, BUE support.     AM-PAC 6 CLICK MOBILITY  Total Score:10     Patient left HOB elevated with all lines intact, call button in reach, and RN notified.    GOALS:   Multidisciplinary Problems       Physical Therapy Goals          Problem: Physical Therapy    Goal Priority Disciplines Outcome Goal Variances Interventions   Physical Therapy Goal     PT, PT/OT Ongoing, Progressing     Description: Goals to be met by: 22     Patient will increase functional independence with mobility by performin. Supine to sit with MInimal Assistance  2. Sit to supine with MInimal Assistance  3. Sit to stand transfer with Minimal Assistance  4. Bed to chair transfer with Minimal Assistance using Rolling Walker  5. Sitting at edge of bed x10 minutes with Stand-by Assistance                         History:     History reviewed. No pertinent past medical history.    Past Surgical History:   Procedure Laterality Date    FLEXIBLE SIGMOIDOSCOPY N/A 2022    Procedure: SIGMOIDOSCOPY;  Surgeon: Torrey Garcia MD;  Location: [facility] ENDOSCOPY;  Service: Gastroenterology;  Laterality: N/A;       Time Tracking:      PT Received On: 09/06/22  PT Start Time: 0905     PT Stop Time: 0915  PT Total Time (min): 10 min     Billable Minutes: Re-roxie 10min      09/06/2022

## 2022-09-06 NOTE — PROGRESS NOTES
[facility]    Chief Complaint: Inpatient Follow-up for      Subjective:  Fidelina Darling is a 89 y.o. White female with a past medical history of hypertension, hyperlipidemia. The patient presented to [facility] on 8/16/2022 with a primary complaint of abdominal pain and falls due to syncope.  Patient reports having a fall on 8/12 while in her garage.  She states she was grabbing for the car door handle when she missed it and fell. Fall was unwitnessed and she reports loss of consciousness. On 08/15 she had a second fall while in the kitchen but denies any loss of consciousness.  Yesterday (08/16/2022) patient was going to the bathroom when she passed out.  Episode was witnessed by neighbor who is visiting her. She reports urinary frequency. Patient denies complaints of headache, vision changes, shortness of breath, cough.  She has been experiencing lower abdominal pain for the last several days. Granddaughter at bedside states that patient is dependent on laxatives have a bowel movement.  Although she uses laxatives she often has to manually disimpact herself . She normally goes every 4-5 days with last bowel movement being 5 days ago. She has been having increased belching.  Granddaughter also states patient has little to no appetite.  Patient lives alone, ambulates with walker, drives and completes activity daily living independently.       Upon presentation to the ED, patient afebrile, hemodynamically stable and SpO2 96% on room air.  Labs notable for WBC 28, potassium 2.6, glucose 161, lactic acid 3.9.  UA with 1+ leukocyte esterase, 2+ bilirubin, positive nitrites, trace ketones and protein.  Chest x-ray without acute processes.  CT abdomen pelvis revealed mild distention of the colon with liquefied stool, indeterminate bilateral adrenal nodules and pancreatic cysts.  While in the ED patient received 40 mEq of IV potassium chloride, 2 g of magnesium sulfate and Rocephin was started for  UTI.    Patient was admitted on account of recurrent syncope and falls. Colonic dilatation with lactic acidosis.   X-ray of the chest reveals left-sided atelectasis cannot rule out infiltrate.      8-21-22  Pt with persistent abd pain and nausea  Has NGT to LIWS  Belching  Minimal flatus  KUB with dilation 56.4mm to 76.8mm of colon    8/22/22:  NG still in place. Was on high suction. Placed on low suction.  Generalized mild abdominal discomfort  No flatus, no stool.  KUB pending.    8/23/22:  US ordered for today due to elevated LFTs  Patient having small liquid brown stool  NG in place with minimal output     8/24/22:  SBFT unremarkable- normal SB transit time  Abdominal U/S unremarkable  LFTs trending up   Patient continues to have stool (per nurse charting x5 stools yesterday) and endorses diffuse abdominal discomfort,   Denies N/V, fever/chills    8/25/22:  LFTS trending down. AST/ALT: 419/209---> 335/179  Patient has not had a BM today and continues to endorse diffuse abdominal pain.   Denies N/V, fever/chills    8/26/22:  Abdomen remains distended and quiet. NG replaced and to LIS and 600ml aspirated. Aspirate is dark.  Just had a small brown stool.  Turned her and c/o abdominal pain.  KUB revealed contrast in colon remains.  On Movantik and dulcolax supp daily.  LFTs decreasing    8/29/22:  Abdomen distended and tender   Pain about the same, not worsening in nature  NG in place and connected to suction but turned off.   Has not had a BM yet today, unable to recall her most recent BM  She denies N/V, and currently has no appetite  Elevated LFTS have improved, 98/79 this am   Currently on Relistor     8/30/22:  Still with abdominal distention  NG in place but suction is turned off. Placed to LIS.  Patient moans when abdomen palpated.  Has extensive peripheral edema.  Urine orange/red color    8/31/22:  Flex Sig:  Findings:        Hemorrhoids were found on perianal exam.        The lumen of the sigmoid colon was  mildly dilated. Decompression was        successful.   Impression:    - Preparation of the colon was poor.                          - Hemorrhoids found on perianal exam.                          - Mild dilation of the examined colon with                          significant amount of stool.                          - Overall, her abdomen is soft based on exam, and                          it seems as though ileus vs sbo is more a                          contributor to her symptoms than                          pseudo-obstruction.                          - No specimens collected  9/1/22:  Patient had a loose BM this am- no associated pain or discomfort, brown in color  Abdomen remains significantly distended   NG was in place to LIS with very little output, so NG was clamped and we will try ice chips.  Patient denies N/V, notes that her overall abdominal discomfort is slightly improved     9/2/22  Pt abdomen remains distended  No guarding or tenderness on palpation  NG remains clamped  NO nausea  1 small bm  Will try clears      9/6/22:  Abdomen remains distended  NG clamped  Patient is currently NPO, states she is unable to tolerate ice chips due to severe abdominal pain and discomfrot  Denies N/V  She is unable to recall her most recent BM but per chart review had BM x1 yesterday, none today. Color/consistency unknown      ROS:  General: lying in bed, seemingly uncomfortable with very limited movement    CV: peripheral edema bilaterally   GI: Abdominal pain and distention.   Information gathering limited by clinical condition    Objective    VITAL SIGNS: 24 HRS MIN & MAX LAST   Temp  Min: 97.6 °F (36.4 °C)  Max: 99 °F (37.2 °C) 99 °F (37.2 °C)   BP  Min: 111/65  Max: 152/67 111/65   Pulse  Min: 64  Max: 92  72   Resp  Min: 15  Max: 20 18   SpO2  Min: 92 %  Max: 95 % (!) 94 %     Physical exam:  General appearance: Awake and alert, in no acute distress.  IN bed.  HEENT: Atraumatic head. Dry mucous membranes of  oral cavity.  Eyes: anicteric  Lungs: Clear to auscultation bilaterally.   Heart: Regular rate and rhythm. 3+ pitting edema in bilateral LE.  Abdomen:  firm, distended, generalized tenderness. Bowel sounds are quiet. NG in place, clamped.    Extremities: No cyanosis, clubbing. No deformities.   Skin: No Rash. Warm and dry.  Neuro: Awake, lethargic. Motor and sensory exams grossly intact.  Psyc: calm and cooperative    Recent Labs   Lab 08/31/22 0415 09/04/22 0445 09/05/22  0403   WBC 5.2 6.4 8.6   RBC 3.03* 3.20* 3.47*   HGB 9.4* 10.0* 10.9*   HCT 29.1* 29.8* 32.2*   MCV 96.0* 93.1 92.8   MCH 31.0 31.3* 31.4*   MCHC 32.3* 33.6 33.9   RDW 13.8 13.7 14.1    305 416*   MPV 9.7 10.2 10.5*         Recent Labs   Lab 08/31/22  0415 09/02/22  0432 09/03/22  1423 09/04/22  0445 09/05/22  0403   * 129* 134 134 135   K 3.6 4.0 3.8 3.3* 3.0*   CO2 28 25 28 27 24   BUN 19.3 18.5 10.5 9.6* 11.4   CREATININE 0.46* 0.46* 0.48* 0.49* 0.53*   CALCIUM 7.6* 7.6* 7.7* 7.5* 7.9*   MG 2.00  --   --  1.70 1.70   ALBUMIN  --  1.9*  --   --   --    ALKPHOS  --  192*  --   --   --    ALT  --  46  --   --   --    AST  --  57*  --   --   --    BILITOT  --  0.4  --   --   --              See below for Radiology    Scheduled Med:   albuterol-ipratropium  3 mL Nebulization Q6H    diclofenac sodium  4 g Topical (Top) Daily    enoxaparin  30 mg Subcutaneous Q12H    furosemide (LASIX) injection  20 mg Intravenous Q12H    LIDOcaine  1 patch Transdermal Q24H    metoclopramide HCl  5 mg Intravenous Q6H    metoprolol tartrate  25 mg Oral BID    multivitamin  1 tablet Oral Daily    nystatin  500,000 Units Oral QID (WM & HS)    pantoprazole  40 mg Intravenous BID    potassium bicarbonate  20 mEq Oral Daily    sodium chloride 0.9%  10 mL Intravenous Q6H        Continuous Infusions:   sodium chloride 0.9% 50 mL/hr at 09/06/22 0617          PRN Meds:  acetaminophen, albuterol-ipratropium, dextrose 50%, dextrose 50%, glucagon (human  recombinant), glucose, glucose, HYDROcodone-acetaminophen, insulin aspart U-100, labetaloL, melatonin, metoclopramide HCl, metoprolol, morphine, ondansetron, sodium chloride 0.9%, Flushing PICC Protocol **AND** sodium chloride 0.9% **AND** sodium chloride 0.9%       Radiology:  X-Ray Abdomen AP 1 View  Narrative: EXAMINATION:  Single radiographic views of the ABDOMEN.    CLINICAL HISTORY:  colonic distention;    TECHNIQUE:  Single radiographic views of the ABDOMEN.    COMPARISON:  KUB 08/30/2022.    FINDINGS:  Three supine views of the abdomen demonstrate a nasogastric tube in the gastric antrum.  There is persistent gaseous and contrast distension of the colon throughout the entirety of the abdomen.  There is a nonobstructive bowel gas pattern.  Impression: Persistent distension of the colon with air and contrast as above.    Electronically signed by: Torrey Knight MD  Date:    09/03/2022  Time:    12:12      Assessment/Plan:  1. Acute abdominal distention, having small volume stool    NGT clamped   Low dose IV reglan   One dose of Senna per NG   Small bowel follow through showing normal small bowel transit time (8/25)  Most recent  KUB (8/26)shows contrast remaining in colon    Flex Sig 8/31 w/ decompression- showing mild dilation of colon, no signs of pseudo obstruction  Continue with ice chips  Continue to stress importance of mobilization in order to encourage BMs  Pet chart review, possible plans to consult Surgical hospitalist for surgical decompression     2. Elevated LFTS- trending down     US with no significant findings.     3. Malnutrition due to lack of intake   -Continue multivitamin supplementation    - Continue intralipids to supplement clinimix         Continue with bowel regimen. 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        Kim Fair NP acting as scribe for Silver Padilla MD  Gastroenterology  [facility]

## 2022-09-06 NOTE — PROGRESS NOTES
[facility]  Hospital Medicine Progress Note        Chief Complaint: abdominal pain    HPI:   89 y.o. White female with a past medical history of hypertension, hyperlipidemia. The patient presented to [facility] on 8/16/2022 with a primary complaint of abdominal pain and falls due to syncope.  Patient reports having a fall on 8/12 while in her garage.  She states she was grabbing for the car door handle when she missed it and fell. Fall was unwitnessed and she reports loss of consciousness. On 08/15 she had a second fall while in the kitchen but denies any loss of consciousness.  Yesterday (08/16/2022) patient was going to the bathroom when she passed out.  Episode was witnessed by neighbor who is visiting her. She reports urinary frequency. Patient denies complaints of headache, vision changes, shortness of breath, cough.  She has been experiencing lower abdominal pain for the last several days. Granddaughter at bedside states that patient is dependent on laxatives have a bowel movement.  Although she uses laxatives she often has to manually disimpact herself . She normally goes every 4-5 days with last bowel movement being 5 days ago. She has been having increased belching.  Granddaughter also states patient has little to no appetite.  Patient lives alone, ambulates with walker, drives and completes activity daily living independently.  Family as it patient to be experiencing dementia over the last 6 months.     Upon presentation to the ED, patient afebrile, hemodynamically stable and SpO2 96% on room air.  Labs notable for WBC 28, potassium 2.6, glucose 161, lactic acid 3.9.  UA with 1+ leukocyte esterase, 2+ bilirubin, positive nitrites, trace ketones and protein.  Chest x-ray without acute processes.  CT abdomen pelvis revealed mild distention of the colon with liquefied stool, indeterminate bilateral adrenal nodules and pancreatic cysts.  While in the ED patient received 40 mEq of IV  potassium chloride, 2 g of magnesium sulfate and Rocephin was started for UTI.  Patient admitted to hospital medicine services for further medical management.  Discussed discharge care planning with family present at bedside.  Patient/family is agreeable with rehabilitation and states that  [facility] has a rehab that patient has been to before. KUB has been reviewed and shows persistent distention of colon with air and contrast still present.    NG tube is in place.    Interval Hx:   No overnight events.  No new complaints.  Patient still with NG tube in place.    Patient complains of right calf pain.    Objective/physical exam:  General: Appears comfortable, no acute distress.  Integumentary: Warm, dry, intact.  Neuro:  Awake, alert, comprehension appears intact.  Musculoskeletal: Purposeful movement noted.     GI: Distended. No abdominal tenderness to touch, no bowel sounds present, no rebound no guarding.    Respiratory: No accessory muscle use. Breath sounds are equal.  Cardiovascular: Regular rate.  +1 peripheral edema  Right calf tenderness to touch with slight warmth.  No increased redness.    VITAL SIGNS: 24 HRS MIN & MAX LAST   Temp  Min: 97.6 °F (36.4 °C)  Max: 99 °F (37.2 °C) 98.5 °F (36.9 °C)   BP  Min: 111/65  Max: 145/57 (!) 145/57     Pulse  Min: 64  Max: 78  78   Resp  Min: 16  Max: 20 16   SpO2  Min: 91 %  Max: 95 % (!) 91 %     X-Ray Abdomen AP 1 View  Narrative: EXAMINATION:  Single radiographic views of the ABDOMEN.    CLINICAL HISTORY:  colonic distention;    TECHNIQUE:  Single radiographic views of the ABDOMEN.    COMPARISON:  KUB 08/30/2022.    FINDINGS:  Three supine views of the abdomen demonstrate a nasogastric tube in the gastric antrum.  There is persistent gaseous and contrast distension of the colon throughout the entirety of the abdomen.  There is a nonobstructive bowel gas pattern.  Impression: Persistent distension of the colon with air and contrast as  above.    Electronically signed by: Torrey Knight MD  Date:    09/03/2022  Time:    12:12    Recent Labs   Lab 08/31/22 0415 09/04/22 0445 09/05/22 0403   WBC 5.2 6.4 8.6   RBC 3.03* 3.20* 3.47*   HGB 9.4* 10.0* 10.9*   HCT 29.1* 29.8* 32.2*   MCV 96.0* 93.1 92.8   MCH 31.0 31.3* 31.4*   MCHC 32.3* 33.6 33.9   RDW 13.8 13.7 14.1    305 416*   MPV 9.7 10.2 10.5*       Recent Labs   Lab 08/31/22 0415 09/02/22 0432 09/03/22 1423 09/04/22 0445 09/05/22 0403   * 129* 134 134 135   K 3.6 4.0 3.8 3.3* 3.0*   CO2 28 25 28 27 24   BUN 19.3 18.5 10.5 9.6* 11.4   CREATININE 0.46* 0.46* 0.48* 0.49* 0.53*   CALCIUM 7.6* 7.6* 7.7* 7.5* 7.9*   MG 2.00  --   --  1.70 1.70   ALBUMIN  --  1.9*  --   --   --    ALKPHOS  --  192*  --   --   --    ALT  --  46  --   --   --    AST  --  57*  --   --   --    BILITOT  --  0.4  --   --   --           Microbiology Results (last 7 days)       ** No results found for the last 168 hours. **             See below for Radiology    Scheduled Med:   albuterol-ipratropium  3 mL Nebulization Q6H    diclofenac sodium  4 g Topical (Top) Daily    enoxaparin  30 mg Subcutaneous Q12H    [START ON 9/8/2022] fat emulsion 20%  250 mL Intravenous Every Mon, Thurs    furosemide (LASIX) injection  20 mg Intravenous Q12H    LIDOcaine  1 patch Transdermal Q24H    metoclopramide HCl  5 mg Intravenous Q6H    metoprolol tartrate  25 mg Oral BID    multivitamin  1 tablet Oral Daily    nystatin  500,000 Units Oral QID (WM & HS)    pantoprazole  40 mg Intravenous BID    potassium bicarbonate  20 mEq Oral Daily    sodium chloride 0.9%  10 mL Intravenous Q6H        PRN Meds:  acetaminophen, albuterol-ipratropium, dextrose 50%, dextrose 50%, glucagon (human recombinant), glucose, glucose, HYDROcodone-acetaminophen, insulin aspart U-100, labetaloL, melatonin, metoclopramide HCl, metoprolol, morphine, ondansetron, sodium chloride 0.9%, Flushing PICC Protocol **AND** sodium chloride 0.9% **AND** sodium  chloride 0.9%       Assessment/Plan:  Colonic distension   Dyspepsia  Transaminitis, improving   B/L LE edema bilateral  Small pancreatic cyst   Sliding hernia -nonstrangulated   Bilateral adrenal nodules repeat follow-up CT outpatient  Essential hypertension  Diastolic dysfunction   sinus tachycardia     Plan  Colonic distension-without improvement  continue IV Reglan, Protonix, suppository twice daily.   Status post colonoscopic decompression several days ago without any improvement.  Will consult surgical hospitalist for possible surgical decompression?  Discontinue clear liquid diet.  Maintain NPO status.  Continue NG to for continue decompression.  Patient complains of right calf pain, will order ultrasound to further evaluate for DVT.  Continue anticoagulation for now with Lovenox.        Bilateral peripheral edema significantly improved will continue IV Lasix 20 mg b.I.d..    Hyponatremia has resolved.  Replete potassium.  Up to chair x3 daily.    Anticipated discharge and Disposition:  Pending. SNF    All diagnosis and differential diagnosis have been reviewed; assessment and plan has been documented; I have personally reviewed the labs and test results that are presently available; I have reviewed the patients medication list; I have reviewed the consulting providers response and recommendations. I have reviewed or attempted to review medical records based upon their availability      I will continue to monitor closely and make adjustments to medical management as needed.    Sherri [place], DO   09/06/2022        This note was created with the assistance of Dragon voice recognition software. There may be transcription errors as a result of using this technology however minimal. Effort has been made to assure accuracy of transcription but any obvious errors or omissions should be clarified with the author of the document.

## 2022-09-06 NOTE — PROGRESS NOTES
Inpatient Nutrition Assessment    Admit Date: 8/16/2022   Length of Stay: 20 days  Nutrition Recommendation/Prescription     - Once medically appropriate, resume oral diet as tolerated. Assist with meals as needed.     - noted palliative care consulted    - Continue PPN Clinimix until able to resume oral diet; if if aggressive care desired, consider starting TPN for nutrition; rec: Central Line Clinimix 5/25% @ 75ml/hr + IL 20% 250ml twice a week    - pt would need central line placed if TPN to start    - Will order lipids twice per week now with PPN for an additional 1000 kcal per week and to prevent fatty acid deficiency.    Communication of Recommendations: reviewed with nurse    Nutrition Assessment     Malnutrition Assessment/Nutrition-Focused Physical Exam    Malnutrition in the context of chronic illness  Degree of Malnutrition: non-severe (moderate) malnutrition  Energy Intake: < 75% of estimated energy requirement for >/= 1 month  Interpretation of Weight Loss: unable to obtain  Body Fat: mild depletion  Area of Body Fat Loss: orbital region  and upper arm region - triceps / biceps  Muscle Mass Loss: mild depletion  Area of Muscle Mass Loss: temple region - temporalis muscle, clavicle bone region - pectoralis major, deltoid, trapezius muscles and dorsal hand - interosseous musle  Fluid Accumulation: mild  Edema: 2+ edema - mild and does not meet criteria  Reduced  Strength: unable to obtain  A minimum of two characteristics is recommended for diagnosis of either severe or non-severe malnutrition.    Chart Review    Reason Seen: follow-up    Diagnosis:  Colonic distension   Dyspepsia  Transaminitis, improving   Hypokalemia  Abdominal distention secondary to above   Small pancreatic cyst   Sliding hernia   Bilateral adrenal nodules repeat follow-up CT outpatient  Essential hypertension  Diastolic dysfunction   sinus tachycardia    hypokalemia    Relevant Medical History: HTN, HLD    Nutrition-Related  Medications: SSI, Reglan, MVI  Calorie Containing IV Medications: Clinimix    Nutrition-Related Labs:  8/19: K 3.1, Glu 119, GFR>60  8/24: K 2.9, BUN 23.7, glu 149, Ca 7.8, AlkPhos 327, ,   8/29: BUN 20.6, Crea 0.45, Ca 7.8, AlkPhos 208  9/2: Na 129, Glu 107, GFR>60  9/6: no updated labs    Diet/PN Order: Diet NPO Except for: Medication, Ice Chips, Sips with Medication  Oral Supplement Order: none at this time  Tube Feeding Order: none at this time  Appetite/Oral Intake: poor/0-25% of meals  Factors Affecting Nutritional Intake: impaired cognitive status/motor control, clear liquid diet, constipation, and decreased appetite  Food/Zoroastrianism/Cultural Preferences: none reported    Skin Integrity: bruised (ecchymotic)  Wound(s):   none documented    Comments    8/19: Pt asleep during rounds; per pt's granddaughter, pt's appetite and wt has decreased over the last few years, more noticeably in the last 6 months. Granddaughter unsure of what weight was 6 months ago but states that the patient weighed ~175 lbs several years ago. Pt was on an appetite stimulant a few months ago which actually seemed to help but did not continue the regimen 2/2 price. Will send an oral supplement.     8/24: pt currently NPO on PPN for ileus with NG for suction; GI following    8/26: pt remains NPO on PPN with NG LIWS; nurse putting new IV due to current one malfunctioning; still with abdominal pain, distended abdomen, absent bowel sounds, contrast from SBFT on 8/24 still in colon per KUB; small amount of stool today    8/30: Pt still having abdominal pain, NG but suction off, NPO with PPN; palliative care consulted    9/2: Pt's diet recently advanced to clears- NG tube clamped. Noted PPN was hanging, but not running during rounds.     9/6: pt was refusing any intake of clear liquids, currently NPO again since yesterday, will continue lipids; currently receiving IVF; PPN d/c'd; would rec starting PN if aggressive care desired to  "prevent malnutrition; NG tube still in, liquid stools noted    Anthropometrics    Height: 5' 7.99" (172.7 cm) Height Method: Stated  Weight: 83 kg (183 lb) (09/03/22 0347) Weight Method: Bed Scale  BMI (Calculated): 27.8  BMI Classification: normal (BMI 18.5-24.9)  Ideal Body Weight (IBW), Female: 139.95 lb  % Ideal Body Weight, Female (lb): 96.41 %                       Usual Weight Provided By: not applicable    Wt Readings from Last 5 Encounters:   09/03/22 83 kg (183 lb)   01/15/19 64.9 kg (142 lb 15.9 oz)     Weight Change(s) Since Admission:  Admit Weight: 61.2 kg (135 lb) (08/16/22 1901)    Estimated Needs    Weight Used For Calorie Calculations: 61.2 kg (134 lb 14.7 oz)  Energy Calorie Requirements (kcal): 1512 kcal (MSJ x 1.4 SF)  Energy Need Method: [place] Jeor  Weight Used For Protein Calculations: 61.2 kg (134 lb 14.7 oz)  Protein Requirements: 80 gm (1.3g/kg)  Fluid Requirements (mL): 1836 mL (30mL/kg)  Temp: 99 °F (37.2 °C)       Enteral Nutrition    Patient not receiving enteral nutrition at this time.    Parenteral Nutrition    Standard Formula: Clinimix E 4.25/5  Custom Formula: not applicable  Additives: none  Rate/Volume: 75ml/lhr  Lipids: none  Total Nutrition Provided by Parenteral Nutrition:  Calories Provided  612 kcal/d, 40% needs   Protein Provided  77 g/d, 126% needs   Dextrose Provided  90 g/d,    Fluid Provided  1800 ml/d, 100% needs       Evaluation of Received Nutrient Intake    Calories: not meeting estimated needs  Protein: not meeting estimated needs    Patient Education    Not applicable.    Nutrition Diagnosis     PES: Malnutrition related to insufficient energy intake as evidenced by <75% est energy requirements met >1 month, physical evidence of mild muscle and fat depletion. (continues)    Interventions/Goals     Intervention(s): modified composition of meals/snacks, commercial beverage, multivitamin/mineral supplement therapy, prescription medication and collaboration with " other providers  Goal: Meet greater than 75% of nutritional needs by follow-up. (goal not met)    Monitoring & Evaluation     Dietitian will monitor energy intake and weight.  Nutrition Risk/Follow-Up: high (follow-up in 1-4 days)

## 2022-09-06 NOTE — PLAN OF CARE
Problem: Physical Therapy  Goal: Physical Therapy Goal  Description: Goals to be met by: 22     Patient will increase functional independence with mobility by performin. Supine to sit with MInimal Assistance  2. Sit to supine with MInimal Assistance  3. Sit to stand transfer with Minimal Assistance  4. Bed to chair transfer with Minimal Assistance using Rolling Walker  5. Sitting at edge of bed x10 minutes with Stand-by Assistance    Outcome: Ongoing, Progressing

## 2022-09-07 LAB
ALBUMIN SERPL-MCNC: 1.6 GM/DL (ref 3.4–4.8)
ALBUMIN/GLOB SERPL: 0.8 RATIO (ref 1.1–2)
ALP SERPL-CCNC: 141 UNIT/L (ref 40–150)
ALT SERPL-CCNC: 26 UNIT/L (ref 0–55)
AST SERPL-CCNC: 29 UNIT/L (ref 5–34)
BASOPHILS # BLD AUTO: 0.04 X10(3)/MCL (ref 0–0.2)
BASOPHILS NFR BLD AUTO: 0.7 %
BILIRUBIN DIRECT+TOT PNL SERPL-MCNC: 0.4 MG/DL
BUN SERPL-MCNC: 12.9 MG/DL (ref 9.8–20.1)
CALCIUM SERPL-MCNC: 7.1 MG/DL (ref 8.4–10.2)
CHLORIDE SERPL-SCNC: 105 MMOL/L (ref 98–111)
CO2 SERPL-SCNC: 26 MMOL/L (ref 23–31)
CREAT SERPL-MCNC: 0.62 MG/DL (ref 0.55–1.02)
EOSINOPHIL # BLD AUTO: 0.06 X10(3)/MCL (ref 0–0.9)
EOSINOPHIL NFR BLD AUTO: 1 %
ERYTHROCYTE [DISTWIDTH] IN BLOOD BY AUTOMATED COUNT: 14.7 % (ref 11.5–17)
GFR SERPLBLD CREATININE-BSD FMLA CKD-EPI: >60 MLS/MIN/1.73/M2
GLOBULIN SER-MCNC: 2 GM/DL (ref 2.4–3.5)
GLUCOSE SERPL-MCNC: 79 MG/DL (ref 75–121)
HCT VFR BLD AUTO: 29.8 % (ref 37–47)
HGB BLD-MCNC: 9.1 GM/DL (ref 12–16)
IMM GRANULOCYTES # BLD AUTO: 0.06 X10(3)/MCL (ref 0–0.04)
IMM GRANULOCYTES NFR BLD AUTO: 1 %
LYMPHOCYTES # BLD AUTO: 1.3 X10(3)/MCL (ref 0.6–4.6)
LYMPHOCYTES NFR BLD AUTO: 21.2 %
MAGNESIUM SERPL-MCNC: 1.7 MG/DL (ref 1.6–2.6)
MCH RBC QN AUTO: 31.9 PG (ref 27–31)
MCHC RBC AUTO-ENTMCNC: 30.5 MG/DL (ref 33–36)
MCV RBC AUTO: 104.6 FL (ref 80–94)
MONOCYTES # BLD AUTO: 0.39 X10(3)/MCL (ref 0.1–1.3)
MONOCYTES NFR BLD AUTO: 6.4 %
NEUTROPHILS # BLD AUTO: 4.3 X10(3)/MCL (ref 2.1–9.2)
NEUTROPHILS NFR BLD AUTO: 69.7 %
NRBC BLD AUTO-RTO: 0 %
PHOSPHATE SERPL-MCNC: 2.7 MG/DL (ref 2.3–4.7)
PLATELET # BLD AUTO: 267 X10(3)/MCL (ref 130–400)
PMV BLD AUTO: 11.3 FL (ref 7.4–10.4)
POCT GLUCOSE: 86 MG/DL (ref 70–110)
POCT GLUCOSE: 90 MG/DL (ref 70–110)
POCT GLUCOSE: 97 MG/DL (ref 70–110)
POTASSIUM SERPL-SCNC: 3.3 MMOL/L (ref 3.5–5.1)
PROT SERPL-MCNC: 3.6 GM/DL (ref 5.8–7.6)
RBC # BLD AUTO: 2.85 X10(6)/MCL (ref 4.2–5.4)
SODIUM SERPL-SCNC: 136 MMOL/L (ref 132–146)
WBC # SPEC AUTO: 6.1 X10(3)/MCL (ref 4.5–11.5)

## 2022-09-07 PROCEDURE — 36415 COLL VENOUS BLD VENIPUNCTURE: CPT | Performed by: STUDENT IN AN ORGANIZED HEALTH CARE EDUCATION/TRAINING PROGRAM

## 2022-09-07 PROCEDURE — 63600175 PHARM REV CODE 636 W HCPCS: Performed by: NURSE PRACTITIONER

## 2022-09-07 PROCEDURE — C9113 INJ PANTOPRAZOLE SODIUM, VIA: HCPCS | Performed by: INTERNAL MEDICINE

## 2022-09-07 PROCEDURE — 25000242 PHARM REV CODE 250 ALT 637 W/ HCPCS: Performed by: INTERNAL MEDICINE

## 2022-09-07 PROCEDURE — 63600175 PHARM REV CODE 636 W HCPCS: Performed by: INTERNAL MEDICINE

## 2022-09-07 PROCEDURE — 99900031 HC PATIENT EDUCATION (STAT)

## 2022-09-07 PROCEDURE — 25000003 PHARM REV CODE 250: Performed by: INTERNAL MEDICINE

## 2022-09-07 PROCEDURE — 97530 THERAPEUTIC ACTIVITIES: CPT

## 2022-09-07 PROCEDURE — 25000003 PHARM REV CODE 250: Performed by: NURSE PRACTITIONER

## 2022-09-07 PROCEDURE — 85025 COMPLETE CBC W/AUTO DIFF WBC: CPT | Performed by: STUDENT IN AN ORGANIZED HEALTH CARE EDUCATION/TRAINING PROGRAM

## 2022-09-07 PROCEDURE — 25000003 PHARM REV CODE 250: Performed by: SURGERY

## 2022-09-07 PROCEDURE — 25000003 PHARM REV CODE 250

## 2022-09-07 PROCEDURE — 25000003 PHARM REV CODE 250: Performed by: STUDENT IN AN ORGANIZED HEALTH CARE EDUCATION/TRAINING PROGRAM

## 2022-09-07 PROCEDURE — 94761 N-INVAS EAR/PLS OXIMETRY MLT: CPT

## 2022-09-07 PROCEDURE — 27000221 HC OXYGEN, UP TO 24 HOURS

## 2022-09-07 PROCEDURE — 63600175 PHARM REV CODE 636 W HCPCS: Performed by: STUDENT IN AN ORGANIZED HEALTH CARE EDUCATION/TRAINING PROGRAM

## 2022-09-07 PROCEDURE — 83735 ASSAY OF MAGNESIUM: CPT | Performed by: STUDENT IN AN ORGANIZED HEALTH CARE EDUCATION/TRAINING PROGRAM

## 2022-09-07 PROCEDURE — 80053 COMPREHEN METABOLIC PANEL: CPT | Performed by: STUDENT IN AN ORGANIZED HEALTH CARE EDUCATION/TRAINING PROGRAM

## 2022-09-07 PROCEDURE — 84100 ASSAY OF PHOSPHORUS: CPT | Performed by: STUDENT IN AN ORGANIZED HEALTH CARE EDUCATION/TRAINING PROGRAM

## 2022-09-07 PROCEDURE — A4216 STERILE WATER/SALINE, 10 ML: HCPCS | Performed by: INTERNAL MEDICINE

## 2022-09-07 PROCEDURE — 94640 AIRWAY INHALATION TREATMENT: CPT

## 2022-09-07 PROCEDURE — 63600175 PHARM REV CODE 636 W HCPCS

## 2022-09-07 PROCEDURE — 21400001 HC TELEMETRY ROOM

## 2022-09-07 RX ORDER — SIMETHICONE 80 MG
1 TABLET,CHEWABLE ORAL 4 TIMES DAILY
Status: DISCONTINUED | OUTPATIENT
Start: 2022-09-07 | End: 2022-09-13 | Stop reason: HOSPADM

## 2022-09-07 RX ORDER — POLYETHYLENE GLYCOL 3350 17 G/17G
17 POWDER, FOR SOLUTION ORAL 2 TIMES DAILY
Status: DISCONTINUED | OUTPATIENT
Start: 2022-09-07 | End: 2022-09-09

## 2022-09-07 RX ORDER — HYDROCORTISONE 25 MG/G
CREAM TOPICAL 2 TIMES DAILY
Status: DISCONTINUED | OUTPATIENT
Start: 2022-09-07 | End: 2022-09-13 | Stop reason: HOSPADM

## 2022-09-07 RX ORDER — BISACODYL 10 MG
10 SUPPOSITORY, RECTAL RECTAL DAILY PRN
Status: DISCONTINUED | OUTPATIENT
Start: 2022-09-07 | End: 2022-09-13 | Stop reason: HOSPADM

## 2022-09-07 RX ADMIN — SODIUM CHLORIDE, PRESERVATIVE FREE 10 ML: 5 INJECTION INTRAVENOUS at 06:09

## 2022-09-07 RX ADMIN — FUROSEMIDE 20 MG: 10 INJECTION, SOLUTION INTRAMUSCULAR; INTRAVENOUS at 06:09

## 2022-09-07 RX ADMIN — ENOXAPARIN SODIUM 30 MG: 30 INJECTION SUBCUTANEOUS at 08:09

## 2022-09-07 RX ADMIN — METOCLOPRAMIDE 5 MG: 5 INJECTION, SOLUTION INTRAMUSCULAR; INTRAVENOUS at 08:09

## 2022-09-07 RX ADMIN — METOCLOPRAMIDE 5 MG: 5 INJECTION, SOLUTION INTRAMUSCULAR; INTRAVENOUS at 09:09

## 2022-09-07 RX ADMIN — NYSTATIN 500000 UNITS: 100000 SUSPENSION ORAL at 09:09

## 2022-09-07 RX ADMIN — POTASSIUM BICARBONATE 20 MEQ: 391 TABLET, EFFERVESCENT ORAL at 08:09

## 2022-09-07 RX ADMIN — METHYLNALTREXONE BROMIDE 12 MG: 12 INJECTION, SOLUTION SUBCUTANEOUS at 06:09

## 2022-09-07 RX ADMIN — METOCLOPRAMIDE 5 MG: 5 INJECTION, SOLUTION INTRAMUSCULAR; INTRAVENOUS at 04:09

## 2022-09-07 RX ADMIN — LIDOCAINE 1 PATCH: 50 PATCH TOPICAL at 06:09

## 2022-09-07 RX ADMIN — ENOXAPARIN SODIUM 30 MG: 30 INJECTION SUBCUTANEOUS at 09:09

## 2022-09-07 RX ADMIN — PANTOPRAZOLE SODIUM 40 MG: 40 INJECTION, POWDER, FOR SOLUTION INTRAVENOUS at 09:09

## 2022-09-07 RX ADMIN — METOPROLOL TARTRATE 25 MG: 25 TABLET, FILM COATED ORAL at 08:09

## 2022-09-07 RX ADMIN — HYDROCORTISONE 2.5%: 25 CREAM TOPICAL at 09:09

## 2022-09-07 RX ADMIN — NYSTATIN 500000 UNITS: 100000 SUSPENSION ORAL at 08:09

## 2022-09-07 RX ADMIN — IPRATROPIUM BROMIDE AND ALBUTEROL SULFATE 3 ML: 2.5; .5 SOLUTION RESPIRATORY (INHALATION) at 08:09

## 2022-09-07 RX ADMIN — PANTOPRAZOLE SODIUM 40 MG: 40 INJECTION, POWDER, FOR SOLUTION INTRAVENOUS at 08:09

## 2022-09-07 RX ADMIN — SIMETHICONE 80 MG: 80 TABLET, CHEWABLE ORAL at 06:09

## 2022-09-07 RX ADMIN — IPRATROPIUM BROMIDE AND ALBUTEROL SULFATE 3 ML: 2.5; .5 SOLUTION RESPIRATORY (INHALATION) at 07:09

## 2022-09-07 RX ADMIN — SODIUM CHLORIDE, PRESERVATIVE FREE 10 ML: 5 INJECTION INTRAVENOUS at 04:09

## 2022-09-07 RX ADMIN — MORPHINE SULFATE 2 MG: 4 INJECTION INTRAVENOUS at 01:09

## 2022-09-07 RX ADMIN — IPRATROPIUM BROMIDE AND ALBUTEROL SULFATE 3 ML: 2.5; .5 SOLUTION RESPIRATORY (INHALATION) at 01:09

## 2022-09-07 RX ADMIN — DICLOFENAC SODIUM 4 G: 10 GEL TOPICAL at 08:09

## 2022-09-07 RX ADMIN — SODIUM CHLORIDE: 9 INJECTION, SOLUTION INTRAVENOUS at 02:09

## 2022-09-07 RX ADMIN — SIMETHICONE 80 MG: 80 TABLET, CHEWABLE ORAL at 09:09

## 2022-09-07 RX ADMIN — METOPROLOL TARTRATE 25 MG: 25 TABLET, FILM COATED ORAL at 09:09

## 2022-09-07 RX ADMIN — METOCLOPRAMIDE 5 MG: 5 INJECTION, SOLUTION INTRAMUSCULAR; INTRAVENOUS at 02:09

## 2022-09-07 RX ADMIN — NYSTATIN 500000 UNITS: 100000 SUSPENSION ORAL at 06:09

## 2022-09-07 RX ADMIN — THERA TABS 1 TABLET: TAB at 08:09

## 2022-09-07 RX ADMIN — POLYETHYLENE GLYCOL 3350 17 G: 17 POWDER, FOR SOLUTION ORAL at 09:09

## 2022-09-07 RX ADMIN — DOCUSATE SODIUM 50 MG: 50 CAPSULE, LIQUID FILLED ORAL at 09:09

## 2022-09-07 NOTE — PROGRESS NOTES
[facility]    Chief Complaint: Inpatient Follow-up for      Subjective:  Fidelina Darling is a 89 y.o. White female with a past medical history of hypertension, hyperlipidemia. The patient presented to [facility] on 8/16/2022 with a primary complaint of abdominal pain and falls due to syncope.  Patient reports having a fall on 8/12 while in her garage.  She states she was grabbing for the car door handle when she missed it and fell. Fall was unwitnessed and she reports loss of consciousness. On 08/15 she had a second fall while in the kitchen but denies any loss of consciousness.  Yesterday (08/16/2022) patient was going to the bathroom when she passed out.  Episode was witnessed by neighbor who is visiting her. She reports urinary frequency. Patient denies complaints of headache, vision changes, shortness of breath, cough.  She has been experiencing lower abdominal pain for the last several days. Granddaughter at bedside states that patient is dependent on laxatives have a bowel movement.  Although she uses laxatives she often has to manually disimpact herself . She normally goes every 4-5 days with last bowel movement being 5 days ago. She has been having increased belching.  Granddaughter also states patient has little to no appetite.  Patient lives alone, ambulates with walker, drives and completes activity daily living independently.       Upon presentation to the ED, patient afebrile, hemodynamically stable and SpO2 96% on room air.  Labs notable for WBC 28, potassium 2.6, glucose 161, lactic acid 3.9.  UA with 1+ leukocyte esterase, 2+ bilirubin, positive nitrites, trace ketones and protein.  Chest x-ray without acute processes.  CT abdomen pelvis revealed mild distention of the colon with liquefied stool, indeterminate bilateral adrenal nodules and pancreatic cysts.  While in the ED patient received 40 mEq of IV potassium chloride, 2 g of magnesium sulfate and Rocephin was started for  UTI.    Patient was admitted on account of recurrent syncope and falls. Colonic dilatation with lactic acidosis.   X-ray of the chest reveals left-sided atelectasis cannot rule out infiltrate.      8-21-22  Pt with persistent abd pain and nausea  Has NGT to LIWS  Belching  Minimal flatus  KUB with dilation 56.4mm to 76.8mm of colon    8/22/22:  NG still in place. Was on high suction. Placed on low suction.  Generalized mild abdominal discomfort  No flatus, no stool.  KUB pending.    8/23/22:  US ordered for today due to elevated LFTs  Patient having small liquid brown stool  NG in place with minimal output     8/24/22:  SBFT unremarkable- normal SB transit time  Abdominal U/S unremarkable  LFTs trending up   Patient continues to have stool (per nurse charting x5 stools yesterday) and endorses diffuse abdominal discomfort,   Denies N/V, fever/chills    8/25/22:  LFTS trending down. AST/ALT: 419/209---> 335/179  Patient has not had a BM today and continues to endorse diffuse abdominal pain.   Denies N/V, fever/chills    8/26/22:  Abdomen remains distended and quiet. NG replaced and to LIS and 600ml aspirated. Aspirate is dark.  Just had a small brown stool.  Turned her and c/o abdominal pain.  KUB revealed contrast in colon remains.  On Movantik and dulcolax supp daily.  LFTs decreasing    8/29/22:  Abdomen distended and tender   Pain about the same, not worsening in nature  NG in place and connected to suction but turned off.   Has not had a BM yet today, unable to recall her most recent BM  She denies N/V, and currently has no appetite  Elevated LFTS have improved, 98/79 this am   Currently on Relistor     8/30/22:  Still with abdominal distention  NG in place but suction is turned off. Placed to LIS.  Patient moans when abdomen palpated.  Has extensive peripheral edema.  Urine orange/red color    8/31/22:  Flex Sig:  Findings:        Hemorrhoids were found on perianal exam.        The lumen of the sigmoid colon was  mildly dilated. Decompression was        successful.   Impression:    - Preparation of the colon was poor.                          - Hemorrhoids found on perianal exam.                          - Mild dilation of the examined colon with                          significant amount of stool.                          - Overall, her abdomen is soft based on exam, and                          it seems as though ileus vs sbo is more a                          contributor to her symptoms than                          pseudo-obstruction.                          - No specimens collected  9/1/22:  Patient had a loose BM this am- no associated pain or discomfort, brown in color  Abdomen remains significantly distended   NG was in place to LIS with very little output, so NG was clamped and we will try ice chips.  Patient denies N/V, notes that her overall abdominal discomfort is slightly improved     9/2/22  Pt abdomen remains distended  No guarding or tenderness on palpation  NG remains clamped  NO nausea  1 small bm  Will try clears    9/6/22:  Abdomen remains distended  NG clamped  Patient is currently NPO, states she is unable to tolerate ice chips due to severe abdominal pain and discomfrot  Denies N/V  She is unable to recall her most recent BM but per chart review had BM x1 yesterday, none today. Color/consistency unknown     9/7/22:  Overall this admission, patient has received: Relistor x4 doses, Multiple days of Bisacodyl suppositories, docusate sodium, and she is currently on metoclopramide 5 mg every 6 hours.    Flexible sigmoidoscopy 8/31/22 with mild sigmoid dilation noted during endoscopy    Surgery has been consulted for recommendations    Patient unable to give details related to bowel pattern and abdominal pains. She is NPO except Boost/protein supplements right now.     NGT still in place but clamped    Today her main complaint is left arm pain- left elbow erythema with dependent edema noted. She resists  "extension.   She also reports abdominal discomfort at mid abdomen. States she is a little nauseated but mostly just "stomach hurts"    Hyperactive upper quadrant bowel sounds, hypoactive right lower quadrant bowel sounds, tinkling left lower quadrant bowel sounds.     She is unsure if she has had a bowel movements today. She is "pretty sure" she has not gotten out of bed today.       ROS:  General: lying in bed, seemingly uncomfortable with very limited movement    CV: peripheral lower edema bilaterally, left am edema- dependent around left elbow  GI: Abdominal pain and distention.   Information gathering limited by clinical condition    Objective    VITAL SIGNS: 24 HRS MIN & MAX LAST   Temp  Min: 97.4 °F (36.3 °C)  Max: 99 °F (37.2 °C) 98.4 °F (36.9 °C)   BP  Min: 90/52  Max: 145/57 (!) 112/59   Pulse  Min: 63  Max: 89  75   Resp  Min: 16  Max: 19 18   SpO2  Min: 91 %  Max: 99 % (!) 94 %     Physical exam:  General appearance: Awake and alert, in no acute distress.  IN bed.  HEENT: Atraumatic head. Dry mucous membranes of oral cavity.  Eyes: anicteric  Lungs: Clear to auscultation bilaterally.   Heart: Regular rate and rhythm. 2+ pitting edema in bilateral LE and knees  Abdomen:  distended at mid abdomen, soft lower abdomen- nontender. Upper abdomen nontender. Mid abdomen mild to moderate tenderness.  Bowel sounds hypoactive and tinkling to lower abdomen   NG in place, clamped.    Extremities: No cyanosis, clubbing. No deformities. peripheral lower edema bilaterally, left am edema- dependent around left elbow  Skin: No Rash. Warm and dry.  Neuro: Awake, lethargic. Motor and sensory exams grossly intact.  Psyc: calm and cooperative    Recent Labs   Lab 09/04/22  0445 09/05/22  0403 09/07/22  0403   WBC 6.4 8.6 6.1   RBC 3.20* 3.47* 2.85*   HGB 10.0* 10.9* 9.1*   HCT 29.8* 32.2* 29.8*   MCV 93.1 92.8 104.6*   MCH 31.3* 31.4* 31.9*   MCHC 33.6 33.9 30.5*   RDW 13.7 14.1 14.7    416* 267   MPV 10.2 10.5* 11.3* "         Recent Labs   Lab 09/02/22  0432 09/03/22  1423 09/04/22  0445 09/05/22  0403 09/07/22  0403   *   < > 134 135 136   K 4.0   < > 3.3* 3.0* 3.3*   CO2 25   < > 27 24 26   BUN 18.5   < > 9.6* 11.4 12.9   CREATININE 0.46*   < > 0.49* 0.53* 0.62   CALCIUM 7.6*   < > 7.5* 7.9* 7.1*   MG  --   --  1.70 1.70 1.70   ALBUMIN 1.9*  --   --   --  1.6*   ALKPHOS 192*  --   --   --  141   ALT 46  --   --   --  26   AST 57*  --   --   --  29   BILITOT 0.4  --   --   --  0.4    < > = values in this interval not displayed.             See below for Radiology    Scheduled Med:   albuterol-ipratropium  3 mL Nebulization Q6H    diclofenac sodium  4 g Topical (Top) Daily    enoxaparin  30 mg Subcutaneous Q12H    [START ON 9/8/2022] fat emulsion 20%  250 mL Intravenous Every Mon, Thurs    furosemide (LASIX) injection  20 mg Intravenous Q12H    LIDOcaine  1 patch Transdermal Q24H    metoclopramide HCl  5 mg Intravenous Q6H    metoprolol tartrate  25 mg Oral BID    multivitamin  1 tablet Oral Daily    nystatin  500,000 Units Oral QID (WM & HS)    pantoprazole  40 mg Intravenous BID    potassium bicarbonate  20 mEq Oral Daily    sodium chloride 0.9%  10 mL Intravenous Q6H        Continuous Infusions:   sodium chloride 0.9% 50 mL/hr at 09/07/22 0250          PRN Meds:  acetaminophen, albuterol-ipratropium, dextrose 50%, dextrose 50%, glucagon (human recombinant), glucose, glucose, HYDROcodone-acetaminophen, insulin aspart U-100, labetaloL, melatonin, metoclopramide HCl, metoprolol, morphine, ondansetron, sodium chloride 0.9%, Flushing PICC Protocol **AND** sodium chloride 0.9% **AND** sodium chloride 0.9%       Radiology:  CV Ultrasound doppler venous legs bilat  There was no evidence of deep or superficial vein thrombosis in the   bilateral lower extremities.      Assessment/Plan:  1. Persistent Abdominal distention, having small volume stool    NGT clamped   Low dose IV reglan every 6 hours   One dose of Senna per NG   Small  bowel follow through showing normal small bowel transit time (8/25)  Most recent  KUB (9/3)shows contrast in colon and continued colonic dilation   Flex Sig 8/31 w/ decompression- showing mild dilation of colon, no signs of pseudo obstruction  Continue to stress importance of mobilization in order to encourage Bms  Surgery has been reconsulted for recommendations   Minimal oral intake and minimal active motion    2. Elevated LFTS- RESOLVED    US with no significant findings.     3. Malnutrition due to lack of intake- likely the reason for slow H/H decline   -Continue multivitamin supplementation    - Continue intralipids to supplement clinimix     Continue with bowel regimen. 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      One of the most difficult aspects of care in this care is the lack of documentation of bowel movements and/or patient being able to tell us what is happening with her bowel movements. Accurate I&Os is needed here.    Kim Fair NP acting as scribe for Silver Padilla MD  Gastroenterology  [facility]

## 2022-09-07 NOTE — PLAN OF CARE
Problem: Adult Inpatient Plan of Care  Goal: Plan of Care Review  Outcome: Unable to Meet, Plan Revised  Goal: Patient-Specific Goal (Individualized)  Outcome: Unable to Meet, Plan Revised  Goal: Absence of Hospital-Acquired Illness or Injury  Outcome: Unable to Meet, Plan Revised  Goal: Optimal Comfort and Wellbeing  Outcome: Unable to Meet, Plan Revised  Goal: Readiness for Transition of Care  Outcome: Unable to Meet, Plan Revised     Problem: Skin Injury Risk Increased  Goal: Skin Health and Integrity  Outcome: Unable to Meet, Plan Revised     Problem: Fall Injury Risk  Goal: Absence of Fall and Fall-Related Injury  Outcome: Unable to Meet, Plan Revised     Problem: Infection  Goal: Absence of Infection Signs and Symptoms  Outcome: Unable to Meet, Plan Revised     Problem: Coping Ineffective  Goal: Effective Coping  Outcome: Unable to Meet, Plan Revised

## 2022-09-07 NOTE — PROGRESS NOTES
[facility]  Hospital Medicine Progress Note        Chief Complaint: abdominal pain    HPI:   89 y.o. White female with a past medical history of hypertension, hyperlipidemia. The patient presented to [facility] on 8/16/2022 with a primary complaint of abdominal pain and falls due to syncope.  Patient reports having a fall on 8/12 while in her garage.  She states she was grabbing for the car door handle when she missed it and fell. Fall was unwitnessed and she reports loss of consciousness. On 08/15 she had a second fall while in the kitchen but denies any loss of consciousness.  Yesterday (08/16/2022) patient was going to the bathroom when she passed out.  Episode was witnessed by neighbor who is visiting her. She reports urinary frequency. Patient denies complaints of headache, vision changes, shortness of breath, cough.  She has been experiencing lower abdominal pain for the last several days. Granddaughter at bedside states that patient is dependent on laxatives have a bowel movement.  Although she uses laxatives she often has to manually disimpact herself . She normally goes every 4-5 days with last bowel movement being 5 days ago. She has been having increased belching.  Granddaughter also states patient has little to no appetite.  Patient lives alone, ambulates with walker, drives and completes activity daily living independently.  Family as it patient to be experiencing dementia over the last 6 months.     Upon presentation to the ED, patient afebrile, hemodynamically stable and SpO2 96% on room air.  Labs notable for WBC 28, potassium 2.6, glucose 161, lactic acid 3.9.  UA with 1+ leukocyte esterase, 2+ bilirubin, positive nitrites, trace ketones and protein.  Chest x-ray without acute processes.  CT abdomen pelvis revealed mild distention of the colon with liquefied stool, indeterminate bilateral adrenal nodules and pancreatic cysts.  While in the ED patient received 40 mEq of IV  potassium chloride, 2 g of magnesium sulfate and Rocephin was started for UTI.  Patient admitted to hospital medicine services for further medical management.  Discussed discharge care planning with family present at bedside.  Patient/family is agreeable with rehabilitation and states that  [facility] has a rehab that patient has been to before. KUB has been reviewed and shows persistent distention of colon with air and contrast still present.    NG tube is in place.    Interval Hx:   No significant improvement still with colonic distention, no formed stool. NG tube remains in place.     Objective/physical exam:  General: Appears comfortable, no acute distress.  Integumentary: Warm, dry, intact.  Neuro:  Awake, alert, comprehension appears intact.  Musculoskeletal: Purposeful movement noted.     GI: Distended. No abdominal tenderness to touch, no bowel sounds present, no rebound no guarding.    Respiratory: No accessory muscle use. Breath sounds are equal.  Cardiovascular: Regular rate.  +1 peripheral edema  Right calf tenderness to touch with slight warmth.  No increased redness.    VITAL SIGNS: 24 HRS MIN & MAX LAST   Temp  Min: 97.4 °F (36.3 °C)  Max: 99 °F (37.2 °C) 97.4 °F (36.3 °C)   BP  Min: 90/52  Max: 145/57 107/65     Pulse  Min: 63  Max: 89  63   Resp  Min: 16  Max: 19 18   SpO2  Min: 91 %  Max: 99 % 95 %     CV Ultrasound doppler venous legs bilat  There was no evidence of deep or superficial vein thrombosis in the   bilateral lower extremities.    Recent Labs   Lab 09/04/22 0445 09/05/22  0403 09/07/22  0403   WBC 6.4 8.6 6.1   RBC 3.20* 3.47* 2.85*   HGB 10.0* 10.9* 9.1*   HCT 29.8* 32.2* 29.8*   MCV 93.1 92.8 104.6*   MCH 31.3* 31.4* 31.9*   MCHC 33.6 33.9 30.5*   RDW 13.7 14.1 14.7    416* 267   MPV 10.2 10.5* 11.3*       Recent Labs   Lab 09/02/22  0432 09/03/22  1423 09/04/22  0445 09/05/22  0403 09/07/22  0403   *   < > 134 135 136   K 4.0   < > 3.3* 3.0* 3.3*   CO2 25   < >  27 24 26   BUN 18.5   < > 9.6* 11.4 12.9   CREATININE 0.46*   < > 0.49* 0.53* 0.62   CALCIUM 7.6*   < > 7.5* 7.9* 7.1*   MG  --   --  1.70 1.70 1.70   ALBUMIN 1.9*  --   --   --  1.6*   ALKPHOS 192*  --   --   --  141   ALT 46  --   --   --  26   AST 57*  --   --   --  29   BILITOT 0.4  --   --   --  0.4    < > = values in this interval not displayed.          Microbiology Results (last 7 days)       ** No results found for the last 168 hours. **             See below for Radiology    Scheduled Med:   albuterol-ipratropium  3 mL Nebulization Q6H    diclofenac sodium  4 g Topical (Top) Daily    enoxaparin  30 mg Subcutaneous Q12H    [START ON 9/8/2022] fat emulsion 20%  250 mL Intravenous Every Mon, Thurs    furosemide (LASIX) injection  20 mg Intravenous Q12H    LIDOcaine  1 patch Transdermal Q24H    metoclopramide HCl  5 mg Intravenous Q6H    metoprolol tartrate  25 mg Oral BID    multivitamin  1 tablet Oral Daily    nystatin  500,000 Units Oral QID (WM & HS)    pantoprazole  40 mg Intravenous BID    potassium bicarbonate  20 mEq Oral Daily    sodium chloride 0.9%  10 mL Intravenous Q6H        PRN Meds:  acetaminophen, albuterol-ipratropium, dextrose 50%, dextrose 50%, glucagon (human recombinant), glucose, glucose, HYDROcodone-acetaminophen, insulin aspart U-100, labetaloL, melatonin, metoclopramide HCl, metoprolol, morphine, ondansetron, sodium chloride 0.9%, Flushing PICC Protocol **AND** sodium chloride 0.9% **AND** sodium chloride 0.9%       Assessment/Plan:  Colonic distension   Dyspepsia  Transaminitis, improving   B/L LE edema bilateral  Small pancreatic cyst   Sliding hernia -nonstrangulated   Bilateral adrenal nodules repeat follow-up CT outpatient  Essential hypertension  Diastolic dysfunction   sinus tachycardia     Plan  Colonic distension-without improvement.  General surgeon consulted.  continue IV Reglan, Protonix, suppository twice daily.   Status post colonoscopic decompression several days ago  without any improvement.    Maintain NPO status.  Continue NG to for continue decompression.  Patient complains of right calf pain, will order ultrasound to further evaluate for DVT.  Continue anticoagulation for now with Lovenox.      Bilateral peripheral edema-improving. Continue IV lasix 20 mg b.I.d..    Up to chair x3 daily.    Anticipated discharge and Disposition:  Pending. SNF    All diagnosis and differential diagnosis have been reviewed; assessment and plan has been documented; I have personally reviewed the labs and test results that are presently available; I have reviewed the patients medication list; I have reviewed the consulting providers response and recommendations. I have reviewed or attempted to review medical records based upon their availability      I will continue to monitor closely and make adjustments to medical management as needed.    Sherri [place], DO   09/07/2022        This note was created with the assistance of Dragon voice recognition software. There may be transcription errors as a result of using this technology however minimal. Effort has been made to assure accuracy of transcription but any obvious errors or omissions should be clarified with the author of the document.

## 2022-09-07 NOTE — PT/OT/SLP PROGRESS
Physical Therapy  Treatment    Fidelina Darling   MRN: [id number]   Admitting Diagnosis: Abdominal cramping    PT Received On: 09/07/22  PT Start Time: 0953     PT Stop Time: 1010    PT Total Time (min): 17 min       Billable Minutes:  Therapeutic Activity 17min    Treatment Type: Treatment  PT/PTA: PT     PTA Visit Number: 1       General Precautions: Standard, NPO  Orthopedic Precautions: N/A   Braces: N/A  Respiratory Status: Room air    Spiritual, Cultural Beliefs, Jew Practices, Values that Affect Care: no    Subjective:  Communicated with RN prior to session.    Pain/Comfort  Pain Rating 1: 0/10    Objective:   Patient found with: PureWick, peripheral IV, NG tube      Therapeutic Activities and Exercises:  Patient agreeable to PT session. Patient required maxA for supine to sit at EOB, able to maintain less than 5 min due to nausea. Patient required maxA to return to supine and for positioning in bed. PT discussed patient's goals and motivating factors, she continues to state that she wants to return to walking. PT educated patient on importance of therapy and participation. Pt is appropriate for continued acute PT services.      AM-PAC 6 CLICK MOBILITY  How much help from another person does this patient currently need?   1 = Unable, Total/Dependent Assistance  2 = A lot, Maximum/Moderate Assistance  3 = A little, Minimum/Contact Guard/Supervision  4 = None, Modified [place]/Independent    Turning over in bed (including adjusting bedclothes, sheets and blankets)?: 2  Sitting down on and standing up from a chair with arms (e.g., wheelchair, bedside commode, etc.): 1  Moving from lying on back to sitting on the side of the bed?: 1  Moving to and from a bed to a chair (including a wheelchair)?: 1  Need to walk in hospital room?: 1  Climbing 3-5 steps with a railing?: 1  Basic Mobility Total Score: 7    AM-PAC Raw Score CMS G-Code Modifier Level of Impairment Assistance   6 % Total / Unable   7 - 9  CM 80 - 100% Maximal Assist   10 - 14 CL 60 - 80% Moderate Assist   15 - 19 CK 40 - 60% Moderate Assist   20 - 22 CJ 20 - 40% Minimal Assist   23 CI 1-20% SBA / CGA   24 CH 0% Independent/ Mod I     Patient left HOB elevated with all lines intact, call button in reach, and RN notified.    Assessment:  Fidelina Darling is a 89 y.o. female with a medical diagnosis of Abdominal cramping.    Rehab identified problem list/impairments: Rehab identified problem list/impairments: weakness, impaired endurance, impaired functional mobility, gait instability, impaired balance, decreased safety awareness, decreased lower extremity function    Rehab potential is fair.    Activity tolerance: Fair    Discharge recommendations: Discharge Facility/Level of Care Needs: nursing facility, skilled     Barriers to discharge:  impaired mobility    Equipment recommendations: Equipment Needed After Discharge: other (see comments) (pending progress)     GOALS:   Multidisciplinary Problems       Physical Therapy Goals          Problem: Physical Therapy    Goal Priority Disciplines Outcome Goal Variances Interventions   Physical Therapy Goal     PT, PT/OT Ongoing, Progressing     Description: Goals to be met by: 22     Patient will increase functional independence with mobility by performin. Supine to sit with MInimal Assistance  2. Sit to supine with MInimal Assistance  3. Sit to stand transfer with Minimal Assistance  4. Bed to chair transfer with Minimal Assistance using Rolling Walker  5. Sitting at edge of bed x10 minutes with Stand-by Assistance                         PLAN:    Patient to be seen 5 x/week  to address the above listed problems via therapeutic activities, therapeutic exercises, neuromuscular re-education, gait training  Plan of Care expires: 10/07/22  Plan of Care reviewed with: patient         2022

## 2022-09-07 NOTE — PROGRESS NOTES
"Trauma/Acute Care Surgery   Daily Progress Note     HD#21  POD#7 Days Post-Op    Subjective  NAEO. VSS except for an episode of hypotension 90/52. AF. Pt is poor historian.   General surgery re-consulted for surgical decompression. Pt has been experiencing acute abdominal distention and small stool volumes this hospitalization. Last bowel movement was last night; however, pt is unable to characterize. She also is "unsure" if having abdominal pain.  NGT clamped.  On low dose IV reglan and given one dose of Senna per NG. Underwent flex Sig on 8/31 w/ GI noting decompression- showing mild dilation of colon, no signs of pseudo obstruction.    Pt was also laxative dependent prior to arrival.     Objective  Temp:  [97.4 °F (36.3 °C)-99 °F (37.2 °C)] 97.4 °F (36.3 °C)  Pulse:  [63-89] 63  Resp:  [16-19] 18  SpO2:  [91 %-99 %] 95 %  BP: ()/(52-65) 107/65     Intake/Output/LDA:  + 1BM last night     Physical Exam:  GEN: NAD, lying in bed  NEURO: alert, confused  RESP: No increased work of breathing, on NC  CV: RR, no LE edema  ABD: slightly tender to palpation, distended, firm, NG tube in place, clamped.  MSK: no deformities  Rectal: good rectal tone, no blood     Labs  H/H 9.1/29.8   K 3.0    Micro  none    Imaging  XR Abd 9/3   Impression:Persistent distension of the colon with air and contrast as above.       Small bowel follow through showing normal small bowel transit time (8/25)    Assessment/Plan  Ms. Fidelina Darling is a 89 yo F w/ hx of chronic constipation and laxative use who presented to the ED after a fall and abdominal pain now continuing to complain of colonic distension w/o improvement s/p colonoscopic decompression and NGT.     - Remove NGT  - CLD  - polyethylene gylcol 17mg BID, once dose methylnaltrexone 12mg/0.6ml, simethicone 80 qid  - Recommend Neostigmine  - No acute surgical intervention at this time     Teresa Pruitt MD  [employer] General Surgery    "

## 2022-09-08 LAB
POCT GLUCOSE: 76 MG/DL (ref 70–110)
POCT GLUCOSE: 92 MG/DL (ref 70–110)

## 2022-09-08 PROCEDURE — 25000003 PHARM REV CODE 250: Performed by: INTERNAL MEDICINE

## 2022-09-08 PROCEDURE — B4185 PARENTERAL SOL 10 GM LIPIDS: HCPCS | Performed by: INTERNAL MEDICINE

## 2022-09-08 PROCEDURE — 25000003 PHARM REV CODE 250

## 2022-09-08 PROCEDURE — 94640 AIRWAY INHALATION TREATMENT: CPT

## 2022-09-08 PROCEDURE — 25000003 PHARM REV CODE 250: Performed by: NURSE PRACTITIONER

## 2022-09-08 PROCEDURE — C9113 INJ PANTOPRAZOLE SODIUM, VIA: HCPCS | Performed by: INTERNAL MEDICINE

## 2022-09-08 PROCEDURE — 99900035 HC TECH TIME PER 15 MIN (STAT)

## 2022-09-08 PROCEDURE — 27000221 HC OXYGEN, UP TO 24 HOURS

## 2022-09-08 PROCEDURE — 25000242 PHARM REV CODE 250 ALT 637 W/ HCPCS: Performed by: INTERNAL MEDICINE

## 2022-09-08 PROCEDURE — 97530 THERAPEUTIC ACTIVITIES: CPT

## 2022-09-08 PROCEDURE — 63600175 PHARM REV CODE 636 W HCPCS: Performed by: STUDENT IN AN ORGANIZED HEALTH CARE EDUCATION/TRAINING PROGRAM

## 2022-09-08 PROCEDURE — 63600175 PHARM REV CODE 636 W HCPCS: Performed by: INTERNAL MEDICINE

## 2022-09-08 PROCEDURE — 63600175 PHARM REV CODE 636 W HCPCS: Performed by: NURSE PRACTITIONER

## 2022-09-08 PROCEDURE — 25000003 PHARM REV CODE 250: Performed by: STUDENT IN AN ORGANIZED HEALTH CARE EDUCATION/TRAINING PROGRAM

## 2022-09-08 PROCEDURE — 21400001 HC TELEMETRY ROOM

## 2022-09-08 PROCEDURE — 94761 N-INVAS EAR/PLS OXIMETRY MLT: CPT

## 2022-09-08 PROCEDURE — A4216 STERILE WATER/SALINE, 10 ML: HCPCS | Performed by: INTERNAL MEDICINE

## 2022-09-08 PROCEDURE — 97535 SELF CARE MNGMENT TRAINING: CPT | Mod: CO

## 2022-09-08 RX ORDER — ATROPINE SULFATE 0.1 MG/ML
1 INJECTION INTRAVENOUS
Status: DISCONTINUED | OUTPATIENT
Start: 2022-09-08 | End: 2022-09-13 | Stop reason: HOSPADM

## 2022-09-08 RX ORDER — POTASSIUM CHLORIDE 14.9 MG/ML
40 INJECTION INTRAVENOUS ONCE
Status: COMPLETED | OUTPATIENT
Start: 2022-09-08 | End: 2022-09-08

## 2022-09-08 RX ORDER — NEOSTIGMINE METHYLSULFATE 1 MG/ML
1 INJECTION, SOLUTION INTRAVENOUS ONCE
Status: DISCONTINUED | OUTPATIENT
Start: 2022-09-08 | End: 2022-09-08

## 2022-09-08 RX ORDER — IPRATROPIUM BROMIDE AND ALBUTEROL SULFATE 2.5; .5 MG/3ML; MG/3ML
3 SOLUTION RESPIRATORY (INHALATION) EVERY 6 HOURS PRN
Status: DISCONTINUED | OUTPATIENT
Start: 2022-09-08 | End: 2022-09-13 | Stop reason: HOSPADM

## 2022-09-08 RX ORDER — NEOSTIGMINE METHYLSULFATE 1 MG/ML
2 INJECTION, SOLUTION INTRAVENOUS ONCE
Status: COMPLETED | OUTPATIENT
Start: 2022-09-08 | End: 2022-09-08

## 2022-09-08 RX ADMIN — LIDOCAINE 1 PATCH: 50 PATCH TOPICAL at 04:09

## 2022-09-08 RX ADMIN — METOCLOPRAMIDE 5 MG: 5 INJECTION, SOLUTION INTRAMUSCULAR; INTRAVENOUS at 10:09

## 2022-09-08 RX ADMIN — DOCUSATE SODIUM 50 MG: 50 CAPSULE, LIQUID FILLED ORAL at 10:09

## 2022-09-08 RX ADMIN — SIMETHICONE 80 MG: 80 TABLET, CHEWABLE ORAL at 04:09

## 2022-09-08 RX ADMIN — SIMETHICONE 80 MG: 80 TABLET, CHEWABLE ORAL at 10:09

## 2022-09-08 RX ADMIN — METOCLOPRAMIDE 5 MG: 5 INJECTION, SOLUTION INTRAMUSCULAR; INTRAVENOUS at 09:09

## 2022-09-08 RX ADMIN — SIMETHICONE 80 MG: 80 TABLET, CHEWABLE ORAL at 09:09

## 2022-09-08 RX ADMIN — METOCLOPRAMIDE 5 MG: 5 INJECTION, SOLUTION INTRAMUSCULAR; INTRAVENOUS at 05:09

## 2022-09-08 RX ADMIN — NYSTATIN 500000 UNITS: 100000 SUSPENSION ORAL at 09:09

## 2022-09-08 RX ADMIN — POTASSIUM CHLORIDE 40 MEQ: 200 INJECTION, SOLUTION INTRAVENOUS at 04:09

## 2022-09-08 RX ADMIN — HYDROCODONE BITARTRATE AND ACETAMINOPHEN 1 TABLET: 5; 325 TABLET ORAL at 09:09

## 2022-09-08 RX ADMIN — HYDROCORTISONE 2.5%: 25 CREAM TOPICAL at 09:09

## 2022-09-08 RX ADMIN — NYSTATIN 500000 UNITS: 100000 SUSPENSION ORAL at 04:09

## 2022-09-08 RX ADMIN — PANTOPRAZOLE SODIUM 40 MG: 40 INJECTION, POWDER, FOR SOLUTION INTRAVENOUS at 10:09

## 2022-09-08 RX ADMIN — DICLOFENAC SODIUM 4 G: 10 GEL TOPICAL at 10:09

## 2022-09-08 RX ADMIN — IPRATROPIUM BROMIDE AND ALBUTEROL SULFATE 3 ML: 2.5; .5 SOLUTION RESPIRATORY (INHALATION) at 02:09

## 2022-09-08 RX ADMIN — SODIUM CHLORIDE, PRESERVATIVE FREE 10 ML: 5 INJECTION INTRAVENOUS at 05:09

## 2022-09-08 RX ADMIN — METOCLOPRAMIDE 5 MG: 5 INJECTION, SOLUTION INTRAMUSCULAR; INTRAVENOUS at 08:09

## 2022-09-08 RX ADMIN — HYDROCORTISONE 2.5%: 25 CREAM TOPICAL at 10:09

## 2022-09-08 RX ADMIN — ENOXAPARIN SODIUM 30 MG: 30 INJECTION SUBCUTANEOUS at 09:09

## 2022-09-08 RX ADMIN — HYDROCODONE BITARTRATE AND ACETAMINOPHEN 1 TABLET: 5; 325 TABLET ORAL at 05:09

## 2022-09-08 RX ADMIN — DOCUSATE SODIUM 50 MG: 50 CAPSULE, LIQUID FILLED ORAL at 09:09

## 2022-09-08 RX ADMIN — FUROSEMIDE 20 MG: 10 INJECTION, SOLUTION INTRAMUSCULAR; INTRAVENOUS at 05:09

## 2022-09-08 RX ADMIN — METOPROLOL TARTRATE 25 MG: 25 TABLET, FILM COATED ORAL at 09:09

## 2022-09-08 RX ADMIN — IPRATROPIUM BROMIDE AND ALBUTEROL SULFATE 3 ML: 2.5; .5 SOLUTION RESPIRATORY (INHALATION) at 08:09

## 2022-09-08 RX ADMIN — ENOXAPARIN SODIUM 30 MG: 30 INJECTION SUBCUTANEOUS at 10:09

## 2022-09-08 RX ADMIN — METOCLOPRAMIDE 5 MG: 5 INJECTION, SOLUTION INTRAMUSCULAR; INTRAVENOUS at 02:09

## 2022-09-08 RX ADMIN — POLYETHYLENE GLYCOL 3350 17 G: 17 POWDER, FOR SOLUTION ORAL at 09:09

## 2022-09-08 RX ADMIN — THERA TABS 1 TABLET: TAB at 10:09

## 2022-09-08 RX ADMIN — NEOSTIGMINE METHYLSULFATE 2 MG: 1 INJECTION INTRAVENOUS at 11:09

## 2022-09-08 RX ADMIN — POTASSIUM BICARBONATE 20 MEQ: 391 TABLET, EFFERVESCENT ORAL at 10:09

## 2022-09-08 RX ADMIN — POLYETHYLENE GLYCOL 3350 17 G: 17 POWDER, FOR SOLUTION ORAL at 10:09

## 2022-09-08 RX ADMIN — I.V. FAT EMULSION 250 ML: 20 EMULSION INTRAVENOUS at 02:09

## 2022-09-08 RX ADMIN — SIMETHICONE 80 MG: 80 TABLET, CHEWABLE ORAL at 02:09

## 2022-09-08 RX ADMIN — PANTOPRAZOLE SODIUM 40 MG: 40 INJECTION, POWDER, FOR SOLUTION INTRAVENOUS at 09:09

## 2022-09-08 RX ADMIN — METOPROLOL TARTRATE 25 MG: 25 TABLET, FILM COATED ORAL at 10:09

## 2022-09-08 NOTE — PROGRESS NOTES
RONNI Khan aware that pt was unable to have BM only passed gas. Vitals have been stable. Old IV removed due to infiltration. Pt returned to room via patient transport. Everyone understands pt info with no further questions.

## 2022-09-08 NOTE — PT/OT/SLP PROGRESS
Occupational Therapy  Treatment    Fidelina Darling   MRN: [id number]   Admitting Diagnosis: Abdominal cramping    OT Date of Treatment: 09/08/22   OT Start Time: 1037  OT Stop Time: 1047  OT Total Time (min): 10 min     Billable Minutes:  Self Care/Home Management 10  Total Minutes: 10     OT/CONSUELO: CONSUELO     CONSUELO Visit Number: 2    General Precautions: Standard, aspiration  Orthopedic Precautions:    Braces:      Spiritual, Cultural Beliefs, Christianity Practices, Values that Affect Care: no    Subjective:  Appropriate, agreeable to OT session.          Objective:  Patient found with: PureWick, peripheral IV, NG tube    Functional Mobility:  Bed Mobility:   Supine to sit: Maximum Assistance   Sit to supine: Maximum Assistance   Rolling: Activity did not occur   Scooting: Activity did not occur    Grooming:  Functional reaching for comb to brush hair. Slow to initiate, task terminated shortly as sx transport present to retrieve pt.     LE Dressing:  Max A LBD of socks.       Balance:   Static Sit: FAIR: Maintains without assist, but unable to take any challenges   Dynamic Sit:  FAIR+: Maintains balance through MINIMAL excursions of active trunk motion    Additional Treatment:      Patient left HOB elevated with all lines intact, call button in reach, and transport present    ASSESSMENT:  Fidelina Darling is a 89 y.o. female with a medical diagnosis of Abdominal cramping.    Rehab potential is good    Activity tolerance: Good    Discharge recommendations: nursing facility, skilled, other (see comments) (swing bed)     Equipment recommendations:       GOALS:   Multidisciplinary Problems       Occupational Therapy Goals          Problem: Occupational Therapy    Goal Priority Disciplines Outcome Interventions   Occupational Therapy Goal     OT, PT/OT Ongoing, Progressing    Description: Goals to be met by: 9/16/22     Patient will increase functional independence with ADLs by performing:    UE Dressing with Modified  [place].  LE Dressing with Modified [place].  Grooming while standing with Modified [place].  Toileting from bedside commode with Modified [place] for hygiene  and clothing management.   Toilet transfer to bedside commode with Modified [place].progress  to toilet.                         Plan:  Patient to be seen 3 x/week, 5 x/week to address the above listed problems via self-care/home management, therapeutic activities, therapeutic exercises  Plan of Care expires: 09/16/22  Plan of Care reviewed with: patient         09/08/2022

## 2022-09-08 NOTE — PROGRESS NOTES
[facility]    Chief Complaint: Inpatient Follow-up for      Subjective:  Fidelina Darling is a 89 y.o. White female with a past medical history of hypertension, hyperlipidemia. The patient presented to [facility] on 8/16/2022 with a primary complaint of abdominal pain and falls due to syncope.  Patient reports having a fall on 8/12 while in her garage.  She states she was grabbing for the car door handle when she missed it and fell. Fall was unwitnessed and she reports loss of consciousness. On 08/15 she had a second fall while in the kitchen but denies any loss of consciousness.  Yesterday (08/16/2022) patient was going to the bathroom when she passed out.  Episode was witnessed by neighbor who is visiting her. She reports urinary frequency. Patient denies complaints of headache, vision changes, shortness of breath, cough.  She has been experiencing lower abdominal pain for the last several days. Granddaughter at bedside states that patient is dependent on laxatives have a bowel movement.  Although she uses laxatives she often has to manually disimpact herself . She normally goes every 4-5 days with last bowel movement being 5 days ago. She has been having increased belching.  Granddaughter also states patient has little to no appetite.  Patient lives alone, ambulates with walker, drives and completes activity daily living independently.       Upon presentation to the ED, patient afebrile, hemodynamically stable and SpO2 96% on room air.  Labs notable for WBC 28, potassium 2.6, glucose 161, lactic acid 3.9.  UA with 1+ leukocyte esterase, 2+ bilirubin, positive nitrites, trace ketones and protein.  Chest x-ray without acute processes.  CT abdomen pelvis revealed mild distention of the colon with liquefied stool, indeterminate bilateral adrenal nodules and pancreatic cysts.  While in the ED patient received 40 mEq of IV potassium chloride, 2 g of magnesium sulfate and Rocephin was started for  UTI.    Patient was admitted on account of recurrent syncope and falls. Colonic dilatation with lactic acidosis.   X-ray of the chest reveals left-sided atelectasis cannot rule out infiltrate.      8-21-22  Pt with persistent abd pain and nausea  Has NGT to LIWS  Belching  Minimal flatus  KUB with dilation 56.4mm to 76.8mm of colon    8/22/22:  NG still in place. Was on high suction. Placed on low suction.  Generalized mild abdominal discomfort  No flatus, no stool.  KUB pending.    8/23/22:  US ordered for today due to elevated LFTs  Patient having small liquid brown stool  NG in place with minimal output     8/24/22:  SBFT unremarkable- normal SB transit time  Abdominal U/S unremarkable  LFTs trending up   Patient continues to have stool (per nurse charting x5 stools yesterday) and endorses diffuse abdominal discomfort,   Denies N/V, fever/chills    8/25/22:  LFTS trending down. AST/ALT: 419/209---> 335/179  Patient has not had a BM today and continues to endorse diffuse abdominal pain.   Denies N/V, fever/chills    8/26/22:  Abdomen remains distended and quiet. NG replaced and to LIS and 600ml aspirated. Aspirate is dark.  Just had a small brown stool.  Turned her and c/o abdominal pain.  KUB revealed contrast in colon remains.  On Movantik and dulcolax supp daily.  LFTs decreasing    8/29/22:  Abdomen distended and tender   Pain about the same, not worsening in nature  NG in place and connected to suction but turned off.   Has not had a BM yet today, unable to recall her most recent BM  She denies N/V, and currently has no appetite  Elevated LFTS have improved, 98/79 this am   Currently on Relistor     8/30/22:  Still with abdominal distention  NG in place but suction is turned off. Placed to LIS.  Patient moans when abdomen palpated.  Has extensive peripheral edema.  Urine orange/red color    8/31/22:  Flex Sig:  Findings:        Hemorrhoids were found on perianal exam.        The lumen of the sigmoid colon was  mildly dilated. Decompression was        successful.   Impression:    - Preparation of the colon was poor.                          - Hemorrhoids found on perianal exam.                          - Mild dilation of the examined colon with                          significant amount of stool.                          - Overall, her abdomen is soft based on exam, and                          it seems as though ileus vs sbo is more a                          contributor to her symptoms than                          pseudo-obstruction.                          - No specimens collected  9/1/22:  Patient had a loose BM this am- no associated pain or discomfort, brown in color  Abdomen remains significantly distended   NG was in place to LIS with very little output, so NG was clamped and we will try ice chips.  Patient denies N/V, notes that her overall abdominal discomfort is slightly improved     9/2/22  Pt abdomen remains distended  No guarding or tenderness on palpation  NG remains clamped  NO nausea  1 small bm  Will try clears    9/6/22:  Abdomen remains distended  NG clamped  Patient is currently NPO, states she is unable to tolerate ice chips due to severe abdominal pain and discomfrot  Denies N/V  She is unable to recall her most recent BM but per chart review had BM x1 yesterday, none today. Color/consistency unknown     9/7/22:  Overall this admission, patient has received: Relistor x4 doses, Multiple days of Bisacodyl suppositories, docusate sodium, and she is currently on metoclopramide 5 mg every 6 hours.    Flexible sigmoidoscopy 8/31/22 with mild sigmoid dilation noted during endoscopy    Surgery has been consulted for recommendations    Patient unable to give details related to bowel pattern and abdominal pains. She is NPO except Boost/protein supplements right now.     NGT still in place but clamped    Today her main complaint is left arm pain- left elbow erythema with dependent edema noted. She resists  "extension.   She also reports abdominal discomfort at mid abdomen. States she is a little nauseated but mostly just "stomach hurts"    Hyperactive upper quadrant bowel sounds, hypoactive right lower quadrant bowel sounds, tinkling left lower quadrant bowel sounds.     She is unsure if she has had a bowel movements today. She is "pretty sure" she has not gotten out of bed today.    9/8/22:   Abdomen seems slightly less distended than last week. Got up to sit on side of bed yesterday but then placed back to bed due to nausea. On clear liquids.  Surgical team has ordered Neostigmine. It will be given in PACU.  No stools charged for yesterday.       ROS:  General: lying in bed, seemingly comfortable  CV: peripheral lower edema bilaterally, left am edema- dependent around left elbow  GI: Abdominal distention.   Information gathering limited by clinical condition    Objective    VITAL SIGNS: 24 HRS MIN & MAX LAST   Temp  Min: 97.4 °F (36.3 °C)  Max: 98.4 °F (36.9 °C) 97.4 °F (36.3 °C)   BP  Min: 107/59  Max: 138/67 (!) 107/59   Pulse  Min: 68  Max: 109  77   Resp  Min: 14  Max: 18 17   SpO2  Min: 94 %  Max: 98 % 97 %     Physical exam:  General appearance: Awake and alert, in no acute distress.  IN bed.  HEENT: Atraumatic head. Dry mucous membranes of oral cavity.  Eyes: anicteric  Lungs: Clear to auscultation bilaterally.   Heart: Regular rate and rhythm. 2+ pitting edema in bilateral LE and knees  Abdomen:  distended at mid abdomen, soft lower abdomen- nontender. Upper abdomen nontender. Mid abdomen mild to moderate tenderness.  Bowel sounds hypoactive and tinkling to left abdomen   Extremities: No cyanosis, clubbing. No deformities. peripheral lower edema bilaterally, left am edema- dependent around left elbow  Skin: No Rash. Warm and dry.  Neuro: Awake, lethargic. Motor and sensory exams grossly intact.  Psyc: calm and cooperative    Recent Labs   Lab 09/04/22  0445 09/05/22  0403 09/07/22  0403   WBC 6.4 8.6 6.1 "   RBC 3.20* 3.47* 2.85*   HGB 10.0* 10.9* 9.1*   HCT 29.8* 32.2* 29.8*   MCV 93.1 92.8 104.6*   MCH 31.3* 31.4* 31.9*   MCHC 33.6 33.9 30.5*   RDW 13.7 14.1 14.7    416* 267   MPV 10.2 10.5* 11.3*         Recent Labs   Lab 09/02/22  0432 09/03/22  1423 09/04/22  0445 09/05/22  0403 09/07/22  0403   *   < > 134 135 136   K 4.0   < > 3.3* 3.0* 3.3*   CO2 25   < > 27 24 26   BUN 18.5   < > 9.6* 11.4 12.9   CREATININE 0.46*   < > 0.49* 0.53* 0.62   CALCIUM 7.6*   < > 7.5* 7.9* 7.1*   MG  --   --  1.70 1.70 1.70   ALBUMIN 1.9*  --   --   --  1.6*   ALKPHOS 192*  --   --   --  141   ALT 46  --   --   --  26   AST 57*  --   --   --  29   BILITOT 0.4  --   --   --  0.4    < > = values in this interval not displayed.             See below for Radiology    Scheduled Med:   albuterol-ipratropium  3 mL Nebulization Q6H    diclofenac sodium  4 g Topical (Top) Daily    docusate sodium  50 mg Oral BID    enoxaparin  30 mg Subcutaneous Q12H    fat emulsion 20%  250 mL Intravenous Every Mon, Thurs    furosemide (LASIX) injection  20 mg Intravenous Q12H    hydrocortisone   Rectal BID    LIDOcaine  1 patch Transdermal Q24H    metoclopramide HCl  5 mg Intravenous Q6H    metoprolol tartrate  25 mg Oral BID    multivitamin  1 tablet Oral Daily    neostigmine  2 mg Intravenous Once    nystatin  500,000 Units Oral QID (WM & HS)    pantoprazole  40 mg Intravenous BID    polyethylene glycol  17 g Oral BID    potassium bicarbonate  20 mEq Oral Daily    simethicone  1 tablet Oral QID    sodium chloride 0.9%  10 mL Intravenous Q6H        Continuous Infusions:   sodium chloride 0.9% 50 mL/hr at 09/07/22 0250          PRN Meds:  acetaminophen, albuterol-ipratropium, atropine, bisacodyL, dextrose 50%, dextrose 50%, glucagon (human recombinant), glucose, glucose, HYDROcodone-acetaminophen, insulin aspart U-100, labetaloL, melatonin, metoclopramide HCl, metoprolol, morphine, ondansetron, sodium chloride 0.9%, Flushing PICC Protocol  **AND** sodium chloride 0.9% **AND** sodium chloride 0.9%       Radiology:  X-Ray Abdomen Flat And Erect  Narrative: EXAMINATION:  XR ABDOMEN FLAT AND ERECT    CLINICAL HISTORY:  colonic distention;,    COMPARISON:  September 3, 2022    FINDINGS:  There is persistent gases distension of the abdomen with gas in loops of large bowel persistent contrast identified in the colon    . No other significant abnormality identified  Impression: Persistent gases distension of the abdomen with persistent residual contrast in the colon    Electronically signed by: Bharathi Gauthier  Date:    09/08/2022  Time:    09:29      Assessment/Plan:  1. Persistent Abdominal distention, having small volume stool    NGT clamped   Low dose IV reglan every 6 hours   One dose of Senna per NG   Small bowel follow through showing normal small bowel transit time (8/25)  Most recent  KUB (9/3)shows contrast in colon and continued colonic dilation   Flex Sig 8/31 w/ decompression- showing mild dilation of colon, no signs of pseudo obstruction  Continue to stress importance of mobilization in order to encourage Bms  Surgery has been reconsulted for recommendations: Neostigmine ordered.  No indication for endoscopic decompression (tried this last week.)  Mobilization.      2. Elevated LFTS- RESOLVED    US with no significant findings.     3. Malnutrition due to lack of intake- likely the reason for slow H/H decline   -Continue multivitamin supplementation    - Continue intralipids to supplement clinimix     Continue with bowel regimen. 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      One of the most difficult aspects of care in this care is the lack of documentation of bowel movements and/or patient being able to tell us what is happening with her bowel movements. Accurate I&Os is needed here.    Meche Chan, DANTE acting as scribe for Silver Padilla,  MD  Gastroenterology  [facility]    I agree with the findings and plan.    New 3 minutes trial today with out significant bowel movement but now having flatus.  The patient does seem to have stooling with ambulation, so I would encourage continued ambulation.  Unfortunately, she is receiving maximal medical therapy and is continuing to have suboptimal colon response.  I recommend continued medical management at this time.  There is no indication for colonic decompression.  Unfortunately, this appears to have been a progressing chronic problem that will be difficult to improve.    Silver Padilla MD  Gastroenterology

## 2022-09-08 NOTE — PROGRESS NOTES
Trauma/Acute Care Surgery   Daily Progress Note     HD#22  POD#8 Days Post-Op    Subjective  Pain overnight. Denies flatus/Bms. Still with pain. Distended. No labs ordered.      Scheduled Meds:   albuterol-ipratropium  3 mL Nebulization Q6H    diclofenac sodium  4 g Topical (Top) Daily    docusate sodium  50 mg Oral BID    enoxaparin  30 mg Subcutaneous Q12H    fat emulsion 20%  250 mL Intravenous Every Mon, Thurs    furosemide (LASIX) injection  20 mg Intravenous Q12H    hydrocortisone   Rectal BID    LIDOcaine  1 patch Transdermal Q24H    metoclopramide HCl  5 mg Intravenous Q6H    metoprolol tartrate  25 mg Oral BID    multivitamin  1 tablet Oral Daily    nystatin  500,000 Units Oral QID (WM & HS)    pantoprazole  40 mg Intravenous BID    polyethylene glycol  17 g Oral BID    potassium bicarbonate  20 mEq Oral Daily    simethicone  1 tablet Oral QID    sodium chloride 0.9%  10 mL Intravenous Q6H       Continuous Infusions:   sodium chloride 0.9% 50 mL/hr at 09/07/22 0250       PRN Meds:acetaminophen, albuterol-ipratropium, bisacodyL, dextrose 50%, dextrose 50%, glucagon (human recombinant), glucose, glucose, HYDROcodone-acetaminophen, insulin aspart U-100, labetaloL, melatonin, metoclopramide HCl, metoprolol, morphine, ondansetron, sodium chloride 0.9%, Flushing PICC Protocol **AND** sodium chloride 0.9% **AND** sodium chloride 0.9%     Objective  Temp:  [97.4 °F (36.3 °C)-98.4 °F (36.9 °C)] 97.4 °F (36.3 °C)  Pulse:  [] 68  Resp:  [14-18] 18  SpO2:  [94 %-98 %] 96 %  BP: (107-138)/(59-72) 107/59     I/O last 3 completed shifts:  In: -   Out: 300 [Urine:300]  No intake/output data recorded.       Peripheral IV - Single Lumen 09/02/22 2100 Distal;Posterior;Right Forearm (Active)   Site Assessment Clean;Dry;Intact 09/08/22 0000   Extremity Assessment Distal to IV No abnormal discoloration;No redness;No swelling;No warmth 09/05/22 0800   Line Status Infusing 09/07/22 0343   Dressing Status Clean;Dry;Intact  09/07/22 0343   Dressing Intervention Integrity maintained 09/07/22 0343   Reason Not Rotated Not due 09/03/22 1600   Number of days: 5            Midline Catheter Insertion/Assessment  - Single Lumen 08/26/22 1220 Right basilic vein (medial side of arm) (Active)   Site Assessment Clean;Dry;Intact 09/06/22 2000   IV Device Securement catheter securement device 09/05/22 2100   Line Status Infusing 09/06/22 2000   Dressing Type Central line dressing 09/06/22 2000   Dressing Status Clean;Dry;Intact 09/06/22 2000   Dressing Intervention Integrity maintained 09/06/22 2000   Dressing Change Due 08/31/22 08/31/22 0800   Reason Not Rotated Not due 09/03/22 1600   Number of days: 12            NG/OG Tube 08/20/22 1200 nasogastric Right nostril (Active)   Placement Check placement verified by distal tube length measurement 09/06/22 2000   Tube advanced (cm) 0 09/05/22 2100   Advancement advanced manually 08/26/22 0800   Distal Tube Length (cm) 68 09/06/22 2000   Tolerance no signs/symptoms of discomfort 09/06/22 2000   Securement secured to nostril center 09/05/22 2100   Clamp Status/Tolerance clamped 09/06/22 2000   Suction Setting/Drainage Method suction at the bedside 09/06/22 2000   Insertion Site Appearance no redness, warmth, tenderness, skin breakdown, drainage 09/05/22 2100   Drainage Brown 09/04/22 2000   Flush/Irrigation flushed w/;water 09/05/22 0800   Intake (mL) 30 mL 09/04/22 0800   Tube Output(mL)(Include Discarded Residual) 0 mL 09/01/22 0820   Number of days: 18        Gen: NAD, AAOx3  HEENT: EOMI, NCAT  CV: RR  Resp: no shortness of breath, normal WOB   Abd: soft, non-distended, ATTP, distended  Ext:  Full ROM. No deformity.      Labs  Recent Labs     09/07/22 0403   WBC 6.1   HGB 9.1*   HCT 29.8*        Recent Labs     09/07/22 0403      K 3.3*   CO2 26   BUN 12.9   CREATININE 0.62   CALCIUM 7.1*   MG 1.70   PHOS 2.7   ALBUMIN 1.6*   BILITOT 0.4   AST 29   ALKPHOS 141   ALT 26        Imaging  No results found in the last 24 hours.       Assessment/Plan  Olgilvies    Neostigmine given in PACU, + flatus, no BM  Return to floor for continued care  Further from Dr. Tabor  May benefit from repeated decompressive scope prior to any surgical considerations    Bill Ortega  Trauma/Acute Care Surgery   c - [phone]    9/8/2022  7:25 AM

## 2022-09-08 NOTE — PROGRESS NOTES
[facility] Hospital Medicine Progress Note        Chief Complaint: Inpatient Follow-up for Colon distension     HPI:   89 y.o. White female with a past medical history of hypertension, hyperlipidemia. The patient presented to [facility] on 8/16/2022 with a primary complaint of abdominal pain and falls due to syncope.  Patient reports having a fall on 8/12 while in her garage.  She states she was grabbing for the car door handle when she missed it and fell. Fall was unwitnessed and she reports loss of consciousness. On 08/15 she had a second fall while in the kitchen but denies any loss of consciousness.  Yesterday (08/16/2022) patient was going to the bathroom when she passed out.  Episode was witnessed by neighbor who is visiting her. She reports urinary frequency. Patient denies complaints of headache, vision changes, shortness of breath, cough.  She has been experiencing lower abdominal pain for the last several days. Granddaughter at bedside states that patient is dependent on laxatives have a bowel movement.  Although she uses laxatives she often has to manually disimpact herself . She normally goes every 4-5 days with last bowel movement being 5 days ago. She has been having increased belching.  Granddaughter also states patient has little to no appetite.  Patient lives alone, ambulates with walker, drives and completes activity daily living independently.  Family as it patient to be experiencing dementia over the last 6 months.     Upon presentation to the ED, patient afebrile, hemodynamically stable and SpO2 96% on room air.  Labs notable for WBC 28, potassium 2.6, glucose 161, lactic acid 3.9.  UA with 1+ leukocyte esterase, 2+ bilirubin, positive nitrites, trace ketones and protein.  Chest x-ray without acute processes.  CT abdomen pelvis revealed mild distention of the colon with liquefied stool, indeterminate bilateral adrenal nodules and pancreatic cysts.  While in the ED  patient received 40 mEq of IV potassium chloride, 2 g of magnesium sulfate and Rocephin was started for UTI.  Patient admitted to hospital medicine services for further medical management.  Discussed discharge care planning with family present at bedside.  Patient/family is agreeable with rehabilitation and states that  [facility] has a rehab that patient has been to before. KUB has been reviewed and shows persistent distention of colon with air and contrast still present.    NG tube was placed and later removed.   Interval Hx:   Patient awake but very lethargic. Answering questions, oriented to self. Following some verbal commands. Has persistent generalized abdominal pain. Not passing any flatus. No BM today. No nausea or vomiting. On clear liquids.     Objective/physical exam:  General: In no acute distress, afebrile, frail  Chest: Clear to auscultation bilaterally  Heart: RRR, +S1, S2, no appreciable murmur  Abdomen: + Distension. No bowel sounds heard + Tenderness to palpation   MSK: Juma UE and LE edema, Juma LE tenderness       VITAL SIGNS: 24 HRS MIN & MAX LAST   Temp  Min: 97.4 °F (36.3 °C)  Max: 97.6 °F (36.4 °C) 97.6 °F (36.4 °C)   BP  Min: 77/52  Max: 142/86 (!) 106/50     Pulse  Min: 60  Max: 109  75   Resp  Min: 11  Max: 20 16   SpO2  Min: 94 %  Max: 98 % 95 %       Recent Labs   Lab 09/04/22 0445 09/05/22  0403 09/07/22  0403   WBC 6.4 8.6 6.1   RBC 3.20* 3.47* 2.85*   HGB 10.0* 10.9* 9.1*   HCT 29.8* 32.2* 29.8*   MCV 93.1 92.8 104.6*   MCH 31.3* 31.4* 31.9*   MCHC 33.6 33.9 30.5*   RDW 13.7 14.1 14.7    416* 267   MPV 10.2 10.5* 11.3*       Recent Labs   Lab 09/02/22  0432 09/03/22  1423 09/04/22  0445 09/05/22  0403 09/07/22  0403   *   < > 134 135 136   K 4.0   < > 3.3* 3.0* 3.3*   CO2 25   < > 27 24 26   BUN 18.5   < > 9.6* 11.4 12.9   CREATININE 0.46*   < > 0.49* 0.53* 0.62   CALCIUM 7.6*   < > 7.5* 7.9* 7.1*   MG  --   --  1.70 1.70 1.70   ALBUMIN 1.9*  --   --   --  1.6*    ALKPHOS 192*  --   --   --  141   ALT 46  --   --   --  26   AST 57*  --   --   --  29   BILITOT 0.4  --   --   --  0.4    < > = values in this interval not displayed.          Microbiology Results (last 7 days)       ** No results found for the last 168 hours. **             See below for Radiology    Scheduled Med:   diclofenac sodium  4 g Topical (Top) Daily    docusate sodium  50 mg Oral BID    enoxaparin  30 mg Subcutaneous Q12H    fat emulsion 20%  250 mL Intravenous Every Mon, Thurs    hydrocortisone   Rectal BID    LIDOcaine  1 patch Transdermal Q24H    metoclopramide HCl  5 mg Intravenous Q6H    metoprolol tartrate  25 mg Oral BID    multivitamin  1 tablet Oral Daily    nystatin  500,000 Units Oral QID (WM & HS)    pantoprazole  40 mg Intravenous BID    polyethylene glycol  17 g Oral BID    potassium bicarbonate  20 mEq Oral Daily    potassium chloride in water  40 mEq Intravenous Once    simethicone  1 tablet Oral QID    sodium chloride 0.9%  10 mL Intravenous Q6H        Continuous Infusions:   sodium chloride 0.9% 50 mL/hr at 09/07/22 0250        PRN Meds:  acetaminophen, albuterol-ipratropium, albuterol-ipratropium, atropine, bisacodyL, dextrose 50%, dextrose 50%, glucagon (human recombinant), glucose, glucose, HYDROcodone-acetaminophen, insulin aspart U-100, labetaloL, melatonin, metoclopramide HCl, metoprolol, morphine, ondansetron, sodium chloride 0.9%, Flushing PICC Protocol **AND** sodium chloride 0.9% **AND** sodium chloride 0.9%       Assessment/Plan:  Colonic distension   B/L UE and LE edema   Severe PCM/Hypoalbuminemia   Small pancreatic cyst   Sliding hernia -nonstrangulated   Bilateral adrenal nodules repeat follow-up CT outpatient  Essential hypertension  Diastolic dysfunction   sinus tachycardia   Poor functional status  Anasarca     Plan:  Patient looks frail and weak  She is on liquid diet, poor appetite  Her abdomen is still distended and no bowel sounds heard  She is not passing any  flatus   Will cont iv fluids   Potassium replaced   F/U by GI and surgery team   With advanced age, poor functionals status and poor nutritional status her over all prognosis is poor    Will consult palliative care team     Cont supportive care       VTE prophylaxis: Lovenox     Patient condition:  Fair     Anticipated discharge and Disposition:         All diagnosis and differential diagnosis have been reviewed; assessment and plan has been documented; I have personally reviewed the labs and test results that are presently available; I have reviewed the patients medication list; I have reviewed the consulting providers response and recommendations. I have reviewed or attempted to review medical records based upon their availability    All of the patient's questions have been  addressed and answered. Patient's is agreeable to the above stated plan. I will continue to monitor closely and make adjustments to medical management as needed.  _____________________________________________________________________    Nutrition Status:    Radiology:  X-Ray Abdomen Flat And Erect  Narrative: EXAMINATION:  XR ABDOMEN FLAT AND ERECT    CLINICAL HISTORY:  colonic distention;,    COMPARISON:  September 3, 2022    FINDINGS:  There is persistent gases distension of the abdomen with gas in loops of large bowel persistent contrast identified in the colon    . No other significant abnormality identified  Impression: Persistent gases distension of the abdomen with persistent residual contrast in the colon    Electronically signed by: Bharathi Gauthier  Date:    09/08/2022  Time:    09:29      Trav Avelar MD   09/08/2022

## 2022-09-08 NOTE — PT/OT/SLP PROGRESS
Physical Therapy Treatment    Patient Name:  Fidelina Darling   MRN:  [id number]    Recommendations:     Discharge Recommendations:  nursing facility, skilled   Discharge Equipment Recommendations: other (see comments) (pending progress)   Barriers to discharge:  impaired mobility    Assessment:     Fidelina Darling is a 89 y.o. female admitted with a medical diagnosis of Abdominal cramping, chronic colonic distension.  She presents with the following impairments/functional limitations:  impaired endurance, impaired functional mobility, gait instability, impaired balance, decreased lower extremity function, decreased safety awareness.    Rehab Prognosis: Fair; patient would benefit from acute skilled PT services to address these deficits and reach maximum level of function.    Recent Surgery: Procedure(s) (LRB):  RECOVERY CARE, AFTER ANESTHESIA OR SEDATION, FOR NON-SURGICAL PROCEDURE (N/A) Day of Surgery    Plan:     During this hospitalization, patient to be seen 5 x/week to address the identified rehab impairments via gait training, therapeutic activities, therapeutic exercises, neuromuscular re-education and progress toward the following goals:    Plan of Care Expires:  10/07/22    Subjective     Chief Complaint: none stated  Patient/Family Comments/goals: to get stronger  Pain/Comfort:  Pain Rating 1: 0/10      Objective:     Communicated with RN prior to session.  Patient found HOB elevated with peripheral IV, PureWick upon PT entry to room.     General Precautions: Standard, fall   Orthopedic Precautions:N/A   Braces: N/A  Respiratory Status: Room air     Functional Mobility:  Bed Mobility:  Scooting: maximal assistance  Supine to Sit: maximal assistance  Balance: Pt maintained static sitting balance w/ CGA, BUE support.       AM-PAC 6 CLICK MOBILITY  Turning over in bed (including adjusting bedclothes, sheets and blankets)?: 2  Sitting down on and standing up from a chair with arms (e.g., wheelchair, bedside  commode, etc.): 2  Moving from lying on back to sitting on the side of the bed?: 2  Moving to and from a bed to a chair (including a wheelchair)?: 1  Need to walk in hospital room?: 1  Climbing 3-5 steps with a railing?: 1  Basic Mobility Total Score: 9       Therapeutic Activities and Exercises:   Patient tolerated PT treatment fairly, required maxA for supine to sit at EOB. Pt required maxA for anterior and lateral scooting to EOB to get feet on floor. Patient maintained static sitting balance w/ CGA for approx. 5min to perform ADLs with CARO. Therapy session cut short as patient transport tech present to take patient off floor. Pt required maxA for sit to supine and positioning in bed.     Patient left HOB elevated with all lines intact, call button in reach, and transport tech present.    GOALS:   Multidisciplinary Problems       Physical Therapy Goals          Problem: Physical Therapy    Goal Priority Disciplines Outcome Goal Variances Interventions   Physical Therapy Goal     PT, PT/OT Ongoing, Progressing     Description: Goals to be met by: 22     Patient will increase functional independence with mobility by performin. Supine to sit with MInimal Assistance  2. Sit to supine with MInimal Assistance  3. Sit to stand transfer with Minimal Assistance  4. Bed to chair transfer with Minimal Assistance using Rolling Walker  5. Sitting at edge of bed x10 minutes with Stand-by Assistance                         Time Tracking:     PT Received On: 22  PT Start Time: 1037     PT Stop Time: 1047  PT Total Time (min): 10 min     Billable Minutes: Therapeutic Activity 10min    Treatment Type: Treatment  PT/PTA: PT     PTA Visit Number: 2     2022

## 2022-09-09 ENCOUNTER — ANESTHESIA (OUTPATIENT)
Dept: ENDOSCOPY | Facility: HOSPITAL | Age: 87
DRG: 392 | End: 2022-09-09
Payer: MEDICARE

## 2022-09-09 ENCOUNTER — ANESTHESIA EVENT (OUTPATIENT)
Dept: ENDOSCOPY | Facility: HOSPITAL | Age: 87
DRG: 392 | End: 2022-09-09
Payer: MEDICARE

## 2022-09-09 LAB
ANION GAP SERPL CALC-SCNC: 6 MEQ/L
BUN SERPL-MCNC: 14.3 MG/DL (ref 9.8–20.1)
CALCIUM SERPL-MCNC: 7.2 MG/DL (ref 8.4–10.2)
CHLORIDE SERPL-SCNC: 102 MMOL/L (ref 98–111)
CO2 SERPL-SCNC: 28 MMOL/L (ref 23–31)
CREAT SERPL-MCNC: 0.58 MG/DL (ref 0.55–1.02)
CREAT/UREA NIT SERPL: 25
GFR SERPLBLD CREATININE-BSD FMLA CKD-EPI: >60 MLS/MIN/1.73/M2
GLUCOSE SERPL-MCNC: 83 MG/DL (ref 75–121)
MAGNESIUM SERPL-MCNC: 1.7 MG/DL (ref 1.6–2.6)
POCT GLUCOSE: 85 MG/DL (ref 70–110)
POTASSIUM SERPL-SCNC: 3.9 MMOL/L (ref 3.5–5.1)
SODIUM SERPL-SCNC: 136 MMOL/L (ref 132–146)

## 2022-09-09 PROCEDURE — 25000003 PHARM REV CODE 250

## 2022-09-09 PROCEDURE — 25000003 PHARM REV CODE 250: Performed by: NURSE ANESTHETIST, CERTIFIED REGISTERED

## 2022-09-09 PROCEDURE — 93010 ELECTROCARDIOGRAM REPORT: CPT | Mod: ,,, | Performed by: INTERNAL MEDICINE

## 2022-09-09 PROCEDURE — 97530 THERAPEUTIC ACTIVITIES: CPT

## 2022-09-09 PROCEDURE — 93010 EKG 12-LEAD: ICD-10-PCS | Mod: ,,, | Performed by: INTERNAL MEDICINE

## 2022-09-09 PROCEDURE — 45337 SIGMOIDOSCOPY & DECOMPRESS: CPT | Performed by: STUDENT IN AN ORGANIZED HEALTH CARE EDUCATION/TRAINING PROGRAM

## 2022-09-09 PROCEDURE — 99233 SBSQ HOSP IP/OBS HIGH 50: CPT | Mod: ,,, | Performed by: NURSE PRACTITIONER

## 2022-09-09 PROCEDURE — 83735 ASSAY OF MAGNESIUM: CPT | Performed by: INTERNAL MEDICINE

## 2022-09-09 PROCEDURE — 99497 ADVNCD CARE PLAN 30 MIN: CPT | Mod: ,,, | Performed by: NURSE PRACTITIONER

## 2022-09-09 PROCEDURE — 63600175 PHARM REV CODE 636 W HCPCS: Performed by: INTERNAL MEDICINE

## 2022-09-09 PROCEDURE — 25000003 PHARM REV CODE 250: Performed by: NURSE PRACTITIONER

## 2022-09-09 PROCEDURE — 99233 PR SUBSEQUENT HOSPITAL CARE,LEVL III: ICD-10-PCS | Mod: ,,, | Performed by: NURSE PRACTITIONER

## 2022-09-09 PROCEDURE — 80048 BASIC METABOLIC PNL TOTAL CA: CPT | Performed by: INTERNAL MEDICINE

## 2022-09-09 PROCEDURE — 25000003 PHARM REV CODE 250: Performed by: INTERNAL MEDICINE

## 2022-09-09 PROCEDURE — 37000009 HC ANESTHESIA EA ADD 15 MINS: Performed by: STUDENT IN AN ORGANIZED HEALTH CARE EDUCATION/TRAINING PROGRAM

## 2022-09-09 PROCEDURE — 25000003 PHARM REV CODE 250: Performed by: PHYSICIAN ASSISTANT

## 2022-09-09 PROCEDURE — 93005 ELECTROCARDIOGRAM TRACING: CPT

## 2022-09-09 PROCEDURE — 99497 PR ADVNCD CARE PLAN 30 MIN: ICD-10-PCS | Mod: ,,, | Performed by: NURSE PRACTITIONER

## 2022-09-09 PROCEDURE — 63600175 PHARM REV CODE 636 W HCPCS: Performed by: NURSE ANESTHETIST, CERTIFIED REGISTERED

## 2022-09-09 PROCEDURE — 63600175 PHARM REV CODE 636 W HCPCS: Performed by: NURSE PRACTITIONER

## 2022-09-09 PROCEDURE — 37000008 HC ANESTHESIA 1ST 15 MINUTES: Performed by: STUDENT IN AN ORGANIZED HEALTH CARE EDUCATION/TRAINING PROGRAM

## 2022-09-09 PROCEDURE — 36415 COLL VENOUS BLD VENIPUNCTURE: CPT | Performed by: INTERNAL MEDICINE

## 2022-09-09 PROCEDURE — 21400001 HC TELEMETRY ROOM

## 2022-09-09 PROCEDURE — C9113 INJ PANTOPRAZOLE SODIUM, VIA: HCPCS | Performed by: INTERNAL MEDICINE

## 2022-09-09 RX ORDER — ENOXAPARIN SODIUM 100 MG/ML
30 INJECTION SUBCUTANEOUS EVERY 24 HOURS
Status: DISCONTINUED | OUTPATIENT
Start: 2022-09-10 | End: 2022-09-11

## 2022-09-09 RX ORDER — LIDOCAINE HYDROCHLORIDE 10 MG/ML
1 INJECTION, SOLUTION EPIDURAL; INFILTRATION; INTRACAUDAL; PERINEURAL ONCE
Status: CANCELLED | OUTPATIENT
Start: 2022-09-09 | End: 2022-09-09

## 2022-09-09 RX ORDER — LIDOCAINE HYDROCHLORIDE 20 MG/ML
INJECTION, SOLUTION EPIDURAL; INFILTRATION; INTRACAUDAL; PERINEURAL
Status: DISPENSED
Start: 2022-09-09 | End: 2022-09-10

## 2022-09-09 RX ORDER — PHENYLEPHRINE HCL IN 0.9% NACL 1 MG/10 ML
SYRINGE (ML) INTRAVENOUS
Status: DISCONTINUED | OUTPATIENT
Start: 2022-09-09 | End: 2022-09-09

## 2022-09-09 RX ORDER — PROPOFOL 10 MG/ML
INJECTION, EMULSION INTRAVENOUS
Status: DISCONTINUED | OUTPATIENT
Start: 2022-09-09 | End: 2022-09-09

## 2022-09-09 RX ORDER — BISACODYL 10 MG
10 SUPPOSITORY, RECTAL RECTAL 2 TIMES DAILY
Status: DISCONTINUED | OUTPATIENT
Start: 2022-09-09 | End: 2022-09-13 | Stop reason: HOSPADM

## 2022-09-09 RX ORDER — POLYETHYLENE GLYCOL 3350, SODIUM SULFATE ANHYDROUS, SODIUM BICARBONATE, SODIUM CHLORIDE, POTASSIUM CHLORIDE 236; 22.74; 6.74; 5.86; 2.97 G/4L; G/4L; G/4L; G/4L; G/4L
4000 POWDER, FOR SOLUTION ORAL ONCE
Status: COMPLETED | OUTPATIENT
Start: 2022-09-09 | End: 2022-09-09

## 2022-09-09 RX ORDER — LIDOCAINE HCL/PF 100 MG/5ML
SYRINGE (ML) INTRAVENOUS
Status: DISCONTINUED | OUTPATIENT
Start: 2022-09-09 | End: 2022-09-09

## 2022-09-09 RX ORDER — PANTOPRAZOLE SODIUM 40 MG/10ML
40 INJECTION, POWDER, LYOPHILIZED, FOR SOLUTION INTRAVENOUS DAILY
Status: DISCONTINUED | OUTPATIENT
Start: 2022-09-10 | End: 2022-09-13 | Stop reason: HOSPADM

## 2022-09-09 RX ORDER — PROPOFOL 10 MG/ML
VIAL (ML) INTRAVENOUS
Status: COMPLETED
Start: 2022-09-09 | End: 2022-09-09

## 2022-09-09 RX ORDER — SODIUM CHLORIDE, SODIUM GLUCONATE, SODIUM ACETATE, POTASSIUM CHLORIDE AND MAGNESIUM CHLORIDE 30; 37; 368; 526; 502 MG/100ML; MG/100ML; MG/100ML; MG/100ML; MG/100ML
1000 INJECTION, SOLUTION INTRAVENOUS CONTINUOUS
Status: CANCELLED | OUTPATIENT
Start: 2022-09-09 | End: 2022-10-09

## 2022-09-09 RX ADMIN — PANTOPRAZOLE SODIUM 40 MG: 40 INJECTION, POWDER, FOR SOLUTION INTRAVENOUS at 10:09

## 2022-09-09 RX ADMIN — RETINOL, ERGOCALCIFEROL, .ALPHA.-TOCOPHEROL ACETATE, DL-, PHYTONADIONE, ASCORBIC ACID, NIACINAMIDE, RIBOFLAVIN 5-PHOSPHATE SODIUM, THIAMINE HYDROCHLORIDE, PYRIDOXINE HYDROCHLORIDE, DEXPANTHENOL, BIOTIN, FOLIC ACID, AND CYANOCOBALAMIN: KIT at 10:09

## 2022-09-09 RX ADMIN — DOCUSATE SODIUM 50 MG: 50 CAPSULE, LIQUID FILLED ORAL at 09:09

## 2022-09-09 RX ADMIN — METOPROLOL TARTRATE 25 MG: 25 TABLET, FILM COATED ORAL at 10:09

## 2022-09-09 RX ADMIN — PROPOFOL 20 MG: 10 INJECTION, EMULSION INTRAVENOUS at 01:09

## 2022-09-09 RX ADMIN — METOCLOPRAMIDE 5 MG: 5 INJECTION, SOLUTION INTRAMUSCULAR; INTRAVENOUS at 02:09

## 2022-09-09 RX ADMIN — SODIUM CHLORIDE, SODIUM GLUCONATE, SODIUM ACETATE, POTASSIUM CHLORIDE AND MAGNESIUM CHLORIDE: 526; 502; 368; 37; 30 INJECTION, SOLUTION INTRAVENOUS at 01:09

## 2022-09-09 RX ADMIN — METOCLOPRAMIDE 5 MG: 5 INJECTION, SOLUTION INTRAMUSCULAR; INTRAVENOUS at 09:09

## 2022-09-09 RX ADMIN — METOCLOPRAMIDE 5 MG: 5 INJECTION, SOLUTION INTRAMUSCULAR; INTRAVENOUS at 08:09

## 2022-09-09 RX ADMIN — HYDROCORTISONE 2.5%: 25 CREAM TOPICAL at 09:09

## 2022-09-09 RX ADMIN — ENOXAPARIN SODIUM 30 MG: 30 INJECTION SUBCUTANEOUS at 09:09

## 2022-09-09 RX ADMIN — BISACODYL 10 MG: 10 SUPPOSITORY RECTAL at 09:09

## 2022-09-09 RX ADMIN — PROPOFOL 30 MG: 10 INJECTION, EMULSION INTRAVENOUS at 01:09

## 2022-09-09 RX ADMIN — NYSTATIN 500000 UNITS: 100000 SUSPENSION ORAL at 09:09

## 2022-09-09 RX ADMIN — METOPROLOL TARTRATE 25 MG: 25 TABLET, FILM COATED ORAL at 09:09

## 2022-09-09 RX ADMIN — SIMETHICONE 80 MG: 80 TABLET, CHEWABLE ORAL at 09:09

## 2022-09-09 RX ADMIN — POLYETHYLENE GLYCOL 3350, SODIUM SULFATE ANHYDROUS, SODIUM BICARBONATE, SODIUM CHLORIDE, POTASSIUM CHLORIDE 4000 ML: 236; 22.74; 6.74; 5.86; 2.97 POWDER, FOR SOLUTION ORAL at 10:09

## 2022-09-09 RX ADMIN — LIDOCAINE HYDROCHLORIDE 100 MG: 20 INJECTION, SOLUTION INTRAVENOUS at 01:09

## 2022-09-09 RX ADMIN — METOCLOPRAMIDE 5 MG: 5 INJECTION, SOLUTION INTRAMUSCULAR; INTRAVENOUS at 10:09

## 2022-09-09 RX ADMIN — Medication 100 MCG: at 02:09

## 2022-09-09 RX ADMIN — POLYETHYLENE GLYCOL 3350 17 G: 17 POWDER, FOR SOLUTION ORAL at 09:09

## 2022-09-09 RX ADMIN — DOCUSATE SODIUM 50 MG: 50 CAPSULE, LIQUID FILLED ORAL at 10:09

## 2022-09-09 RX ADMIN — THERA TABS 1 TABLET: TAB at 09:09

## 2022-09-09 NOTE — PROVATION PATIENT INSTRUCTIONS
Discharge Summary/Instructions after an Endoscopic Procedure  Patient Name: Fidelina Darling  Patient MRN: [id number]  Patient YOB: 1932 Friday, September 9, 2022  Silver Padilla MD  Dear patient,  As a result of recent federal legislation (The Federal Cures Act), you may   receive lab or pathology results from your procedure in your [facility]   account before your physician is able to contact you. Your physician or   their representative will relay the results to you with their   recommendations at their soonest availability.  Thank you,  RESTRICTIONS:  During your procedure today, you received medications for sedation.  These   medications may affect your judgment, balance and coordination.  Therefore,   for 24 hours, you have the following restrictions:   - DO NOT drive a car, operate machinery, make legal/financial decisions,   sign important papers or drink alcohol.    ACTIVITY:  Today: no heavy lifting, straining or running due to procedural   sedation/anesthesia.  The following day: return to full activity including work.  DIET:  Eat and drink normally unless instructed otherwise.     TREATMENT FOR COMMON SIDE EFFECTS:  - Mild abdominal pain, nausea, belching, bloating or excessive gas:  rest,   eat lightly and use a heating pad.  - Sore Throat: treat with throat lozenges and/or gargle with warm salt   water.  - Because air was used during the procedure, expelling large amounts of air   from your rectum or belching is normal.  - If a bowel prep was taken, you may not have a bowel movement for 1-3 days.    This is normal.  SYMPTOMS TO WATCH FOR AND REPORT TO YOUR PHYSICIAN:  1. Abdominal pain or bloating, other than gas cramps.  2. Chest pain.  3. Back pain.  4. Signs of infection such as: chills or fever occurring within 24 hours   after the procedure.  5. Rectal bleeding, which would show as bright red, maroon, or black stools.   (A tablespoon of blood from the rectum is not serious, especially  if   hemorrhoids are present.)  6. Vomiting.  7. Weakness or dizziness.  GO DIRECTLY TO THE NEAREST EMERGENCY ROOM IF YOU HAVE ANY OF THE FOLLOWING:      Difficulty breathing              Chills and/or fever over 101 F   Persistent vomiting and/or vomiting blood   Severe abdominal pain   Severe chest pain   Black, tarry stools   Bleeding- more than one tablespoon   Any other symptom or condition that you feel may need urgent attention  Your doctor recommends these additional instructions:  If any biopsies were taken, your doctors clinic will contact you in 1 to 2   weeks with any results.  - Return patient to hospital [facility] for observation.   - Clear liquid diet.   - Continue present medications.   - - Start large volume bowel preparation to continue to encourage   defecation  For questions, problems or results please call your physician - Silver Padilla MD at Work:  [phone].  [facility], EMERGENCY ROOM PHONE NUMBER: [phone]  IF A COMPLICATION OR EMERGENCY SITUATION ARISES AND YOU ARE UNABLE TO REACH   YOUR PHYSICIAN - GO DIRECTLY TO THE EMERGENCY ROOM.  MD Silver Roe MD  9/9/2022 2:17:47 PM  This report has been verified and signed electronically.  Dear patient,  As a result of recent federal legislation (The Federal Cures Act), you may   receive lab or pathology results from your procedure in your [facility]   account before your physician is able to contact you. Your physician or   their representative will relay the results to you with their   recommendations at their soonest availability.  Thank you,  PROVATION

## 2022-09-09 NOTE — ANESTHESIA POSTPROCEDURE EVALUATION
Anesthesia Post Evaluation    Patient: Fidelina Darling    Procedure(s) Performed: Procedure(s) (LRB):  SIGMOIDOSCOPY, FLEXIBLE (N/A)    Final Anesthesia Type: general      Patient location during evaluation: PACU  Patient participation: Yes- Able to Participate  Level of consciousness: awake and alert  Post-procedure vital signs: reviewed and stable  Pain management: adequate  Airway patency: patent    PONV status at discharge: No PONV, nausea (controlled) and vomiting (controlled)  Anesthetic complications: no      Cardiovascular status: blood pressure returned to baseline and stable  Respiratory status: unassisted  Hydration status: euvolemic  Follow-up not needed.          Vitals Value Taken Time   /72 09/09/22 1439   Temp 37 09/09/22 1451   Pulse 66 09/09/22 1440   Resp 17 09/09/22 1440   SpO2 96 % 09/09/22 1440   Vitals shown include unvalidated device data.      No case tracking events are documented in the log.      Pain/Jesse Score: Pain Rating Prior to Med Admin: 6 (9/8/2022  9:07 PM)  Pain Rating Post Med Admin: 0 (9/8/2022 10:07 PM)  Jesse Score: 10 (9/9/2022  2:32 PM)

## 2022-09-09 NOTE — CONSULTS
Consults  Patient Name: Fidelina Darling   MRN: [id number]   Admission Date: 8/16/2022   Hospital Length of Stay: 23   Attending Provider: Alex Whitfield MD   Consulting Provider: Danyell MENDOZA  Reason for Consult: Goals of Care  Primary Care Physician:  Primary Doctor No     Principal Problem: Abdominal cramping       Final diagnoses:  [R10.9] Abdominal cramping (Primary)  [K59.00] Constipation, unspecified constipation type  [R10.9] Abdominal pain, unspecified abdominal location  [N39.0, R31.9] Urinary tract infection with hematuria, site unspecified  [D72.829] Leukocytosis, unspecified type  [K86.9] Pancreatic lesion  [E87.6] Hypokalemia  [E87.2] Lactic acidosis      Assessment/Plan:     I reviewed the patient and family's understanding of the seriousness of the illness and its expected prognosis. We discussed the patient's goals of care and treatment preferences.        Met with patient's granddaughter Christiane at bedside.  Discussed course of hospitalization events and that medical team has tried every class of laxative and stimulant in an effort to alleviate colonic distention.  Discussed there may be few other options for treatment and surgery is not recommending procedure. Granddaughter related that patient was slowly depending on walker more often.  Discussed that this is typical and is a good measure for safety but also a sign of decline of mobility.  Family states that patient does not have specific reason for her lack of mobility.  Discussed that she does not have a terminal disease but that she has multiple reasons for her debility: age, prior orthopedic problems and surgery, poor appetite, all of which are not under her control.     Recommended family meeting with brothers and other involved family on phone so that myself and hospitalist and possibly GI could give update and options for plan depending on whether patient improves or not. Granddaughter states that patient has been to SNF and rehab  before. Christiane states that her brother may be at hospital Saturday and may be present for hospitalist rounds.      Daughter Ayana arrived and gave update on prior conversation.   Christiane asking about medication regimen after colon prep--discussed that if treatment is successful, then patient will need to maintain proper colon function which will be a challenge considering the number of medications that have been utilized with no result.  Daughter stressing to patient the importance of therapy and movement.  Discussed that bedbound patients are able to have regular bowel movements with the correct regimen. Discussed current plan and discussed possible family meeting to discuss plan of care.         Interval History:   Patient underwent another decompression per GI.  Surgery evaluated patient yesterday and did not recommend surgical procedure.  I am continuing to follow for assistance with plan of care.         Active Ambulatory Problems     Diagnosis Date Noted    No Active Ambulatory Problems     Resolved Ambulatory Problems     Diagnosis Date Noted    No Resolved Ambulatory Problems     Past Medical History:   Diagnosis Date    Hypertension     Mixed hyperlipidemia     Syncope         Past Surgical History:   Procedure Laterality Date    FLEXIBLE SIGMOIDOSCOPY N/A 8/31/2022    Procedure: SIGMOIDOSCOPY;  Surgeon: Torrey Garcia MD;  Location: [facility] ENDOSCOPY;  Service: Gastroenterology;  Laterality: N/A;        Review of patient's allergies indicates:  No Known Allergies       Current Facility-Administered Medications:     0.9%  NaCl infusion, , Intravenous, Continuous, Sherri Colorado, DO, Last Rate: 50 mL/hr at 09/07/22 0250, New Bag at 09/07/22 0250    acetaminophen tablet 650 mg, 650 mg, Oral, Q8H PRN, Umer Bynum MD    albuterol-ipratropium 2.5 mg-0.5 mg/3 mL nebulizer solution 3 mL, 3 mL, Nebulization, Q4H PRN, Taty Petit [facility], 3 mL at 08/29/22 0901    albuterol-ipratropium 2.5  mg-0.5 mg/3 mL nebulizer solution 3 mL, 3 mL, Nebulization, Q6H PRN, Trav Avelar MD    atropine injection 1 mg, 1 mg, Intravenous, PRN, Bill Ortega, REJI    bisacodyL suppository 10 mg, 10 mg, Rectal, Daily PRN, Rodriguez Raymundo MD    bisacodyL suppository 10 mg, 10 mg, Rectal, BID, Kim Nevarez, LEXIS    dextrose 50% injection 12.5 g, 12.5 g, Intravenous, PRN, Neelima Duran MD    dextrose 50% injection 25 g, 25 g, Intravenous, PRN, Neelima Duran MD    diclofenac sodium 1 % gel 4 g, 4 g, Topical (Top), Daily, Yinka Cabral MD, 4 g at 09/08/22 1025    docusate sodium capsule 50 mg, 50 mg, Oral, BID, Rodriguez Raymundo MD, 50 mg at 09/09/22 0949    [START ON 9/10/2022] enoxaparin injection 30 mg, 30 mg, Subcutaneous, Daily, Trav Avelar MD    glucagon (human recombinant) injection 1 mg, 1 mg, Intramuscular, PRN, Neelima Duran MD    glucose chewable tablet 16 g, 16 g, Oral, PRN, Neelima Duran MD, 16 g at 09/06/22 0531    glucose chewable tablet 24 g, 24 g, Oral, PRN, Neelima Duran MD    HYDROcodone-acetaminophen 5-325 mg per tablet 1 tablet, 1 tablet, Oral, Q6H PRN, Neelima Duran MD, 1 tablet at 09/08/22 2107    hydrocortisone 2.5 % rectal cream, , Rectal, BID, Nils Tabor MD, Given at 09/08/22 2108    insulin aspart U-100 injection 0-5 Units, 0-5 Units, Subcutaneous, QID (AC + HS) PRN, Neelima Duran MD    labetaloL injection 20 mg, 20 mg, Intravenous, Q6H PRN, Yinka Cabral MD    LIDOcaine (PF) 20 mg/mL (2%) 20 mg/mL (2 %) injection, , , ,     LIDOcaine 5 % patch 1 patch, 1 patch, Transdermal, Q24H, Yinka Cabral MD, 1 patch at 09/08/22 1655    melatonin tablet 6 mg, 6 mg, Oral, Nightly PRN, Umer Bynum MD, 6 mg at 09/02/22 2045    metoclopramide HCl injection 5 mg, 5 mg, Intravenous, Q6H PRN, Yinka Cabral MD, 5 mg at 09/08/22 2107    metoclopramide HCl injection 5 mg, 5 mg, Intravenous, Q6H, LEXIS Goldstein, 5 mg at 09/09/22 0949    metoprolol  "injection 5 mg, 5 mg, Intravenous, Q5 Min PRN, Yinka Cabral MD    metoprolol tartrate (LOPRESSOR) tablet 25 mg, 25 mg, Oral, BID, Yinka Cabral MD, 25 mg at 09/09/22 0949    morphine injection 2 mg, 2 mg, Intravenous, Q2H PRN, REJI Amaro, 2 mg at 09/07/22 1318    multivitamin tablet, 1 tablet, Oral, Daily, LEXIS Goldstein, 1 tablet at 09/09/22 0949    naloxegoL (MOVANTIK) tablet 25 mg, 25 mg, Oral, Daily, LEXIS Goldstein    ondansetron injection 4 mg, 4 mg, Intravenous, Q6H PRN, Yinka Cabral MD, 4 mg at 09/03/22 2319    [START ON 9/10/2022] pantoprazole injection 40 mg, 40 mg, Intravenous, Daily, Trav Avelar MD    polyethylene glycol (GoLYTELY) solution, 4,000 mL, Oral, Once, MAGALY Guzman    simethicone chewable tablet 80 mg, 1 tablet, Oral, QID, Teresa Pruitt MD, 80 mg at 09/09/22 0949    sodium chloride 0.9% flush 10 mL, 10 mL, Intravenous, PRN, Umer Bynum MD    Flushing PICC Protocol, , , Until Discontinued **AND** sodium chloride 0.9% flush 10 mL, 10 mL, Intravenous, Q6H, 10 mL at 09/08/22 1736 **AND** sodium chloride 0.9% flush 10 mL, 10 mL, Intravenous, PRN, Yinka Cabral MD     acetaminophen, albuterol-ipratropium, albuterol-ipratropium, atropine, bisacodyL, dextrose 50%, dextrose 50%, glucagon (human recombinant), glucose, glucose, HYDROcodone-acetaminophen, insulin aspart U-100, labetaloL, melatonin, metoclopramide HCl, metoprolol, morphine, ondansetron, sodium chloride 0.9%, Flushing PICC Protocol **AND** sodium chloride 0.9% **AND** sodium chloride 0.9%     History reviewed. No pertinent family history.       Review of Systems   Constitutional:  Positive for fatigue.   Musculoskeletal:  Positive for arthralgias.          Objective:   /72 (BP Location: Left arm, Patient Position: Lying)   Pulse 77   Temp 97.3 °F (36.3 °C) (Tympanic)   Resp 17   Ht 5' 7.99" (1.727 m)   Wt 83 kg (183 lb)   SpO2 96%   BMI 27.83 kg/m²      Physical Exam "   Constitutional: She appears ill.   HENT:   Head: Normocephalic.   Eyes: Pupils are equal, round, and reactive to light.   Cardiovascular: Normal rate and regular rhythm. Pulmonary:      Effort: Pulmonary effort is normal.     Abdominal: She exhibits distension.   Musculoskeletal:      Comments: sarcopenic   Neurological:   To person and place   Skin: There is pallor.        Review of Symptoms  Review of Symptoms      Symptom Assessment (ESAS 0-10 Scale)  Pain:  0  Dyspnea:  0  Anxiety:  0  Nausea:  0  Depression:  0  Anorexia:  0  Fatigue:  0  Insomnia:  0  Restlessness:  0  Agitation:  0         Performance Status:  30    Advance Care Planning   Advance Care Planning        PAINAD: NA    Caregiver burden formerly assessed: yes      No results displayed because visit has over 200 results.               > 50% of 35 min of encounter was spent in chart review, face to face discussion of goals of care, symptom assessment, coordination of care and emotional support.     40 minutes in ACP discussion    Danyell CHURCHP, [facility]  Palliative Medicine  [facility]

## 2022-09-09 NOTE — ANESTHESIA PREPROCEDURE EVALUATION
"                                                                                                             09/09/2022  Fidelina Darling is a 89 y.o., female.  Presents with   Assessment/Plan:  1. Persistent Abdominal distention, having small volume stool               NGT clamped              Low dose IV reglan every 6 hours              One dose of Senna per NG              Small bowel follow through showing normal small bowel transit time (8/25)  Most recent  KUB (9/3)shows contrast in colon and continued colonic dilation              Flex Sig 8/31 w/ decompression- showing mild dilation of colon, no signs of pseudo obstruction  Continue to stress importance of mobilization in order to encourage Bms  Surgery has been reconsulted for recommendations: Neostigmine ordered.  No indication for endoscopic decompression (tried this last week.)  Mobilization.        2. Elevated LFTS- RESOLVED                        US with no significant findings.      3. Malnutrition due to lack of intake    Diagnosis:  Abdominal cramping      She comes to [facility] for then noted procedure under GA/TIVA w/ IV Propofol.  Procedure:   SIGMOIDOSCOPY, FLEXIBLE (Abdomen)      PMHx:  Other Medical History   Hypertension Syncope   Mixed hyperlipidemia      Surgical History/PSHx:  FLEXIBLE SIGMOIDOSCOPY         PSHx:  Surgical History    FLEXIBLE SIGMOIDOSCOPY         Lab Data:      Echo:        EKG:(09/09/22)  NSR. Left Bundle Branch Block. Rt: 76          Vital signs:  Pre Vitals  Current as of 09/09/22 1227  BP: 126/63 Pulse: 71   Resp: 16 SpO2: 93   Temp: 36.3 °C (97.3 °F)   Height: 5' 7.99" (1.727 m) (08/17/22) Weight: 83 kg (183 lb) (09/03/22)   BMI: 27.83 IBW: 63.9 kg (140 lb 13.3 oz)   Last edited 09/09/22 1212 by AD      Pre-op Assessment    I have reviewed the Patient Summary Reports.     I have reviewed the Nursing Notes. I have reviewed the NPO Status.   I have reviewed the Medications.     Review of Systems  Anesthesia " Hx:  No problems with previous Anesthesia    Social:  Non-Smoker    Hematology/Oncology:  Hematology Normal   Oncology Normal     EENT/Dental:EENT/Dental Normal   Cardiovascular:  Cardiovascular Normal Exercise tolerance: good   Functional Capacity good / => 4 METS    Pulmonary:  Pulmonary Normal    Renal/:  Renal/ Normal     Hepatic/GI:  Hepatic/GI Normal    Musculoskeletal:  Musculoskeletal Normal    Neurological:  Neurology Normal    Endocrine:  Endocrine Normal    Dermatological:  Skin Normal    Psych:  Psychiatric Normal           Physical Exam  General: Alert, Oriented, Well nourished and Cooperative    Airway:  Mallampati: II   Mouth Opening: Normal  TM Distance: Normal  Tongue: Normal  Neck ROM: Normal ROM    Dental:  Intact    Chest/Lungs:  Clear to auscultation, Normal Respiratory Rate    Heart:  Rate: Normal  Rhythm: Regular Rhythm        Anesthesia Plan  Type of Anesthesia, risks & benefits discussed:    Anesthesia Type: Gen Natural Airway  Intra-op Monitoring Plan: Standard ASA Monitors  Induction:  IV  Informed Consent: Informed consent signed with the Patient and all parties understand the risks and agree with anesthesia plan.  All questions answered.   ASA Score: 3  Day of Surgery Review of History & Physical: H&P Update referred to the surgeon/provider.    Ready For Surgery From Anesthesia Perspective.     .

## 2022-09-09 NOTE — PROGRESS NOTES
Inpatient Nutrition Assessment    Admit Date: 8/16/2022   Length of Stay: 23 days  Nutrition Recommendation/Prescription     - Once medically appropriate, resume oral diet as tolerated. Assist with meals as needed.     - noted palliative care consulted    - resume PN (Clinimix 4.25/5% @ 75ml/hr) until able to tolerate oral diet; if if aggressive care desired, consider starting TPN for nutrition; rec: Central Line Clinimix 5/25% @ 75ml/hr + IL 20% 250ml twice a week    - pt would need central line placed if TPN to start    - Will order lipids twice per week for an additional 1000 kcal per week and to prevent fatty acid deficiency.    Communication of Recommendations: reviewed with nurse    Nutrition Assessment     Malnutrition Assessment/Nutrition-Focused Physical Exam    Malnutrition in the context of chronic illness  Degree of Malnutrition: non-severe (moderate) malnutrition  Energy Intake: < 75% of estimated energy requirement for >/= 1 month  Interpretation of Weight Loss: unable to obtain  Body Fat: mild depletion  Area of Body Fat Loss: orbital region  and upper arm region - triceps / biceps  Muscle Mass Loss: mild depletion  Area of Muscle Mass Loss: temple region - temporalis muscle, clavicle bone region - pectoralis major, deltoid, trapezius muscles and dorsal hand - interosseous musle  Fluid Accumulation: mild  Edema: 2+ edema - mild and does not meet criteria  Reduced  Strength: unable to obtain  A minimum of two characteristics is recommended for diagnosis of either severe or non-severe malnutrition.    Chart Review    Reason Seen: follow-up    Diagnosis:  Colonic distension   Dyspepsia  Transaminitis, improving   Hypokalemia  Abdominal distention secondary to above   Small pancreatic cyst   Sliding hernia   Bilateral adrenal nodules repeat follow-up CT outpatient  Essential hypertension  Diastolic dysfunction   sinus tachycardia    hypokalemia    Relevant Medical History: HTN,  HLD    Nutrition-Related Medications: SSI, Reglan, MVI  Calorie Containing IV Medications:  IL 20% twice a week    Nutrition-Related Labs:  8/19: K 3.1, Glu 119, GFR>60  8/24: K 2.9, BUN 23.7, glu 149, Ca 7.8, AlkPhos 327, ,   8/29: BUN 20.6, Crea 0.45, Ca 7.8, AlkPhos 208  9/2: Na 129, Glu 107, GFR>60  9/6: no updated labs  9/9: Ca 7.2; rest of BMP wnl    Diet/PN Order: Diet clear liquid  Oral Supplement Order: none at this time  Tube Feeding Order: none at this time  Appetite/Oral Intake: poor/0-25% of meals  Factors Affecting Nutritional Intake: impaired cognitive status/motor control, clear liquid diet, constipation, and decreased appetite  Food/Shinto/Cultural Preferences: none reported    Skin Integrity: bruised (ecchymotic)  Wound(s):   none documented    Comments    8/19: Pt asleep during rounds; per pt's granddaughter, pt's appetite and wt has decreased over the last few years, more noticeably in the last 6 months. Granddaughter unsure of what weight was 6 months ago but states that the patient weighed ~175 lbs several years ago. Pt was on an appetite stimulant a few months ago which actually seemed to help but did not continue the regimen 2/2 price. Will send an oral supplement.     8/24: pt currently NPO on PPN for ileus with NG for suction; GI following    8/26: pt remains NPO on PPN with NG LIWS; nurse putting new IV due to current one malfunctioning; still with abdominal pain, distended abdomen, absent bowel sounds, contrast from SBFT on 8/24 still in colon per KUB; small amount of stool today    8/30: Pt still having abdominal pain, NG but suction off, NPO with PPN; palliative care consulted    9/2: Pt's diet recently advanced to clears- NG tube clamped. Noted PPN was hanging, but not running during rounds.     9/6: pt was refusing any intake of clear liquids, currently NPO again since yesterday, will continue lipids; currently receiving IVF; PPN d/c'd; would rec starting PN if  "aggressive care desired to prevent malnutrition; NG tube still in, liquid stools noted    9/9: PPN not ordered, intralipid given twice a week; currently on CL diet, abdomen still distended, no BM or flatus, NPO today for GI procedure (decompressive flex sig), MD plans to continue bowel regimen; out of room during rounds    Anthropometrics    Height: 5' 7.99" (172.7 cm) Height Method: Stated  Weight: 83 kg (183 lb) (09/03/22 0347) Weight Method: Bed Scale  BMI (Calculated): 27.8  BMI Classification: normal (BMI 18.5-24.9)  Ideal Body Weight (IBW), Female: 139.95 lb  % Ideal Body Weight, Female (lb): 96.41 %                       Usual Weight Provided By: not applicable    Wt Readings from Last 5 Encounters:   09/03/22 83 kg (183 lb)   01/15/19 64.9 kg (142 lb 15.9 oz)     Weight Change(s) Since Admission:  Admit Weight: 61.2 kg (135 lb) (08/16/22 1901)    Estimated Needs    Weight Used For Calorie Calculations: 61.2 kg (134 lb 14.7 oz)  Energy Calorie Requirements (kcal): 1512 kcal (MSJ x 1.4 SF)  Energy Need Method: Wadley-St Jeor  Weight Used For Protein Calculations: 61.2 kg (134 lb 14.7 oz)  Protein Requirements: 80 gm (1.3g/kg)  Fluid Requirements (mL): 1836 mL (30mL/kg)  Temp: 97.3 °F (36.3 °C)       Enteral Nutrition    Patient not receiving enteral nutrition at this time.    Parenteral Nutrition    Standard Formula: Clinimix E 4.25/5  Custom Formula: not applicable  Additives: none  Rate/Volume: 75ml/lhr  Lipids: none  Total Nutrition Provided by Parenteral Nutrition:  Calories Provided  612 kcal/d, 40% needs   Protein Provided  77 g/d, 126% needs   Dextrose Provided  90 g/d,    Fluid Provided  1800 ml/d, 100% needs   9/9: not currently running    Evaluation of Received Nutrient Intake    Calories: not meeting estimated needs  Protein: not meeting estimated needs    Patient Education    Not applicable.    Nutrition Diagnosis     PES: Malnutrition related to insufficient energy intake as evidenced by <75% est " energy requirements met >1 month, physical evidence of mild muscle and fat depletion. (continues)    Interventions/Goals     Intervention(s): modified composition of meals/snacks, commercial beverage, multivitamin/mineral supplement therapy, prescription medication and collaboration with other providers  Goal: Meet greater than 75% of nutritional needs by follow-up. (goal not met)    Monitoring & Evaluation     Dietitian will monitor energy intake and weight.  Nutrition Risk/Follow-Up: high (follow-up in 1-4 days)

## 2022-09-09 NOTE — PT/OT/SLP PROGRESS
Physical Therapy Treatment    Patient Name:  Fiedlina Darling   MRN:  [id number]    Recommendations:     Discharge Recommendations:  nursing facility, skilled   Discharge Equipment Recommendations: wheelchair, walker, rolling   Barriers to discharge:  impaired mobility    Assessment:     Fidelina Darling is a 89 y.o. female admitted with a medical diagnosis of Abdominal cramping, colonic distension. She presents with the following impairments/functional limitations:  weakness, impaired endurance, impaired sensation, impaired functional mobility, gait instability, impaired balance, decreased lower extremity function, decreased safety awareness, decreased ROM. Patient tolerated PT treatment fairly, limited by nausea. Pt required maxA x2 for supine to sit and sit<>stand at EOB, unable to ambulate. Pt is appropriate for continued acute PT services.     Rehab Prognosis: Fair; patient would benefit from acute skilled PT services to address these deficits and reach maximum level of function.    Recent Surgery: Procedure(s) (LRB):  SIGMOIDOSCOPY, FLEXIBLE (N/A) Day of Surgery    Plan:     During this hospitalization, patient to be seen 5 x/week to address the identified rehab impairments via gait training, therapeutic activities, therapeutic exercises, neuromuscular re-education and progress toward the following goals:    Plan of Care Expires:  10/07/22    Subjective     Chief Complaint: none stated  Patient/Family Comments/goals: to get stronger  Pain/Comfort:  Pain Rating 1: 0/10      Objective:     Communicated with RN prior to session.  Patient found HOB elevated with PureWick, peripheral IV, pulse ox (continuous), telemetry upon PT entry to room.     General Precautions: Standard, fall   Orthopedic Precautions:N/A   Braces: N/A  Respiratory Status: Nasal cannula, flow 1.5 L/min     Functional Mobility:  Bed Mobility:  Supine to Sit: maximal assistance  Transfers:  Sit to Stand:  maximal assistance and of 2 persons with  hand-held assist      AM-PAC 6 CLICK MOBILITY  Turning over in bed (including adjusting bedclothes, sheets and blankets)?: 2  Sitting down on and standing up from a chair with arms (e.g., wheelchair, bedside commode, etc.): 2  Moving from lying on back to sitting on the side of the bed?: 2  Moving to and from a bed to a chair (including a wheelchair)?: 2  Need to walk in hospital room?: 1  Climbing 3-5 steps with a railing?: 1  Basic Mobility Total Score: 10       Therapeutic Activities and Exercises:   Patient tolerated PT treatment fairly. Pt required maxA x2 for supine<>sit at EOB. Pt maintained sitting balance w/ Marlene, required modA for scooting at EOB. Pt attempted sit<>stand x2 trials. Performed with maxA x2, limited by ROM deficits in R knee and tolerance to upright position. Pt is appropriate for continued acute PT services.     Patient left HOB elevated with all lines intact, call button in reach, and RN notified..    GOALS:   Multidisciplinary Problems       Physical Therapy Goals          Problem: Physical Therapy    Goal Priority Disciplines Outcome Goal Variances Interventions   Physical Therapy Goal     PT, PT/OT Ongoing, Progressing     Description: Goals to be met by: 22     Patient will increase functional independence with mobility by performin. Supine to sit with MInimal Assistance  2. Sit to supine with MInimal Assistance  3. Sit to stand transfer with Minimal Assistance  4. Bed to chair transfer with Minimal Assistance using Rolling Walker  5. Sitting at edge of bed x10 minutes with Stand-by Assistance                         Time Tracking:     PT Received On: 22  PT Start Time: 928     PT Stop Time: 946  PT Total Time (min): 18 min     Billable Minutes: Therapeutic Activity 18min    Treatment Type: Treatment  PT/PTA: PT     PTA Visit Number: 2     2022

## 2022-09-09 NOTE — PROGRESS NOTES
Procedure results reported to floor nurse. Informed of medications given, IVF administered, vital signs, diet, bowel prep orders per MD, and pt's stability. Nurse verbalized understanding.

## 2022-09-09 NOTE — PROGRESS NOTES
[facility] Hospital Medicine Progress Note        Chief Complaint: Inpatient Follow-up for Colon distension     HPI:   89 y.o. White female with a past medical history of hypertension, hyperlipidemia. The patient presented to [facility] on 8/16/2022 with a primary complaint of abdominal pain and falls due to syncope.  Patient reports having a fall on 8/12 while in her garage.  She states she was grabbing for the car door handle when she missed it and fell. Fall was unwitnessed and she reports loss of consciousness. On 08/15 she had a second fall while in the kitchen but denies any loss of consciousness.  Yesterday (08/16/2022) patient was going to the bathroom when she passed out.  Episode was witnessed by neighbor who is visiting her. She reports urinary frequency. Patient denies complaints of headache, vision changes, shortness of breath, cough.  She has been experiencing lower abdominal pain for the last several days. Granddaughter at bedside states that patient is dependent on laxatives have a bowel movement.  Although she uses laxatives she often has to manually disimpact herself . She normally goes every 4-5 days with last bowel movement being 5 days ago. She has been having increased belching.  Granddaughter also states patient has little to no appetite.  Patient lives alone, ambulates with walker, drives and completes activity daily living independently.  Family as it patient to be experiencing dementia over the last 6 months.     Upon presentation to the ED, patient afebrile, hemodynamically stable and SpO2 96% on room air.  Labs notable for WBC 28, potassium 2.6, glucose 161, lactic acid 3.9.  UA with 1+ leukocyte esterase, 2+ bilirubin, positive nitrites, trace ketones and protein.  Chest x-ray without acute processes.  CT abdomen pelvis revealed mild distention of the colon with liquefied stool, indeterminate bilateral adrenal nodules and pancreatic cysts.  While in the ED  patient received 40 mEq of IV potassium chloride, 2 g of magnesium sulfate and Rocephin was started for UTI.  Patient admitted to hospital medicine services for further medical management.  Discussed discharge care planning with family present at bedside.  Patient/family is agreeable with rehabilitation and states that  [facility] has a rehab that patient has been to before. KUB has been reviewed and shows persistent distention of colon with air and contrast still present.    NG tube was placed and later removed.   Interval Hx:   Patient awake but very lethargic. Has mild generalized abdominal pain. No nausea, vomiting, fever or chills. She had not had a BM and no flatus. Abdomen is still distended.     Objective/physical exam:  General: In no acute distress, afebrile, frail  Chest: Clear to auscultation bilaterally  Heart: RRR, +S1, S2, no appreciable murmur  Abdomen: + Firm and Distended. No bowel sounds heard + Tenderness to palpation   MSK: Juma UE and LE edema, Juma LE tenderness       VITAL SIGNS: 24 HRS MIN & MAX LAST   Temp  Min: 97.3 °F (36.3 °C)  Max: 98.7 °F (37.1 °C) 97.3 °F (36.3 °C)   BP  Min: 111/64  Max: 138/69 (!) 113/59     Pulse  Min: 70  Max: 104  78   Resp  Min: 15  Max: 20 20   SpO2  Min: 90 %  Max: 96 % (!) 94 %       Recent Labs   Lab 09/04/22 0445 09/05/22 0403 09/07/22  0403   WBC 6.4 8.6 6.1   RBC 3.20* 3.47* 2.85*   HGB 10.0* 10.9* 9.1*   HCT 29.8* 32.2* 29.8*   MCV 93.1 92.8 104.6*   MCH 31.3* 31.4* 31.9*   MCHC 33.6 33.9 30.5*   RDW 13.7 14.1 14.7    416* 267   MPV 10.2 10.5* 11.3*       Recent Labs   Lab 09/05/22 0403 09/07/22 0403 09/09/22  0432    136 136   K 3.0* 3.3* 3.9   CO2 24 26 28   BUN 11.4 12.9 14.3   CREATININE 0.53* 0.62 0.58   CALCIUM 7.9* 7.1* 7.2*   MG 1.70 1.70 1.70   ALBUMIN  --  1.6*  --    ALKPHOS  --  141  --    ALT  --  26  --    AST  --  29  --    BILITOT  --  0.4  --           Microbiology Results (last 7 days)       ** No results found for  the last 168 hours. **             See below for Radiology    Scheduled Med:   bisacodyL  10 mg Rectal BID    diclofenac sodium  4 g Topical (Top) Daily    docusate sodium  50 mg Oral BID    enoxaparin  30 mg Subcutaneous Q12H    hydrocortisone   Rectal BID    LIDOcaine (PF) 20 mg/mL (2%)        LIDOcaine  1 patch Transdermal Q24H    metoclopramide HCl  5 mg Intravenous Q6H    metoprolol tartrate  25 mg Oral BID    multivitamin  1 tablet Oral Daily    naloxegoL  25 mg Oral Daily    nystatin  500,000 Units Oral QID (WM & HS)    pantoprazole  40 mg Intravenous BID    polyethylene glycol  4,000 mL Oral Once    simethicone  1 tablet Oral QID    sodium chloride 0.9%  10 mL Intravenous Q6H        Continuous Infusions:   sodium chloride 0.9% 50 mL/hr at 09/07/22 0250        PRN Meds:  acetaminophen, albuterol-ipratropium, albuterol-ipratropium, atropine, bisacodyL, dextrose 50%, dextrose 50%, glucagon (human recombinant), glucose, glucose, HYDROcodone-acetaminophen, insulin aspart U-100, labetaloL, melatonin, metoclopramide HCl, metoprolol, morphine, ondansetron, sodium chloride 0.9%, Flushing PICC Protocol **AND** sodium chloride 0.9% **AND** sodium chloride 0.9%       Assessment/Plan:  Colonic distension   B/L UE and LE edema   Severe PCM/Hypoalbuminemia   Small pancreatic cyst   Sliding hernia -nonstrangulated   Bilateral adrenal nodules repeat follow-up CT outpatient  Essential hypertension  Diastolic dysfunction   sinus tachycardia   Poor functional status  Anasarca     Plan:  Patient continues to have abdominal pain, abdominal swelling and no bowel sounds  She will be going for a colon decompression per GI team  She is now NPO  Will cont iv fluids   Potassium now better  F/U by GI and surgery team   Will closely monitor patients daily weight, urine out put, renal parameters and volume status      With advanced age, poor functionals status and poor nutritional status her over all prognosis is poor    F/U by palliative  care team     Cont supportive care       VTE prophylaxis: Lovenox     Patient condition:  Fair     Anticipated discharge and Disposition:         All diagnosis and differential diagnosis have been reviewed; assessment and plan has been documented; I have personally reviewed the labs and test results that are presently available; I have reviewed the patients medication list; I have reviewed the consulting providers response and recommendations. I have reviewed or attempted to review medical records based upon their availability    All of the patient's questions have been  addressed and answered. Patient's is agreeable to the above stated plan. I will continue to monitor closely and make adjustments to medical management as needed.  _____________________________________________________________________    Nutrition Status:    Radiology:  CV Ultrasound doppler venous arm left  There was no evidence of deep vein thrombosis in the left upper extremity.  A superficial vein thrombosis was identified in the left cephalic vein.      Trav Avelar MD   09/09/2022        Will start on clinimix at 75 cc/hr for nutritional support

## 2022-09-09 NOTE — TRANSFER OF CARE
"Anesthesia Transfer of Care Note    Patient: Fidelina Dalring    Procedure(s) Performed: Procedure(s) (LRB):  SIGMOIDOSCOPY, FLEXIBLE (N/A)    Patient location: GI    Anesthesia Type: general    Transport from OR: Transported from OR on room air with adequate spontaneous ventilation    Post pain: adequate analgesia    Post assessment: no apparent anesthetic complications    Post vital signs: stable    Level of consciousness: sedated and responds to stimulation    Nausea/Vomiting: no nausea/vomiting    Complications: none    Transfer of care protocol was followed      Last vitals:   Visit Vitals  BP (!) 113/59   Pulse 78   Temp 36.3 °C (97.3 °F) (Tympanic)   Resp 20   Ht 5' 7.99" (1.727 m)   Wt 83 kg (183 lb)   SpO2 (!) 94%   BMI 27.83 kg/m²     "

## 2022-09-09 NOTE — PROGRESS NOTES
Trauma/Acute Care Surgery   Daily Progress Note     HD#23  POD#1 Day Post-Op    Subjective  Attempted Neostigmine yesterday, no BM or flatus per nurse or patient. Less firm to me today but essentially unchanged. Palliative consulted. BUE edema.      Scheduled Meds:   diclofenac sodium  4 g Topical (Top) Daily    docusate sodium  50 mg Oral BID    enoxaparin  30 mg Subcutaneous Q12H    hydrocortisone   Rectal BID    LIDOcaine  1 patch Transdermal Q24H    metoclopramide HCl  5 mg Intravenous Q6H    metoprolol tartrate  25 mg Oral BID    multivitamin  1 tablet Oral Daily    nystatin  500,000 Units Oral QID (WM & HS)    pantoprazole  40 mg Intravenous BID    polyethylene glycol  17 g Oral BID    potassium bicarbonate  20 mEq Oral Daily    simethicone  1 tablet Oral QID    sodium chloride 0.9%  10 mL Intravenous Q6H       Continuous Infusions:   sodium chloride 0.9% 50 mL/hr at 09/07/22 0250       PRN Meds:acetaminophen, albuterol-ipratropium, albuterol-ipratropium, atropine, bisacodyL, dextrose 50%, dextrose 50%, glucagon (human recombinant), glucose, glucose, HYDROcodone-acetaminophen, insulin aspart U-100, labetaloL, melatonin, metoclopramide HCl, metoprolol, morphine, ondansetron, sodium chloride 0.9%, Flushing PICC Protocol **AND** sodium chloride 0.9% **AND** sodium chloride 0.9%     Objective  Temp:  [97.5 °F (36.4 °C)-98.7 °F (37.1 °C)] 98.6 °F (37 °C)  Pulse:  [] 70  Resp:  [11-20] 18  SpO2:  [94 %-98 %] 96 %  BP: ()/(47-86) 112/64     No intake/output data recorded.  I/O this shift:  In: 240 [P.O.:240]  Out: 125 [Urine:125]       Peripheral IV - Single Lumen 09/02/22 2100 Distal;Posterior;Right Forearm (Active)   Site Assessment Clean;Dry;Intact 09/09/22 0400   Extremity Assessment Distal to IV No abnormal discoloration;No redness;No swelling;No warmth 09/05/22 0800   Line Status Infusing 09/08/22 0800   Dressing Status Clean;Dry;Intact 09/08/22 0800   Dressing Intervention Integrity maintained  09/08/22 0800   Reason Not Rotated Not due 09/03/22 1600   Number of days: 6            Midline Catheter Insertion/Assessment  - Single Lumen 08/26/22 1220 Right basilic vein (medial side of arm) (Active)   Site Assessment Clean;Dry;Intact;No redness;No swelling 09/08/22 0800   IV Device Securement catheter securement device 09/08/22 0800   Line Status Infusing 09/08/22 0800   Dressing Type Central line dressing 09/08/22 0800   Dressing Status Clean;Dry;Intact 09/08/22 0800   Dressing Intervention Integrity maintained 09/06/22 2000   Dressing Change Due 08/31/22 08/31/22 0800   Reason Not Rotated Not due 09/03/22 1600   Number of days: 13            NG/OG Tube 08/20/22 1200 nasogastric Right nostril (Active)   Placement Check placement verified by distal tube length measurement 09/06/22 2000   Tube advanced (cm) 0 09/05/22 2100   Advancement advanced manually 08/26/22 0800   Distal Tube Length (cm) 68 09/06/22 2000   Tolerance no signs/symptoms of discomfort 09/06/22 2000   Securement secured to nostril center 09/05/22 2100   Clamp Status/Tolerance clamped 09/06/22 2000   Suction Setting/Drainage Method suction at the bedside 09/06/22 2000   Insertion Site Appearance no redness, warmth, tenderness, skin breakdown, drainage 09/05/22 2100   Drainage Brown 09/04/22 2000   Flush/Irrigation flushed w/;water 09/05/22 0800   Intake (mL) 30 mL 09/04/22 0800   Tube Output(mL)(Include Discarded Residual) 0 mL 09/01/22 0820   Number of days: 19        Gen: NAD, AAOx3  HEENT: EOMI, NCAT  CV: RR  Resp: no shortness of breath, normal WOB   Abd: firm, distended, ATTP   Ext:  Full ROM. No deformity.      Labs  Recent Labs     09/07/22  0403   WBC 6.1   HGB 9.1*   HCT 29.8*        Recent Labs     09/07/22  0403 09/09/22  0432    136   K 3.3* 3.9   CO2 26 28   BUN 12.9 14.3   CREATININE 0.62 0.58   CALCIUM 7.1* 7.2*   MG 1.70 1.70   PHOS 2.7  --    ALBUMIN 1.6*  --    BILITOT 0.4  --    AST 29  --    ALKPHOS 141  --     ALT 26  --        Imaging  X-Ray Abdomen Flat And Erect    Result Date: 9/8/2022  Persistent gases distension of the abdomen with persistent residual contrast in the colon Electronically signed by: Bharathi Gauthier Date:    09/08/2022 Time:    09:29         Assessment/Plan  Olgilvies     No improvement with Neostigmine. Continue current plan. Will follow.     Bill Ortega  Trauma/Acute Care Surgery   c - [phone]    9/9/2022  6:35 AM

## 2022-09-09 NOTE — PROGRESS NOTES
[facility]    Chief Complaint: Inpatient Follow-up for      Subjective:  Fidelina Darling is a 89 y.o. White female with a past medical history of hypertension, hyperlipidemia. The patient presented to [facility] on 8/16/2022 with a primary complaint of abdominal pain and falls due to syncope.  Patient reports having a fall on 8/12 while in her garage.  She states she was grabbing for the car door handle when she missed it and fell. Fall was unwitnessed and she reports loss of consciousness. On 08/15 she had a second fall while in the kitchen but denies any loss of consciousness.  Yesterday (08/16/2022) patient was going to the bathroom when she passed out.  Episode was witnessed by neighbor who is visiting her. She reports urinary frequency. Patient denies complaints of headache, vision changes, shortness of breath, cough.  She has been experiencing lower abdominal pain for the last several days. Granddaughter at bedside states that patient is dependent on laxatives have a bowel movement.  Although she uses laxatives she often has to manually disimpact herself . She normally goes every 4-5 days with last bowel movement being 5 days ago. She has been having increased belching.  Granddaughter also states patient has little to no appetite.  Patient lives alone, ambulates with walker, drives and completes activity daily living independently.       Upon presentation to the ED, patient afebrile, hemodynamically stable and SpO2 96% on room air.  Labs notable for WBC 28, potassium 2.6, glucose 161, lactic acid 3.9.  UA with 1+ leukocyte esterase, 2+ bilirubin, positive nitrites, trace ketones and protein.  Chest x-ray without acute processes.  CT abdomen pelvis revealed mild distention of the colon with liquefied stool, indeterminate bilateral adrenal nodules and pancreatic cysts.  While in the ED patient received 40 mEq of IV potassium chloride, 2 g of magnesium sulfate and Rocephin was started for  UTI.    Patient was admitted on account of recurrent syncope and falls. Colonic dilatation with lactic acidosis.   X-ray of the chest reveals left-sided atelectasis cannot rule out infiltrate.      8-21-22  Pt with persistent abd pain and nausea  Has NGT to LIWS  Belching  Minimal flatus  KUB with dilation 56.4mm to 76.8mm of colon    8/22/22:  NG still in place. Was on high suction. Placed on low suction.  Generalized mild abdominal discomfort  No flatus, no stool.  KUB pending.    8/23/22:  US ordered for today due to elevated LFTs  Patient having small liquid brown stool  NG in place with minimal output     8/24/22:  SBFT unremarkable- normal SB transit time  Abdominal U/S unremarkable  LFTs trending up   Patient continues to have stool (per nurse charting x5 stools yesterday) and endorses diffuse abdominal discomfort,   Denies N/V, fever/chills    8/25/22:  LFTS trending down. AST/ALT: 419/209---> 335/179  Patient has not had a BM today and continues to endorse diffuse abdominal pain.   Denies N/V, fever/chills    8/26/22:  Abdomen remains distended and quiet. NG replaced and to LIS and 600ml aspirated. Aspirate is dark.  Just had a small brown stool.  Turned her and c/o abdominal pain.  KUB revealed contrast in colon remains.  On Movantik and dulcolax supp daily.  LFTs decreasing    8/29/22:  Abdomen distended and tender   Pain about the same, not worsening in nature  NG in place and connected to suction but turned off.   Has not had a BM yet today, unable to recall her most recent BM  She denies N/V, and currently has no appetite  Elevated LFTS have improved, 98/79 this am   Currently on Relistor     8/30/22:  Still with abdominal distention  NG in place but suction is turned off. Placed to LIS.  Patient moans when abdomen palpated.  Has extensive peripheral edema.  Urine orange/red color    8/31/22:  Flex Sig:  Findings:        Hemorrhoids were found on perianal exam.        The lumen of the sigmoid colon was  mildly dilated. Decompression was        successful.   Impression:    - Preparation of the colon was poor.                          - Hemorrhoids found on perianal exam.                          - Mild dilation of the examined colon with                          significant amount of stool.                          - Overall, her abdomen is soft based on exam, and                          it seems as though ileus vs sbo is more a                          contributor to her symptoms than                          pseudo-obstruction.                          - No specimens collected  9/1/22:  Patient had a loose BM this am- no associated pain or discomfort, brown in color  Abdomen remains significantly distended   NG was in place to LIS with very little output, so NG was clamped and we will try ice chips.  Patient denies N/V, notes that her overall abdominal discomfort is slightly improved     9/2/22  Pt abdomen remains distended  No guarding or tenderness on palpation  NG remains clamped  NO nausea  1 small bm  Will try clears    9/6/22:  Abdomen remains distended  NG clamped  Patient is currently NPO, states she is unable to tolerate ice chips due to severe abdominal pain and discomfrot  Denies N/V  She is unable to recall her most recent BM but per chart review had BM x1 yesterday, none today. Color/consistency unknown     9/7/22:  Overall this admission, patient has received: Relistor x4 doses, Multiple days of Bisacodyl suppositories, docusate sodium, and she is currently on metoclopramide 5 mg every 6 hours.    Flexible sigmoidoscopy 8/31/22 with mild sigmoid dilation noted during endoscopy    Surgery has been consulted for recommendations    Patient unable to give details related to bowel pattern and abdominal pains. She is NPO except Boost/protein supplements right now.     NGT still in place but clamped    Today her main complaint is left arm pain- left elbow erythema with dependent edema noted. She resists  "extension.   She also reports abdominal discomfort at mid abdomen. States she is a little nauseated but mostly just "stomach hurts"    Hyperactive upper quadrant bowel sounds, hypoactive right lower quadrant bowel sounds, tinkling left lower quadrant bowel sounds.     She is unsure if she has had a bowel movements today. She is "pretty sure" she has not gotten out of bed today.    9/8/22:   Abdomen seems slightly less distended than last week. Got up to sit on side of bed yesterday but then placed back to bed due to nausea. On clear liquids.  Surgical team has ordered Neostigmine. It will be given in PACU.  No stools charged for yesterday.    X-RAY FINDINGS:  There is persistent gases distension of the abdomen with gas in loops of large bowel persistent contrast identified in the colon     Impression:  Persistent gases distension of the abdomen with persistent residual contrast in the colon    9/9/22:  Received Neostigmine yesterday with no BM since.  Her abdomen remains distended and painful on palpation. She is unaware if she has been tolerating anything PO  Plans to be taken to GI lab today for further decompression    Called and spoke with daughter, Ayana today. 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 Daughter states that patient has chronic leg and back pain. Daughter wants her mother to be pushed to eat and to work with PT/OT. Informed her that we will attempt colonic decompression per endoscopy today.       ROS:  General: lying in bed, seemingly comfortable  CV: peripheral lower edema bilaterally, left am edema- dependent around left elbow  GI: Abdominal distention.   Information gathering limited by clinical condition    Objective    VITAL SIGNS: 24 HRS MIN & MAX LAST   Temp  Min: 97.5 °F (36.4 °C)  Max: 98.7 °F (37.1 °C) 98.1 °F (36.7 °C)   BP  Min: 77/52  Max: 142/86 114/65   Pulse  Min: 60  Max: 104  70 "   Resp  Min: 11  Max: 20 17   SpO2  Min: 94 %  Max: 98 % 96 %     Physical exam:  General appearance: Awake and alert, in no acute distress.  IN bed.  HEENT: Atraumatic head. Dry mucous membranes of oral cavity.  Eyes: anicteric  Lungs: Clear to auscultation bilaterally.   Heart: Regular rate and rhythm. 2+ pitting edema in bilateral LE and knees  Abdomen:  distended at mid abdomen, soft lower abdomen- nontender. Upper abdomen nontender. Mid abdomen mild to moderate tenderness.  Bowel sounds hypoactive and tinkling to left abdomen   Extremities: No cyanosis, clubbing. No deformities. peripheral lower edema bilaterally, left am edema- dependent around left elbow  Skin: No Rash. Warm and dry.  Neuro: Awake, lethargic. Motor and sensory exams grossly intact.  Psyc: calm and cooperative    Recent Labs   Lab 09/04/22  0445 09/05/22  0403 09/07/22  0403   WBC 6.4 8.6 6.1   RBC 3.20* 3.47* 2.85*   HGB 10.0* 10.9* 9.1*   HCT 29.8* 32.2* 29.8*   MCV 93.1 92.8 104.6*   MCH 31.3* 31.4* 31.9*   MCHC 33.6 33.9 30.5*   RDW 13.7 14.1 14.7    416* 267   MPV 10.2 10.5* 11.3*         Recent Labs   Lab 09/05/22  0403 09/07/22  0403 09/09/22  0432    136 136   K 3.0* 3.3* 3.9   CO2 24 26 28   BUN 11.4 12.9 14.3   CREATININE 0.53* 0.62 0.58   CALCIUM 7.9* 7.1* 7.2*   MG 1.70 1.70 1.70   ALBUMIN  --  1.6*  --    ALKPHOS  --  141  --    ALT  --  26  --    AST  --  29  --    BILITOT  --  0.4  --              See below for Radiology    Scheduled Med:   bisacodyL  10 mg Rectal BID    diclofenac sodium  4 g Topical (Top) Daily    docusate sodium  50 mg Oral BID    enoxaparin  30 mg Subcutaneous Q12H    hydrocortisone   Rectal BID    LIDOcaine  1 patch Transdermal Q24H    metoclopramide HCl  5 mg Intravenous Q6H    metoprolol tartrate  25 mg Oral BID    multivitamin  1 tablet Oral Daily    naloxegoL  25 mg Oral Daily    nystatin  500,000 Units Oral QID (WM & HS)    pantoprazole  40 mg Intravenous BID    polyethylene glycol  17 g  Oral BID    simethicone  1 tablet Oral QID    sodium chloride 0.9%  10 mL Intravenous Q6H        Continuous Infusions:   sodium chloride 0.9% 50 mL/hr at 09/07/22 0250          PRN Meds:  acetaminophen, albuterol-ipratropium, albuterol-ipratropium, atropine, bisacodyL, dextrose 50%, dextrose 50%, glucagon (human recombinant), glucose, glucose, HYDROcodone-acetaminophen, insulin aspart U-100, labetaloL, melatonin, metoclopramide HCl, metoprolol, morphine, ondansetron, sodium chloride 0.9%, Flushing PICC Protocol **AND** sodium chloride 0.9% **AND** sodium chloride 0.9%       Radiology:  CV Ultrasound doppler venous arm left  There was no evidence of deep vein thrombosis in the left upper extremity.  A superficial vein thrombosis was identified in the left cephalic vein.      Assessment/Plan:  1. Persistent Abdominal distention, having small volume stool    NGT removed    Low dose IV reglan every 6 hours    Small bowel follow through showing normal small bowel transit time (8/25)  Most recent  KUB (9/3)shows contrast in colon and continued colonic dilation   Flex Sig 8/31 w/ decompression- showing mild dilation of colon, no signs of pseudo obstruction  Continue to stress importance of mobilization in order to encourage Bms  Surgery has been reconsulted for recommendations: Neostigmine given without resulting stool.  Limit narcotics. Added Movantik and Dulcolax supp.  Mobilization.   Surgery has recommended further decompression following trial of Neostigmine with no BM. She will be taken to GI lab today     2. Elevated LFTS- RESOLVED    US with no significant findings.     3. Malnutrition due to lack of intake- likely the reason for slow H/H decline   -Continue multivitamin supplementation    - Continue intralipids to supplement clinimix     Continue with bowel regimen. 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        Meche Chan RN  acting as scribe for Silver Padilla MD  Gastroenterology  [facility]

## 2022-09-10 LAB
ANION GAP SERPL CALC-SCNC: 4 MEQ/L
BUN SERPL-MCNC: 16.1 MG/DL (ref 9.8–20.1)
CALCIUM SERPL-MCNC: 7.3 MG/DL (ref 8.4–10.2)
CHLORIDE SERPL-SCNC: 105 MMOL/L (ref 98–111)
CO2 SERPL-SCNC: 28 MMOL/L (ref 23–31)
CREAT SERPL-MCNC: 0.47 MG/DL (ref 0.55–1.02)
CREAT/UREA NIT SERPL: 34
GFR SERPLBLD CREATININE-BSD FMLA CKD-EPI: >60 MLS/MIN/1.73/M2
GLUCOSE SERPL-MCNC: 108 MG/DL (ref 75–121)
POCT GLUCOSE: 102 MG/DL (ref 70–110)
POCT GLUCOSE: 110 MG/DL (ref 70–110)
POCT GLUCOSE: 81 MG/DL (ref 70–110)
POCT GLUCOSE: 85 MG/DL (ref 70–110)
POTASSIUM SERPL-SCNC: 3.3 MMOL/L (ref 3.5–5.1)
SODIUM SERPL-SCNC: 137 MMOL/L (ref 132–146)

## 2022-09-10 PROCEDURE — 21400001 HC TELEMETRY ROOM

## 2022-09-10 PROCEDURE — C9113 INJ PANTOPRAZOLE SODIUM, VIA: HCPCS | Performed by: INTERNAL MEDICINE

## 2022-09-10 PROCEDURE — 25000003 PHARM REV CODE 250

## 2022-09-10 PROCEDURE — 36415 COLL VENOUS BLD VENIPUNCTURE: CPT | Performed by: INTERNAL MEDICINE

## 2022-09-10 PROCEDURE — 63600175 PHARM REV CODE 636 W HCPCS: Performed by: INTERNAL MEDICINE

## 2022-09-10 PROCEDURE — 63600175 PHARM REV CODE 636 W HCPCS: Performed by: NURSE PRACTITIONER

## 2022-09-10 PROCEDURE — 25000003 PHARM REV CODE 250: Performed by: NURSE PRACTITIONER

## 2022-09-10 PROCEDURE — A4216 STERILE WATER/SALINE, 10 ML: HCPCS | Performed by: INTERNAL MEDICINE

## 2022-09-10 PROCEDURE — 76937 US GUIDE VASCULAR ACCESS: CPT

## 2022-09-10 PROCEDURE — 25000003 PHARM REV CODE 250: Performed by: INTERNAL MEDICINE

## 2022-09-10 PROCEDURE — 25000003 PHARM REV CODE 250: Performed by: STUDENT IN AN ORGANIZED HEALTH CARE EDUCATION/TRAINING PROGRAM

## 2022-09-10 PROCEDURE — 80048 BASIC METABOLIC PNL TOTAL CA: CPT | Performed by: INTERNAL MEDICINE

## 2022-09-10 PROCEDURE — C1751 CATH, INF, PER/CENT/MIDLINE: HCPCS

## 2022-09-10 RX ORDER — POTASSIUM CHLORIDE 14.9 MG/ML
40 INJECTION INTRAVENOUS EVERY 12 HOURS
Status: COMPLETED | OUTPATIENT
Start: 2022-09-10 | End: 2022-09-10

## 2022-09-10 RX ADMIN — DOCUSATE SODIUM 50 MG: 50 CAPSULE, LIQUID FILLED ORAL at 09:09

## 2022-09-10 RX ADMIN — POTASSIUM CHLORIDE 40 MEQ: 14.9 INJECTION, SOLUTION INTRAVENOUS at 09:09

## 2022-09-10 RX ADMIN — SODIUM CHLORIDE, PRESERVATIVE FREE 10 ML: 5 INJECTION INTRAVENOUS at 09:09

## 2022-09-10 RX ADMIN — METOCLOPRAMIDE 5 MG: 5 INJECTION, SOLUTION INTRAMUSCULAR; INTRAVENOUS at 09:09

## 2022-09-10 RX ADMIN — SODIUM CHLORIDE, PRESERVATIVE FREE 10 ML: 5 INJECTION INTRAVENOUS at 01:09

## 2022-09-10 RX ADMIN — SIMETHICONE 80 MG: 80 TABLET, CHEWABLE ORAL at 04:09

## 2022-09-10 RX ADMIN — METOPROLOL TARTRATE 25 MG: 25 TABLET, FILM COATED ORAL at 08:09

## 2022-09-10 RX ADMIN — BISACODYL 10 MG: 10 SUPPOSITORY RECTAL at 09:09

## 2022-09-10 RX ADMIN — PANTOPRAZOLE SODIUM 40 MG: 40 INJECTION, POWDER, FOR SOLUTION INTRAVENOUS at 09:09

## 2022-09-10 RX ADMIN — ENOXAPARIN SODIUM 30 MG: 30 INJECTION SUBCUTANEOUS at 04:09

## 2022-09-10 RX ADMIN — METOCLOPRAMIDE 5 MG: 5 INJECTION, SOLUTION INTRAMUSCULAR; INTRAVENOUS at 01:09

## 2022-09-10 RX ADMIN — SIMETHICONE 80 MG: 80 TABLET, CHEWABLE ORAL at 09:09

## 2022-09-10 RX ADMIN — THERA TABS 1 TABLET: TAB at 09:09

## 2022-09-10 RX ADMIN — HYDROCORTISONE 2.5%: 25 CREAM TOPICAL at 09:09

## 2022-09-10 RX ADMIN — DICLOFENAC SODIUM 4 G: 10 GEL TOPICAL at 01:09

## 2022-09-10 RX ADMIN — NALOXEGOL OXALATE 25 MG: 25 TABLET, FILM COATED ORAL at 09:09

## 2022-09-10 RX ADMIN — ACETAMINOPHEN 650 MG: 325 TABLET ORAL at 04:09

## 2022-09-10 RX ADMIN — METOCLOPRAMIDE 5 MG: 5 INJECTION, SOLUTION INTRAMUSCULAR; INTRAVENOUS at 08:09

## 2022-09-10 RX ADMIN — SIMETHICONE 80 MG: 80 TABLET, CHEWABLE ORAL at 01:09

## 2022-09-10 RX ADMIN — METOPROLOL TARTRATE 25 MG: 25 TABLET, FILM COATED ORAL at 09:09

## 2022-09-10 NOTE — PROGRESS NOTES
"Gastroenterology Progress Note    Subjective/Interval History:  89 y.o. White female with a past medical history of hypertension, hyperlipidemia. The patient presented to [facility] on 8/16/2022 with a primary complaint of abdominal pain and falls due to syncope.  Patient reports having a fall on 8/12 while in her garage. She reports loss of consciousness. On 08/15 she had a second fall while in the kitchen but denies any loss of consciousness.  8/16/2022 patient was going to the bathroom when she passed out.  Episode was witnessed by neighbor visiting her. She reports urinary frequency. Patient denies complaints of headache, vision changes, shortness of breath, cough.  She had been experiencing lower abdominal pain for the last several days. Granddaughter at bedside states that patient is dependent on laxatives have a bowel movement.  Although she uses laxatives she often has to manually disimpact herself . She normally goes every 4-5 days...    Persistent distention and obstipation here prompted  to perform an attempted decompressive endoscopy yesterday.  He was able to visualize the rectum and sigmoid and descending colon, but found a voluminous colon with retained stool obscuring a significant amount of the mucosa.  Things seemed viable, and he was able to lavage on withdrawal and perform some element of decompression.      The patient had an uneventful night.  She does remain distended, and it generally doughy on examination with some mild diffuse tenderness, she suggests is her routine.  A bowel regimen is in place.    ROS:  ROS    Vital Signs:  /83   Pulse 66   Temp 97.7 °F (36.5 °C) (Oral)   Resp 18   Ht 5' 7.99" (1.727 m)   Wt 83 kg (183 lb)   SpO2 98%   BMI 27.83 kg/m²   Body mass index is 27.83 kg/m².    Physical Exam:  Physical Exam  Constitutional:       Comments: Quite elderly and frail   HENT:      Mouth/Throat:      Mouth: Mucous membranes are moist.   Eyes:      Extraocular " Movements: Extraocular movements intact.      Pupils: Pupils are equal, round, and reactive to light.   Cardiovascular:      Rate and Rhythm: Normal rate and regular rhythm.   Pulmonary:      Breath sounds: Normal breath sounds.   Abdominal:      Comments: Slightly protuberant, doughy, mildly diffusely tender without guarding or rebound.  Hypoactive bowel sounds   Musculoskeletal:      Right lower leg: Edema present.      Left lower leg: Edema present.   Skin:     General: Skin is warm and dry.      Coloration: Skin is not jaundiced or pale.   Neurological:      General: No focal deficit present.      Mental Status: Mental status is at baseline.   Psychiatric:         Mood and Affect: Mood normal.      Comments: Somnolent       Labs:  Recent Results (from the past 48 hour(s))   POCT glucose    Collection Time: 09/08/22  6:06 PM   Result Value Ref Range    POCT Glucose 92 70 - 110 mg/dL   Basic Metabolic Panel    Collection Time: 09/09/22  4:32 AM   Result Value Ref Range    Sodium Level 136 132 - 146 mmol/L    Potassium Level 3.9 3.5 - 5.1 mmol/L    Chloride 102 98 - 111 mmol/L    Carbon Dioxide 28 23 - 31 mmol/L    Glucose Level 83 75 - 121 mg/dL    Blood Urea Nitrogen 14.3 9.8 - 20.1 mg/dL    Creatinine 0.58 0.55 - 1.02 mg/dL    BUN/Creatinine Ratio 25     Calcium Level Total 7.2 (L) 8.4 - 10.2 mg/dL    Anion Gap 6.0 mEq/L    eGFR >60 mls/min/1.73/m2   Magnesium    Collection Time: 09/09/22  4:32 AM   Result Value Ref Range    Magnesium Level 1.70 1.60 - 2.60 mg/dL   POCT glucose    Collection Time: 09/09/22  4:27 PM   Result Value Ref Range    POCT Glucose 85 70 - 110 mg/dL   POCT glucose    Collection Time: 09/10/22 12:47 AM   Result Value Ref Range    POCT Glucose 102 70 - 110 mg/dL   Basic Metabolic Panel    Collection Time: 09/10/22  4:51 AM   Result Value Ref Range    Sodium Level 137 132 - 146 mmol/L    Potassium Level 3.3 (L) 3.5 - 5.1 mmol/L    Chloride 105 98 - 111 mmol/L    Carbon Dioxide 28 23 - 31  mmol/L    Glucose Level 108 75 - 121 mg/dL    Blood Urea Nitrogen 16.1 9.8 - 20.1 mg/dL    Creatinine 0.47 (L) 0.55 - 1.02 mg/dL    BUN/Creatinine Ratio 34     Calcium Level Total 7.3 (L) 8.4 - 10.2 mg/dL    Anion Gap 4.0 mEq/L    eGFR >60 mls/min/1.73/m2   POCT glucose    Collection Time: 09/10/22  6:38 AM   Result Value Ref Range    POCT Glucose 110 70 - 110 mg/dL         Assessment/Plan:  Very complex case, this elderly patient is relatively immobile, suffers from chronic constipation, and has had 2 different attempts at colonic decompression this admission, last just yesterday.  For now, her current regimen seems appropriate.  Mobilization is imperative.  We will be available tomorrow should the need arise, otherwise we will check back early week.  Please let us know.       Silver Simpson PA-C

## 2022-09-10 NOTE — PROCEDURES
"Fidelina Darling is a 89 y.o. female patient.    Temp: 97.7 °F (36.5 °C) (09/10/22 1100)  Pulse: 66 (09/10/22 1100)  Resp: 18 (09/10/22 1100)  BP: 131/83 (09/10/22 1100)  SpO2: 98 % (09/10/22 1100)  Weight: 83 kg (183 lb) (09/03/22 0347)  Height: 5' 7.99" (172.7 cm) (08/17/22 0733)    PICC  Date/Time: 9/10/2022 12:55 PM  Performed by: Pierce Osullivan RN  Consent Done: Yes  Time out: Immediately prior to procedure a time out was called to verify the correct patient, procedure, equipment, support staff and site/side marked as required  Indications: vascular access and med administration  Preparation: skin prepped with ChloraPrep  Skin prep agent dried: skin prep agent completely dried prior to procedure  Sterile barriers: all five maximum sterile barriers used - cap, mask, sterile gown, sterile gloves, and large sterile sheet  Hand hygiene: hand hygiene performed prior to central venous catheter insertion  Location details: right basilic  Catheter type: single lumen  Catheter size: 4 Fr  Catheter Length: 14cm    Ultrasound guidance: yes  Vessel Caliber: small and patent, compressibility normal  Vascular Doppler: not done  Needle advanced into vessel with real time Ultrasound guidance.  no esophageal manometryNumber of attempts: 1  Post-procedure: blood return through all ports and sterile dressing applied  Specimens: No  Implants: No  Assessment: free fluid flow        Name Pierce Osullivan  9/10/2022    "

## 2022-09-10 NOTE — PROGRESS NOTES
[facility] Hospital Medicine Progress Note        Chief Complaint: Inpatient Follow-up for Colon distension     HPI:   89 y.o. White female with a past medical history of hypertension, hyperlipidemia. The patient presented to [facility] on 8/16/2022 with a primary complaint of abdominal pain and falls due to syncope.  Patient reports having a fall on 8/12 while in her garage.  She states she was grabbing for the car door handle when she missed it and fell. Fall was unwitnessed and she reports loss of consciousness. On 08/15 she had a second fall while in the kitchen but denies any loss of consciousness.  Yesterday (08/16/2022) patient was going to the bathroom when she passed out.  Episode was witnessed by neighbor who is visiting her. She reports urinary frequency. Patient denies complaints of headache, vision changes, shortness of breath, cough.  She has been experiencing lower abdominal pain for the last several days. Granddaughter at bedside states that patient is dependent on laxatives have a bowel movement.  Although she uses laxatives she often has to manually disimpact herself . She normally goes every 4-5 days with last bowel movement being 5 days ago. She has been having increased belching.  Granddaughter also states patient has little to no appetite.  Patient lives alone, ambulates with walker, drives and completes activity daily living independently.  Family as it patient to be experiencing dementia over the last 6 months.     Upon presentation to the ED, patient afebrile, hemodynamically stable and SpO2 96% on room air.  Labs notable for WBC 28, potassium 2.6, glucose 161, lactic acid 3.9.  UA with 1+ leukocyte esterase, 2+ bilirubin, positive nitrites, trace ketones and protein.  Chest x-ray without acute processes.  CT abdomen pelvis revealed mild distention of the colon with liquefied stool, indeterminate bilateral adrenal nodules and pancreatic cysts.  While in the ED  patient received 40 mEq of IV potassium chloride, 2 g of magnesium sulfate and Rocephin was started for UTI.  Patient admitted to hospital medicine services for further medical management.  Discussed discharge care planning with family present at bedside.  Patient/family is agreeable with rehabilitation and states that  [facility] has a rehab that patient has been to before. KUB has been reviewed and shows persistent distention of colon with air and contrast still present.    NG tube was placed and later removed.   Interval Hx:   Patient today more alert and awake. Oriented and following verbal commands. Tolerating po liquids and jello. Denies any abdominal pain, nausea, vomiting. No BM so far. No family at bedside. She still has generalized weakness and needing full assist for ADLs.     Objective/physical exam:  General: In no acute distress, afebrile, frail  Chest: Clear to auscultation bilaterally  Heart: RRR, +S1, S2, no appreciable murmur  Abdomen: Soft and less Distended today. + bowel sounds heard today, No Tenderness to palpation   MSK: Juma UE and LE edema, Juma LE tenderness       VITAL SIGNS: 24 HRS MIN & MAX LAST   Temp  Min: 97.3 °F (36.3 °C)  Max: 98 °F (36.7 °C) 97.9 °F (36.6 °C)   BP  Min: 108/69  Max: 145/72 108/69     Pulse  Min: 61  Max: 93  71   Resp  Min: 12  Max: 20 18   SpO2  Min: 85 %  Max: 99 % 96 %       Recent Labs   Lab 09/04/22  0445 09/05/22  0403 09/07/22  0403   WBC 6.4 8.6 6.1   RBC 3.20* 3.47* 2.85*   HGB 10.0* 10.9* 9.1*   HCT 29.8* 32.2* 29.8*   MCV 93.1 92.8 104.6*   MCH 31.3* 31.4* 31.9*   MCHC 33.6 33.9 30.5*   RDW 13.7 14.1 14.7    416* 267   MPV 10.2 10.5* 11.3*       Recent Labs   Lab 09/05/22  0403 09/07/22  0403 09/09/22  0432 09/10/22  0451    136 136 137   K 3.0* 3.3* 3.9 3.3*   CO2 24 26 28 28   BUN 11.4 12.9 14.3 16.1   CREATININE 0.53* 0.62 0.58 0.47*   CALCIUM 7.9* 7.1* 7.2* 7.3*   MG 1.70 1.70 1.70  --    ALBUMIN  --  1.6*  --   --    ALKPHOS  --   141  --   --    ALT  --  26  --   --    AST  --  29  --   --    BILITOT  --  0.4  --   --           Microbiology Results (last 7 days)       ** No results found for the last 168 hours. **             See below for Radiology    Scheduled Med:   bisacodyL  10 mg Rectal BID    diclofenac sodium  4 g Topical (Top) Daily    docusate sodium  50 mg Oral BID    enoxaparin  30 mg Subcutaneous Daily    hydrocortisone   Rectal BID    LIDOcaine  1 patch Transdermal Q24H    metoclopramide HCl  5 mg Intravenous Q6H    metoprolol tartrate  25 mg Oral BID    multivitamin  1 tablet Oral Daily    naloxegoL  25 mg Oral Daily    pantoprazole  40 mg Intravenous Daily    potassium chloride in water  40 mEq Intravenous Q12H    simethicone  1 tablet Oral QID    sodium chloride 0.9%  10 mL Intravenous Q6H        Continuous Infusions:   Amino acid 4.25% - dextrose 5% (CLINIMIX-E) solution (1L provides 42.5 gm AA, 50 gm CHO (170 kcal/L dextrose), Na 35, K 30, Mg 5, Ca 4.5, Acetate 70, Cl 39, Phos 15) 75 mL/hr at 09/09/22 2213        PRN Meds:  acetaminophen, albuterol-ipratropium, albuterol-ipratropium, atropine, bisacodyL, dextrose 50%, dextrose 50%, glucagon (human recombinant), glucose, glucose, HYDROcodone-acetaminophen, insulin aspart U-100, labetaloL, melatonin, metoclopramide HCl, metoprolol, morphine, ondansetron, sodium chloride 0.9%, Flushing PICC Protocol **AND** sodium chloride 0.9% **AND** sodium chloride 0.9%       Assessment/Plan:  Colonic distension secondary to Adynamic ileus   B/L UE and LE edema   Severe PCM/Hypoalbuminemia   Small pancreatic cyst   Sliding hernia -nonstrangulated   Bilateral adrenal nodules repeat follow-up CT outpatient  Essential hypertension  Diastolic dysfunction   sinus tachycardia   Poor functional status  Anasarca   Hypokalemia     Plan:  Patient clinically looking better. She is more awake and alert  Tolerating po liquids  Now can hear bowel sounds. Abdomen less distended and soft. No  tenderness  But no BM so far   Potassium replaced   F/U by GI and surgery team   Will closely monitor patients daily weight, urine out put, renal parameters and volume status      With advanced age, poor functionals status and poor nutritional status her over all prognosis is poor    F/U by palliative care team     Cont supportive care, PT      VTE prophylaxis: Lovenox     Patient condition:  Fair     Anticipated discharge and Disposition:         All diagnosis and differential diagnosis have been reviewed; assessment and plan has been documented; I have personally reviewed the labs and test results that are presently available; I have reviewed the patients medication list; I have reviewed the consulting providers response and recommendations. I have reviewed or attempted to review medical records based upon their availability    All of the patient's questions have been  addressed and answered. Patient's is agreeable to the above stated plan. I will continue to monitor closely and make adjustments to medical management as needed.  _____________________________________________________________________    Nutrition Status:    Radiology:  CV Ultrasound doppler venous arm left  There was no evidence of deep vein thrombosis in the left upper extremity.  A superficial vein thrombosis was identified in the left cephalic vein.      Trav Avelar MD   09/10/2022        Will start on clinimix at 75 cc/hr for nutritional support

## 2022-09-11 LAB
ANION GAP SERPL CALC-SCNC: 9 MEQ/L
BASOPHILS # BLD AUTO: 0.06 X10(3)/MCL (ref 0–0.2)
BASOPHILS NFR BLD AUTO: 0.6 %
BUN SERPL-MCNC: 16.8 MG/DL (ref 9.8–20.1)
CALCIUM SERPL-MCNC: 7.5 MG/DL (ref 8.4–10.2)
CHLORIDE SERPL-SCNC: 104 MMOL/L (ref 98–111)
CO2 SERPL-SCNC: 24 MMOL/L (ref 23–31)
CREAT SERPL-MCNC: 0.51 MG/DL (ref 0.55–1.02)
CREAT/UREA NIT SERPL: 33
EOSINOPHIL # BLD AUTO: 0.08 X10(3)/MCL (ref 0–0.9)
EOSINOPHIL NFR BLD AUTO: 0.8 %
ERYTHROCYTE [DISTWIDTH] IN BLOOD BY AUTOMATED COUNT: 14.8 % (ref 11.5–17)
GFR SERPLBLD CREATININE-BSD FMLA CKD-EPI: >60 MLS/MIN/1.73/M2
GLUCOSE SERPL-MCNC: 108 MG/DL (ref 75–121)
HCT VFR BLD AUTO: 30.5 % (ref 37–47)
HGB BLD-MCNC: 10 GM/DL (ref 12–16)
IMM GRANULOCYTES # BLD AUTO: 0.06 X10(3)/MCL (ref 0–0.04)
IMM GRANULOCYTES NFR BLD AUTO: 0.6 %
LYMPHOCYTES # BLD AUTO: 2.2 X10(3)/MCL (ref 0.6–4.6)
LYMPHOCYTES NFR BLD AUTO: 22.6 %
MCH RBC QN AUTO: 31.5 PG (ref 27–31)
MCHC RBC AUTO-ENTMCNC: 32.8 MG/DL (ref 33–36)
MCV RBC AUTO: 96.2 FL (ref 80–94)
MONOCYTES # BLD AUTO: 0.84 X10(3)/MCL (ref 0.1–1.3)
MONOCYTES NFR BLD AUTO: 8.6 %
NEUTROPHILS # BLD AUTO: 6.5 X10(3)/MCL (ref 2.1–9.2)
NEUTROPHILS NFR BLD AUTO: 66.8 %
NRBC BLD AUTO-RTO: 0 %
PLATELET # BLD AUTO: 364 X10(3)/MCL (ref 130–400)
PMV BLD AUTO: 10.1 FL (ref 7.4–10.4)
POCT GLUCOSE: 119 MG/DL (ref 70–110)
POCT GLUCOSE: 130 MG/DL (ref 70–110)
POCT GLUCOSE: 139 MG/DL (ref 70–110)
POTASSIUM SERPL-SCNC: 3.2 MMOL/L (ref 3.5–5.1)
RBC # BLD AUTO: 3.17 X10(6)/MCL (ref 4.2–5.4)
SODIUM SERPL-SCNC: 137 MMOL/L (ref 132–146)
WBC # SPEC AUTO: 9.7 X10(3)/MCL (ref 4.5–11.5)

## 2022-09-11 PROCEDURE — 80048 BASIC METABOLIC PNL TOTAL CA: CPT | Performed by: INTERNAL MEDICINE

## 2022-09-11 PROCEDURE — 36415 COLL VENOUS BLD VENIPUNCTURE: CPT | Performed by: INTERNAL MEDICINE

## 2022-09-11 PROCEDURE — 25000003 PHARM REV CODE 250

## 2022-09-11 PROCEDURE — 25000003 PHARM REV CODE 250: Performed by: STUDENT IN AN ORGANIZED HEALTH CARE EDUCATION/TRAINING PROGRAM

## 2022-09-11 PROCEDURE — A4216 STERILE WATER/SALINE, 10 ML: HCPCS | Performed by: INTERNAL MEDICINE

## 2022-09-11 PROCEDURE — 63600175 PHARM REV CODE 636 W HCPCS: Performed by: NURSE PRACTITIONER

## 2022-09-11 PROCEDURE — 85025 COMPLETE CBC W/AUTO DIFF WBC: CPT | Performed by: INTERNAL MEDICINE

## 2022-09-11 PROCEDURE — C9113 INJ PANTOPRAZOLE SODIUM, VIA: HCPCS | Performed by: INTERNAL MEDICINE

## 2022-09-11 PROCEDURE — 25000003 PHARM REV CODE 250: Performed by: INTERNAL MEDICINE

## 2022-09-11 PROCEDURE — 21400001 HC TELEMETRY ROOM

## 2022-09-11 PROCEDURE — 63600175 PHARM REV CODE 636 W HCPCS: Performed by: INTERNAL MEDICINE

## 2022-09-11 PROCEDURE — 25000003 PHARM REV CODE 250: Performed by: NURSE PRACTITIONER

## 2022-09-11 RX ORDER — ENOXAPARIN SODIUM 100 MG/ML
40 INJECTION SUBCUTANEOUS EVERY 24 HOURS
Status: DISCONTINUED | OUTPATIENT
Start: 2022-09-11 | End: 2022-09-13 | Stop reason: HOSPADM

## 2022-09-11 RX ORDER — POTASSIUM CHLORIDE 14.9 MG/ML
40 INJECTION INTRAVENOUS EVERY 8 HOURS
Status: DISPENSED | OUTPATIENT
Start: 2022-09-11 | End: 2022-09-12

## 2022-09-11 RX ADMIN — PANTOPRAZOLE SODIUM 40 MG: 40 INJECTION, POWDER, FOR SOLUTION INTRAVENOUS at 10:09

## 2022-09-11 RX ADMIN — METOCLOPRAMIDE 5 MG: 5 INJECTION, SOLUTION INTRAMUSCULAR; INTRAVENOUS at 09:09

## 2022-09-11 RX ADMIN — POTASSIUM CHLORIDE 40 MEQ: 200 INJECTION, SOLUTION INTRAVENOUS at 09:09

## 2022-09-11 RX ADMIN — SODIUM CHLORIDE, PRESERVATIVE FREE 10 ML: 5 INJECTION INTRAVENOUS at 12:09

## 2022-09-11 RX ADMIN — NALOXEGOL OXALATE 25 MG: 25 TABLET, FILM COATED ORAL at 10:09

## 2022-09-11 RX ADMIN — LIDOCAINE 1 PATCH: 50 PATCH TOPICAL at 06:09

## 2022-09-11 RX ADMIN — BISACODYL 10 MG: 10 SUPPOSITORY RECTAL at 10:09

## 2022-09-11 RX ADMIN — RETINOL, ERGOCALCIFEROL, .ALPHA.-TOCOPHEROL ACETATE, DL-, PHYTONADIONE, ASCORBIC ACID, NIACINAMIDE, RIBOFLAVIN 5-PHOSPHATE SODIUM, THIAMINE HYDROCHLORIDE, PYRIDOXINE HYDROCHLORIDE, DEXPANTHENOL, BIOTIN, FOLIC ACID, AND CYANOCOBALAMIN: KIT at 09:09

## 2022-09-11 RX ADMIN — SIMETHICONE 80 MG: 80 TABLET, CHEWABLE ORAL at 06:09

## 2022-09-11 RX ADMIN — METOCLOPRAMIDE 5 MG: 5 INJECTION, SOLUTION INTRAMUSCULAR; INTRAVENOUS at 03:09

## 2022-09-11 RX ADMIN — SIMETHICONE 80 MG: 80 TABLET, CHEWABLE ORAL at 12:09

## 2022-09-11 RX ADMIN — DOCUSATE SODIUM 50 MG: 50 CAPSULE, LIQUID FILLED ORAL at 09:09

## 2022-09-11 RX ADMIN — SIMETHICONE 80 MG: 80 TABLET, CHEWABLE ORAL at 10:09

## 2022-09-11 RX ADMIN — SIMETHICONE 80 MG: 80 TABLET, CHEWABLE ORAL at 09:09

## 2022-09-11 RX ADMIN — HYDROCORTISONE 2.5%: 25 CREAM TOPICAL at 09:09

## 2022-09-11 RX ADMIN — DOCUSATE SODIUM 50 MG: 50 CAPSULE, LIQUID FILLED ORAL at 10:09

## 2022-09-11 RX ADMIN — METOCLOPRAMIDE 5 MG: 5 INJECTION, SOLUTION INTRAMUSCULAR; INTRAVENOUS at 02:09

## 2022-09-11 RX ADMIN — THERA TABS 1 TABLET: TAB at 10:09

## 2022-09-11 RX ADMIN — BISACODYL 10 MG: 10 SUPPOSITORY RECTAL at 09:09

## 2022-09-11 RX ADMIN — METOPROLOL TARTRATE 25 MG: 25 TABLET, FILM COATED ORAL at 10:09

## 2022-09-11 RX ADMIN — METOCLOPRAMIDE 5 MG: 5 INJECTION, SOLUTION INTRAMUSCULAR; INTRAVENOUS at 08:09

## 2022-09-11 RX ADMIN — METOPROLOL TARTRATE 25 MG: 25 TABLET, FILM COATED ORAL at 09:09

## 2022-09-11 RX ADMIN — METOCLOPRAMIDE 5 MG: 5 INJECTION, SOLUTION INTRAMUSCULAR; INTRAVENOUS at 10:09

## 2022-09-11 RX ADMIN — HYDROCORTISONE 2.5%: 25 CREAM TOPICAL at 10:09

## 2022-09-11 RX ADMIN — MELATONIN TAB 3 MG 6 MG: 3 TAB at 09:09

## 2022-09-11 RX ADMIN — ENOXAPARIN SODIUM 40 MG: 40 INJECTION SUBCUTANEOUS at 06:09

## 2022-09-11 NOTE — PLAN OF CARE
Problem: Adult Inpatient Plan of Care  Goal: Plan of Care Review  9/11/2022 1839 by Lynne Bright RN  Outcome: Ongoing, Progressing  9/11/2022 1752 by Lynne Bright RN  Outcome: Ongoing, Progressing  Goal: Patient-Specific Goal (Individualized)  9/11/2022 1839 by Lynne Bright RN  Outcome: Ongoing, Progressing  9/11/2022 1752 by Lynne Bright RN  Outcome: Ongoing, Progressing  Goal: Absence of Hospital-Acquired Illness or Injury  9/11/2022 1839 by Lynne Bright RN  Outcome: Ongoing, Progressing  9/11/2022 1752 by Lynne Bright RN  Outcome: Ongoing, Progressing  Goal: Optimal Comfort and Wellbeing  9/11/2022 1839 by Lynne Bright RN  Outcome: Ongoing, Progressing  9/11/2022 1752 by Lynne Bright RN  Outcome: Ongoing, Progressing  Goal: Readiness for Transition of Care  9/11/2022 1839 by Lynne Bright RN  Outcome: Ongoing, Progressing  9/11/2022 1752 by Lynne Bright RN  Outcome: Ongoing, Progressing     Problem: Skin Injury Risk Increased  Goal: Skin Health and Integrity  9/11/2022 1839 by Lynne Bright RN  Outcome: Ongoing, Progressing  9/11/2022 1752 by Lynne Bright RN  Outcome: Ongoing, Progressing     Problem: Fall Injury Risk  Goal: Absence of Fall and Fall-Related Injury  9/11/2022 1839 by Lynne Bright RN  Outcome: Ongoing, Progressing  9/11/2022 1752 by Lynne Bright RN  Outcome: Ongoing, Progressing     Problem: Infection  Goal: Absence of Infection Signs and Symptoms  9/11/2022 1839 by Lynne Bright RN  Outcome: Ongoing, Progressing  9/11/2022 1752 by Lynne Bright RN  Outcome: Ongoing, Progressing     Problem: Coping Ineffective  Goal: Effective Coping  9/11/2022 1839 by Lynne Bright RN  Outcome: Ongoing, Progressing  9/11/2022 1752 by Lynne Brihgt RN  Outcome: Ongoing, Progressing

## 2022-09-11 NOTE — PROGRESS NOTES
[facility] Hospital Medicine Progress Note        Chief Complaint: Inpatient Follow-up for Colon distension     HPI:   89 y.o. White female with a past medical history of hypertension, hyperlipidemia. The patient presented to [facility] on 8/16/2022 with a primary complaint of abdominal pain and falls due to syncope.  Patient reports having a fall on 8/12 while in her garage.  She states she was grabbing for the car door handle when she missed it and fell. Fall was unwitnessed and she reports loss of consciousness. On 08/15 she had a second fall while in the kitchen but denies any loss of consciousness.  Yesterday (08/16/2022) patient was going to the bathroom when she passed out.  Episode was witnessed by neighbor who is visiting her. She reports urinary frequency. Patient denies complaints of headache, vision changes, shortness of breath, cough.  She has been experiencing lower abdominal pain for the last several days. Granddaughter at bedside states that patient is dependent on laxatives have a bowel movement.  Although she uses laxatives she often has to manually disimpact herself . She normally goes every 4-5 days with last bowel movement being 5 days ago. She has been having increased belching.  Granddaughter also states patient has little to no appetite.  Patient lives alone, ambulates with walker, drives and completes activity daily living independently.  Family as it patient to be experiencing dementia over the last 6 months.     Upon presentation to the ED, patient afebrile, hemodynamically stable and SpO2 96% on room air.  Labs notable for WBC 28, potassium 2.6, glucose 161, lactic acid 3.9.  UA with 1+ leukocyte esterase, 2+ bilirubin, positive nitrites, trace ketones and protein.  Chest x-ray without acute processes.  CT abdomen pelvis revealed mild distention of the colon with liquefied stool, indeterminate bilateral adrenal nodules and pancreatic cysts.  While in the ED  patient received 40 mEq of IV potassium chloride, 2 g of magnesium sulfate and Rocephin was started for UTI.  Patient admitted to hospital medicine services for further medical management.  Discussed discharge care planning with family present at bedside.  Patient/family is agreeable with rehabilitation and states that  [facility] has a rehab that patient has been to before. KUB has been reviewed and shows persistent distention of colon with air and contrast still present.    NG tube was placed and later removed.   Interval Hx:   Patient today more alert and awake. Oriented and following verbal commands. Still not passing flatus or having BM. On liquid diet.   No family at bedside at the time of exam. Discussed treatment plan with the nurse.     Objective/physical exam:  General: In no acute distress, afebrile, frail  Chest: Clear to auscultation bilaterally  Heart: RRR, +S1, S2, no appreciable murmur  Abdomen: Firm and more Distended today. + faint bowel sounds heard, Mild Tenderness to palpation   MSK: Juma UE and LE edema, Juma LE tenderness       VITAL SIGNS: 24 HRS MIN & MAX LAST   Temp  Min: 97.7 °F (36.5 °C)  Max: 98.1 °F (36.7 °C) 97.7 °F (36.5 °C)   BP  Min: 121/76  Max: 136/79 136/79     Pulse  Min: 74  Max: 83  79   Resp  Min: 18  Max: 18 18   SpO2  Min: 93 %  Max: 97 % 97 %       Recent Labs   Lab 09/05/22 0403 09/07/22 0403 09/11/22  0428   WBC 8.6 6.1 9.7   RBC 3.47* 2.85* 3.17*   HGB 10.9* 9.1* 10.0*   HCT 32.2* 29.8* 30.5*   MCV 92.8 104.6* 96.2*   MCH 31.4* 31.9* 31.5*   MCHC 33.9 30.5* 32.8*   RDW 14.1 14.7 14.8   * 267 364   MPV 10.5* 11.3* 10.1       Recent Labs   Lab 09/05/22 0403 09/07/22  0403 09/09/22  0432 09/10/22  0451 09/11/22  0428    136 136 137 137   K 3.0* 3.3* 3.9 3.3* 3.2*   CO2 24 26 28 28 24   BUN 11.4 12.9 14.3 16.1 16.8   CREATININE 0.53* 0.62 0.58 0.47* 0.51*   CALCIUM 7.9* 7.1* 7.2* 7.3* 7.5*   MG 1.70 1.70 1.70  --   --    ALBUMIN  --  1.6*  --   --   --     ALKPHOS  --  141  --   --   --    ALT  --  26  --   --   --    AST  --  29  --   --   --    BILITOT  --  0.4  --   --   --           Microbiology Results (last 7 days)       ** No results found for the last 168 hours. **             See below for Radiology    Scheduled Med:   bisacodyL  10 mg Rectal BID    diclofenac sodium  4 g Topical (Top) Daily    docusate sodium  50 mg Oral BID    enoxaparin  40 mg Subcutaneous Daily    hydrocortisone   Rectal BID    LIDOcaine  1 patch Transdermal Q24H    metoclopramide HCl  5 mg Intravenous Q6H    metoprolol tartrate  25 mg Oral BID    multivitamin  1 tablet Oral Daily    naloxegoL  25 mg Oral Daily    pantoprazole  40 mg Intravenous Daily    potassium chloride in water  40 mEq Intravenous Q8H    simethicone  1 tablet Oral QID    sodium chloride 0.9%  10 mL Intravenous Q6H        Continuous Infusions:   Amino acid 4.25% - dextrose 5% (CLINIMIX-E) solution (1L provides 42.5 gm AA, 50 gm CHO (170 kcal/L dextrose), Na 35, K 30, Mg 5, Ca 4.5, Acetate 70, Cl 39, Phos 15)      [START ON 9/12/2022] Amino acid 4.25% - dextrose 5% (CLINIMIX-E) solution (1L provides 42.5 gm AA, 50 gm CHO (170 kcal/L dextrose), Na 35, K 30, Mg 5, Ca 4.5, Acetate 70, Cl 39, Phos 15)      [START ON 9/13/2022] Amino acid 4.25% - dextrose 5% (CLINIMIX-E) solution (1L provides 42.5 gm AA, 50 gm CHO (170 kcal/L dextrose), Na 35, K 30, Mg 5, Ca 4.5, Acetate 70, Cl 39, Phos 15)          PRN Meds:  acetaminophen, albuterol-ipratropium, albuterol-ipratropium, atropine, bisacodyL, dextrose 50%, dextrose 50%, glucagon (human recombinant), glucose, glucose, HYDROcodone-acetaminophen, insulin aspart U-100, labetaloL, melatonin, metoclopramide HCl, metoprolol, morphine, ondansetron, sodium chloride 0.9%, Flushing PICC Protocol **AND** sodium chloride 0.9% **AND** sodium chloride 0.9%       Assessment/Plan:  Colonic distension secondary to Adynamic ileus   B/L UE and LE edema   Severe PCM/Hypoalbuminemia   Small  pancreatic cyst   Sliding hernia -nonstrangulated   Bilateral adrenal nodules repeat follow-up CT outpatient  Essential hypertension  Diastolic dysfunction   sinus tachycardia   Poor functional status  Anasarca   Hypokalemia     Plan:  Patient unfortunately not getting better  Abdomen today is more distended and firm  Not passing flatus or BM  Tolerating po liquids  Potassium replaced   F/U by GI and surgery team   Will closely monitor patients daily weight, urine out put, renal parameters and volume status    Cont clinimix for nutrition    With advanced age, poor functionals status and poor nutritional status her over all prognosis is poor    F/U by palliative care team     Cont supportive care, PT      VTE prophylaxis: Lovenox     Patient condition:  Fair     Anticipated discharge and Disposition:         All diagnosis and differential diagnosis have been reviewed; assessment and plan has been documented; I have personally reviewed the labs and test results that are presently available; I have reviewed the patients medication list; I have reviewed the consulting providers response and recommendations. I have reviewed or attempted to review medical records based upon their availability    All of the patient's questions have been  addressed and answered. Patient's is agreeable to the above stated plan. I will continue to monitor closely and make adjustments to medical management as needed.  _____________________________________________________________________    Nutrition Status:    Radiology:  X-Ray Abdomen AP 1 View  Narrative: EXAMINATION:  September 8, 2022 XR ABDOMEN AP 1 VIEW    CLINICAL HISTORY:  Abdominal swelling;    TECHNIQUE:  One view    COMPARISON:  September 8, 2022    FINDINGS:  There is persistent gaseous distention of the colon.  This may represent ileus.  No significant dilatation of the small bowel loops  Impression: Persistent considerable gaseous distention of the colon.    Electronically signed  by: Jorden Gomez  Date:    09/11/2022  Time:    16:26      Trav Avelar MD   09/11/2022

## 2022-09-12 LAB
ANION GAP SERPL CALC-SCNC: 8 MEQ/L
BUN SERPL-MCNC: 17 MG/DL (ref 9.8–20.1)
CALCIUM SERPL-MCNC: 7.7 MG/DL (ref 8.4–10.2)
CHLORIDE SERPL-SCNC: 104 MMOL/L (ref 98–111)
CO2 SERPL-SCNC: 27 MMOL/L (ref 23–31)
CREAT SERPL-MCNC: 0.5 MG/DL (ref 0.55–1.02)
CREAT/UREA NIT SERPL: 34
GFR SERPLBLD CREATININE-BSD FMLA CKD-EPI: >60 MLS/MIN/1.73/M2
GLUCOSE SERPL-MCNC: 104 MG/DL (ref 75–121)
MAGNESIUM SERPL-MCNC: 1.8 MG/DL (ref 1.6–2.6)
PHOSPHATE SERPL-MCNC: 3.1 MG/DL (ref 2.3–4.7)
POCT GLUCOSE: 133 MG/DL (ref 70–110)
POCT GLUCOSE: 137 MG/DL (ref 70–110)
POCT GLUCOSE: 162 MG/DL (ref 70–110)
POTASSIUM SERPL-SCNC: 3.4 MMOL/L (ref 3.5–5.1)
SODIUM SERPL-SCNC: 139 MMOL/L (ref 132–146)

## 2022-09-12 PROCEDURE — C9113 INJ PANTOPRAZOLE SODIUM, VIA: HCPCS | Performed by: INTERNAL MEDICINE

## 2022-09-12 PROCEDURE — 21400001 HC TELEMETRY ROOM

## 2022-09-12 PROCEDURE — 25000003 PHARM REV CODE 250: Performed by: NURSE PRACTITIONER

## 2022-09-12 PROCEDURE — 80048 BASIC METABOLIC PNL TOTAL CA: CPT | Performed by: INTERNAL MEDICINE

## 2022-09-12 PROCEDURE — 36415 COLL VENOUS BLD VENIPUNCTURE: CPT | Performed by: INTERNAL MEDICINE

## 2022-09-12 PROCEDURE — 83735 ASSAY OF MAGNESIUM: CPT | Performed by: INTERNAL MEDICINE

## 2022-09-12 PROCEDURE — 63600175 PHARM REV CODE 636 W HCPCS: Performed by: NURSE PRACTITIONER

## 2022-09-12 PROCEDURE — 97535 SELF CARE MNGMENT TRAINING: CPT | Mod: CO

## 2022-09-12 PROCEDURE — 25000003 PHARM REV CODE 250

## 2022-09-12 PROCEDURE — 63600175 PHARM REV CODE 636 W HCPCS: Performed by: INTERNAL MEDICINE

## 2022-09-12 PROCEDURE — 84100 ASSAY OF PHOSPHORUS: CPT | Performed by: INTERNAL MEDICINE

## 2022-09-12 PROCEDURE — A4216 STERILE WATER/SALINE, 10 ML: HCPCS | Performed by: INTERNAL MEDICINE

## 2022-09-12 PROCEDURE — 25000003 PHARM REV CODE 250: Performed by: INTERNAL MEDICINE

## 2022-09-12 PROCEDURE — 27000221 HC OXYGEN, UP TO 24 HOURS

## 2022-09-12 PROCEDURE — 97530 THERAPEUTIC ACTIVITIES: CPT

## 2022-09-12 RX ORDER — POTASSIUM CHLORIDE 14.9 MG/ML
40 INJECTION INTRAVENOUS EVERY 12 HOURS
Status: COMPLETED | OUTPATIENT
Start: 2022-09-12 | End: 2022-09-13

## 2022-09-12 RX ADMIN — POTASSIUM CHLORIDE 40 MEQ: 14.9 INJECTION, SOLUTION INTRAVENOUS at 08:09

## 2022-09-12 RX ADMIN — ENOXAPARIN SODIUM 40 MG: 40 INJECTION SUBCUTANEOUS at 05:09

## 2022-09-12 RX ADMIN — DOCUSATE SODIUM 50 MG: 50 CAPSULE, LIQUID FILLED ORAL at 08:09

## 2022-09-12 RX ADMIN — SIMETHICONE 80 MG: 80 TABLET, CHEWABLE ORAL at 09:09

## 2022-09-12 RX ADMIN — PANTOPRAZOLE SODIUM 40 MG: 40 INJECTION, POWDER, FOR SOLUTION INTRAVENOUS at 09:09

## 2022-09-12 RX ADMIN — METOPROLOL TARTRATE 25 MG: 25 TABLET, FILM COATED ORAL at 08:09

## 2022-09-12 RX ADMIN — DOCUSATE SODIUM 50 MG: 50 CAPSULE, LIQUID FILLED ORAL at 09:09

## 2022-09-12 RX ADMIN — BISACODYL 10 MG: 10 SUPPOSITORY RECTAL at 09:09

## 2022-09-12 RX ADMIN — SODIUM CHLORIDE, PRESERVATIVE FREE 10 ML: 5 INJECTION INTRAVENOUS at 12:09

## 2022-09-12 RX ADMIN — METOCLOPRAMIDE 5 MG: 5 INJECTION, SOLUTION INTRAMUSCULAR; INTRAVENOUS at 05:09

## 2022-09-12 RX ADMIN — METOCLOPRAMIDE 5 MG: 5 INJECTION, SOLUTION INTRAMUSCULAR; INTRAVENOUS at 08:09

## 2022-09-12 RX ADMIN — METOPROLOL TARTRATE 25 MG: 25 TABLET, FILM COATED ORAL at 09:09

## 2022-09-12 RX ADMIN — LIDOCAINE 1 PATCH: 50 PATCH TOPICAL at 05:09

## 2022-09-12 RX ADMIN — METOCLOPRAMIDE 5 MG: 5 INJECTION, SOLUTION INTRAMUSCULAR; INTRAVENOUS at 04:09

## 2022-09-12 RX ADMIN — BISACODYL 10 MG: 10 SUPPOSITORY RECTAL at 08:09

## 2022-09-12 RX ADMIN — METOCLOPRAMIDE 5 MG: 5 INJECTION, SOLUTION INTRAMUSCULAR; INTRAVENOUS at 09:09

## 2022-09-12 RX ADMIN — NALOXEGOL OXALATE 25 MG: 25 TABLET, FILM COATED ORAL at 09:09

## 2022-09-12 RX ADMIN — THERA TABS 1 TABLET: TAB at 09:09

## 2022-09-12 RX ADMIN — SIMETHICONE 80 MG: 80 TABLET, CHEWABLE ORAL at 05:09

## 2022-09-12 RX ADMIN — HYDROCORTISONE 2.5%: 25 CREAM TOPICAL at 08:09

## 2022-09-12 RX ADMIN — SODIUM CHLORIDE, PRESERVATIVE FREE 10 ML: 5 INJECTION INTRAVENOUS at 09:09

## 2022-09-12 RX ADMIN — METOCLOPRAMIDE 5 MG: 5 INJECTION, SOLUTION INTRAMUSCULAR; INTRAVENOUS at 02:09

## 2022-09-12 NOTE — PLAN OF CARE
Problem: Adult Inpatient Plan of Care  Goal: Plan of Care Review  9/12/2022 1723 by Betsey Tyler RN  Outcome: Ongoing, Progressing  9/12/2022 1357 by Betsey Tyler RN  Outcome: Ongoing, Progressing  Goal: Absence of Hospital-Acquired Illness or Injury  9/12/2022 1723 by Betsey Tyler RN  Outcome: Ongoing, Progressing  9/12/2022 1357 by Betsey Tyler RN  Outcome: Ongoing, Progressing  Goal: Optimal Comfort and Wellbeing  9/12/2022 1723 by Betsey Tyler RN  Outcome: Ongoing, Progressing  9/12/2022 1357 by Betsey Tyler RN  Outcome: Ongoing, Progressing  Goal: Readiness for Transition of Care  9/12/2022 1723 by Betsey Tyler RN  Outcome: Ongoing, Progressing  9/12/2022 1357 by Betsey Tyler RN  Outcome: Ongoing, Progressing     Problem: Skin Injury Risk Increased  Goal: Skin Health and Integrity  9/12/2022 1723 by Betsey Tyler RN  Outcome: Ongoing, Progressing  9/12/2022 1357 by Betsey Tyler RN  Outcome: Ongoing, Progressing     Problem: Fall Injury Risk  Goal: Absence of Fall and Fall-Related Injury  9/12/2022 1723 by Betsey Tyler RN  Outcome: Ongoing, Progressing  9/12/2022 1357 by Betsey Tyler RN  Outcome: Ongoing, Progressing     Problem: Infection  Goal: Absence of Infection Signs and Symptoms  9/12/2022 1723 by Betsey Tyler RN  Outcome: Ongoing, Progressing  9/12/2022 1357 by Betsey Tyler RN  Outcome: Ongoing, Progressing     Problem: Coping Ineffective  Goal: Effective Coping  9/12/2022 1723 by Betsey Tyler RN  Outcome: Ongoing, Progressing  9/12/2022 1357 by Betsey Tyler RN  Outcome: Ongoing, Progressing

## 2022-09-12 NOTE — PROGRESS NOTES
SCD hose applied on low. Daughter at bedside. Answered numerous questions. Pt sipping on golte. Pt had a lg bm per PCA and 1 smear earlier in shift

## 2022-09-12 NOTE — PROGRESS NOTES
[facility] Hospital Medicine Progress Note        Chief Complaint: Inpatient Follow-up for Colon distension     HPI:   89 y.o. White female with a past medical history of hypertension, hyperlipidemia. The patient presented to [facility] on 8/16/2022 with a primary complaint of abdominal pain and falls due to syncope.  Patient reports having a fall on 8/12 while in her garage.  She states she was grabbing for the car door handle when she missed it and fell. Fall was unwitnessed and she reports loss of consciousness. On 08/15 she had a second fall while in the kitchen but denies any loss of consciousness.  Yesterday (08/16/2022) patient was going to the bathroom when she passed out.  Episode was witnessed by neighbor who is visiting her. She reports urinary frequency. Patient denies complaints of headache, vision changes, shortness of breath, cough.  She has been experiencing lower abdominal pain for the last several days. Granddaughter at bedside states that patient is dependent on laxatives have a bowel movement.  Although she uses laxatives she often has to manually disimpact herself . She normally goes every 4-5 days with last bowel movement being 5 days ago. She has been having increased belching.  Granddaughter also states patient has little to no appetite.  Patient lives alone, ambulates with walker, drives and completes activity daily living independently.  Family as it patient to be experiencing dementia over the last 6 months.     Upon presentation to the ED, patient afebrile, hemodynamically stable and SpO2 96% on room air.  Labs notable for WBC 28, potassium 2.6, glucose 161, lactic acid 3.9.  UA with 1+ leukocyte esterase, 2+ bilirubin, positive nitrites, trace ketones and protein.  Chest x-ray without acute processes.  CT abdomen pelvis revealed mild distention of the colon with liquefied stool, indeterminate bilateral adrenal nodules and pancreatic cysts.  While in the ED  patient received 40 mEq of IV potassium chloride, 2 g of magnesium sulfate and Rocephin was started for UTI.  Patient admitted to hospital medicine services for further medical management.  Discussed discharge care planning with family present at bedside.  Patient/family is agreeable with rehabilitation and states that  [facility] has a rehab that patient has been to before. KUB has been reviewed and shows persistent distention of colon with air and contrast still present.    NG tube was placed and later removed.   Interval Hx:   Patient today more alert and awake. Oriented and following verbal commands. Still not passing flatus and no BM. Abdomen today more distended.   Objective/physical exam:  General: In no acute distress, afebrile, frail  Chest: Clear to auscultation bilaterally  Heart: RRR, +S1, S2, no appreciable murmur  Abdomen: Firm and more Distended today. NO bowel sounds heard, Mild Tenderness to palpation   MSK: Juma UE and LE edema, Juma LE tenderness       VITAL SIGNS: 24 HRS MIN & MAX LAST   Temp  Min: 97.7 °F (36.5 °C)  Max: 99.5 °F (37.5 °C) 97.7 °F (36.5 °C)   BP  Min: 119/80  Max: 141/90 124/72     Pulse  Min: 71  Max: 88  71   Resp  Min: 16  Max: 18 18   SpO2  Min: 90 %  Max: 96 % (!) 90 %       Recent Labs   Lab 09/07/22 0403 09/11/22 0428   WBC 6.1 9.7   RBC 2.85* 3.17*   HGB 9.1* 10.0*   HCT 29.8* 30.5*   .6* 96.2*   MCH 31.9* 31.5*   MCHC 30.5* 32.8*   RDW 14.7 14.8    364   MPV 11.3* 10.1       Recent Labs   Lab 09/07/22  0403 09/09/22  0432 09/10/22  0451 09/11/22  0428 09/12/22  0351    136 137 137 139   K 3.3* 3.9 3.3* 3.2* 3.4*   CO2 26 28 28 24 27   BUN 12.9 14.3 16.1 16.8 17.0   CREATININE 0.62 0.58 0.47* 0.51* 0.50*   CALCIUM 7.1* 7.2* 7.3* 7.5* 7.7*   MG 1.70 1.70  --   --  1.80   ALBUMIN 1.6*  --   --   --   --    ALKPHOS 141  --   --   --   --    ALT 26  --   --   --   --    AST 29  --   --   --   --    BILITOT 0.4  --   --   --   --            Microbiology Results (last 7 days)       ** No results found for the last 168 hours. **             See below for Radiology    Scheduled Med:   bisacodyL  10 mg Rectal BID    diclofenac sodium  4 g Topical (Top) Daily    docusate sodium  50 mg Oral BID    enoxaparin  40 mg Subcutaneous Daily    hydrocortisone   Rectal BID    LIDOcaine  1 patch Transdermal Q24H    metoclopramide HCl  5 mg Intravenous Q6H    metoprolol tartrate  25 mg Oral BID    multivitamin  1 tablet Oral Daily    naloxegoL  25 mg Oral Daily    pantoprazole  40 mg Intravenous Daily    potassium chloride in water  40 mEq Intravenous Q8H    simethicone  1 tablet Oral QID    sodium chloride 0.9%  10 mL Intravenous Q6H        Continuous Infusions:   Amino acid 4.25% - dextrose 5% (CLINIMIX-E) solution (1L provides 42.5 gm AA, 50 gm CHO (170 kcal/L dextrose), Na 35, K 30, Mg 5, Ca 4.5, Acetate 70, Cl 39, Phos 15) 75 mL/hr at 09/11/22 2159    Amino acid 4.25% - dextrose 5% (CLINIMIX-E) solution (1L provides 42.5 gm AA, 50 gm CHO (170 kcal/L dextrose), Na 35, K 30, Mg 5, Ca 4.5, Acetate 70, Cl 39, Phos 15)      [START ON 9/13/2022] Amino acid 4.25% - dextrose 5% (CLINIMIX-E) solution (1L provides 42.5 gm AA, 50 gm CHO (170 kcal/L dextrose), Na 35, K 30, Mg 5, Ca 4.5, Acetate 70, Cl 39, Phos 15)          PRN Meds:  acetaminophen, albuterol-ipratropium, albuterol-ipratropium, atropine, bisacodyL, dextrose 50%, dextrose 50%, glucagon (human recombinant), glucose, glucose, HYDROcodone-acetaminophen, insulin aspart U-100, labetaloL, melatonin, metoclopramide HCl, metoprolol, morphine, ondansetron, sodium chloride 0.9%, Flushing PICC Protocol **AND** sodium chloride 0.9% **AND** sodium chloride 0.9%       Assessment/Plan:  Colonic distension secondary to Adynamic ileus   B/L UE and LE edema   Severe PCM/Hypoalbuminemia   Small pancreatic cyst   Sliding hernia -nonstrangulated   Bilateral adrenal nodules repeat follow-up CT outpatient  Essential  hypertension  Diastolic dysfunction   sinus tachycardia   Poor functional status  Anasarca   Hypokalemia     Plan:  Patient is not getting better.   Abdomen still distended and firm  Not passing flatus or BM  Tolerating po liquids  Potassium replaced   F/U by GI and surgery team   Will closely monitor patients daily weight, urine out put, renal parameters and volume status    Cont clinimix for nutrition    With advanced age, poor functionals status and poor nutritional status her over all prognosis is poor    F/U by palliative care team     Cont supportive care, PT      VTE prophylaxis: Lovenox     Patient condition:  Fair     Anticipated discharge and Disposition:         All diagnosis and differential diagnosis have been reviewed; assessment and plan has been documented; I have personally reviewed the labs and test results that are presently available; I have reviewed the patients medication list; I have reviewed the consulting providers response and recommendations. I have reviewed or attempted to review medical records based upon their availability    All of the patient's questions have been  addressed and answered. Patient's is agreeable to the above stated plan. I will continue to monitor closely and make adjustments to medical management as needed.  _____________________________________________________________________    Nutrition Status:    Radiology:  X-Ray Abdomen AP 1 View  Narrative: EXAMINATION:  September 8, 2022 XR ABDOMEN AP 1 VIEW    CLINICAL HISTORY:  Abdominal swelling;    TECHNIQUE:  One view    COMPARISON:  September 8, 2022    FINDINGS:  There is persistent gaseous distention of the colon.  This may represent ileus.  No significant dilatation of the small bowel loops  Impression: Persistent considerable gaseous distention of the colon.    Electronically signed by: Jorden Gomez  Date:    09/11/2022  Time:    16:26      Trav Avelar MD   09/12/2022

## 2022-09-12 NOTE — PLAN OF CARE
Problem: Adult Inpatient Plan of Care  Goal: Plan of Care Review  Outcome: Ongoing, Progressing  Goal: Absence of Hospital-Acquired Illness or Injury  Outcome: Ongoing, Progressing  Goal: Optimal Comfort and Wellbeing  Outcome: Ongoing, Progressing  Goal: Readiness for Transition of Care  Outcome: Ongoing, Progressing     Problem: Skin Injury Risk Increased  Goal: Skin Health and Integrity  Outcome: Ongoing, Progressing     Problem: Fall Injury Risk  Goal: Absence of Fall and Fall-Related Injury  Outcome: Ongoing, Progressing     Problem: Infection  Goal: Absence of Infection Signs and Symptoms  Outcome: Ongoing, Progressing     Problem: Coping Ineffective  Goal: Effective Coping  Outcome: Ongoing, Progressing

## 2022-09-12 NOTE — PROGRESS NOTES
The documentation recorded by the scribe/NP accurately reflects the service I personally performed and the decisions made by me.       Kamari Ndiaye MD  [facility]    Chief Complaint: Inpatient Follow-up for      Subjective:  Fidelina Darling is a 89 y.o. White female with a past medical history of hypertension, hyperlipidemia. The patient presented to [facility] on 8/16/2022 with a primary complaint of abdominal pain and falls due to syncope.  Patient reports having a fall on 8/12 while in her garage.  She states she was grabbing for the car door handle when she missed it and fell. Fall was unwitnessed and she reports loss of consciousness. On 08/15 she had a second fall while in the kitchen but denies any loss of consciousness.  Yesterday (08/16/2022) patient was going to the bathroom when she passed out.  Episode was witnessed by neighbor who is visiting her. She reports urinary frequency. Patient denies complaints of headache, vision changes, shortness of breath, cough.  She has been experiencing lower abdominal pain for the last several days. Granddaughter at bedside states that patient is dependent on laxatives have a bowel movement.  Although she uses laxatives she often has to manually disimpact herself . She normally goes every 4-5 days with last bowel movement being 5 days ago. She has been having increased belching.  Granddaughter also states patient has little to no appetite.  Patient lives alone, ambulates with walker, drives and completes activity daily living independently.       Upon presentation to the ED, patient afebrile, hemodynamically stable and SpO2 96% on room air.  Labs notable for WBC 28, potassium 2.6, glucose 161, lactic acid 3.9.  UA with 1+ leukocyte esterase, 2+ bilirubin, positive nitrites, trace ketones and protein.  Chest x-ray without acute processes.  CT abdomen pelvis revealed mild distention of the colon with liquefied stool,  indeterminate bilateral adrenal nodules and pancreatic cysts.  While in the ED patient received 40 mEq of IV potassium chloride, 2 g of magnesium sulfate and Rocephin was started for UTI.    Patient was admitted on account of recurrent syncope and falls. Colonic dilatation with lactic acidosis.   X-ray of the chest reveals left-sided atelectasis cannot rule out infiltrate.      8-21-22  Pt with persistent abd pain and nausea  Has NGT to LIWS  Belching  Minimal flatus  KUB with dilation 56.4mm to 76.8mm of colon    8/22/22:  NG still in place. Was on high suction. Placed on low suction.  Generalized mild abdominal discomfort  No flatus, no stool.  KUB pending.    8/23/22:  US ordered for today due to elevated LFTs  Patient having small liquid brown stool  NG in place with minimal output     8/24/22:  SBFT unremarkable- normal SB transit time  Abdominal U/S unremarkable  LFTs trending up   Patient continues to have stool (per nurse charting x5 stools yesterday) and endorses diffuse abdominal discomfort,   Denies N/V, fever/chills    8/25/22:  LFTS trending down. AST/ALT: 419/209---> 335/179  Patient has not had a BM today and continues to endorse diffuse abdominal pain.   Denies N/V, fever/chills    8/26/22:  Abdomen remains distended and quiet. NG replaced and to LIS and 600ml aspirated. Aspirate is dark.  Just had a small brown stool.  Turned her and c/o abdominal pain.  KUB revealed contrast in colon remains.  On Movantik and dulcolax supp daily.  LFTs decreasing    8/29/22:  Abdomen distended and tender   Pain about the same, not worsening in nature  NG in place and connected to suction but turned off.   Has not had a BM yet today, unable to recall her most recent BM  She denies N/V, and currently has no appetite  Elevated LFTS have improved, 98/79 this am   Currently on Relistor     8/30/22:  Still with abdominal distention  NG in place but suction is turned off. Placed to LIS.  Patient moans when abdomen  palpated.  Has extensive peripheral edema.  Urine orange/red color    8/31/22:  Flex Sig:  Findings:        Hemorrhoids were found on perianal exam.        The lumen of the sigmoid colon was mildly dilated. Decompression was        successful.   Impression:    - Preparation of the colon was poor.                          - Hemorrhoids found on perianal exam.                          - Mild dilation of the examined colon with                          significant amount of stool.                          - Overall, her abdomen is soft based on exam, and                          it seems as though ileus vs sbo is more a                          contributor to her symptoms than                          pseudo-obstruction.                          - No specimens collected  9/1/22:  Patient had a loose BM this am- no associated pain or discomfort, brown in color  Abdomen remains significantly distended   NG was in place to LIS with very little output, so NG was clamped and we will try ice chips.  Patient denies N/V, notes that her overall abdominal discomfort is slightly improved     9/2/22  Pt abdomen remains distended  No guarding or tenderness on palpation  NG remains clamped  NO nausea  1 small bm  Will try clears    9/6/22:  Abdomen remains distended  NG clamped  Patient is currently NPO, states she is unable to tolerate ice chips due to severe abdominal pain and discomfrot  Denies N/V  She is unable to recall her most recent BM but per chart review had BM x1 yesterday, none today. Color/consistency unknown     9/7/22:  Overall this admission, patient has received: Relistor x4 doses, Multiple days of Bisacodyl suppositories, docusate sodium, and she is currently on metoclopramide 5 mg every 6 hours.    Flexible sigmoidoscopy 8/31/22 with mild sigmoid dilation noted during endoscopy    Surgery has been consulted for recommendations    Patient unable to give details related to bowel pattern and abdominal pains. She is  "NPO except Boost/protein supplements right now.     NGT still in place but clamped    Today her main complaint is left arm pain- left elbow erythema with dependent edema noted. She resists extension.   She also reports abdominal discomfort at mid abdomen. States she is a little nauseated but mostly just "stomach hurts"    Hyperactive upper quadrant bowel sounds, hypoactive right lower quadrant bowel sounds, tinkling left lower quadrant bowel sounds.     She is unsure if she has had a bowel movements today. She is "pretty sure" she has not gotten out of bed today.    9/8/22:   Abdomen seems slightly less distended than last week. Got up to sit on side of bed yesterday but then placed back to bed due to nausea. On clear liquids.  Surgical team has ordered Neostigmine. It will be given in PACU.  No stools charged for yesterday.    X-RAY FINDINGS:  There is persistent gases distension of the abdomen with gas in loops of large bowel persistent contrast identified in the colon     Impression:  Persistent gases distension of the abdomen with persistent residual contrast in the colon    9/9/22:  Received Neostigmine yesterday with no BM since.  Her abdomen remains distended and painful on palpation. She is unaware if she has been tolerating anything PO  Plans to be taken to GI lab today for further decompression    Called and spoke with daughter, Ayana today. 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 Daughter states that patient has chronic leg and back pain. Daughter wants her mother to be pushed to eat and to work with PT/OT. Informed her that we will attempt colonic decompression per endoscopy today.     Flex Sig:  Findings:        The perianal and digital rectal examinations were normal.        Extensive amounts of semi-liquid stool was found in the rectum, in        the sigmoid colon and in the descending colon, " precluding        visualization. Lavage of the area was performed using copious        amounts of normal saline, resulting in incomplete clearance with        continued poor visualization.        Colonoscope was advanced using water only, minimal air was used.        Moderate dilation of descending colon was visualized poorly. The        colon was decompressed throught the rectum.    9/10/22:  The patient had an uneventful night.  She does remain distended, and it generally doughy on examination with some mild diffuse tenderness, she suggests is her routine.  A bowel regimen is in place.    9/11/22: XRAY  Impression:  Persistent considerable gaseous distention of the colon    9/12/22:  PT in the room this morning. They state that she was sitting on the edge of bed and had one loose BM when she went to stand up.  Light brown in color.       ROS:  General: lying in bed, seemingly comfortable  CV: peripheral lower edema bilaterally, left am edema- dependent around left elbow  GI: Abdominal distention.   Information gathering limited by clinical condition    Objective    VITAL SIGNS: 24 HRS MIN & MAX LAST   Temp  Min: 97.7 °F (36.5 °C)  Max: 99.5 °F (37.5 °C) 98.5 °F (36.9 °C)   BP  Min: 119/80  Max: 141/90 136/75   Pulse  Min: 73  Max: 88  73   Resp  Min: 16  Max: 18 18   SpO2  Min: 95 %  Max: 97 % 95 %     Physical exam:  General appearance: Awake and alert, in no acute distress.  IN bed.  HEENT: Atraumatic head. Dry mucous membranes of oral cavity.  Eyes: anicteric  Lungs: Clear to auscultation bilaterally.   Heart: Regular rate and rhythm. 2+ pitting edema in bilateral LE and knees  Abdomen:  distended at mid abdomen, soft lower abdomen- nontender. Upper abdomen nontender. Mid abdomen mild to moderate tenderness.  Bowel sounds hypoactive and tinkling to left abdomen   Extremities: No cyanosis, clubbing. No deformities. peripheral lower edema bilaterally, left am edema- dependent around left elbow  Skin: No Rash.  Warm and dry.  Neuro: Awake, lethargic. Motor and sensory exams grossly intact.  Psyc: calm and cooperative    Recent Labs   Lab 09/07/22  0403 09/11/22  0428   WBC 6.1 9.7   RBC 2.85* 3.17*   HGB 9.1* 10.0*   HCT 29.8* 30.5*   .6* 96.2*   MCH 31.9* 31.5*   MCHC 30.5* 32.8*   RDW 14.7 14.8    364   MPV 11.3* 10.1         Recent Labs   Lab 09/07/22  0403 09/09/22  0432 09/10/22  0451 09/11/22  0428 09/12/22  0351    136 137 137 139   K 3.3* 3.9 3.3* 3.2* 3.4*   CO2 26 28 28 24 27   BUN 12.9 14.3 16.1 16.8 17.0   CREATININE 0.62 0.58 0.47* 0.51* 0.50*   CALCIUM 7.1* 7.2* 7.3* 7.5* 7.7*   MG 1.70 1.70  --   --  1.80   ALBUMIN 1.6*  --   --   --   --    ALKPHOS 141  --   --   --   --    ALT 26  --   --   --   --    AST 29  --   --   --   --    BILITOT 0.4  --   --   --   --              See below for Radiology    Scheduled Med:   bisacodyL  10 mg Rectal BID    diclofenac sodium  4 g Topical (Top) Daily    docusate sodium  50 mg Oral BID    enoxaparin  40 mg Subcutaneous Daily    hydrocortisone   Rectal BID    LIDOcaine  1 patch Transdermal Q24H    metoclopramide HCl  5 mg Intravenous Q6H    metoprolol tartrate  25 mg Oral BID    multivitamin  1 tablet Oral Daily    naloxegoL  25 mg Oral Daily    pantoprazole  40 mg Intravenous Daily    potassium chloride in water  40 mEq Intravenous Q8H    simethicone  1 tablet Oral QID    sodium chloride 0.9%  10 mL Intravenous Q6H        Continuous Infusions:   Amino acid 4.25% - dextrose 5% (CLINIMIX-E) solution (1L provides 42.5 gm AA, 50 gm CHO (170 kcal/L dextrose), Na 35, K 30, Mg 5, Ca 4.5, Acetate 70, Cl 39, Phos 15) 75 mL/hr at 09/11/22 2159    Amino acid 4.25% - dextrose 5% (CLINIMIX-E) solution (1L provides 42.5 gm AA, 50 gm CHO (170 kcal/L dextrose), Na 35, K 30, Mg 5, Ca 4.5, Acetate 70, Cl 39, Phos 15)      [START ON 9/13/2022] Amino acid 4.25% - dextrose 5% (CLINIMIX-E) solution (1L provides 42.5 gm AA, 50 gm CHO (170 kcal/L dextrose), Na 35, K 30, Mg  5, Ca 4.5, Acetate 70, Cl 39, Phos 15)            PRN Meds:  acetaminophen, albuterol-ipratropium, albuterol-ipratropium, atropine, bisacodyL, dextrose 50%, dextrose 50%, glucagon (human recombinant), glucose, glucose, HYDROcodone-acetaminophen, insulin aspart U-100, labetaloL, melatonin, metoclopramide HCl, metoprolol, morphine, ondansetron, sodium chloride 0.9%, Flushing PICC Protocol **AND** sodium chloride 0.9% **AND** sodium chloride 0.9%       Radiology:  X-Ray Abdomen AP 1 View  Narrative: EXAMINATION:  September 8, 2022 XR ABDOMEN AP 1 VIEW    CLINICAL HISTORY:  Abdominal swelling;    TECHNIQUE:  One view    COMPARISON:  September 8, 2022    FINDINGS:  There is persistent gaseous distention of the colon.  This may represent ileus.  No significant dilatation of the small bowel loops  Impression: Persistent considerable gaseous distention of the colon.    Electronically signed by: Jorden Gomez  Date:    09/11/2022  Time:    16:26      Assessment/Plan:  1. Persistent Abdominal distention, having small volume stool    NGT removed    Low dose IV reglan every 6 hours    Small bowel follow through showing normal small bowel transit time (8/25)  Most recent  KUB (9/3)shows contrast in colon and continued colonic dilation   Flex Sig 8/31 w/ decompression- showing mild dilation of colon, no signs of pseudo obstruction  S/p Flex Sig 9/9/22 for further decompression  Continue to stress importance of mobilization in order to encourage Bms  Surgery has been reconsulted for recommendations: Neostigmine given without resulting stool.  Limit narcotics. Added Movantik and Dulcolax supp.  Mobilization.      2. Elevated LFTS- RESOLVED    US with no significant findings.     3. Malnutrition due to lack of intake- likely the reason for slow H/H decline   -Continue multivitamin supplementation    - Continue intralipids to supplement clinimix     Continue with bowel regimen. 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      Attending considering LTAC placement     No further GI recommendations.  Will sign off. Call if need.  Meche Chan RN acting as scribe for Kamari Ndiaye MD  Gastroenterology  [facility]

## 2022-09-12 NOTE — PLAN OF CARE
Problem: Adult Inpatient Plan of Care  Goal: Plan of Care Review  9/11/2022 2048 by Lynne Bright RN  Outcome: Ongoing, Progressing  9/11/2022 1839 by Lynne Bright RN  Outcome: Ongoing, Progressing  9/11/2022 1752 by Lynne Bright RN  Outcome: Ongoing, Progressing  Goal: Patient-Specific Goal (Individualized)  9/11/2022 2048 by Lynne Bright RN  Outcome: Ongoing, Progressing  9/11/2022 1839 by Lynne Bright RN  Outcome: Ongoing, Progressing  9/11/2022 1752 by Lynne Bright RN  Outcome: Ongoing, Progressing  Goal: Absence of Hospital-Acquired Illness or Injury  9/11/2022 2048 by Lynne Bright RN  Outcome: Ongoing, Progressing  9/11/2022 1839 by Lynne Bright RN  Outcome: Ongoing, Progressing  9/11/2022 1752 by Lynne Bright RN  Outcome: Ongoing, Progressing  Goal: Optimal Comfort and Wellbeing  9/11/2022 2048 by Lynne Bright RN  Outcome: Ongoing, Progressing  9/11/2022 1839 by Lynne Bright RN  Outcome: Ongoing, Progressing  9/11/2022 1752 by Lynne Bright RN  Outcome: Ongoing, Progressing  Goal: Readiness for Transition of Care  9/11/2022 2048 by Lynne Bright RN  Outcome: Ongoing, Progressing  9/11/2022 1839 by Lynne Bright RN  Outcome: Ongoing, Progressing  9/11/2022 1752 by Lynne Bright RN  Outcome: Ongoing, Progressing     Problem: Skin Injury Risk Increased  Goal: Skin Health and Integrity  9/11/2022 2048 by Lynne Bright RN  Outcome: Ongoing, Progressing  9/11/2022 1839 by Lynne Bright RN  Outcome: Ongoing, Progressing  9/11/2022 1752 by Lynne Bright RN  Outcome: Ongoing, Progressing     Problem: Fall Injury Risk  Goal: Absence of Fall and Fall-Related Injury  9/11/2022 2048 by Lynne Bright RN  Outcome: Ongoing, Progressing  9/11/2022 1839 by Lynne Bright RN  Outcome: Ongoing, Progressing  9/11/2022 1752 by Lynne Bright RN  Outcome: Ongoing, Progressing     Problem: Infection  Goal: Absence of Infection Signs and Symptoms  9/11/2022 2048  by Lynne Bright RN  Outcome: Ongoing, Progressing  9/11/2022 1839 by Lynne Bright RN  Outcome: Ongoing, Progressing  9/11/2022 1752 by Lynne Bright RN  Outcome: Ongoing, Progressing     Problem: Coping Ineffective  Goal: Effective Coping  9/11/2022 2048 by Lynne Bright RN  Outcome: Ongoing, Progressing  9/11/2022 1839 by Lynne Bright RN  Outcome: Ongoing, Progressing  9/11/2022 1752 by Lynne Bright RN  Outcome: Ongoing, Progressing

## 2022-09-12 NOTE — PLAN OF CARE
09/12/22 1142   Discharge Reassessment   Assessment Type Discharge Planning Reassessment   Post-Acute Status   Post-Acute Authorization Placement   Post-Acute Placement Status Referrals Sent  ([facility])

## 2022-09-12 NOTE — PT/OT/SLP PROGRESS
Physical Therapy Treatment    Patient Name:  Fidelina Darling   MRN:  [id number]    Recommendations:     Discharge Recommendations:  nursing facility, skilled   Discharge Equipment Recommendations: other (see comments) (pending progress with mobility)   Barriers to discharge:  impaired mobility    Assessment:     Fidelina Darling is a 89 y.o. female admitted with a medical diagnosis of Abdominal cramping, colonic distention.  She presents with the following impairments/functional limitations:  weakness, impaired endurance, impaired functional mobility, gait instability, impaired balance, decreased safety awareness.    Rehab Prognosis: Fair; patient would benefit from acute skilled PT services to address these deficits and reach maximum level of function.    Recent Surgery: Procedure(s) (LRB):  SIGMOIDOSCOPY, FLEXIBLE (N/A) 3 Days Post-Op    Plan:     During this hospitalization, patient to be seen 5 x/week to address the identified rehab impairments via gait training, therapeutic activities, therapeutic exercises, neuromuscular re-education and progress toward the following goals:    Plan of Care Expires:  10/14/22    Subjective     Chief Complaint: nausea  Patient/Family Comments/goals: to go home  Pain/Comfort:  Pain Rating 1: 0/10      Objective:     Communicated with RN prior to session.  Patient found HOB elevated with oxygen, PureWick, peripheral IV, pulse ox (continuous), telemetry, SCD upon PT entry to room.     General Precautions: Standard, fall   Orthopedic Precautions:N/A   Braces: N/A  Respiratory Status: Nasal cannula, flow 2 L/min     Functional Mobility:  Bed Mobility:  Supine to Sit: maximal assistance and of 2 persons  Transfers:  Sit to Stand:  maximal assistance and of 2 persons with hand-held assist  Balance: Pt required mod-maxA for static sitting balance at EOB to perform ADLs with OT.       AM-PAC 6 CLICK MOBILITY  Turning over in bed (including adjusting bedclothes, sheets and blankets)?:  2  Sitting down on and standing up from a chair with arms (e.g., wheelchair, bedside commode, etc.): 2  Moving from lying on back to sitting on the side of the bed?: 2  Moving to and from a bed to a chair (including a wheelchair)?: 1  Need to walk in hospital room?: 1  Climbing 3-5 steps with a railing?: 1  Basic Mobility Total Score: 9       Therapeutic Activities and Exercises:   Patient tolerated PT treatment fairly, co-treatment with CARO due to limited mobility tolerance. Pt required maxA x2 for supine to sit at EOB, mod-maxA for static sitting balance while performing ADLs with CARO. Pt required maxA x2 for sit<>stand, limited by +BM in standing. Pt required maxA x2 for return to supine. Pt tolerance rolling each direction w/ maxA for cleaning/changing of linens. Pt repositioned w/ HOB elevated, all needs met at conclusion of session.     Patient left HOB elevated with all lines intact, call button in reach, and RN notified..    GOALS:   Multidisciplinary Problems       Physical Therapy Goals          Problem: Physical Therapy    Goal Priority Disciplines Outcome Goal Variances Interventions   Physical Therapy Goal     PT, PT/OT Ongoing, Progressing     Description: Goals to be met by: 22     Patient will increase functional independence with mobility by performin. Supine to sit with MInimal Assistance  2. Sit to supine with MInimal Assistance  3. Sit to stand transfer with Minimal Assistance  4. Bed to chair transfer with Minimal Assistance using Rolling Walker  5. Sitting at edge of bed x10 minutes with Stand-by Assistance                         Time Tracking:     PT Received On: 22  PT Start Time: 1030     PT Stop Time: 1058  PT Total Time (min): 28 min     Billable Minutes: Therapeutic Activity 28min    Treatment Type: Treatment  PT/PTA: PT     PTA Visit Number: 3     2022

## 2022-09-13 VITALS
HEIGHT: 68 IN | OXYGEN SATURATION: 91 % | DIASTOLIC BLOOD PRESSURE: 74 MMHG | HEART RATE: 77 BPM | BODY MASS INDEX: 28.19 KG/M2 | RESPIRATION RATE: 20 BRPM | WEIGHT: 186 LBS | SYSTOLIC BLOOD PRESSURE: 138 MMHG | TEMPERATURE: 98 F

## 2022-09-13 LAB
ANION GAP SERPL CALC-SCNC: 6 MEQ/L
BUN SERPL-MCNC: 17 MG/DL (ref 9.8–20.1)
CALCIUM SERPL-MCNC: 7.3 MG/DL (ref 8.4–10.2)
CHLORIDE SERPL-SCNC: 105 MMOL/L (ref 98–111)
CO2 SERPL-SCNC: 27 MMOL/L (ref 23–31)
CREAT SERPL-MCNC: 0.47 MG/DL (ref 0.55–1.02)
CREAT/UREA NIT SERPL: 36
GFR SERPLBLD CREATININE-BSD FMLA CKD-EPI: >60 MLS/MIN/1.73/M2
GLUCOSE SERPL-MCNC: 100 MG/DL (ref 75–121)
POCT GLUCOSE: 110 MG/DL (ref 70–110)
POCT GLUCOSE: 137 MG/DL (ref 70–110)
POTASSIUM SERPL-SCNC: 3 MMOL/L (ref 3.5–5.1)
SARS-COV-2 RDRP RESP QL NAA+PROBE: NEGATIVE
SODIUM SERPL-SCNC: 138 MMOL/L (ref 132–146)

## 2022-09-13 PROCEDURE — 63600175 PHARM REV CODE 636 W HCPCS: Performed by: INTERNAL MEDICINE

## 2022-09-13 PROCEDURE — C9113 INJ PANTOPRAZOLE SODIUM, VIA: HCPCS | Performed by: INTERNAL MEDICINE

## 2022-09-13 PROCEDURE — A4216 STERILE WATER/SALINE, 10 ML: HCPCS | Performed by: INTERNAL MEDICINE

## 2022-09-13 PROCEDURE — 87635 SARS-COV-2 COVID-19 AMP PRB: CPT | Performed by: INTERNAL MEDICINE

## 2022-09-13 PROCEDURE — 36415 COLL VENOUS BLD VENIPUNCTURE: CPT | Performed by: INTERNAL MEDICINE

## 2022-09-13 PROCEDURE — 25000003 PHARM REV CODE 250: Performed by: INTERNAL MEDICINE

## 2022-09-13 PROCEDURE — 25000003 PHARM REV CODE 250

## 2022-09-13 PROCEDURE — 25000003 PHARM REV CODE 250: Performed by: NURSE PRACTITIONER

## 2022-09-13 PROCEDURE — 80048 BASIC METABOLIC PNL TOTAL CA: CPT | Performed by: INTERNAL MEDICINE

## 2022-09-13 PROCEDURE — 63600175 PHARM REV CODE 636 W HCPCS: Performed by: NURSE PRACTITIONER

## 2022-09-13 RX ORDER — IPRATROPIUM BROMIDE AND ALBUTEROL SULFATE 2.5; .5 MG/3ML; MG/3ML
3 SOLUTION RESPIRATORY (INHALATION) EVERY 6 HOURS PRN
Qty: 75 ML | Refills: 0 | Status: SHIPPED | OUTPATIENT
Start: 2022-09-13 | End: 2023-09-13

## 2022-09-13 RX ORDER — POTASSIUM CHLORIDE 14.9 MG/ML
40 INJECTION INTRAVENOUS EVERY 8 HOURS
Status: DISCONTINUED | OUTPATIENT
Start: 2022-09-13 | End: 2022-09-13 | Stop reason: HOSPADM

## 2022-09-13 RX ORDER — ENOXAPARIN SODIUM 100 MG/ML
40 INJECTION SUBCUTANEOUS DAILY
Start: 2022-09-13

## 2022-09-13 RX ORDER — PANTOPRAZOLE SODIUM 40 MG/10ML
40 INJECTION, POWDER, LYOPHILIZED, FOR SOLUTION INTRAVENOUS DAILY
Qty: 30 EACH | Refills: 11 | Status: SHIPPED | OUTPATIENT
Start: 2022-09-13 | End: 2023-09-13

## 2022-09-13 RX ORDER — METOCLOPRAMIDE HYDROCHLORIDE 5 MG/ML
5 INJECTION INTRAMUSCULAR; INTRAVENOUS EVERY 6 HOURS PRN
Start: 2022-09-13

## 2022-09-13 RX ORDER — METOPROLOL TARTRATE 25 MG/1
25 TABLET, FILM COATED ORAL 2 TIMES DAILY
Qty: 60 TABLET | Refills: 11 | Status: SHIPPED | OUTPATIENT
Start: 2022-09-13 | End: 2023-09-13

## 2022-09-13 RX ORDER — HYDROCORTISONE 25 MG/G
CREAM TOPICAL 2 TIMES DAILY
Start: 2022-09-13

## 2022-09-13 RX ORDER — SIMETHICONE 80 MG
80 TABLET,CHEWABLE ORAL 4 TIMES DAILY
Refills: 0
Start: 2022-09-13

## 2022-09-13 RX ORDER — ONDANSETRON 2 MG/ML
4 INJECTION INTRAMUSCULAR; INTRAVENOUS EVERY 6 HOURS PRN
Start: 2022-09-13

## 2022-09-13 RX ORDER — IPRATROPIUM BROMIDE AND ALBUTEROL SULFATE 2.5; .5 MG/3ML; MG/3ML
3 SOLUTION RESPIRATORY (INHALATION) EVERY 4 HOURS PRN
Qty: 75 ML | Refills: 0 | Status: SHIPPED | OUTPATIENT
Start: 2022-09-13 | End: 2023-09-13

## 2022-09-13 RX ADMIN — DOCUSATE SODIUM 50 MG: 50 CAPSULE, LIQUID FILLED ORAL at 09:09

## 2022-09-13 RX ADMIN — METOCLOPRAMIDE 5 MG: 5 INJECTION, SOLUTION INTRAMUSCULAR; INTRAVENOUS at 10:09

## 2022-09-13 RX ADMIN — PANTOPRAZOLE SODIUM 40 MG: 40 INJECTION, POWDER, FOR SOLUTION INTRAVENOUS at 10:09

## 2022-09-13 RX ADMIN — HYDROCODONE BITARTRATE AND ACETAMINOPHEN 1 TABLET: 5; 325 TABLET ORAL at 04:09

## 2022-09-13 RX ADMIN — HYDROCORTISONE 2.5%: 25 CREAM TOPICAL at 09:09

## 2022-09-13 RX ADMIN — SODIUM CHLORIDE, PRESERVATIVE FREE 10 ML: 5 INJECTION INTRAVENOUS at 06:09

## 2022-09-13 RX ADMIN — SODIUM CHLORIDE, PRESERVATIVE FREE 10 ML: 5 INJECTION INTRAVENOUS at 01:09

## 2022-09-13 RX ADMIN — SIMETHICONE 80 MG: 80 TABLET, CHEWABLE ORAL at 01:09

## 2022-09-13 RX ADMIN — RETINOL, ERGOCALCIFEROL, .ALPHA.-TOCOPHEROL ACETATE, DL-, PHYTONADIONE, ASCORBIC ACID, NIACINAMIDE, RIBOFLAVIN 5-PHOSPHATE SODIUM, THIAMINE HYDROCHLORIDE, PYRIDOXINE HYDROCHLORIDE, DEXPANTHENOL, BIOTIN, FOLIC ACID, AND CYANOCOBALAMIN: KIT at 12:09

## 2022-09-13 RX ADMIN — METOPROLOL TARTRATE 25 MG: 25 TABLET, FILM COATED ORAL at 09:09

## 2022-09-13 RX ADMIN — POTASSIUM CHLORIDE 40 MEQ: 14.9 INJECTION, SOLUTION INTRAVENOUS at 09:09

## 2022-09-13 RX ADMIN — METOCLOPRAMIDE 5 MG: 5 INJECTION, SOLUTION INTRAMUSCULAR; INTRAVENOUS at 08:09

## 2022-09-13 RX ADMIN — METOCLOPRAMIDE 5 MG: 5 INJECTION, SOLUTION INTRAMUSCULAR; INTRAVENOUS at 01:09

## 2022-09-13 RX ADMIN — THERA TABS 1 TABLET: TAB at 09:09

## 2022-09-13 RX ADMIN — DICLOFENAC SODIUM 4 G: 10 GEL TOPICAL at 09:09

## 2022-09-13 RX ADMIN — HYDROCODONE BITARTRATE AND ACETAMINOPHEN 1 TABLET: 5; 325 TABLET ORAL at 11:09

## 2022-09-13 RX ADMIN — NALOXEGOL OXALATE 25 MG: 25 TABLET, FILM COATED ORAL at 09:09

## 2022-09-13 RX ADMIN — BISACODYL 10 MG: 10 SUPPOSITORY RECTAL at 09:09

## 2022-09-13 NOTE — PLAN OF CARE
09/13/22 1347   Final Note   Assessment Type Final Discharge Note   Anticipated Discharge Disposition LTAC   Post-Acute Status   Post-Acute Authorization Placement   Post-Acute Placement Status Set-up Complete/Auth obtained  ([facility])

## 2022-09-13 NOTE — PROGRESS NOTES
Inpatient Nutrition Assessment    Admit Date: 8/16/2022   Length of Stay: 27 days  Nutrition Recommendation/Prescription     - Once medically appropriate, resume oral diet as tolerated. Assist with meals as needed.     - noted palliative care consulted    - resume PN (Clinimix 4.25/5% @ 75ml/hr) until able to tolerate oral diet; if if aggressive care desired, consider starting TPN for nutrition; rec: Central Line Clinimix 5/25% @ 75ml/hr + IL 20% 250ml twice a week    - pt would need central line placed if TPN to start    - Will order lipids twice per week for an additional 1000 kcal per week and to prevent fatty acid deficiency.    Communication of Recommendations: reviewed with nurse    Nutrition Assessment     Malnutrition Assessment/Nutrition-Focused Physical Exam    Malnutrition in the context of chronic illness  Degree of Malnutrition: non-severe (moderate) malnutrition  Energy Intake: < 75% of estimated energy requirement for >/= 1 month  Interpretation of Weight Loss: unable to obtain  Body Fat: mild depletion  Area of Body Fat Loss: orbital region  and upper arm region - triceps / biceps  Muscle Mass Loss: mild depletion  Area of Muscle Mass Loss: temple region - temporalis muscle, clavicle bone region - pectoralis major, deltoid, trapezius muscles and dorsal hand - interosseous musle  Fluid Accumulation: mild  Edema: 2+ edema - mild and does not meet criteria  Reduced  Strength: unable to obtain  A minimum of two characteristics is recommended for diagnosis of either severe or non-severe malnutrition.    Chart Review    Reason Seen: follow-up    Diagnosis:  Colonic distension   Dyspepsia  Transaminitis, improving   Hypokalemia  Abdominal distention secondary to above   Small pancreatic cyst   Sliding hernia   Bilateral adrenal nodules repeat follow-up CT outpatient  Essential hypertension  Diastolic dysfunction   sinus tachycardia    hypokalemia    Relevant Medical History: HTN,  HLD    Nutrition-Related Medications: SSI, Reglan, MVI  Calorie Containing IV Medications:  IL 20% twice a week    Nutrition-Related Labs:  8/19: K 3.1, Glu 119, GFR>60  8/24: K 2.9, BUN 23.7, glu 149, Ca 7.8, AlkPhos 327, ,   8/29: BUN 20.6, Crea 0.45, Ca 7.8, AlkPhos 208  9/2: Na 129, Glu 107, GFR>60  9/6: no updated labs  9/9: Ca 7.2; rest of BMP wnl  9/13: K 3, Crea 0.47, Ca 7.3    Diet/PN Order: Diet clear liquid  Amino acid 4.25% - dextrose 5% (CLINIMIX-E) solution (1L provides 42.5 gm AA, 50 gm CHO (170 kcal/L dextrose), Na 35, K 30, Mg 5, Ca 4.5, Acetate 70, Cl 39, Phos 15)  Oral Supplement Order: none at this time  Tube Feeding Order: none at this time  Appetite/Oral Intake: poor/0-25% of meals  Factors Affecting Nutritional Intake: impaired cognitive status/motor control, clear liquid diet, constipation, and decreased appetite  Food/Mosque/Cultural Preferences: none reported    Skin Integrity: bruised (ecchymotic)  Wound(s):   none documented    Comments    8/19: Pt asleep during rounds; per pt's granddaughter, pt's appetite and wt has decreased over the last few years, more noticeably in the last 6 months. Granddaughter unsure of what weight was 6 months ago but states that the patient weighed ~175 lbs several years ago. Pt was on an appetite stimulant a few months ago which actually seemed to help but did not continue the regimen 2/2 price. Will send an oral supplement.     8/24: pt currently NPO on PPN for ileus with NG for suction; GI following    8/26: pt remains NPO on PPN with NG LIWS; nurse putting new IV due to current one malfunctioning; still with abdominal pain, distended abdomen, absent bowel sounds, contrast from SBFT on 8/24 still in colon per KUB; small amount of stool today    8/30: Pt still having abdominal pain, NG but suction off, NPO with PPN; palliative care consulted    9/2: Pt's diet recently advanced to clears- NG tube clamped. Noted PPN was hanging, but not  "running during rounds.     9/6: pt was refusing any intake of clear liquids, currently NPO again since yesterday, will continue lipids; currently receiving IVF; PPN d/c'd; would rec starting PN if aggressive care desired to prevent malnutrition; NG tube still in, liquid stools noted    9/9: PPN not ordered, intralipid given twice a week; currently on CL diet, abdomen still distended, no BM or flatus, NPO today for GI procedure (decompressive flex sig), MD plans to continue bowel regimen; out of room during rounds    9/13: on clear liquid diet, no bowel sounds noted; abdomen distended; about 25% intake of liquids; on PPN    Anthropometrics    Height: 5' 7.99" (172.7 cm) Height Method: Stated  Weight: 84.4 kg (186 lb) (09/12/22 0723) Weight Method: Bed Scale  BMI (Calculated): 28.3  BMI Classification: normal (BMI 18.5-24.9)  Ideal Body Weight (IBW), Female: 139.95 lb  % Ideal Body Weight, Female (lb): 96.41 %                       Usual Weight Provided By: not applicable    Wt Readings from Last 5 Encounters:   09/12/22 84.4 kg (186 lb)   01/15/19 64.9 kg (142 lb 15.9 oz)     Weight Change(s) Since Admission:  Admit Weight: 61.2 kg (135 lb) (08/16/22 1901)  9/12: 84.4kg; suspect inaccurate weight; will monitor    Estimated Needs    Weight Used For Calorie Calculations: 61.2 kg (134 lb 14.7 oz)  Energy Calorie Requirements (kcal): 1512 kcal (MSJ x 1.4 SF)  Energy Need Method: [facility]  Weight Used For Protein Calculations: 61.2 kg (134 lb 14.7 oz)  Protein Requirements: 80 gm (1.3g/kg)  Fluid Requirements (mL): 1836 mL (30mL/kg)  Temp: 97.7 °F (36.5 °C)       Enteral Nutrition    Patient not receiving enteral nutrition at this time.    Parenteral Nutrition    Standard Formula: Clinimix E 4.25/5  Custom Formula: not applicable  Additives: none  Rate/Volume: 75ml/lhr  Lipids: none  Total Nutrition Provided by Parenteral Nutrition:  Calories Provided  612 kcal/d, 40% needs   Protein Provided  77 g/d, 126% needs "   Dextrose Provided  90 g/d,    Fluid Provided  1800 ml/d, 100% needs   9/9: not currently running    Evaluation of Received Nutrient Intake    Calories: not meeting estimated needs  Protein: not meeting estimated needs    Patient Education    Not applicable.    Nutrition Diagnosis     PES: Malnutrition related to insufficient energy intake as evidenced by <75% est energy requirements met >1 month, physical evidence of mild muscle and fat depletion. (continues)    Interventions/Goals     Intervention(s): modified composition of meals/snacks, commercial beverage, multivitamin/mineral supplement therapy, prescription medication and collaboration with other providers  Goal: Meet greater than 75% of nutritional needs by follow-up. (goal not met)    Monitoring & Evaluation     Dietitian will monitor energy intake and weight.  Nutrition Risk/Follow-Up: high (follow-up in 1-4 days)

## 2022-09-13 NOTE — DISCHARGE SUMMARY
[facility]  Hospital Medicine Discharge Summary    Admit Date: 8/16/2022  Discharge Date and Time: 9/13/20228:52 AM  Admitting Physician:  Team  Discharging Physician: Trav Avelar MD.  Primary Care Physician: Primary Doctor No  Consults: Gastroenterology    Discharge Diagnoses:  Colonic distension secondary to Adynamic ileus   B/L UE and LE edema   Severe PCM/Hypoalbuminemia   Small pancreatic cyst   Sliding hernia -nonstrangulated   Bilateral adrenal nodules repeat follow-up CT outpatient  Essential hypertension  Diastolic dysfunction   sinus tachycardia   Poor functional status  Anasarca   Hypokalemia        Hospital Course:   89 y.o. White female with a past medical history of hypertension, hyperlipidemia. The patient presented to [facility] on 8/16/2022 with a primary complaint of abdominal pain and falls due to syncope.  Patient reports having a fall on 8/12 while in her garage.  She states she was grabbing for the car door handle when she missed it and fell. Fall was unwitnessed and she reports loss of consciousness. On 08/15 she had a second fall while in the kitchen but denies any loss of consciousness.  Yesterday (08/16/2022) patient was going to the bathroom when she passed out.  Episode was witnessed by neighbor who is visiting her. She reports urinary frequency. Patient denies complaints of headache, vision changes, shortness of breath, cough.  She has been experiencing lower abdominal pain for the last several days. Granddaughter at bedside states that patient is dependent on laxatives have a bowel movement.  Although she uses laxatives she often has to manually disimpact herself . She normally goes every 4-5 days with last bowel movement being 5 days ago. She has been having increased belching.  Granddaughter also states patient has little to no appetite.  Patient lives alone, ambulates with walker, drives and completes activity daily living independently.  Family  as it patient to be experiencing dementia over the last 6 months.     Upon presentation to the ED, patient afebrile, hemodynamically stable and SpO2 96% on room air.  Labs notable for WBC 28, potassium 2.6, glucose 161, lactic acid 3.9.  UA with 1+ leukocyte esterase, 2+ bilirubin, positive nitrites, trace ketones and protein.  Chest x-ray without acute processes.  CT abdomen pelvis revealed mild distention of the colon with liquefied stool, indeterminate bilateral adrenal nodules and pancreatic cysts.  While in the ED patient received 40 mEq of IV potassium chloride, 2 g of magnesium sulfate and Rocephin was started for UTI.  Patient admitted to hospital medicine services for further medical management.   KUB was done and showed persistent distention of colon with air and contrast still present.  She had NGT placed, surgery and GI team was consulted. She was given adequate bowel rest and then NGT was removed. She had no bowel movement or bowel sounds. Abdomen was still distended and firm. She was placed on reglan and laxatives. Surgery team even tried neostigmine. There was no improvement. PT was started. She was tolerating liquids. Had few liquid BM. Per family wishes patient was moved to an LTAC to continue present tx plan. Potassium was daily checked and replaced.   Pt was seen and examined on the day of discharge  Vitals:  VITAL SIGNS: 24 HRS MIN & MAX LAST   Temp  Min: 97.4 °F (36.3 °C)  Max: 99 °F (37.2 °C) 97.4 °F (36.3 °C)   BP  Min: 112/57  Max: 136/75 121/66   Pulse  Min: 67  Max: 75  69   Resp  Min: 18  Max: 22 20   SpO2  Min: 90 %  Max: 95 % (!) 93 %         Physical Exam:  Patient is lethargic but answering few questions  Heart RRR  Lungs clear   Abdomen firm and distended. No bowel sounds       Procedures Performed: No admission procedures for hospital encounter.     Significant Diagnostic Studies: See Full reports for all details    Recent Labs   Lab 09/07/22  0403 09/11/22  0428   WBC 6.1 9.7   RBC  2.85* 3.17*   HGB 9.1* 10.0*   HCT 29.8* 30.5*   .6* 96.2*   MCH 31.9* 31.5*   MCHC 30.5* 32.8*   RDW 14.7 14.8    364   MPV 11.3* 10.1       Recent Labs   Lab 09/07/22  0403 09/09/22  0432 09/10/22  0451 09/11/22  0428 09/12/22  0351 09/13/22  0359    136   < > 137 139 138   K 3.3* 3.9   < > 3.2* 3.4* 3.0*   CO2 26 28   < > 24 27 27   BUN 12.9 14.3   < > 16.8 17.0 17.0   CREATININE 0.62 0.58   < > 0.51* 0.50* 0.47*   CALCIUM 7.1* 7.2*   < > 7.5* 7.7* 7.3*   MG 1.70 1.70  --   --  1.80  --    ALBUMIN 1.6*  --   --   --   --   --    ALKPHOS 141  --   --   --   --   --    ALT 26  --   --   --   --   --    AST 29  --   --   --   --   --    BILITOT 0.4  --   --   --   --   --     < > = values in this interval not displayed.        Microbiology Results (last 7 days)       ** No results found for the last 168 hours. **             X-Ray Abdomen AP 1 View  Narrative: EXAMINATION:  September 8, 2022 XR ABDOMEN AP 1 VIEW    CLINICAL HISTORY:  Abdominal swelling;    TECHNIQUE:  One view    COMPARISON:  September 8, 2022    FINDINGS:  There is persistent gaseous distention of the colon.  This may represent ileus.  No significant dilatation of the small bowel loops  Impression: Persistent considerable gaseous distention of the colon.    Electronically signed by: Jorden Gomez  Date:    09/11/2022  Time:    16:26         Medication List        START taking these medications      * albuterol-ipratropium 2.5 mg-0.5 mg/3 mL nebulizer solution  Commonly known as: DUO-NEB  Take 3 mLs by nebulization every 4 (four) hours as needed for Wheezing or Shortness of Breath. Rescue     * albuterol-ipratropium 2.5 mg-0.5 mg/3 mL nebulizer solution  Commonly known as: DUO-NEB  Take 3 mLs by nebulization every 6 (six) hours as needed for Wheezing. Rescue     enoxaparin 40 mg/0.4 mL Syrg  Commonly known as: LOVENOX  Inject 0.4 mLs (40 mg total) into the skin once daily at 6am.     hydrocortisone 2.5 % rectal cream  Commonly  known as: ANUSOL-HC  Place rectally 2 (two) times daily.     metoclopramide HCl 5 mg/mL injection  Commonly known as: REGLAN  Inject 1 mL (5 mg total) into the vein every 6 (six) hours as needed.     metoprolol tartrate 25 MG tablet  Commonly known as: LOPRESSOR  Take 1 tablet (25 mg total) by mouth 2 (two) times daily.     naloxegoL 25 mg tablet  Commonly known as: MOVANTIK  Take 25 mg by mouth once daily.     ondansetron 4 mg/2 mL Soln  Inject 4 mg into the vein every 6 (six) hours as needed.     pantoprazole 40 mg injection  Commonly known as: PROTONIX  Inject 40 mg into the vein once daily.     simethicone 80 MG chewable tablet  Commonly known as: MYLICON  Take 1 tablet (80 mg total) by mouth 4 (four) times daily.           * This list has 2 medication(s) that are the same as other medications prescribed for you. Read the directions carefully, and ask your doctor or other care provider to review them with you.                   Where to Get Your Medications        You can get these medications from any pharmacy    Bring a paper prescription for each of these medications  albuterol-ipratropium 2.5 mg-0.5 mg/3 mL nebulizer solution  albuterol-ipratropium 2.5 mg-0.5 mg/3 mL nebulizer solution  metoprolol tartrate 25 MG tablet  pantoprazole 40 mg injection       Information about where to get these medications is not yet available    Ask your nurse or doctor about these medications  enoxaparin 40 mg/0.4 mL Syrg  hydrocortisone 2.5 % rectal cream  metoclopramide HCl 5 mg/mL injection  naloxegoL 25 mg tablet  ondansetron 4 mg/2 mL Soln  simethicone 80 MG chewable tablet          Explained in detail to the patient about the discharge plan, medications, and follow-up visits. Pt understands and agrees with the treatment plan  Discharge Disposition:    Discharged Condition: stable  Diet-   Dietary Orders (From admission, onward)       Start     Ordered    09/09/22 1409  Diet clear liquid  Diet effective now          09/09/22 1411    08/19/22 1306  Dietary nutrition supplements Boost Max Vanilla; TID  Continuous        Question Answer Comment   Select PO Supplement: Boost Max Vanilla    Frequency: TID        08/19/22 1306                   Medications Per DC med rec  Activities as tolerated    For further questions contact hospitalist office    Discharge time 33 minutes    For worsening symptoms, chest pain, shortness of breath, increased abdominal pain, high grade fever, stroke or stroke like symptoms, immediately go to the nearest Emergency Room or call 911 as soon as possible.      Trav Rodas M.D, on 9/13/2022. at 8:52 AM.

## 2022-09-16 NOTE — PHYSICIAN QUERY
PT Name: Fidelina Darling  MR #: [id number]     Documentation Clarification      CDS: Edouard COLLIER,RN        Contact information:[email or web address]    This form is a permanent document in the medical record.     Query Date: September 16, 2022    By submitting this query, we are merely seeking further clarification of documentation. Please utilize your independent clinical judgment when addressing the question(s) below.    The Medical Record reflects the following:    Supporting Clinical Findings Location in Medical Record     Moderate Malnutrition-a minimum of 2 of the 6 moderate malnutrition characteristics noted above.    Severe PCM/Hypoalbuminemia      8/26 Per physician query response        9/9  PN/Nagendran   Once medically appropriate, resume oral diet as tolerated. Assist with meals as needed. Noted palliative care consulted   resume PN (Clinimix 4.25/5% @ 75ml/hr) until able to tolerate oral diet; if if aggressive care desired, consider starting TPN for nutrition; rec: Central Line Clinimix 5/25% @ 75ml/hr + IL 20% 250ml twice a week.Pt would need central line placed if TPN to start. Will order lipids twice per week for an additional 1000 kcal per week and to prevent fatty acid deficiency.    Degree of Malnutrition: non-severe (moderate) malnutrition  Energy Intake: < 75% of estimated energy requirement for >/= 1 month  Interpretation of Weight Loss: unable to obtain  Body Fat: mild depletion  Area of Body Fat Loss: orbital region  and upper arm region - triceps / biceps  Muscle Mass Loss: mild depletion  Area of Muscle Mass Loss: temple region - temporalis muscle, clavicle bone region - pectoralis major, deltoid, trapezius muscles and dorsal hand - interosseous musle  Fluid Accumulation: mild  Edema: 2+ edema - mild and does not meet criteria  Reduced  Strength: unable to obtain    Wt: 186 lb BMI: 28.3  Admit Weight: 135 lb (8/16/22 1901)  9/12/22 186 lb  1/15/10 142 lb 15.9  oz  8/19-Granddaughter unsure of what weight was 6 months ago but states that the patient weighed ~175 lbs several years ago. Pt was on an appetite stimulant a few months ago which actually seemed to help but did not continue the regimen 2/2 price. Will send an oral supplement.       Severe PCM/Hypoalbuminemia                           9/13  Nutrition PN                                                             9/13 HM DS/Nagaurelioran                                                                            Please clarify the conflicting diagnosis for malnutrition (coding query: moderate malnutrition vs. DC summary: severe malnutrition)    [  x ] Moderate Malnutrition   [   ] Severe Malnutrition   [   ] Moderate Malnutrition progressing to severe malnutrition during the stay   [   ] Other (please specify): ____________   [  ] Clinically undetermined

## (undated) DEVICE — UNDERPAD DISPOSABLE 30X30IN

## (undated) DEVICE — SOL IRRI STRL WATER 1000ML

## (undated) DEVICE — BLOCK BLOX BITE DENT RIM 54FR

## (undated) DEVICE — ADAPTER DUAL NSL LUER M-M 7FT

## (undated) DEVICE — KIT CANIST SUCTION 1200CC

## (undated) DEVICE — COLLECTION SPECIMEN NEPTUNE

## (undated) DEVICE — KIT SURGICAL COLON .25 1.1OZ

## (undated) DEVICE — TIP SUCTION YANKAUER